# Patient Record
Sex: FEMALE | Race: WHITE | Employment: UNEMPLOYED
[De-identification: names, ages, dates, MRNs, and addresses within clinical notes are randomized per-mention and may not be internally consistent; named-entity substitution may affect disease eponyms.]

---

## 2017-01-03 ENCOUNTER — OFFICE VISIT (OUTPATIENT)
Dept: BARIATRICS/WEIGHT MGMT | Facility: CLINIC | Age: 42
End: 2017-01-03

## 2017-01-03 VITALS
WEIGHT: 232.6 LBS | BODY MASS INDEX: 43.92 KG/M2 | SYSTOLIC BLOOD PRESSURE: 122 MMHG | HEART RATE: 68 BPM | HEIGHT: 61 IN | DIASTOLIC BLOOD PRESSURE: 70 MMHG

## 2017-01-03 DIAGNOSIS — J45.909 UNCOMPLICATED ASTHMA, UNSPECIFIED ASTHMA SEVERITY: Primary | ICD-10-CM

## 2017-01-03 DIAGNOSIS — M25.561 CHRONIC PAIN OF RIGHT KNEE: ICD-10-CM

## 2017-01-03 DIAGNOSIS — F41.9 ANXIETY AND DEPRESSION: ICD-10-CM

## 2017-01-03 DIAGNOSIS — E66.01 OBESITY, MORBID, BMI 40.0-49.9 (HCC): ICD-10-CM

## 2017-01-03 DIAGNOSIS — M54.9 CHRONIC NECK AND BACK PAIN: ICD-10-CM

## 2017-01-03 DIAGNOSIS — F32.A ANXIETY AND DEPRESSION: ICD-10-CM

## 2017-01-03 DIAGNOSIS — G89.29 CHRONIC NECK AND BACK PAIN: ICD-10-CM

## 2017-01-03 DIAGNOSIS — M19.90 ARTHRITIS: ICD-10-CM

## 2017-01-03 DIAGNOSIS — M54.2 CHRONIC NECK AND BACK PAIN: ICD-10-CM

## 2017-01-03 DIAGNOSIS — G89.29 CHRONIC PAIN OF RIGHT KNEE: ICD-10-CM

## 2017-01-03 PROCEDURE — 99213 OFFICE O/P EST LOW 20 MIN: CPT | Performed by: NURSE PRACTITIONER

## 2017-01-09 ENCOUNTER — OFFICE VISIT (OUTPATIENT)
Dept: BARIATRICS/WEIGHT MGMT | Facility: CLINIC | Age: 42
End: 2017-01-09

## 2017-01-09 VITALS
WEIGHT: 234 LBS | SYSTOLIC BLOOD PRESSURE: 114 MMHG | HEART RATE: 78 BPM | BODY MASS INDEX: 44.18 KG/M2 | OXYGEN SATURATION: 14 % | HEIGHT: 61 IN | DIASTOLIC BLOOD PRESSURE: 72 MMHG

## 2017-01-09 DIAGNOSIS — F32.A ANXIETY AND DEPRESSION: ICD-10-CM

## 2017-01-09 DIAGNOSIS — M54.9 CHRONIC NECK AND BACK PAIN: ICD-10-CM

## 2017-01-09 DIAGNOSIS — J45.909 UNCOMPLICATED ASTHMA, UNSPECIFIED ASTHMA SEVERITY: Primary | ICD-10-CM

## 2017-01-09 DIAGNOSIS — M54.2 CHRONIC NECK AND BACK PAIN: ICD-10-CM

## 2017-01-09 DIAGNOSIS — G89.29 CHRONIC NECK AND BACK PAIN: ICD-10-CM

## 2017-01-09 DIAGNOSIS — E66.01 OBESITY, MORBID, BMI 40.0-49.9 (HCC): ICD-10-CM

## 2017-01-09 DIAGNOSIS — M19.90 ARTHRITIS: ICD-10-CM

## 2017-01-09 DIAGNOSIS — F41.9 ANXIETY AND DEPRESSION: ICD-10-CM

## 2017-01-09 PROCEDURE — 99213 OFFICE O/P EST LOW 20 MIN: CPT | Performed by: NURSE PRACTITIONER

## 2017-01-09 RX ORDER — BUDESONIDE AND FORMOTEROL FUMARATE DIHYDRATE 160; 4.5 UG/1; UG/1
2 AEROSOL RESPIRATORY (INHALATION) 2 TIMES DAILY
COMMUNITY
End: 2017-01-09 | Stop reason: SDUPTHER

## 2017-01-27 ENCOUNTER — NURSE ONLY (OUTPATIENT)
Dept: BARIATRICS/WEIGHT MGMT | Facility: CLINIC | Age: 42
End: 2017-01-27

## 2017-01-27 DIAGNOSIS — E66.01 MORBID OBESITY DUE TO EXCESS CALORIES (HCC): Primary | ICD-10-CM

## 2017-02-06 ENCOUNTER — NURSE ONLY (OUTPATIENT)
Dept: BARIATRICS/WEIGHT MGMT | Facility: CLINIC | Age: 42
End: 2017-02-06

## 2017-02-06 DIAGNOSIS — E66.01 MORBID OBESITY DUE TO EXCESS CALORIES (HCC): Primary | ICD-10-CM

## 2017-02-07 ENCOUNTER — APPOINTMENT (OUTPATIENT)
Dept: PHYSICAL THERAPY | Age: 42
End: 2017-02-07
Payer: MEDICARE

## 2017-02-10 ENCOUNTER — HOSPITAL ENCOUNTER (OUTPATIENT)
Dept: PHYSICAL THERAPY | Age: 42
Setting detail: THERAPIES SERIES
Discharge: HOME | End: 2017-02-10
Payer: MEDICARE

## 2017-02-10 PROCEDURE — 97113 AQUATIC THERAPY/EXERCISES: CPT

## 2017-02-10 ASSESSMENT — PAIN DESCRIPTION - PAIN TYPE: TYPE: CHRONIC PAIN

## 2017-02-10 ASSESSMENT — PAIN DESCRIPTION - ORIENTATION: ORIENTATION: LOWER

## 2017-02-10 ASSESSMENT — PAIN SCALES - GENERAL: PAINLEVEL_OUTOF10: 5

## 2017-02-10 ASSESSMENT — PAIN DESCRIPTION - DESCRIPTORS: DESCRIPTORS: DULL;CONSTANT

## 2017-02-10 ASSESSMENT — PAIN DESCRIPTION - LOCATION: LOCATION: BACK

## 2017-02-13 ENCOUNTER — HOSPITAL ENCOUNTER (OUTPATIENT)
Dept: PREADMISSION TESTING | Age: 42
Discharge: HOME | End: 2017-02-13
Payer: MEDICARE

## 2017-02-13 ENCOUNTER — HOSPITAL ENCOUNTER (OUTPATIENT)
Dept: GENERAL RADIOLOGY | Age: 42
Discharge: HOME | End: 2017-02-13
Payer: MEDICARE

## 2017-02-13 VITALS
RESPIRATION RATE: 18 BRPM | HEIGHT: 62 IN | HEART RATE: 81 BPM | OXYGEN SATURATION: 98 % | TEMPERATURE: 97.9 F | DIASTOLIC BLOOD PRESSURE: 89 MMHG | SYSTOLIC BLOOD PRESSURE: 128 MMHG | BODY MASS INDEX: 42.88 KG/M2 | WEIGHT: 233.03 LBS

## 2017-02-13 LAB
ABSOLUTE EOS #: 0.1 K/UL (ref 0–0.4)
ABSOLUTE LYMPH #: 1.9 K/UL (ref 1–4.8)
ABSOLUTE MONO #: 0.3 K/UL (ref 0.1–1.2)
ALP BLD-CCNC: 122 U/L (ref 35–104)
ALT SERPL-CCNC: 70 U/L (ref 5–33)
ANION GAP SERPL CALCULATED.3IONS-SCNC: 17 MMOL/L (ref 9–17)
BASOPHILS # BLD: 0 % (ref 0–2)
BASOPHILS ABSOLUTE: 0 K/UL (ref 0–0.2)
BUN BLDV-MCNC: 22 MG/DL (ref 6–20)
BUN/CREAT BLD: ABNORMAL (ref 9–20)
CALCIUM SERPL-MCNC: 10.3 MG/DL (ref 8.6–10.4)
CHLORIDE BLD-SCNC: 100 MMOL/L (ref 98–107)
CO2: 24 MMOL/L (ref 20–31)
CREAT SERPL-MCNC: 1.25 MG/DL (ref 0.5–0.9)
DIFFERENTIAL TYPE: ABNORMAL
EOSINOPHILS RELATIVE PERCENT: 1 % (ref 1–4)
GFR AFRICAN AMERICAN: 57 ML/MIN
GFR NON-AFRICAN AMERICAN: 47 ML/MIN
GFR SERPL CREATININE-BSD FRML MDRD: ABNORMAL ML/MIN/{1.73_M2}
GFR SERPL CREATININE-BSD FRML MDRD: ABNORMAL ML/MIN/{1.73_M2}
GLUCOSE BLD-MCNC: 82 MG/DL (ref 70–99)
HCT VFR BLD CALC: 41.7 % (ref 36–46)
HEMOGLOBIN: 13.9 G/DL (ref 12–16)
INR BLD: 0.9
LYMPHOCYTES # BLD: 25 % (ref 24–44)
MCH RBC QN AUTO: 27.3 PG (ref 26–34)
MCHC RBC AUTO-ENTMCNC: 33.2 G/DL (ref 31–37)
MCV RBC AUTO: 82.1 FL (ref 80–100)
MONOCYTES # BLD: 5 % (ref 2–11)
PDW BLD-RTO: 15 % (ref 12.5–15.4)
PLATELET # BLD: 297 K/UL (ref 140–450)
PLATELET ESTIMATE: ABNORMAL
PMV BLD AUTO: 8.6 FL (ref 6–12)
POTASSIUM SERPL-SCNC: 4.1 MMOL/L (ref 3.7–5.3)
PROTHROMBIN TIME: 10.2 SEC (ref 9.4–12.6)
RBC # BLD: 5.09 M/UL (ref 4–5.2)
RBC # BLD: ABNORMAL 10*6/UL
SEG NEUTROPHILS: 69 % (ref 36–66)
SEGMENTED NEUTROPHILS ABSOLUTE COUNT: 5.1 K/UL (ref 1.8–7.7)
SODIUM BLD-SCNC: 141 MMOL/L (ref 135–144)
WBC # BLD: 7.4 K/UL (ref 3.5–11)
WBC # BLD: ABNORMAL 10*3/UL

## 2017-02-13 PROCEDURE — 84460 ALANINE AMINO (ALT) (SGPT): CPT

## 2017-02-13 PROCEDURE — 93005 ELECTROCARDIOGRAM TRACING: CPT

## 2017-02-13 PROCEDURE — 85610 PROTHROMBIN TIME: CPT

## 2017-02-13 PROCEDURE — 71020 XR CHEST STANDARD TWO VW: CPT

## 2017-02-13 PROCEDURE — 85025 COMPLETE CBC W/AUTO DIFF WBC: CPT

## 2017-02-13 PROCEDURE — G0480 DRUG TEST DEF 1-7 CLASSES: HCPCS

## 2017-02-13 PROCEDURE — 36415 COLL VENOUS BLD VENIPUNCTURE: CPT

## 2017-02-13 PROCEDURE — 84075 ASSAY ALKALINE PHOSPHATASE: CPT

## 2017-02-13 PROCEDURE — 80048 BASIC METABOLIC PNL TOTAL CA: CPT

## 2017-02-13 RX ORDER — HYDROXYZINE PAMOATE 50 MG/1
50 CAPSULE ORAL NIGHTLY
Status: ON HOLD | COMMUNITY
End: 2017-02-20

## 2017-02-13 RX ORDER — M-VIT,TX,IRON,MINS/CALC/FOLIC 27MG-0.4MG
1 TABLET ORAL DAILY
Status: ON HOLD | COMMUNITY
End: 2017-02-20

## 2017-02-13 RX ORDER — ALBUTEROL SULFATE 90 UG/1
2 AEROSOL, METERED RESPIRATORY (INHALATION) EVERY 6 HOURS PRN
COMMUNITY

## 2017-02-13 RX ORDER — AMITRIPTYLINE HYDROCHLORIDE 100 MG/1
100 TABLET, FILM COATED ORAL NIGHTLY
Status: ON HOLD | COMMUNITY
End: 2017-02-20

## 2017-02-13 RX ORDER — SODIUM CHLORIDE, SODIUM LACTATE, POTASSIUM CHLORIDE, CALCIUM CHLORIDE 600; 310; 30; 20 MG/100ML; MG/100ML; MG/100ML; MG/100ML
1000 INJECTION, SOLUTION INTRAVENOUS CONTINUOUS
Status: CANCELLED | OUTPATIENT
Start: 2017-02-13

## 2017-02-14 LAB
EKG ATRIAL RATE: 91 BPM
EKG P AXIS: 31 DEGREES
EKG P-R INTERVAL: 148 MS
EKG Q-T INTERVAL: 368 MS
EKG QRS DURATION: 88 MS
EKG QTC CALCULATION (BAZETT): 452 MS
EKG R AXIS: 17 DEGREES
EKG T AXIS: 81 DEGREES
EKG VENTRICULAR RATE: 91 BPM

## 2017-02-16 ENCOUNTER — OFFICE VISIT (OUTPATIENT)
Dept: BARIATRICS/WEIGHT MGMT | Facility: CLINIC | Age: 42
End: 2017-02-16

## 2017-02-16 VITALS
DIASTOLIC BLOOD PRESSURE: 76 MMHG | BODY MASS INDEX: 43.43 KG/M2 | WEIGHT: 230 LBS | HEIGHT: 61 IN | SYSTOLIC BLOOD PRESSURE: 118 MMHG | HEART RATE: 76 BPM

## 2017-02-16 DIAGNOSIS — M17.0 PRIMARY OSTEOARTHRITIS OF BOTH KNEES: Primary | ICD-10-CM

## 2017-02-16 DIAGNOSIS — E66.01 OBESITY, MORBID, BMI 40.0-49.9 (HCC): ICD-10-CM

## 2017-02-16 DIAGNOSIS — J43.8 OTHER EMPHYSEMA (HCC): ICD-10-CM

## 2017-02-16 DIAGNOSIS — J45.40 MODERATE PERSISTENT ASTHMA WITHOUT COMPLICATION: ICD-10-CM

## 2017-02-16 PROCEDURE — 99212 OFFICE O/P EST SF 10 MIN: CPT | Performed by: SURGERY

## 2017-02-18 LAB
3-OH-COTININE: <2 NG/ML
COTININE: <2 NG/ML
NICOTINE: <2 NG/ML

## 2017-02-20 ENCOUNTER — ANESTHESIA (OUTPATIENT)
Dept: OPERATING ROOM | Age: 42
DRG: 403 | End: 2017-02-20
Payer: MEDICARE

## 2017-02-20 ENCOUNTER — SURGERY (OUTPATIENT)
Age: 42
End: 2017-02-20

## 2017-02-20 ENCOUNTER — ANESTHESIA EVENT (OUTPATIENT)
Dept: OPERATING ROOM | Age: 42
DRG: 403 | End: 2017-02-20
Payer: MEDICARE

## 2017-02-20 ENCOUNTER — HOSPITAL ENCOUNTER (INPATIENT)
Age: 42
LOS: 1 days | Discharge: HOME | DRG: 403 | End: 2017-02-21
Attending: SURGERY | Admitting: SURGERY
Payer: MEDICARE

## 2017-02-20 VITALS — OXYGEN SATURATION: 98 % | SYSTOLIC BLOOD PRESSURE: 112 MMHG | TEMPERATURE: 87.8 F | DIASTOLIC BLOOD PRESSURE: 64 MMHG

## 2017-02-20 LAB
GLUCOSE BLD-MCNC: 90 MG/DL (ref 74–106)
POC POTASSIUM: 4.1 MMOL/L (ref 3.5–5.1)

## 2017-02-20 PROCEDURE — 6360000002 HC RX W HCPCS: Performed by: ANESTHESIOLOGY

## 2017-02-20 PROCEDURE — 88307 TISSUE EXAM BY PATHOLOGIST: CPT

## 2017-02-20 PROCEDURE — 82947 ASSAY GLUCOSE BLOOD QUANT: CPT

## 2017-02-20 PROCEDURE — 6360000002 HC RX W HCPCS: Performed by: SURGERY

## 2017-02-20 PROCEDURE — 3600000019 HC SURGERY ROBOT ADDTL 15MIN: Performed by: SURGERY

## 2017-02-20 PROCEDURE — C9113 INJ PANTOPRAZOLE SODIUM, VIA: HCPCS | Performed by: SURGERY

## 2017-02-20 PROCEDURE — 2720000003 HC MISC SUTURE/STAPLES/RELOADS/ETC: Performed by: SURGERY

## 2017-02-20 PROCEDURE — 2500000003 HC RX 250 WO HCPCS: Performed by: SPECIALIST

## 2017-02-20 PROCEDURE — 84132 ASSAY OF SERUM POTASSIUM: CPT

## 2017-02-20 PROCEDURE — 3600000009 HC SURGERY ROBOT BASE: Performed by: SURGERY

## 2017-02-20 PROCEDURE — 2580000003 HC RX 258: Performed by: SURGERY

## 2017-02-20 PROCEDURE — 6360000002 HC RX W HCPCS

## 2017-02-20 PROCEDURE — 3700000001 HC ADD 15 MINUTES (ANESTHESIA): Performed by: SURGERY

## 2017-02-20 PROCEDURE — 7100000000 HC PACU RECOVERY - FIRST 15 MIN: Performed by: SURGERY

## 2017-02-20 PROCEDURE — 8E0W4CZ ROBOTIC ASSISTED PROCEDURE OF TRUNK REGION, PERCUTANEOUS ENDOSCOPIC APPROACH: ICD-10-PCS | Performed by: SURGERY

## 2017-02-20 PROCEDURE — 6370000000 HC RX 637 (ALT 250 FOR IP): Performed by: SURGERY

## 2017-02-20 PROCEDURE — 7100000001 HC PACU RECOVERY - ADDTL 15 MIN: Performed by: SURGERY

## 2017-02-20 PROCEDURE — 2580000003 HC RX 258: Performed by: ANESTHESIOLOGY

## 2017-02-20 PROCEDURE — 94640 AIRWAY INHALATION TREATMENT: CPT

## 2017-02-20 PROCEDURE — 0DB60Z3 EXCISION OF STOMACH, OPEN APPROACH, VERTICAL: ICD-10-PCS | Performed by: SURGERY

## 2017-02-20 PROCEDURE — 2500000003 HC RX 250 WO HCPCS

## 2017-02-20 PROCEDURE — 1200000000 HC SEMI PRIVATE

## 2017-02-20 PROCEDURE — 2500000003 HC RX 250 WO HCPCS: Performed by: NURSE ANESTHETIST, CERTIFIED REGISTERED

## 2017-02-20 PROCEDURE — 3700000000 HC ANESTHESIA ATTENDED CARE: Performed by: SURGERY

## 2017-02-20 PROCEDURE — 6360000002 HC RX W HCPCS: Performed by: NURSE ANESTHETIST, CERTIFIED REGISTERED

## 2017-02-20 PROCEDURE — 2500000003 HC RX 250 WO HCPCS: Performed by: SURGERY

## 2017-02-20 PROCEDURE — S2900 ROBOTIC SURGICAL SYSTEM: HCPCS | Performed by: SURGERY

## 2017-02-20 PROCEDURE — 43775 LAP SLEEVE GASTRECTOMY: CPT | Performed by: SURGERY

## 2017-02-20 PROCEDURE — 2580000003 HC RX 258: Performed by: NURSE ANESTHETIST, CERTIFIED REGISTERED

## 2017-02-20 PROCEDURE — 6360000002 HC RX W HCPCS: Performed by: SPECIALIST

## 2017-02-20 RX ORDER — PROMETHAZINE HYDROCHLORIDE 25 MG/1
25 TABLET ORAL EVERY 6 HOURS PRN
COMMUNITY
End: 2017-09-27 | Stop reason: ALTCHOICE

## 2017-02-20 RX ORDER — ONDANSETRON 2 MG/ML
4 INJECTION INTRAMUSCULAR; INTRAVENOUS EVERY 4 HOURS PRN
Status: DISCONTINUED | OUTPATIENT
Start: 2017-02-20 | End: 2017-02-21 | Stop reason: HOSPADM

## 2017-02-20 RX ORDER — AMITRIPTYLINE HYDROCHLORIDE 100 MG/1
100 TABLET, FILM COATED ORAL NIGHTLY
COMMUNITY

## 2017-02-20 RX ORDER — TIZANIDINE 4 MG/1
4 TABLET ORAL NIGHTLY
COMMUNITY
End: 2017-07-14 | Stop reason: ALTCHOICE

## 2017-02-20 RX ORDER — ACETAMINOPHEN 500 MG
1000 TABLET ORAL EVERY 8 HOURS PRN
Status: ON HOLD | COMMUNITY
End: 2017-02-21 | Stop reason: HOSPADM

## 2017-02-20 RX ORDER — SODIUM CHLORIDE, SODIUM LACTATE, POTASSIUM CHLORIDE, CALCIUM CHLORIDE 600; 310; 30; 20 MG/100ML; MG/100ML; MG/100ML; MG/100ML
INJECTION, SOLUTION INTRAVENOUS CONTINUOUS PRN
Status: DISCONTINUED | OUTPATIENT
Start: 2017-02-20 | End: 2017-02-20 | Stop reason: SDUPTHER

## 2017-02-20 RX ORDER — DEXAMETHASONE SODIUM PHOSPHATE 10 MG/ML
INJECTION, SOLUTION INTRAMUSCULAR; INTRAVENOUS PRN
Status: DISCONTINUED | OUTPATIENT
Start: 2017-02-20 | End: 2017-02-20 | Stop reason: SDUPTHER

## 2017-02-20 RX ORDER — ALBUTEROL SULFATE 90 UG/1
2 AEROSOL, METERED RESPIRATORY (INHALATION) EVERY 6 HOURS PRN
Status: DISCONTINUED | OUTPATIENT
Start: 2017-02-20 | End: 2017-02-21 | Stop reason: HOSPADM

## 2017-02-20 RX ORDER — SUMATRIPTAN 100 MG/1
100 TABLET, FILM COATED ORAL
COMMUNITY

## 2017-02-20 RX ORDER — FENTANYL CITRATE 50 UG/ML
INJECTION, SOLUTION INTRAMUSCULAR; INTRAVENOUS PRN
Status: DISCONTINUED | OUTPATIENT
Start: 2017-02-20 | End: 2017-02-20 | Stop reason: SDUPTHER

## 2017-02-20 RX ORDER — KETOROLAC TROMETHAMINE 30 MG/ML
30 INJECTION, SOLUTION INTRAMUSCULAR; INTRAVENOUS EVERY 6 HOURS
Status: DISCONTINUED | OUTPATIENT
Start: 2017-02-20 | End: 2017-02-21 | Stop reason: HOSPADM

## 2017-02-20 RX ORDER — GLYCOPYRROLATE 0.2 MG/ML
INJECTION INTRAMUSCULAR; INTRAVENOUS PRN
Status: DISCONTINUED | OUTPATIENT
Start: 2017-02-20 | End: 2017-02-20 | Stop reason: SDUPTHER

## 2017-02-20 RX ORDER — FLUTICASONE PROPIONATE 50 MCG
1 SPRAY, SUSPENSION (ML) NASAL 2 TIMES DAILY PRN
Status: DISCONTINUED | OUTPATIENT
Start: 2017-02-20 | End: 2017-02-21 | Stop reason: HOSPADM

## 2017-02-20 RX ORDER — OXYCODONE HCL 5 MG/5 ML
5 SOLUTION, ORAL ORAL EVERY 4 HOURS PRN
Status: DISCONTINUED | OUTPATIENT
Start: 2017-02-20 | End: 2017-02-21 | Stop reason: HOSPADM

## 2017-02-20 RX ORDER — PROMETHAZINE HYDROCHLORIDE 25 MG/ML
12.5 INJECTION, SOLUTION INTRAMUSCULAR; INTRAVENOUS EVERY 4 HOURS PRN
Status: DISCONTINUED | OUTPATIENT
Start: 2017-02-20 | End: 2017-02-21 | Stop reason: HOSPADM

## 2017-02-20 RX ORDER — M-VIT,TX,IRON,MINS/CALC/FOLIC 27MG-0.4MG
1 TABLET ORAL DAILY
COMMUNITY
End: 2017-03-01 | Stop reason: CLARIF

## 2017-02-20 RX ORDER — MAGNESIUM HYDROXIDE 1200 MG/15ML
LIQUID ORAL CONTINUOUS PRN
Status: DISCONTINUED | OUTPATIENT
Start: 2017-02-20 | End: 2017-02-20 | Stop reason: HOSPADM

## 2017-02-20 RX ORDER — ONDANSETRON 2 MG/ML
INJECTION INTRAMUSCULAR; INTRAVENOUS PRN
Status: DISCONTINUED | OUTPATIENT
Start: 2017-02-20 | End: 2017-02-20 | Stop reason: SDUPTHER

## 2017-02-20 RX ORDER — PROMETHAZINE HYDROCHLORIDE 25 MG/ML
6.25 INJECTION, SOLUTION INTRAMUSCULAR; INTRAVENOUS
Status: COMPLETED | OUTPATIENT
Start: 2017-02-20 | End: 2017-02-20

## 2017-02-20 RX ORDER — OXCARBAZEPINE 150 MG/1
450 TABLET, FILM COATED ORAL NIGHTLY
COMMUNITY

## 2017-02-20 RX ORDER — HYDROXYZINE PAMOATE 50 MG/1
25 CAPSULE ORAL 3 TIMES DAILY PRN
COMMUNITY
End: 2019-11-03

## 2017-02-20 RX ORDER — MIDAZOLAM HYDROCHLORIDE 1 MG/ML
1 INJECTION INTRAMUSCULAR; INTRAVENOUS EVERY 5 MIN PRN
Status: COMPLETED | OUTPATIENT
Start: 2017-02-20 | End: 2017-02-20

## 2017-02-20 RX ORDER — ONDANSETRON 2 MG/ML
4 INJECTION INTRAMUSCULAR; INTRAVENOUS
Status: COMPLETED | OUTPATIENT
Start: 2017-02-20 | End: 2017-02-20

## 2017-02-20 RX ORDER — OMEPRAZOLE 20 MG/1
40 CAPSULE, DELAYED RELEASE ORAL DAILY
COMMUNITY

## 2017-02-20 RX ORDER — SODIUM CHLORIDE 0.9 % (FLUSH) 0.9 %
10 SYRINGE (ML) INJECTION PRN
Status: DISCONTINUED | OUTPATIENT
Start: 2017-02-20 | End: 2017-02-21 | Stop reason: HOSPADM

## 2017-02-20 RX ORDER — SODIUM CHLORIDE, SODIUM LACTATE, POTASSIUM CHLORIDE, CALCIUM CHLORIDE 600; 310; 30; 20 MG/100ML; MG/100ML; MG/100ML; MG/100ML
1000 INJECTION, SOLUTION INTRAVENOUS CONTINUOUS
Status: DISCONTINUED | OUTPATIENT
Start: 2017-02-20 | End: 2017-02-21 | Stop reason: HOSPADM

## 2017-02-20 RX ORDER — HEPARIN SODIUM 5000 [USP'U]/ML
5000 INJECTION, SOLUTION INTRAVENOUS; SUBCUTANEOUS
Status: COMPLETED | OUTPATIENT
Start: 2017-02-20 | End: 2017-02-20

## 2017-02-20 RX ORDER — LIDOCAINE HYDROCHLORIDE 10 MG/ML
INJECTION, SOLUTION EPIDURAL; INFILTRATION; INTRACAUDAL; PERINEURAL PRN
Status: DISCONTINUED | OUTPATIENT
Start: 2017-02-20 | End: 2017-02-20 | Stop reason: SDUPTHER

## 2017-02-20 RX ORDER — NEOSTIGMINE METHYLSULFATE 1 MG/ML
INJECTION, SOLUTION INTRAVENOUS PRN
Status: DISCONTINUED | OUTPATIENT
Start: 2017-02-20 | End: 2017-02-20 | Stop reason: SDUPTHER

## 2017-02-20 RX ORDER — FENTANYL CITRATE 50 UG/ML
50 INJECTION, SOLUTION INTRAMUSCULAR; INTRAVENOUS EVERY 5 MIN PRN
Status: DISCONTINUED | OUTPATIENT
Start: 2017-02-20 | End: 2017-02-20 | Stop reason: HOSPADM

## 2017-02-20 RX ORDER — MIDAZOLAM HYDROCHLORIDE 1 MG/ML
INJECTION INTRAMUSCULAR; INTRAVENOUS PRN
Status: DISCONTINUED | OUTPATIENT
Start: 2017-02-20 | End: 2017-02-20 | Stop reason: SDUPTHER

## 2017-02-20 RX ORDER — SODIUM CHLORIDE 9 MG/ML
INJECTION, SOLUTION INTRAVENOUS CONTINUOUS
Status: DISCONTINUED | OUTPATIENT
Start: 2017-02-20 | End: 2017-02-21 | Stop reason: HOSPADM

## 2017-02-20 RX ORDER — PROPOFOL 10 MG/ML
INJECTION, EMULSION INTRAVENOUS PRN
Status: DISCONTINUED | OUTPATIENT
Start: 2017-02-20 | End: 2017-02-20 | Stop reason: SDUPTHER

## 2017-02-20 RX ORDER — ROCURONIUM BROMIDE 10 MG/ML
INJECTION, SOLUTION INTRAVENOUS PRN
Status: DISCONTINUED | OUTPATIENT
Start: 2017-02-20 | End: 2017-02-20 | Stop reason: SDUPTHER

## 2017-02-20 RX ORDER — SODIUM CHLORIDE 0.9 % (FLUSH) 0.9 %
10 SYRINGE (ML) INJECTION EVERY 12 HOURS SCHEDULED
Status: DISCONTINUED | OUTPATIENT
Start: 2017-02-20 | End: 2017-02-21 | Stop reason: HOSPADM

## 2017-02-20 RX ORDER — BUPIVACAINE HYDROCHLORIDE AND EPINEPHRINE 5; 5 MG/ML; UG/ML
INJECTION, SOLUTION PERINEURAL PRN
Status: DISCONTINUED | OUTPATIENT
Start: 2017-02-20 | End: 2017-02-20 | Stop reason: HOSPADM

## 2017-02-20 RX ORDER — PANTOPRAZOLE SODIUM 40 MG/10ML
40 INJECTION, POWDER, LYOPHILIZED, FOR SOLUTION INTRAVENOUS DAILY
Status: DISCONTINUED | OUTPATIENT
Start: 2017-02-20 | End: 2017-02-21 | Stop reason: HOSPADM

## 2017-02-20 RX ORDER — LIDOCAINE HYDROCHLORIDE 40 MG/ML
1 SOLUTION TOPICAL DAILY PRN
COMMUNITY
End: 2018-06-05 | Stop reason: ALTCHOICE

## 2017-02-20 RX ORDER — MEPERIDINE HYDROCHLORIDE 50 MG/ML
12.5 INJECTION INTRAMUSCULAR; INTRAVENOUS; SUBCUTANEOUS EVERY 5 MIN PRN
Status: DISCONTINUED | OUTPATIENT
Start: 2017-02-20 | End: 2017-02-20 | Stop reason: HOSPADM

## 2017-02-20 RX ORDER — BUDESONIDE AND FORMOTEROL FUMARATE DIHYDRATE 160; 4.5 UG/1; UG/1
2 AEROSOL RESPIRATORY (INHALATION) 2 TIMES DAILY
Status: DISCONTINUED | OUTPATIENT
Start: 2017-02-20 | End: 2017-02-21 | Stop reason: HOSPADM

## 2017-02-20 RX ORDER — FENTANYL CITRATE 50 UG/ML
25 INJECTION, SOLUTION INTRAMUSCULAR; INTRAVENOUS EVERY 5 MIN PRN
Status: DISCONTINUED | OUTPATIENT
Start: 2017-02-20 | End: 2017-02-20 | Stop reason: HOSPADM

## 2017-02-20 RX ORDER — 0.9 % SODIUM CHLORIDE 0.9 %
10 VIAL (ML) INJECTION DAILY
Status: DISCONTINUED | OUTPATIENT
Start: 2017-02-20 | End: 2017-02-21 | Stop reason: HOSPADM

## 2017-02-20 RX ADMIN — MIDAZOLAM HYDROCHLORIDE 1 MG: 1 INJECTION, SOLUTION INTRAMUSCULAR; INTRAVENOUS at 09:05

## 2017-02-20 RX ADMIN — SODIUM CHLORIDE, POTASSIUM CHLORIDE, SODIUM LACTATE AND CALCIUM CHLORIDE 1000 ML: 600; 310; 30; 20 INJECTION, SOLUTION INTRAVENOUS at 08:31

## 2017-02-20 RX ADMIN — FENTANYL CITRATE 100 MCG: 50 INJECTION INTRAMUSCULAR; INTRAVENOUS at 11:15

## 2017-02-20 RX ADMIN — ROCURONIUM BROMIDE 50 MG: 10 INJECTION, SOLUTION INTRAVENOUS at 09:43

## 2017-02-20 RX ADMIN — PHENYLEPHRINE HYDROCHLORIDE 100 MCG: 10 INJECTION INTRAMUSCULAR; INTRAVENOUS; SUBCUTANEOUS at 10:15

## 2017-02-20 RX ADMIN — ROCURONIUM BROMIDE 10 MG: 10 INJECTION, SOLUTION INTRAVENOUS at 10:30

## 2017-02-20 RX ADMIN — ENOXAPARIN SODIUM 30 MG: 30 INJECTION SUBCUTANEOUS at 21:15

## 2017-02-20 RX ADMIN — KETOROLAC TROMETHAMINE 30 MG: 30 INJECTION, SOLUTION INTRAMUSCULAR at 21:15

## 2017-02-20 RX ADMIN — HYDROMORPHONE HYDROCHLORIDE 0.5 MG: 1 INJECTION, SOLUTION INTRAMUSCULAR; INTRAVENOUS; SUBCUTANEOUS at 17:52

## 2017-02-20 RX ADMIN — BUDESONIDE AND FORMOTEROL FUMARATE DIHYDRATE 2 PUFF: 160; 4.5 AEROSOL RESPIRATORY (INHALATION) at 20:15

## 2017-02-20 RX ADMIN — FENTANYL CITRATE 50 MCG: 50 INJECTION, SOLUTION INTRAMUSCULAR; INTRAVENOUS at 12:51

## 2017-02-20 RX ADMIN — SODIUM CHLORIDE, POTASSIUM CHLORIDE, SODIUM LACTATE AND CALCIUM CHLORIDE: 600; 310; 30; 20 INJECTION, SOLUTION INTRAVENOUS at 09:37

## 2017-02-20 RX ADMIN — FENTANYL CITRATE 100 MCG: 50 INJECTION INTRAMUSCULAR; INTRAVENOUS at 09:43

## 2017-02-20 RX ADMIN — PANTOPRAZOLE SODIUM 40 MG: 40 INJECTION, POWDER, FOR SOLUTION INTRAVENOUS at 15:57

## 2017-02-20 RX ADMIN — SODIUM CHLORIDE, POTASSIUM CHLORIDE, SODIUM LACTATE AND CALCIUM CHLORIDE: 600; 310; 30; 20 INJECTION, SOLUTION INTRAVENOUS at 10:45

## 2017-02-20 RX ADMIN — KETOROLAC TROMETHAMINE 30 MG: 30 INJECTION, SOLUTION INTRAMUSCULAR at 15:49

## 2017-02-20 RX ADMIN — LIDOCAINE HYDROCHLORIDE 50 MG: 10 INJECTION, SOLUTION EPIDURAL; INFILTRATION; INTRACAUDAL; PERINEURAL at 09:43

## 2017-02-20 RX ADMIN — Medication 10 ML: at 16:00

## 2017-02-20 RX ADMIN — PROMETHAZINE HYDROCHLORIDE 6.25 MG: 25 INJECTION, SOLUTION INTRAMUSCULAR; INTRAVENOUS at 13:00

## 2017-02-20 RX ADMIN — SODIUM CHLORIDE 1000 ML: 900 IRRIGANT IRRIGATION at 10:10

## 2017-02-20 RX ADMIN — PROPOFOL 200 MG: 10 INJECTION, EMULSION INTRAVENOUS at 09:43

## 2017-02-20 RX ADMIN — DEXAMETHASONE SODIUM PHOSPHATE 10 MG: 10 INJECTION, SOLUTION INTRAMUSCULAR; INTRAVENOUS at 10:25

## 2017-02-20 RX ADMIN — ONDANSETRON 4 MG: 2 INJECTION, SOLUTION INTRAMUSCULAR; INTRAVENOUS at 12:14

## 2017-02-20 RX ADMIN — MIDAZOLAM HYDROCHLORIDE 1 MG: 1 INJECTION, SOLUTION INTRAMUSCULAR; INTRAVENOUS at 08:40

## 2017-02-20 RX ADMIN — NEOSTIGMINE METHYLSULFATE 5 MG: 1 INJECTION INTRAVENOUS at 11:02

## 2017-02-20 RX ADMIN — FENTANYL CITRATE 50 MCG: 50 INJECTION INTRAMUSCULAR; INTRAVENOUS at 11:27

## 2017-02-20 RX ADMIN — MIDAZOLAM HYDROCHLORIDE 2 MG: 1 INJECTION, SOLUTION INTRAMUSCULAR; INTRAVENOUS at 09:42

## 2017-02-20 RX ADMIN — HYDROMORPHONE HYDROCHLORIDE 0.5 MG: 1 INJECTION, SOLUTION INTRAMUSCULAR; INTRAVENOUS; SUBCUTANEOUS at 14:59

## 2017-02-20 RX ADMIN — Medication 2 G: at 09:55

## 2017-02-20 RX ADMIN — ONDANSETRON 4 MG: 2 INJECTION INTRAMUSCULAR; INTRAVENOUS at 10:59

## 2017-02-20 RX ADMIN — BUPIVACAINE HYDROCHLORIDE AND EPINEPHRINE BITARTRATE 50 ML: 5; .005 INJECTION, SOLUTION PERINEURAL at 10:45

## 2017-02-20 RX ADMIN — HYDROMORPHONE HYDROCHLORIDE 0.5 MG: 1 INJECTION, SOLUTION INTRAMUSCULAR; INTRAVENOUS; SUBCUTANEOUS at 21:15

## 2017-02-20 RX ADMIN — FENTANYL CITRATE 50 MCG: 50 INJECTION, SOLUTION INTRAMUSCULAR; INTRAVENOUS at 12:21

## 2017-02-20 RX ADMIN — DEXTROSE MONOHYDRATE 2 G: 50 INJECTION, SOLUTION INTRAVENOUS at 19:27

## 2017-02-20 RX ADMIN — HEPARIN SODIUM 5000 UNITS: 5000 INJECTION, SOLUTION INTRAVENOUS; SUBCUTANEOUS at 08:31

## 2017-02-20 RX ADMIN — SODIUM CHLORIDE 125 ML/HR: 9 INJECTION, SOLUTION INTRAVENOUS at 17:23

## 2017-02-20 RX ADMIN — PROPOFOL 80 MG: 10 INJECTION, EMULSION INTRAVENOUS at 09:55

## 2017-02-20 RX ADMIN — GLYCOPYRROLATE 0.8 MG: 0.2 INJECTION INTRAMUSCULAR; INTRAVENOUS at 11:02

## 2017-02-20 ASSESSMENT — PAIN DESCRIPTION - PROGRESSION
CLINICAL_PROGRESSION: NOT CHANGED

## 2017-02-20 ASSESSMENT — PAIN SCALES - GENERAL
PAINLEVEL_OUTOF10: 2
PAINLEVEL_OUTOF10: 4
PAINLEVEL_OUTOF10: 5
PAINLEVEL_OUTOF10: 7
PAINLEVEL_OUTOF10: 8
PAINLEVEL_OUTOF10: 5
PAINLEVEL_OUTOF10: 0
PAINLEVEL_OUTOF10: 8
PAINLEVEL_OUTOF10: 7
PAINLEVEL_OUTOF10: 5
PAINLEVEL_OUTOF10: 6
PAINLEVEL_OUTOF10: 5

## 2017-02-20 ASSESSMENT — PAIN SCALES - WONG BAKER
WONGBAKER_NUMERICALRESPONSE: 0
WONGBAKER_NUMERICALRESPONSE: 4
WONGBAKER_NUMERICALRESPONSE: 0
WONGBAKER_NUMERICALRESPONSE: 0

## 2017-02-20 ASSESSMENT — PAIN DESCRIPTION - DESCRIPTORS: DESCRIPTORS: CONSTANT;DULL

## 2017-02-20 ASSESSMENT — PAIN DESCRIPTION - PAIN TYPE
TYPE: SURGICAL PAIN

## 2017-02-20 ASSESSMENT — PAIN DESCRIPTION - LOCATION
LOCATION: ABDOMEN

## 2017-02-20 ASSESSMENT — ENCOUNTER SYMPTOMS
STRIDOR: 0
SHORTNESS OF BREATH: 0

## 2017-02-20 ASSESSMENT — PAIN - FUNCTIONAL ASSESSMENT: PAIN_FUNCTIONAL_ASSESSMENT: 0-10

## 2017-02-20 ASSESSMENT — PAIN DESCRIPTION - ORIENTATION: ORIENTATION: ANTERIOR

## 2017-02-20 ASSESSMENT — PAIN DESCRIPTION - FREQUENCY: FREQUENCY: CONTINUOUS

## 2017-02-21 VITALS
OXYGEN SATURATION: 96 % | SYSTOLIC BLOOD PRESSURE: 114 MMHG | HEART RATE: 88 BPM | HEIGHT: 62 IN | RESPIRATION RATE: 14 BRPM | DIASTOLIC BLOOD PRESSURE: 64 MMHG | BODY MASS INDEX: 42.23 KG/M2 | TEMPERATURE: 98.1 F | WEIGHT: 229.5 LBS

## 2017-02-21 LAB
ANION GAP SERPL CALCULATED.3IONS-SCNC: 11 MMOL/L (ref 9–17)
BUN BLDV-MCNC: 12 MG/DL (ref 6–20)
BUN/CREAT BLD: ABNORMAL (ref 9–20)
CALCIUM SERPL-MCNC: 9.1 MG/DL (ref 8.6–10.4)
CHLORIDE BLD-SCNC: 105 MMOL/L (ref 98–107)
CO2: 22 MMOL/L (ref 20–31)
CREAT SERPL-MCNC: 1.26 MG/DL (ref 0.5–0.9)
GFR AFRICAN AMERICAN: 56 ML/MIN
GFR NON-AFRICAN AMERICAN: 47 ML/MIN
GFR SERPL CREATININE-BSD FRML MDRD: ABNORMAL ML/MIN/{1.73_M2}
GFR SERPL CREATININE-BSD FRML MDRD: ABNORMAL ML/MIN/{1.73_M2}
GLUCOSE BLD-MCNC: 95 MG/DL (ref 70–99)
HCT VFR BLD CALC: 34.3 % (ref 36–46)
HEMOGLOBIN: 11.6 G/DL (ref 12–16)
MCH RBC QN AUTO: 27.4 PG (ref 26–34)
MCHC RBC AUTO-ENTMCNC: 33.7 G/DL (ref 31–37)
MCV RBC AUTO: 81.4 FL (ref 80–100)
PDW BLD-RTO: 14.7 % (ref 12.5–15.4)
PLATELET # BLD: 265 K/UL (ref 140–450)
PMV BLD AUTO: 8.7 FL (ref 6–12)
POTASSIUM SERPL-SCNC: 4.8 MMOL/L (ref 3.7–5.3)
RBC # BLD: 4.21 M/UL (ref 4–5.2)
SODIUM BLD-SCNC: 138 MMOL/L (ref 135–144)
SURGICAL PATHOLOGY REPORT: NORMAL
WBC # BLD: 10.1 K/UL (ref 3.5–11)

## 2017-02-21 PROCEDURE — C9113 INJ PANTOPRAZOLE SODIUM, VIA: HCPCS | Performed by: SURGERY

## 2017-02-21 PROCEDURE — 90686 IIV4 VACC NO PRSV 0.5 ML IM: CPT | Performed by: SURGERY

## 2017-02-21 PROCEDURE — 6360000002 HC RX W HCPCS: Performed by: SURGERY

## 2017-02-21 PROCEDURE — 36415 COLL VENOUS BLD VENIPUNCTURE: CPT

## 2017-02-21 PROCEDURE — 94640 AIRWAY INHALATION TREATMENT: CPT

## 2017-02-21 PROCEDURE — 80048 BASIC METABOLIC PNL TOTAL CA: CPT

## 2017-02-21 PROCEDURE — 2580000003 HC RX 258: Performed by: SURGERY

## 2017-02-21 PROCEDURE — G0009 ADMIN PNEUMOCOCCAL VACCINE: HCPCS | Performed by: SURGERY

## 2017-02-21 PROCEDURE — 6370000000 HC RX 637 (ALT 250 FOR IP): Performed by: SURGERY

## 2017-02-21 PROCEDURE — 85027 COMPLETE CBC AUTOMATED: CPT

## 2017-02-21 PROCEDURE — G0008 ADMIN INFLUENZA VIRUS VAC: HCPCS | Performed by: SURGERY

## 2017-02-21 PROCEDURE — 90732 PPSV23 VACC 2 YRS+ SUBQ/IM: CPT | Performed by: SURGERY

## 2017-02-21 RX ORDER — OXYCODONE HCL 5 MG/5 ML
5 SOLUTION, ORAL ORAL EVERY 4 HOURS PRN
Qty: 250 ML | Refills: 0 | Status: SHIPPED | OUTPATIENT
Start: 2017-02-21 | End: 2017-03-03

## 2017-02-21 RX ORDER — ONDANSETRON 4 MG/1
4 TABLET, FILM COATED ORAL EVERY 8 HOURS PRN
Qty: 30 TABLET | Refills: 2 | Status: SHIPPED | OUTPATIENT
Start: 2017-02-21 | End: 2017-07-21 | Stop reason: SDUPTHER

## 2017-02-21 RX ADMIN — SODIUM CHLORIDE: 9 INJECTION, SOLUTION INTRAVENOUS at 10:38

## 2017-02-21 RX ADMIN — PANTOPRAZOLE SODIUM 40 MG: 40 INJECTION, POWDER, FOR SOLUTION INTRAVENOUS at 08:08

## 2017-02-21 RX ADMIN — Medication 10 ML: at 08:08

## 2017-02-21 RX ADMIN — ALBUTEROL SULFATE 2 PUFF: 90 AEROSOL, METERED RESPIRATORY (INHALATION) at 07:39

## 2017-02-21 RX ADMIN — BUDESONIDE AND FORMOTEROL FUMARATE DIHYDRATE 2 PUFF: 160; 4.5 AEROSOL RESPIRATORY (INHALATION) at 08:55

## 2017-02-21 RX ADMIN — ALBUTEROL SULFATE 2 PUFF: 90 AEROSOL, METERED RESPIRATORY (INHALATION) at 01:00

## 2017-02-21 RX ADMIN — OXYCODONE HYDROCHLORIDE 5 MG: 5 SOLUTION ORAL at 11:42

## 2017-02-21 RX ADMIN — HYDROMORPHONE HYDROCHLORIDE 0.5 MG: 1 INJECTION, SOLUTION INTRAMUSCULAR; INTRAVENOUS; SUBCUTANEOUS at 04:40

## 2017-02-21 RX ADMIN — OXYCODONE HYDROCHLORIDE 5 MG: 5 SOLUTION ORAL at 07:13

## 2017-02-21 RX ADMIN — PNEUMOCOCCAL VACCINE POLYVALENT 0.5 ML
25; 25; 25; 25; 25; 25; 25; 25; 25; 25; 25; 25; 25; 25; 25; 25; 25; 25; 25; 25; 25; 25; 25 INJECTION, SOLUTION INTRAMUSCULAR; SUBCUTANEOUS at 10:38

## 2017-02-21 RX ADMIN — INFLUENZA A VIRUS A/CALIFORNIA/7/2009 X-179A (H1N1) ANTIGEN (FORMALDEHYDE INACTIVATED), INFLUENZA A VIRUS A/HONG KONG/4801/2014 X-263B (H3N2) ANTIGEN (FORMALDEHYDE INACTIVATED), INFLUENZA B VIRUS B/PHUKET/3073/2013 ANTIGEN (FORMALDEHYDE INACTIVATED), AND INFLUENZA B VIRUS B/BRISBANE/60/2008 ANTIGEN (FORMALDEHYDE INACTIVATED) 0.5 ML: 15; 15; 15; 15 INJECTION, SUSPENSION INTRAMUSCULAR at 07:40

## 2017-02-21 RX ADMIN — ENOXAPARIN SODIUM 30 MG: 30 INJECTION SUBCUTANEOUS at 08:08

## 2017-02-21 RX ADMIN — HYDROMORPHONE HYDROCHLORIDE 0.5 MG: 1 INJECTION, SOLUTION INTRAMUSCULAR; INTRAVENOUS; SUBCUTANEOUS at 00:05

## 2017-02-21 RX ADMIN — KETOROLAC TROMETHAMINE 30 MG: 30 INJECTION, SOLUTION INTRAMUSCULAR at 04:40

## 2017-02-21 RX ADMIN — OXYCODONE HYDROCHLORIDE 5 MG: 5 SOLUTION ORAL at 16:29

## 2017-02-21 ASSESSMENT — PAIN DESCRIPTION - PROGRESSION

## 2017-02-21 ASSESSMENT — PAIN SCALES - GENERAL
PAINLEVEL_OUTOF10: 7
PAINLEVEL_OUTOF10: 4
PAINLEVEL_OUTOF10: 4
PAINLEVEL_OUTOF10: 7
PAINLEVEL_OUTOF10: 7
PAINLEVEL_OUTOF10: 4
PAINLEVEL_OUTOF10: 4
PAINLEVEL_OUTOF10: 7
PAINLEVEL_OUTOF10: 8
PAINLEVEL_OUTOF10: 6
PAINLEVEL_OUTOF10: 7
PAINLEVEL_OUTOF10: 6

## 2017-02-21 ASSESSMENT — PAIN DESCRIPTION - FREQUENCY: FREQUENCY: CONTINUOUS

## 2017-02-21 ASSESSMENT — PAIN DESCRIPTION - LOCATION
LOCATION: ABDOMEN

## 2017-02-21 ASSESSMENT — PAIN DESCRIPTION - PAIN TYPE: TYPE: SURGICAL PAIN

## 2017-02-21 ASSESSMENT — PAIN DESCRIPTION - ORIENTATION
ORIENTATION: RIGHT
ORIENTATION: RIGHT;ANTERIOR

## 2017-02-21 ASSESSMENT — PAIN DESCRIPTION - DESCRIPTORS: DESCRIPTORS: ACHING;CONSTANT;DISCOMFORT

## 2017-02-23 ENCOUNTER — TELEPHONE (OUTPATIENT)
Dept: BARIATRICS/WEIGHT MGMT | Facility: CLINIC | Age: 42
End: 2017-02-23

## 2017-03-01 ENCOUNTER — OFFICE VISIT (OUTPATIENT)
Dept: BARIATRICS/WEIGHT MGMT | Facility: CLINIC | Age: 42
End: 2017-03-01

## 2017-03-01 VITALS
BODY MASS INDEX: 41.72 KG/M2 | SYSTOLIC BLOOD PRESSURE: 107 MMHG | HEIGHT: 61 IN | WEIGHT: 221 LBS | DIASTOLIC BLOOD PRESSURE: 70 MMHG | TEMPERATURE: 98.4 F | HEART RATE: 97 BPM

## 2017-03-01 DIAGNOSIS — R00.0 TACHYCARDIA: Primary | ICD-10-CM

## 2017-03-01 DIAGNOSIS — J43.2 CENTRILOBULAR EMPHYSEMA (HCC): ICD-10-CM

## 2017-03-01 DIAGNOSIS — E66.01 OBESITY, MORBID, BMI 40.0-49.9 (HCC): ICD-10-CM

## 2017-03-01 PROCEDURE — 99024 POSTOP FOLLOW-UP VISIT: CPT | Performed by: SURGERY

## 2017-03-01 RX ORDER — ACETAMINOPHEN 500 MG
500 TABLET ORAL EVERY 6 HOURS PRN
COMMUNITY
End: 2017-03-08 | Stop reason: SDUPTHER

## 2017-03-06 ENCOUNTER — TELEPHONE (OUTPATIENT)
Dept: BARIATRICS/WEIGHT MGMT | Facility: CLINIC | Age: 42
End: 2017-03-06

## 2017-03-08 ENCOUNTER — NURSE ONLY (OUTPATIENT)
Dept: BARIATRICS/WEIGHT MGMT | Facility: CLINIC | Age: 42
End: 2017-03-08

## 2017-03-08 ENCOUNTER — OFFICE VISIT (OUTPATIENT)
Dept: ORTHOPEDIC SURGERY | Facility: CLINIC | Age: 42
End: 2017-03-08

## 2017-03-08 VITALS — WEIGHT: 221 LBS | BODY MASS INDEX: 41.72 KG/M2 | HEIGHT: 61 IN

## 2017-03-08 DIAGNOSIS — E66.01 OBESITY, MORBID, BMI 40.0-49.9 (HCC): Primary | ICD-10-CM

## 2017-03-08 DIAGNOSIS — M17.11 PRIMARY OSTEOARTHRITIS OF RIGHT KNEE: Primary | ICD-10-CM

## 2017-03-08 PROCEDURE — 99213 OFFICE O/P EST LOW 20 MIN: CPT | Performed by: ORTHOPAEDIC SURGERY

## 2017-03-08 RX ORDER — ACETAMINOPHEN 500 MG
500 TABLET ORAL EVERY 6 HOURS PRN
Qty: 120 TABLET | Refills: 0 | Status: SHIPPED | OUTPATIENT
Start: 2017-03-08 | End: 2017-05-03 | Stop reason: SDUPTHER

## 2017-03-11 ASSESSMENT — ENCOUNTER SYMPTOMS
SHORTNESS OF BREATH: 0
WHEEZING: 0
ABDOMINAL DISTENTION: 0

## 2017-04-19 ENCOUNTER — OFFICE VISIT (OUTPATIENT)
Dept: ORTHOPEDIC SURGERY | Age: 42
End: 2017-04-19
Payer: MEDICARE

## 2017-04-19 VITALS — BODY MASS INDEX: 41.71 KG/M2 | WEIGHT: 220.9 LBS | HEIGHT: 61 IN

## 2017-04-19 DIAGNOSIS — S63.511A SPRAIN OF SCAPHOLUNATE LIGAMENT, RIGHT, INITIAL ENCOUNTER: ICD-10-CM

## 2017-04-19 DIAGNOSIS — M79.641 RIGHT HAND PAIN: Primary | ICD-10-CM

## 2017-04-19 PROCEDURE — 99213 OFFICE O/P EST LOW 20 MIN: CPT | Performed by: STUDENT IN AN ORGANIZED HEALTH CARE EDUCATION/TRAINING PROGRAM

## 2017-04-19 RX ORDER — NAPROXEN 250 MG/1
250 TABLET ORAL 2 TIMES DAILY WITH MEALS
Qty: 60 TABLET | Refills: 1 | Status: SHIPPED | OUTPATIENT
Start: 2017-04-19 | End: 2017-08-09 | Stop reason: ALTCHOICE

## 2017-04-21 ENCOUNTER — OFFICE VISIT (OUTPATIENT)
Dept: BARIATRICS/WEIGHT MGMT | Age: 42
End: 2017-04-21

## 2017-04-21 VITALS
SYSTOLIC BLOOD PRESSURE: 105 MMHG | WEIGHT: 212 LBS | HEART RATE: 102 BPM | DIASTOLIC BLOOD PRESSURE: 68 MMHG | BODY MASS INDEX: 40.02 KG/M2 | HEIGHT: 61 IN

## 2017-04-21 DIAGNOSIS — G89.29 CHRONIC NECK AND BACK PAIN: ICD-10-CM

## 2017-04-21 DIAGNOSIS — J45.20 MILD INTERMITTENT ASTHMA WITHOUT COMPLICATION: Primary | ICD-10-CM

## 2017-04-21 DIAGNOSIS — M54.9 CHRONIC NECK AND BACK PAIN: ICD-10-CM

## 2017-04-21 DIAGNOSIS — M54.2 CHRONIC NECK AND BACK PAIN: ICD-10-CM

## 2017-04-21 PROCEDURE — 99024 POSTOP FOLLOW-UP VISIT: CPT | Performed by: SURGERY

## 2017-04-21 RX ORDER — CIPROFLOXACIN 500 MG/1
TABLET, FILM COATED ORAL
Refills: 0 | COMMUNITY
Start: 2017-04-13 | End: 2017-06-23

## 2017-05-01 ENCOUNTER — HOSPITAL ENCOUNTER (OUTPATIENT)
Dept: INTERVENTIONAL RADIOLOGY/VASCULAR | Age: 42
Discharge: HOME | End: 2017-05-01
Payer: MEDICARE

## 2017-05-01 ENCOUNTER — HOSPITAL ENCOUNTER (OUTPATIENT)
Dept: MRI IMAGING | Age: 42
Discharge: HOME | End: 2017-05-01
Payer: MEDICARE

## 2017-05-01 DIAGNOSIS — M79.641 RIGHT HAND PAIN: ICD-10-CM

## 2017-05-01 PROCEDURE — 25246 INJECTION FOR WRIST X-RAY: CPT | Performed by: RADIOLOGY

## 2017-05-01 PROCEDURE — 77002 NEEDLE LOCALIZATION BY XRAY: CPT | Performed by: RADIOLOGY

## 2017-05-01 PROCEDURE — A9579 GAD-BASE MR CONTRAST NOS,1ML: HCPCS | Performed by: STUDENT IN AN ORGANIZED HEALTH CARE EDUCATION/TRAINING PROGRAM

## 2017-05-01 PROCEDURE — 73222 MRI JOINT UPR EXTREM W/DYE: CPT

## 2017-05-01 PROCEDURE — 6360000004 HC RX CONTRAST MEDICATION: Performed by: RADIOLOGY

## 2017-05-01 PROCEDURE — 6360000004 HC RX CONTRAST MEDICATION: Performed by: STUDENT IN AN ORGANIZED HEALTH CARE EDUCATION/TRAINING PROGRAM

## 2017-05-01 RX ADMIN — GADOPENTETATE DIMEGLUMINE 1 ML: 469.01 INJECTION INTRAVENOUS at 15:30

## 2017-05-01 RX ADMIN — IOTHALAMATE MEGLUMINE 13 ML: 430 INJECTION INTRAVASCULAR at 13:39

## 2017-05-03 ENCOUNTER — OFFICE VISIT (OUTPATIENT)
Dept: ORTHOPEDIC SURGERY | Age: 42
End: 2017-05-03
Payer: MEDICARE

## 2017-05-03 VITALS — HEIGHT: 62 IN | BODY MASS INDEX: 38.83 KG/M2 | WEIGHT: 211 LBS

## 2017-05-03 DIAGNOSIS — M22.2X1 PATELLOFEMORAL SYNDROME OF RIGHT KNEE: Primary | ICD-10-CM

## 2017-05-03 DIAGNOSIS — M79.641 RIGHT HAND PAIN: ICD-10-CM

## 2017-05-03 PROCEDURE — 99213 OFFICE O/P EST LOW 20 MIN: CPT | Performed by: STUDENT IN AN ORGANIZED HEALTH CARE EDUCATION/TRAINING PROGRAM

## 2017-05-03 RX ORDER — ACETAMINOPHEN 500 MG
500 TABLET ORAL EVERY 6 HOURS PRN
Qty: 120 TABLET | Refills: 0 | Status: SHIPPED | OUTPATIENT
Start: 2017-05-03 | End: 2017-07-14 | Stop reason: ALTCHOICE

## 2017-05-12 ENCOUNTER — HOSPITAL ENCOUNTER (OUTPATIENT)
Dept: PHYSICAL THERAPY | Age: 42
Setting detail: THERAPIES SERIES
Discharge: HOME | End: 2017-05-12
Payer: MEDICARE

## 2017-05-12 PROCEDURE — 97161 PT EVAL LOW COMPLEX 20 MIN: CPT

## 2017-05-12 PROCEDURE — 97110 THERAPEUTIC EXERCISES: CPT

## 2017-05-16 ENCOUNTER — HOSPITAL ENCOUNTER (OUTPATIENT)
Dept: PHYSICAL THERAPY | Age: 42
Setting detail: THERAPIES SERIES
Discharge: HOME | End: 2017-05-16
Payer: MEDICARE

## 2017-05-16 PROCEDURE — 97110 THERAPEUTIC EXERCISES: CPT

## 2017-05-18 ENCOUNTER — HOSPITAL ENCOUNTER (OUTPATIENT)
Dept: PHYSICAL THERAPY | Age: 42
Setting detail: THERAPIES SERIES
Discharge: HOME | End: 2017-05-18
Payer: MEDICARE

## 2017-05-18 PROCEDURE — 97110 THERAPEUTIC EXERCISES: CPT

## 2017-05-18 PROCEDURE — 97016 VASOPNEUMATIC DEVICE THERAPY: CPT

## 2017-05-23 ENCOUNTER — HOSPITAL ENCOUNTER (OUTPATIENT)
Dept: PHYSICAL THERAPY | Age: 42
Setting detail: THERAPIES SERIES
Discharge: HOME | End: 2017-05-23
Payer: MEDICARE

## 2017-05-23 PROCEDURE — 97016 VASOPNEUMATIC DEVICE THERAPY: CPT

## 2017-05-23 PROCEDURE — 97110 THERAPEUTIC EXERCISES: CPT

## 2017-05-26 ENCOUNTER — HOSPITAL ENCOUNTER (OUTPATIENT)
Dept: PHYSICAL THERAPY | Age: 42
Setting detail: THERAPIES SERIES
Discharge: HOME | End: 2017-05-26
Payer: MEDICARE

## 2017-05-26 PROCEDURE — 97110 THERAPEUTIC EXERCISES: CPT

## 2017-05-31 ENCOUNTER — APPOINTMENT (OUTPATIENT)
Dept: PHYSICAL THERAPY | Age: 42
End: 2017-05-31
Payer: MEDICARE

## 2017-06-02 ENCOUNTER — APPOINTMENT (OUTPATIENT)
Dept: PHYSICAL THERAPY | Age: 42
End: 2017-06-02
Payer: MEDICARE

## 2017-06-05 ENCOUNTER — HOSPITAL ENCOUNTER (OUTPATIENT)
Dept: PHYSICAL THERAPY | Age: 42
Setting detail: THERAPIES SERIES
Discharge: HOME | End: 2017-06-05
Payer: MEDICARE

## 2017-06-05 PROCEDURE — 97110 THERAPEUTIC EXERCISES: CPT

## 2017-06-08 ENCOUNTER — HOSPITAL ENCOUNTER (OUTPATIENT)
Dept: PHYSICAL THERAPY | Age: 42
Setting detail: THERAPIES SERIES
Discharge: HOME | End: 2017-06-08
Payer: MEDICARE

## 2017-06-08 PROCEDURE — 97110 THERAPEUTIC EXERCISES: CPT

## 2017-06-14 ENCOUNTER — HOSPITAL ENCOUNTER (OUTPATIENT)
Dept: PHYSICAL THERAPY | Age: 42
Setting detail: THERAPIES SERIES
Discharge: HOME | End: 2017-06-14
Payer: MEDICARE

## 2017-06-14 PROCEDURE — 97016 VASOPNEUMATIC DEVICE THERAPY: CPT

## 2017-06-14 PROCEDURE — 97110 THERAPEUTIC EXERCISES: CPT

## 2017-06-16 ENCOUNTER — HOSPITAL ENCOUNTER (OUTPATIENT)
Dept: PHYSICAL THERAPY | Age: 42
Setting detail: THERAPIES SERIES
Discharge: HOME | End: 2017-06-16
Payer: MEDICARE

## 2017-06-16 PROCEDURE — 97016 VASOPNEUMATIC DEVICE THERAPY: CPT

## 2017-06-16 PROCEDURE — 97110 THERAPEUTIC EXERCISES: CPT

## 2017-06-20 ENCOUNTER — HOSPITAL ENCOUNTER (OUTPATIENT)
Dept: PHYSICAL THERAPY | Age: 42
Setting detail: THERAPIES SERIES
Discharge: HOME | End: 2017-06-20
Payer: MEDICARE

## 2017-06-20 PROCEDURE — 97016 VASOPNEUMATIC DEVICE THERAPY: CPT

## 2017-06-20 PROCEDURE — 97110 THERAPEUTIC EXERCISES: CPT

## 2017-06-22 ENCOUNTER — HOSPITAL ENCOUNTER (OUTPATIENT)
Dept: PHYSICAL THERAPY | Age: 42
Setting detail: THERAPIES SERIES
Discharge: HOME | End: 2017-06-22
Payer: MEDICARE

## 2017-06-22 PROCEDURE — 97110 THERAPEUTIC EXERCISES: CPT

## 2017-06-23 ENCOUNTER — OFFICE VISIT (OUTPATIENT)
Dept: BARIATRICS/WEIGHT MGMT | Age: 42
End: 2017-06-23
Payer: MEDICARE

## 2017-06-23 VITALS
HEART RATE: 70 BPM | SYSTOLIC BLOOD PRESSURE: 95 MMHG | BODY MASS INDEX: 38.14 KG/M2 | TEMPERATURE: 98.6 F | WEIGHT: 202 LBS | DIASTOLIC BLOOD PRESSURE: 63 MMHG | HEIGHT: 61 IN

## 2017-06-23 DIAGNOSIS — M17.0 PRIMARY OSTEOARTHRITIS OF BOTH KNEES: ICD-10-CM

## 2017-06-23 DIAGNOSIS — J41.0 SIMPLE CHRONIC BRONCHITIS (HCC): ICD-10-CM

## 2017-06-23 DIAGNOSIS — M17.11 PRIMARY OSTEOARTHRITIS OF RIGHT KNEE: Primary | ICD-10-CM

## 2017-06-23 DIAGNOSIS — J45.20 MILD INTERMITTENT ASTHMA WITHOUT COMPLICATION: ICD-10-CM

## 2017-06-23 PROCEDURE — 99213 OFFICE O/P EST LOW 20 MIN: CPT | Performed by: SURGERY

## 2017-06-23 RX ORDER — CHLORHEXIDINE GLUCONATE 0.12 MG/ML
RINSE ORAL PRN
COMMUNITY
Start: 2017-05-26 | End: 2018-01-15

## 2017-06-26 ENCOUNTER — HOSPITAL ENCOUNTER (OUTPATIENT)
Dept: PAIN MANAGEMENT | Age: 42
Discharge: HOME | End: 2017-06-26
Payer: MEDICARE

## 2017-06-26 ENCOUNTER — HOSPITAL ENCOUNTER (OUTPATIENT)
Dept: PHYSICAL THERAPY | Age: 42
Setting detail: THERAPIES SERIES
Discharge: HOME | End: 2017-06-26
Payer: MEDICARE

## 2017-06-26 VITALS
TEMPERATURE: 98.2 F | HEIGHT: 61 IN | WEIGHT: 202 LBS | BODY MASS INDEX: 38.14 KG/M2 | DIASTOLIC BLOOD PRESSURE: 80 MMHG | RESPIRATION RATE: 16 BRPM | SYSTOLIC BLOOD PRESSURE: 136 MMHG | HEART RATE: 92 BPM

## 2017-06-26 PROCEDURE — 97110 THERAPEUTIC EXERCISES: CPT

## 2017-06-26 PROCEDURE — 99214 OFFICE O/P EST MOD 30 MIN: CPT

## 2017-06-26 ASSESSMENT — PAIN DESCRIPTION - PAIN TYPE: TYPE: CHRONIC PAIN

## 2017-06-26 ASSESSMENT — PAIN DESCRIPTION - LOCATION: LOCATION: BACK

## 2017-06-26 ASSESSMENT — PAIN DESCRIPTION - FREQUENCY: FREQUENCY: CONTINUOUS

## 2017-06-26 ASSESSMENT — PAIN DESCRIPTION - ONSET: ONSET: ON-GOING

## 2017-06-26 ASSESSMENT — PAIN DESCRIPTION - PROGRESSION: CLINICAL_PROGRESSION: GRADUALLY WORSENING

## 2017-06-26 ASSESSMENT — PAIN DESCRIPTION - ORIENTATION: ORIENTATION: LOWER;LEFT

## 2017-06-26 ASSESSMENT — PAIN SCALES - GENERAL: PAINLEVEL_OUTOF10: 6

## 2017-06-30 ENCOUNTER — HOSPITAL ENCOUNTER (OUTPATIENT)
Dept: PHYSICAL THERAPY | Age: 42
Setting detail: THERAPIES SERIES
Discharge: HOME | End: 2017-06-30
Payer: MEDICARE

## 2017-06-30 ENCOUNTER — HOSPITAL ENCOUNTER (EMERGENCY)
Age: 42
Discharge: HOME | End: 2017-06-30
Attending: EMERGENCY MEDICINE
Payer: MEDICARE

## 2017-06-30 VITALS
BODY MASS INDEX: 38.17 KG/M2 | DIASTOLIC BLOOD PRESSURE: 81 MMHG | HEART RATE: 97 BPM | OXYGEN SATURATION: 100 % | TEMPERATURE: 99.1 F | RESPIRATION RATE: 18 BRPM | SYSTOLIC BLOOD PRESSURE: 120 MMHG | WEIGHT: 202 LBS

## 2017-06-30 DIAGNOSIS — S61.219A LACERATION OF FINGER, INITIAL ENCOUNTER: Primary | ICD-10-CM

## 2017-06-30 PROCEDURE — 97110 THERAPEUTIC EXERCISES: CPT

## 2017-06-30 PROCEDURE — 97016 VASOPNEUMATIC DEVICE THERAPY: CPT

## 2017-06-30 PROCEDURE — 12001 RPR S/N/AX/GEN/TRNK 2.5CM/<: CPT

## 2017-06-30 PROCEDURE — 90471 IMMUNIZATION ADMIN: CPT | Performed by: PHYSICIAN ASSISTANT

## 2017-06-30 PROCEDURE — 6360000002 HC RX W HCPCS: Performed by: PHYSICIAN ASSISTANT

## 2017-06-30 PROCEDURE — G0381 LEV 2 HOSP TYPE B ED VISIT: HCPCS

## 2017-06-30 PROCEDURE — 90715 TDAP VACCINE 7 YRS/> IM: CPT | Performed by: PHYSICIAN ASSISTANT

## 2017-06-30 RX ADMIN — TETANUS TOXOID, REDUCED DIPHTHERIA TOXOID AND ACELLULAR PERTUSSIS VACCINE, ADSORBED 0.5 ML: 5; 2.5; 8; 8; 2.5 SUSPENSION INTRAMUSCULAR at 17:45

## 2017-06-30 ASSESSMENT — PAIN DESCRIPTION - ORIENTATION: ORIENTATION: LEFT

## 2017-06-30 ASSESSMENT — PAIN SCALES - GENERAL: PAINLEVEL_OUTOF10: 5

## 2017-06-30 ASSESSMENT — ENCOUNTER SYMPTOMS
NAUSEA: 0
COUGH: 0
VOMITING: 0
ABDOMINAL PAIN: 0
WHEEZING: 0

## 2017-06-30 ASSESSMENT — PAIN DESCRIPTION - PAIN TYPE: TYPE: ACUTE PAIN

## 2017-06-30 ASSESSMENT — PAIN DESCRIPTION - FREQUENCY: FREQUENCY: CONTINUOUS

## 2017-06-30 ASSESSMENT — PAIN DESCRIPTION - LOCATION: LOCATION: OTHER (COMMENT)

## 2017-06-30 ASSESSMENT — PAIN DESCRIPTION - DESCRIPTORS: DESCRIPTORS: ACHING;THROBBING

## 2017-07-05 ENCOUNTER — OFFICE VISIT (OUTPATIENT)
Dept: ORTHOPEDIC SURGERY | Age: 42
End: 2017-07-05
Payer: MEDICARE

## 2017-07-05 VITALS — HEIGHT: 63 IN | BODY MASS INDEX: 35.79 KG/M2 | WEIGHT: 202 LBS

## 2017-07-05 DIAGNOSIS — M17.11 PRIMARY OSTEOARTHRITIS OF RIGHT KNEE: Primary | ICD-10-CM

## 2017-07-05 PROCEDURE — 99213 OFFICE O/P EST LOW 20 MIN: CPT | Performed by: ORTHOPAEDIC SURGERY

## 2017-07-07 ENCOUNTER — HOSPITAL ENCOUNTER (OUTPATIENT)
Age: 42
Discharge: HOME | End: 2017-07-07
Payer: MEDICARE

## 2017-07-07 ENCOUNTER — HOSPITAL ENCOUNTER (OUTPATIENT)
Dept: PHYSICAL THERAPY | Age: 42
Setting detail: THERAPIES SERIES
Discharge: HOME | End: 2017-07-07
Payer: MEDICARE

## 2017-07-07 LAB
ALBUMIN SERPL-MCNC: 4.4 G/DL (ref 3.5–5.2)
ANION GAP SERPL CALCULATED.3IONS-SCNC: 13 MMOL/L (ref 9–17)
BILIRUBIN URINE: NEGATIVE
BUN BLDV-MCNC: 20 MG/DL (ref 6–20)
BUN/CREAT BLD: ABNORMAL (ref 9–20)
CALCIUM IONIZED: 1.36 MMOL/L (ref 1.13–1.33)
CALCIUM SERPL-MCNC: 10.1 MG/DL (ref 8.6–10.4)
CHLORIDE BLD-SCNC: 102 MMOL/L (ref 98–107)
CO2: 24 MMOL/L (ref 20–31)
COLOR: YELLOW
COMMENT UA: NORMAL
CREAT SERPL-MCNC: 1.03 MG/DL (ref 0.5–0.9)
CREATININE URINE: 190 MG/DL (ref 28–217)
GFR AFRICAN AMERICAN: >60 ML/MIN
GFR NON-AFRICAN AMERICAN: 58 ML/MIN
GFR SERPL CREATININE-BSD FRML MDRD: ABNORMAL ML/MIN/{1.73_M2}
GFR SERPL CREATININE-BSD FRML MDRD: ABNORMAL ML/MIN/{1.73_M2}
GLUCOSE BLD-MCNC: 75 MG/DL (ref 70–99)
GLUCOSE URINE: NEGATIVE
HCT VFR BLD CALC: 37.3 % (ref 36–46)
HEMOGLOBIN: 12.5 G/DL (ref 12–16)
KETONES, URINE: NEGATIVE
LEUKOCYTE ESTERASE, URINE: NEGATIVE
MAGNESIUM: 1.9 MG/DL (ref 1.6–2.6)
MCH RBC QN AUTO: 28.1 PG (ref 26–34)
MCHC RBC AUTO-ENTMCNC: 33.5 G/DL (ref 31–37)
MCV RBC AUTO: 83.8 FL (ref 80–100)
MICROALBUMIN/CREAT 24H UR: <12 MG/L
MICROALBUMIN/CREAT UR-RTO: 6 MCG/MG CREAT
NITRITE, URINE: NEGATIVE
PDW BLD-RTO: 15.5 % (ref 12.5–15.4)
PH UA: 5.5 (ref 5–8)
PHOSPHORUS: 3.2 MG/DL (ref 2.6–4.5)
PLATELET # BLD: 306 K/UL (ref 140–450)
PMV BLD AUTO: 8.7 FL (ref 6–12)
POTASSIUM SERPL-SCNC: 4.2 MMOL/L (ref 3.7–5.3)
PROTEIN UA: NEGATIVE
PTH INTACT: 57.69 PG/ML (ref 15–65)
RBC # BLD: 4.45 M/UL (ref 4–5.2)
SODIUM BLD-SCNC: 139 MMOL/L (ref 135–144)
SPECIFIC GRAVITY UA: 1.02 (ref 1–1.03)
TURBIDITY: CLEAR
URINE HGB: NEGATIVE
UROBILINOGEN, URINE: NORMAL
WBC # BLD: 7.1 K/UL (ref 3.5–11)

## 2017-07-07 PROCEDURE — 82043 UR ALBUMIN QUANTITATIVE: CPT

## 2017-07-07 PROCEDURE — 82040 ASSAY OF SERUM ALBUMIN: CPT

## 2017-07-07 PROCEDURE — 82330 ASSAY OF CALCIUM: CPT

## 2017-07-07 PROCEDURE — 85027 COMPLETE CBC AUTOMATED: CPT

## 2017-07-07 PROCEDURE — 82570 ASSAY OF URINE CREATININE: CPT

## 2017-07-07 PROCEDURE — 36415 COLL VENOUS BLD VENIPUNCTURE: CPT

## 2017-07-07 PROCEDURE — 84100 ASSAY OF PHOSPHORUS: CPT

## 2017-07-07 PROCEDURE — 80048 BASIC METABOLIC PNL TOTAL CA: CPT

## 2017-07-07 PROCEDURE — 83735 ASSAY OF MAGNESIUM: CPT

## 2017-07-07 PROCEDURE — 81003 URINALYSIS AUTO W/O SCOPE: CPT

## 2017-07-07 PROCEDURE — 83970 ASSAY OF PARATHORMONE: CPT

## 2017-07-14 ENCOUNTER — HOSPITAL ENCOUNTER (EMERGENCY)
Age: 42
Discharge: HOME | End: 2017-07-14
Attending: EMERGENCY MEDICINE
Payer: MEDICARE

## 2017-07-14 VITALS
HEART RATE: 88 BPM | DIASTOLIC BLOOD PRESSURE: 79 MMHG | SYSTOLIC BLOOD PRESSURE: 121 MMHG | TEMPERATURE: 99 F | BODY MASS INDEX: 36.62 KG/M2 | HEIGHT: 62 IN | WEIGHT: 199 LBS | OXYGEN SATURATION: 100 % | RESPIRATION RATE: 18 BRPM

## 2017-07-14 DIAGNOSIS — S29.019A STRAIN OF THORACIC SPINE, INITIAL ENCOUNTER: Primary | ICD-10-CM

## 2017-07-14 DIAGNOSIS — M54.50 CHRONIC LEFT-SIDED LOW BACK PAIN WITHOUT SCIATICA: ICD-10-CM

## 2017-07-14 DIAGNOSIS — S39.012A STRAIN OF LUMBAR SPINE, INITIAL ENCOUNTER: ICD-10-CM

## 2017-07-14 DIAGNOSIS — G89.29 CHRONIC LEFT-SIDED LOW BACK PAIN WITHOUT SCIATICA: ICD-10-CM

## 2017-07-14 PROCEDURE — 6360000002 HC RX W HCPCS: Performed by: PHYSICIAN ASSISTANT

## 2017-07-14 PROCEDURE — 6370000000 HC RX 637 (ALT 250 FOR IP): Performed by: PHYSICIAN ASSISTANT

## 2017-07-14 PROCEDURE — G0382 LEV 3 HOSP TYPE B ED VISIT: HCPCS

## 2017-07-14 PROCEDURE — 96372 THER/PROPH/DIAG INJ SC/IM: CPT

## 2017-07-14 RX ORDER — ORPHENADRINE CITRATE 30 MG/ML
60 INJECTION INTRAMUSCULAR; INTRAVENOUS ONCE
Status: COMPLETED | OUTPATIENT
Start: 2017-07-14 | End: 2017-07-14

## 2017-07-14 RX ORDER — ACETAMINOPHEN 325 MG/1
650 TABLET ORAL ONCE
Status: COMPLETED | OUTPATIENT
Start: 2017-07-14 | End: 2017-07-14

## 2017-07-14 RX ORDER — ACETAMINOPHEN 325 MG/1
650 TABLET ORAL EVERY 6 HOURS PRN
Qty: 40 TABLET | Refills: 0 | Status: ON HOLD | OUTPATIENT
Start: 2017-07-14 | End: 2017-09-12 | Stop reason: HOSPADM

## 2017-07-14 RX ORDER — DIAZEPAM 2 MG/1
2 TABLET ORAL EVERY 8 HOURS PRN
Qty: 12 TABLET | Refills: 0 | Status: SHIPPED | OUTPATIENT
Start: 2017-07-14 | End: 2017-08-29 | Stop reason: ALTCHOICE

## 2017-07-14 RX ADMIN — ACETAMINOPHEN 650 MG: 325 TABLET ORAL at 10:25

## 2017-07-14 RX ADMIN — ORPHENADRINE CITRATE 60 MG: 30 INJECTION INTRAMUSCULAR; INTRAVENOUS at 10:25

## 2017-07-14 ASSESSMENT — PAIN SCALES - GENERAL
PAINLEVEL_OUTOF10: 8
PAINLEVEL_OUTOF10: 8

## 2017-07-14 ASSESSMENT — ENCOUNTER SYMPTOMS
ALLERGIC/IMMUNOLOGIC NEGATIVE: 1
GASTROINTESTINAL NEGATIVE: 1
EYES NEGATIVE: 1
BACK PAIN: 1
RESPIRATORY NEGATIVE: 1

## 2017-07-14 ASSESSMENT — PAIN DESCRIPTION - PAIN TYPE: TYPE: CHRONIC PAIN

## 2017-07-14 ASSESSMENT — PAIN DESCRIPTION - LOCATION: LOCATION: BACK

## 2017-07-19 ENCOUNTER — HOSPITAL ENCOUNTER (OUTPATIENT)
Dept: PAIN MANAGEMENT | Age: 42
Discharge: HOME | End: 2017-07-19
Payer: MEDICARE

## 2017-07-19 VITALS
BODY MASS INDEX: 36.62 KG/M2 | HEART RATE: 89 BPM | SYSTOLIC BLOOD PRESSURE: 114 MMHG | TEMPERATURE: 98.5 F | WEIGHT: 199 LBS | RESPIRATION RATE: 18 BRPM | HEIGHT: 62 IN | OXYGEN SATURATION: 99 % | DIASTOLIC BLOOD PRESSURE: 80 MMHG

## 2017-07-19 DIAGNOSIS — M54.9 BACKACHE, UNSPECIFIED: ICD-10-CM

## 2017-07-19 PROCEDURE — 99213 OFFICE O/P EST LOW 20 MIN: CPT

## 2017-07-19 ASSESSMENT — PAIN - FUNCTIONAL ASSESSMENT: PAIN_FUNCTIONAL_ASSESSMENT: 0-10

## 2017-07-21 RX ORDER — ONDANSETRON 4 MG/1
TABLET, FILM COATED ORAL
Qty: 30 TABLET | Refills: 1 | Status: SHIPPED | OUTPATIENT
Start: 2017-07-21 | End: 2017-10-26 | Stop reason: SDUPTHER

## 2017-07-28 ENCOUNTER — HOSPITAL ENCOUNTER (OUTPATIENT)
Dept: MRI IMAGING | Age: 42
Discharge: HOME | End: 2017-07-28
Payer: MEDICARE

## 2017-07-28 DIAGNOSIS — M17.11 PRIMARY OSTEOARTHRITIS OF RIGHT KNEE: ICD-10-CM

## 2017-07-28 PROCEDURE — 73721 MRI JNT OF LWR EXTRE W/O DYE: CPT

## 2017-08-02 ENCOUNTER — TELEPHONE (OUTPATIENT)
Dept: ORTHOPEDIC SURGERY | Age: 42
End: 2017-08-02

## 2017-08-02 ENCOUNTER — HOSPITAL ENCOUNTER (OUTPATIENT)
Dept: PAIN MANAGEMENT | Age: 42
Discharge: HOME | End: 2017-08-02
Payer: MEDICARE

## 2017-08-02 VITALS
TEMPERATURE: 98.6 F | HEART RATE: 68 BPM | RESPIRATION RATE: 12 BRPM | BODY MASS INDEX: 36.62 KG/M2 | SYSTOLIC BLOOD PRESSURE: 140 MMHG | HEIGHT: 62 IN | DIASTOLIC BLOOD PRESSURE: 60 MMHG | OXYGEN SATURATION: 93 % | WEIGHT: 199 LBS

## 2017-08-02 DIAGNOSIS — G89.29 CHRONIC BACK PAIN, UNSPECIFIED BACK LOCATION, UNSPECIFIED BACK PAIN LATERALITY: ICD-10-CM

## 2017-08-02 DIAGNOSIS — M54.9 CHRONIC BACK PAIN, UNSPECIFIED BACK LOCATION, UNSPECIFIED BACK PAIN LATERALITY: ICD-10-CM

## 2017-08-02 PROCEDURE — 6360000002 HC RX W HCPCS: Performed by: ANESTHESIOLOGY

## 2017-08-02 PROCEDURE — 62323 NJX INTERLAMINAR LMBR/SAC: CPT

## 2017-08-02 PROCEDURE — 2580000003 HC RX 258: Performed by: ANESTHESIOLOGY

## 2017-08-02 RX ORDER — MIDAZOLAM HYDROCHLORIDE 1 MG/ML
0.5 INJECTION INTRAMUSCULAR; INTRAVENOUS 3 TIMES DAILY PRN
Status: DISCONTINUED | OUTPATIENT
Start: 2017-08-02 | End: 2017-08-03 | Stop reason: HOSPADM

## 2017-08-02 RX ORDER — DIPHENHYDRAMINE HYDROCHLORIDE 50 MG/ML
25 INJECTION INTRAMUSCULAR; INTRAVENOUS ONCE
Status: COMPLETED | OUTPATIENT
Start: 2017-08-02 | End: 2017-08-02

## 2017-08-02 RX ORDER — SODIUM CHLORIDE, SODIUM LACTATE, POTASSIUM CHLORIDE, CALCIUM CHLORIDE 600; 310; 30; 20 MG/100ML; MG/100ML; MG/100ML; MG/100ML
500 INJECTION, SOLUTION INTRAVENOUS CONTINUOUS
Status: DISCONTINUED | OUTPATIENT
Start: 2017-08-02 | End: 2017-08-03 | Stop reason: HOSPADM

## 2017-08-02 RX ORDER — FENTANYL CITRATE 50 UG/ML
50 INJECTION, SOLUTION INTRAMUSCULAR; INTRAVENOUS
Status: DISCONTINUED | OUTPATIENT
Start: 2017-08-02 | End: 2017-08-03 | Stop reason: HOSPADM

## 2017-08-02 RX ADMIN — FENTANYL CITRATE 50 MCG: 50 INJECTION, SOLUTION INTRAMUSCULAR; INTRAVENOUS at 10:26

## 2017-08-02 ASSESSMENT — PAIN SCALES - GENERAL
PAINLEVEL_OUTOF10: 0
PAINLEVEL_OUTOF10: 5

## 2017-08-02 ASSESSMENT — PAIN - FUNCTIONAL ASSESSMENT
PAIN_FUNCTIONAL_ASSESSMENT: 0-10

## 2017-08-09 ENCOUNTER — OFFICE VISIT (OUTPATIENT)
Dept: ORTHOPEDIC SURGERY | Age: 42
End: 2017-08-09
Payer: MEDICARE

## 2017-08-09 VITALS — WEIGHT: 199.08 LBS | BODY MASS INDEX: 36.63 KG/M2 | HEIGHT: 62 IN

## 2017-08-09 DIAGNOSIS — M17.11 PRIMARY OSTEOARTHRITIS OF RIGHT KNEE: ICD-10-CM

## 2017-08-09 DIAGNOSIS — M22.2X1 PATELLOFEMORAL SYNDROME OF RIGHT KNEE: Primary | ICD-10-CM

## 2017-08-09 DIAGNOSIS — Z01.818 PRE-OP TESTING: ICD-10-CM

## 2017-08-09 PROCEDURE — 99213 OFFICE O/P EST LOW 20 MIN: CPT | Performed by: STUDENT IN AN ORGANIZED HEALTH CARE EDUCATION/TRAINING PROGRAM

## 2017-08-29 ENCOUNTER — HOSPITAL ENCOUNTER (OUTPATIENT)
Dept: PREADMISSION TESTING | Age: 42
Discharge: HOME | End: 2017-08-29
Payer: MEDICARE

## 2017-08-29 VITALS
RESPIRATION RATE: 16 BRPM | TEMPERATURE: 98.1 F | WEIGHT: 195 LBS | HEART RATE: 89 BPM | DIASTOLIC BLOOD PRESSURE: 75 MMHG | HEIGHT: 62 IN | SYSTOLIC BLOOD PRESSURE: 112 MMHG | BODY MASS INDEX: 35.88 KG/M2 | OXYGEN SATURATION: 99 %

## 2017-08-29 DIAGNOSIS — Z01.818 PRE-OP TESTING: ICD-10-CM

## 2017-08-29 LAB
-: NORMAL
ALBUMIN SERPL-MCNC: 4.3 G/DL (ref 3.5–5.2)
ALBUMIN/GLOBULIN RATIO: 1.3 (ref 1–2.5)
ALP BLD-CCNC: 113 U/L (ref 35–104)
ALT SERPL-CCNC: 16 U/L (ref 5–33)
AMORPHOUS: NORMAL
ANION GAP SERPL CALCULATED.3IONS-SCNC: 12 MMOL/L (ref 9–17)
AST SERPL-CCNC: 14 U/L
BACTERIA: NORMAL
BILIRUB SERPL-MCNC: 0.19 MG/DL (ref 0.3–1.2)
BILIRUBIN URINE: NEGATIVE
BUN BLDV-MCNC: 16 MG/DL (ref 6–20)
BUN/CREAT BLD: ABNORMAL (ref 9–20)
CALCIUM SERPL-MCNC: 10 MG/DL (ref 8.6–10.4)
CASTS UA: NORMAL /LPF (ref 0–8)
CHLORIDE BLD-SCNC: 101 MMOL/L (ref 98–107)
CO2: 25 MMOL/L (ref 20–31)
COLOR: YELLOW
COMMENT UA: ABNORMAL
CREAT SERPL-MCNC: 1.04 MG/DL (ref 0.5–0.9)
CRYSTALS, UA: NORMAL /HPF
EPITHELIAL CELLS UA: NORMAL /HPF (ref 0–5)
GFR AFRICAN AMERICAN: >60 ML/MIN
GFR NON-AFRICAN AMERICAN: 58 ML/MIN
GFR SERPL CREATININE-BSD FRML MDRD: ABNORMAL ML/MIN/{1.73_M2}
GFR SERPL CREATININE-BSD FRML MDRD: ABNORMAL ML/MIN/{1.73_M2}
GLUCOSE BLD-MCNC: 85 MG/DL (ref 70–99)
GLUCOSE URINE: NEGATIVE
HCT VFR BLD CALC: 39.4 % (ref 36–46)
HEMOGLOBIN: 13 G/DL (ref 12–16)
KETONES, URINE: NEGATIVE
LEUKOCYTE ESTERASE, URINE: ABNORMAL
MCH RBC QN AUTO: 28.7 PG (ref 26–34)
MCHC RBC AUTO-ENTMCNC: 33.1 G/DL (ref 31–37)
MCV RBC AUTO: 86.8 FL (ref 80–100)
MUCUS: NORMAL
NITRITE, URINE: NEGATIVE
OTHER OBSERVATIONS UA: NORMAL
PDW BLD-RTO: 14.1 % (ref 12.5–15.4)
PH UA: 7.5 (ref 5–8)
PLATELET # BLD: 282 K/UL (ref 140–450)
PMV BLD AUTO: 8.4 FL (ref 6–12)
POTASSIUM SERPL-SCNC: 4.1 MMOL/L (ref 3.7–5.3)
PROTEIN UA: NEGATIVE
RBC # BLD: 4.53 M/UL (ref 4–5.2)
RBC UA: NORMAL /HPF (ref 0–4)
RENAL EPITHELIAL, UA: NORMAL /HPF
SODIUM BLD-SCNC: 138 MMOL/L (ref 135–144)
SPECIFIC GRAVITY UA: 1.01 (ref 1–1.03)
TOTAL PROTEIN: 7.7 G/DL (ref 6.4–8.3)
TRICHOMONAS: NORMAL
TURBIDITY: ABNORMAL
URINE HGB: ABNORMAL
UROBILINOGEN, URINE: NORMAL
WBC # BLD: 7.6 K/UL (ref 3.5–11)
WBC UA: NORMAL /HPF (ref 0–5)
YEAST: NORMAL

## 2017-08-29 PROCEDURE — 85027 COMPLETE CBC AUTOMATED: CPT

## 2017-08-29 PROCEDURE — 81001 URINALYSIS AUTO W/SCOPE: CPT

## 2017-08-29 PROCEDURE — 80053 COMPREHEN METABOLIC PANEL: CPT

## 2017-08-29 PROCEDURE — 93005 ELECTROCARDIOGRAM TRACING: CPT

## 2017-08-29 PROCEDURE — 36415 COLL VENOUS BLD VENIPUNCTURE: CPT

## 2017-08-29 RX ORDER — MONTELUKAST SODIUM 10 MG/1
10 TABLET ORAL NIGHTLY
Refills: 1 | COMMUNITY
Start: 2017-08-07

## 2017-08-29 RX ORDER — ORPHENADRINE CITRATE 100 MG/1
100 TABLET, EXTENDED RELEASE ORAL 2 TIMES DAILY
COMMUNITY
Start: 2017-08-10 | End: 2019-11-03

## 2017-08-29 RX ORDER — SODIUM CHLORIDE, SODIUM LACTATE, POTASSIUM CHLORIDE, CALCIUM CHLORIDE 600; 310; 30; 20 MG/100ML; MG/100ML; MG/100ML; MG/100ML
1000 INJECTION, SOLUTION INTRAVENOUS CONTINUOUS
Status: CANCELLED | OUTPATIENT
Start: 2017-08-29

## 2017-08-29 RX ORDER — OMEGA-3S/DHA/EPA/FISH OIL/D3 300MG-1000
400 CAPSULE ORAL DAILY
COMMUNITY

## 2017-08-29 ASSESSMENT — PAIN DESCRIPTION - FREQUENCY: FREQUENCY: CONTINUOUS

## 2017-08-29 ASSESSMENT — PAIN DESCRIPTION - PAIN TYPE: TYPE: CHRONIC PAIN

## 2017-08-29 ASSESSMENT — PAIN DESCRIPTION - PROGRESSION: CLINICAL_PROGRESSION: GRADUALLY WORSENING

## 2017-08-29 ASSESSMENT — PAIN DESCRIPTION - ONSET: ONSET: ON-GOING

## 2017-08-29 ASSESSMENT — PAIN DESCRIPTION - DESCRIPTORS: DESCRIPTORS: THROBBING;CONSTANT

## 2017-08-29 ASSESSMENT — PAIN DESCRIPTION - LOCATION: LOCATION: KNEE

## 2017-08-29 ASSESSMENT — PAIN DESCRIPTION - ORIENTATION: ORIENTATION: RIGHT

## 2017-08-29 ASSESSMENT — PAIN SCALES - GENERAL: PAINLEVEL_OUTOF10: 7

## 2017-08-30 LAB
EKG ATRIAL RATE: 90 BPM
EKG P AXIS: 29 DEGREES
EKG P-R INTERVAL: 146 MS
EKG Q-T INTERVAL: 366 MS
EKG QRS DURATION: 90 MS
EKG QTC CALCULATION (BAZETT): 447 MS
EKG R AXIS: 18 DEGREES
EKG T AXIS: 82 DEGREES
EKG VENTRICULAR RATE: 90 BPM

## 2017-09-11 ENCOUNTER — HOSPITAL ENCOUNTER (OUTPATIENT)
Age: 42
Discharge: HOME | End: 2017-09-11
Payer: MEDICARE

## 2017-09-11 ENCOUNTER — HOSPITAL ENCOUNTER (OUTPATIENT)
Dept: PAIN MANAGEMENT | Age: 42
Discharge: HOME | End: 2017-09-11
Payer: MEDICARE

## 2017-09-11 VITALS
BODY MASS INDEX: 35.51 KG/M2 | TEMPERATURE: 97.5 F | HEART RATE: 100 BPM | RESPIRATION RATE: 14 BRPM | SYSTOLIC BLOOD PRESSURE: 101 MMHG | WEIGHT: 193 LBS | DIASTOLIC BLOOD PRESSURE: 63 MMHG | HEIGHT: 62 IN

## 2017-09-11 LAB
ANION GAP SERPL CALCULATED.3IONS-SCNC: 15 MMOL/L (ref 9–17)
BUN BLDV-MCNC: 16 MG/DL (ref 6–20)
BUN/CREAT BLD: ABNORMAL (ref 9–20)
CALCIUM SERPL-MCNC: 10 MG/DL (ref 8.6–10.4)
CHLORIDE BLD-SCNC: 100 MMOL/L (ref 98–107)
CO2: 25 MMOL/L (ref 20–31)
CREAT SERPL-MCNC: 1.1 MG/DL (ref 0.5–0.9)
GFR AFRICAN AMERICAN: >60 ML/MIN
GFR NON-AFRICAN AMERICAN: 54 ML/MIN
GFR SERPL CREATININE-BSD FRML MDRD: ABNORMAL ML/MIN/{1.73_M2}
GFR SERPL CREATININE-BSD FRML MDRD: ABNORMAL ML/MIN/{1.73_M2}
GLUCOSE BLD-MCNC: 85 MG/DL (ref 70–99)
POTASSIUM SERPL-SCNC: 4.3 MMOL/L (ref 3.7–5.3)
SODIUM BLD-SCNC: 140 MMOL/L (ref 135–144)

## 2017-09-11 PROCEDURE — 36415 COLL VENOUS BLD VENIPUNCTURE: CPT

## 2017-09-11 PROCEDURE — 99213 OFFICE O/P EST LOW 20 MIN: CPT

## 2017-09-11 PROCEDURE — 80048 BASIC METABOLIC PNL TOTAL CA: CPT

## 2017-09-11 PROCEDURE — 99211 OFF/OP EST MAY X REQ PHY/QHP: CPT | Performed by: ANESTHESIOLOGY

## 2017-09-11 ASSESSMENT — PAIN SCALES - GENERAL: PAINLEVEL_OUTOF10: 2

## 2017-09-11 ASSESSMENT — PAIN DESCRIPTION - DESCRIPTORS: DESCRIPTORS: THROBBING

## 2017-09-11 ASSESSMENT — PAIN DESCRIPTION - PROGRESSION: CLINICAL_PROGRESSION: GRADUALLY IMPROVING

## 2017-09-11 ASSESSMENT — PAIN DESCRIPTION - FREQUENCY: FREQUENCY: INTERMITTENT

## 2017-09-11 ASSESSMENT — PAIN DESCRIPTION - ORIENTATION: ORIENTATION: LOWER;LEFT

## 2017-09-11 ASSESSMENT — PAIN DESCRIPTION - LOCATION: LOCATION: BACK

## 2017-09-11 ASSESSMENT — PAIN DESCRIPTION - ONSET: ONSET: ON-GOING

## 2017-09-11 ASSESSMENT — PAIN DESCRIPTION - PAIN TYPE: TYPE: CHRONIC PAIN

## 2017-09-12 ENCOUNTER — HOSPITAL ENCOUNTER (OUTPATIENT)
Age: 42
Setting detail: OUTPATIENT SURGERY
Discharge: HOME | End: 2017-09-12
Attending: ORTHOPAEDIC SURGERY | Admitting: ORTHOPAEDIC SURGERY
Payer: MEDICARE

## 2017-09-12 ENCOUNTER — ANESTHESIA EVENT (OUTPATIENT)
Dept: OPERATING ROOM | Age: 42
End: 2017-09-12
Payer: MEDICARE

## 2017-09-12 ENCOUNTER — ANESTHESIA (OUTPATIENT)
Dept: OPERATING ROOM | Age: 42
End: 2017-09-12
Payer: MEDICARE

## 2017-09-12 VITALS — DIASTOLIC BLOOD PRESSURE: 85 MMHG | SYSTOLIC BLOOD PRESSURE: 110 MMHG | TEMPERATURE: 96.5 F | OXYGEN SATURATION: 96 %

## 2017-09-12 VITALS
SYSTOLIC BLOOD PRESSURE: 106 MMHG | HEART RATE: 111 BPM | HEIGHT: 62 IN | WEIGHT: 193 LBS | TEMPERATURE: 97.9 F | BODY MASS INDEX: 35.51 KG/M2 | RESPIRATION RATE: 20 BRPM | OXYGEN SATURATION: 99 % | DIASTOLIC BLOOD PRESSURE: 61 MMHG

## 2017-09-12 PROCEDURE — 6360000002 HC RX W HCPCS: Performed by: STUDENT IN AN ORGANIZED HEALTH CARE EDUCATION/TRAINING PROGRAM

## 2017-09-12 PROCEDURE — 6370000000 HC RX 637 (ALT 250 FOR IP): Performed by: ANESTHESIOLOGY

## 2017-09-12 PROCEDURE — 3700000001 HC ADD 15 MINUTES (ANESTHESIA): Performed by: ORTHOPAEDIC SURGERY

## 2017-09-12 PROCEDURE — 3700000000 HC ANESTHESIA ATTENDED CARE: Performed by: ORTHOPAEDIC SURGERY

## 2017-09-12 PROCEDURE — 2580000003 HC RX 258: Performed by: ORTHOPAEDIC SURGERY

## 2017-09-12 PROCEDURE — 6360000002 HC RX W HCPCS: Performed by: NURSE ANESTHETIST, CERTIFIED REGISTERED

## 2017-09-12 PROCEDURE — 7100000001 HC PACU RECOVERY - ADDTL 15 MIN: Performed by: ORTHOPAEDIC SURGERY

## 2017-09-12 PROCEDURE — 2580000003 HC RX 258: Performed by: ANESTHESIOLOGY

## 2017-09-12 PROCEDURE — 2500000003 HC RX 250 WO HCPCS: Performed by: NURSE ANESTHETIST, CERTIFIED REGISTERED

## 2017-09-12 PROCEDURE — 7100000010 HC PHASE II RECOVERY - FIRST 15 MIN: Performed by: ORTHOPAEDIC SURGERY

## 2017-09-12 PROCEDURE — 3600000004 HC SURGERY LEVEL 4 BASE: Performed by: ORTHOPAEDIC SURGERY

## 2017-09-12 PROCEDURE — 6360000002 HC RX W HCPCS: Performed by: ANESTHESIOLOGY

## 2017-09-12 PROCEDURE — 2500000003 HC RX 250 WO HCPCS: Performed by: ORTHOPAEDIC SURGERY

## 2017-09-12 PROCEDURE — 29876 ARTHRS KNEE SURG SYNVCT MAJ: CPT | Performed by: ORTHOPAEDIC SURGERY

## 2017-09-12 PROCEDURE — 6360000002 HC RX W HCPCS

## 2017-09-12 PROCEDURE — 7100000000 HC PACU RECOVERY - FIRST 15 MIN: Performed by: ORTHOPAEDIC SURGERY

## 2017-09-12 PROCEDURE — 3600000014 HC SURGERY LEVEL 4 ADDTL 15MIN: Performed by: ORTHOPAEDIC SURGERY

## 2017-09-12 PROCEDURE — 29873 ARTHRS KNEE SURG W/LAT RLS: CPT | Performed by: ORTHOPAEDIC SURGERY

## 2017-09-12 RX ORDER — MAGNESIUM HYDROXIDE 1200 MG/15ML
LIQUID ORAL CONTINUOUS PRN
Status: DISCONTINUED | OUTPATIENT
Start: 2017-09-12 | End: 2017-09-12 | Stop reason: HOSPADM

## 2017-09-12 RX ORDER — BUPIVACAINE HYDROCHLORIDE 2.5 MG/ML
INJECTION, SOLUTION INFILTRATION; PERINEURAL PRN
Status: DISCONTINUED | OUTPATIENT
Start: 2017-09-12 | End: 2017-09-12 | Stop reason: HOSPADM

## 2017-09-12 RX ORDER — ROCURONIUM BROMIDE 10 MG/ML
INJECTION, SOLUTION INTRAVENOUS PRN
Status: DISCONTINUED | OUTPATIENT
Start: 2017-09-12 | End: 2017-09-12 | Stop reason: SDUPTHER

## 2017-09-12 RX ORDER — MIDAZOLAM HYDROCHLORIDE 1 MG/ML
1 INJECTION INTRAMUSCULAR; INTRAVENOUS EVERY 5 MIN PRN
Status: COMPLETED | OUTPATIENT
Start: 2017-09-12 | End: 2017-09-12

## 2017-09-12 RX ORDER — GLYCOPYRROLATE 0.2 MG/ML
INJECTION INTRAMUSCULAR; INTRAVENOUS PRN
Status: DISCONTINUED | OUTPATIENT
Start: 2017-09-12 | End: 2017-09-12 | Stop reason: SDUPTHER

## 2017-09-12 RX ORDER — HYDROCODONE BITARTRATE AND ACETAMINOPHEN 5; 325 MG/1; MG/1
1-2 TABLET ORAL EVERY 6 HOURS PRN
Qty: 50 TABLET | Refills: 0 | Status: SHIPPED | OUTPATIENT
Start: 2017-09-12 | End: 2017-09-19

## 2017-09-12 RX ORDER — DOCUSATE SODIUM 100 MG/1
100 CAPSULE, LIQUID FILLED ORAL 2 TIMES DAILY PRN
Qty: 60 CAPSULE | Refills: 0 | Status: SHIPPED | OUTPATIENT
Start: 2017-09-12 | End: 2017-11-16 | Stop reason: ALTCHOICE

## 2017-09-12 RX ORDER — LIDOCAINE HYDROCHLORIDE 10 MG/ML
INJECTION, SOLUTION EPIDURAL; INFILTRATION; INTRACAUDAL; PERINEURAL PRN
Status: DISCONTINUED | OUTPATIENT
Start: 2017-09-12 | End: 2017-09-12 | Stop reason: SDUPTHER

## 2017-09-12 RX ORDER — FENTANYL CITRATE 50 UG/ML
INJECTION, SOLUTION INTRAMUSCULAR; INTRAVENOUS
Status: COMPLETED
Start: 2017-09-12 | End: 2017-09-12

## 2017-09-12 RX ORDER — PROPOFOL 10 MG/ML
INJECTION, EMULSION INTRAVENOUS PRN
Status: DISCONTINUED | OUTPATIENT
Start: 2017-09-12 | End: 2017-09-12 | Stop reason: SDUPTHER

## 2017-09-12 RX ORDER — SODIUM CHLORIDE, SODIUM LACTATE, POTASSIUM CHLORIDE, CALCIUM CHLORIDE 600; 310; 30; 20 MG/100ML; MG/100ML; MG/100ML; MG/100ML
1000 INJECTION, SOLUTION INTRAVENOUS CONTINUOUS
Status: DISCONTINUED | OUTPATIENT
Start: 2017-09-12 | End: 2017-09-12 | Stop reason: HOSPADM

## 2017-09-12 RX ORDER — EPHEDRINE SULFATE 50 MG/ML
INJECTION, SOLUTION INTRAVENOUS PRN
Status: DISCONTINUED | OUTPATIENT
Start: 2017-09-12 | End: 2017-09-12 | Stop reason: SDUPTHER

## 2017-09-12 RX ORDER — ONDANSETRON 2 MG/ML
4 INJECTION INTRAMUSCULAR; INTRAVENOUS
Status: DISCONTINUED | OUTPATIENT
Start: 2017-09-12 | End: 2017-09-12 | Stop reason: HOSPADM

## 2017-09-12 RX ORDER — ONDANSETRON 2 MG/ML
INJECTION INTRAMUSCULAR; INTRAVENOUS PRN
Status: DISCONTINUED | OUTPATIENT
Start: 2017-09-12 | End: 2017-09-12 | Stop reason: SDUPTHER

## 2017-09-12 RX ORDER — OXYCODONE HYDROCHLORIDE AND ACETAMINOPHEN 5; 325 MG/1; MG/1
1 TABLET ORAL PRN
Status: COMPLETED | OUTPATIENT
Start: 2017-09-12 | End: 2017-09-12

## 2017-09-12 RX ORDER — OXYCODONE HYDROCHLORIDE AND ACETAMINOPHEN 5; 325 MG/1; MG/1
2 TABLET ORAL PRN
Status: COMPLETED | OUTPATIENT
Start: 2017-09-12 | End: 2017-09-12

## 2017-09-12 RX ORDER — FENTANYL CITRATE 50 UG/ML
25 INJECTION, SOLUTION INTRAMUSCULAR; INTRAVENOUS EVERY 5 MIN PRN
Status: DISCONTINUED | OUTPATIENT
Start: 2017-09-12 | End: 2017-09-12 | Stop reason: HOSPADM

## 2017-09-12 RX ORDER — FENTANYL CITRATE 50 UG/ML
50 INJECTION, SOLUTION INTRAMUSCULAR; INTRAVENOUS EVERY 5 MIN PRN
Status: DISCONTINUED | OUTPATIENT
Start: 2017-09-12 | End: 2017-09-12 | Stop reason: HOSPADM

## 2017-09-12 RX ORDER — FENTANYL CITRATE 50 UG/ML
INJECTION, SOLUTION INTRAMUSCULAR; INTRAVENOUS PRN
Status: DISCONTINUED | OUTPATIENT
Start: 2017-09-12 | End: 2017-09-12 | Stop reason: SDUPTHER

## 2017-09-12 RX ORDER — DEXAMETHASONE SODIUM PHOSPHATE 10 MG/ML
INJECTION INTRAMUSCULAR; INTRAVENOUS PRN
Status: DISCONTINUED | OUTPATIENT
Start: 2017-09-12 | End: 2017-09-12 | Stop reason: SDUPTHER

## 2017-09-12 RX ADMIN — FENTANYL CITRATE 100 MCG: 50 INJECTION INTRAMUSCULAR; INTRAVENOUS at 10:40

## 2017-09-12 RX ADMIN — OXYCODONE HYDROCHLORIDE AND ACETAMINOPHEN 1 TABLET: 5; 325 TABLET ORAL at 14:06

## 2017-09-12 RX ADMIN — FENTANYL CITRATE 50 MCG: 50 INJECTION INTRAMUSCULAR; INTRAVENOUS at 12:41

## 2017-09-12 ASSESSMENT — PAIN DESCRIPTION - ONSET: ONSET: AWAKENED FROM SLEEP

## 2017-09-12 ASSESSMENT — PAIN SCALES - GENERAL
PAINLEVEL_OUTOF10: 4
PAINLEVEL_OUTOF10: 5
PAINLEVEL_OUTOF10: 6
PAINLEVEL_OUTOF10: 8
PAINLEVEL_OUTOF10: 3

## 2017-09-12 ASSESSMENT — PAIN DESCRIPTION - ORIENTATION: ORIENTATION: RIGHT

## 2017-09-12 ASSESSMENT — ENCOUNTER SYMPTOMS
STRIDOR: 0
SHORTNESS OF BREATH: 0

## 2017-09-12 ASSESSMENT — PAIN DESCRIPTION - LOCATION: LOCATION: KNEE

## 2017-09-12 ASSESSMENT — PAIN DESCRIPTION - FREQUENCY: FREQUENCY: CONTINUOUS

## 2017-09-12 ASSESSMENT — PAIN DESCRIPTION - PAIN TYPE: TYPE: ACUTE PAIN;SURGICAL PAIN

## 2017-09-12 ASSESSMENT — PAIN DESCRIPTION - PROGRESSION: CLINICAL_PROGRESSION: GRADUALLY WORSENING

## 2017-09-12 ASSESSMENT — PAIN DESCRIPTION - DESCRIPTORS: DESCRIPTORS: ACHING

## 2017-09-12 ASSESSMENT — PAIN - FUNCTIONAL ASSESSMENT: PAIN_FUNCTIONAL_ASSESSMENT: 0-10

## 2017-09-15 ENCOUNTER — HOSPITAL ENCOUNTER (OUTPATIENT)
Dept: PHYSICAL THERAPY | Age: 42
Setting detail: THERAPIES SERIES
Discharge: HOME | End: 2017-09-15
Payer: MEDICARE

## 2017-09-15 PROCEDURE — 97016 VASOPNEUMATIC DEVICE THERAPY: CPT

## 2017-09-15 PROCEDURE — 97161 PT EVAL LOW COMPLEX 20 MIN: CPT

## 2017-09-15 PROCEDURE — 97110 THERAPEUTIC EXERCISES: CPT

## 2017-09-17 ENCOUNTER — HOSPITAL ENCOUNTER (EMERGENCY)
Age: 42
Discharge: HOME | End: 2017-09-17
Attending: EMERGENCY MEDICINE
Payer: MEDICARE

## 2017-09-17 ENCOUNTER — APPOINTMENT (OUTPATIENT)
Dept: CT IMAGING | Age: 42
End: 2017-09-17
Payer: MEDICARE

## 2017-09-17 VITALS
RESPIRATION RATE: 19 BRPM | SYSTOLIC BLOOD PRESSURE: 110 MMHG | TEMPERATURE: 99.1 F | DIASTOLIC BLOOD PRESSURE: 69 MMHG | HEART RATE: 96 BPM | OXYGEN SATURATION: 99 %

## 2017-09-17 DIAGNOSIS — R79.89 ELEVATED SERUM CREATININE: ICD-10-CM

## 2017-09-17 DIAGNOSIS — J20.9 ACUTE BRONCHITIS, UNSPECIFIED ORGANISM: Primary | ICD-10-CM

## 2017-09-17 LAB
ABSOLUTE EOS #: 0.1 K/UL (ref 0–0.4)
ABSOLUTE LYMPH #: 2.4 K/UL (ref 1–4.8)
ABSOLUTE MONO #: 0.2 K/UL (ref 0.1–1.2)
ANION GAP SERPL CALCULATED.3IONS-SCNC: 12 MMOL/L (ref 9–17)
BASOPHILS # BLD: 1 %
BASOPHILS ABSOLUTE: 0.1 K/UL (ref 0–0.2)
BUN BLDV-MCNC: 18 MG/DL (ref 6–20)
BUN/CREAT BLD: ABNORMAL (ref 9–20)
CALCIUM SERPL-MCNC: 9.5 MG/DL (ref 8.6–10.4)
CHLORIDE BLD-SCNC: 101 MMOL/L (ref 98–107)
CO2: 26 MMOL/L (ref 20–31)
CREAT SERPL-MCNC: 1.03 MG/DL (ref 0.5–0.9)
DIFFERENTIAL TYPE: NORMAL
EOSINOPHILS RELATIVE PERCENT: 2 %
GFR AFRICAN AMERICAN: >60 ML/MIN
GFR NON-AFRICAN AMERICAN: 58 ML/MIN
GFR SERPL CREATININE-BSD FRML MDRD: ABNORMAL ML/MIN/{1.73_M2}
GFR SERPL CREATININE-BSD FRML MDRD: ABNORMAL ML/MIN/{1.73_M2}
GLUCOSE BLD-MCNC: 99 MG/DL (ref 70–99)
HCT VFR BLD CALC: 38 % (ref 36–46)
HEMOGLOBIN: 12.6 G/DL (ref 12–16)
LYMPHOCYTES # BLD: 27 %
MCH RBC QN AUTO: 28.6 PG (ref 26–34)
MCHC RBC AUTO-ENTMCNC: 33.1 G/DL (ref 31–37)
MCV RBC AUTO: 86.4 FL (ref 80–100)
MONOCYTES # BLD: 3 %
PDW BLD-RTO: 13.6 % (ref 12.5–15.4)
PLATELET # BLD: 290 K/UL (ref 140–450)
PLATELET ESTIMATE: NORMAL
PMV BLD AUTO: 8.4 FL (ref 6–12)
POC TROPONIN I: 0 NG/ML (ref 0–0.1)
POC TROPONIN I: 0 NG/ML (ref 0–0.1)
POC TROPONIN INTERP: NORMAL
POC TROPONIN INTERP: NORMAL
POTASSIUM SERPL-SCNC: 4.3 MMOL/L (ref 3.7–5.3)
RBC # BLD: 4.4 M/UL (ref 4–5.2)
RBC # BLD: NORMAL 10*6/UL
SEG NEUTROPHILS: 67 %
SEGMENTED NEUTROPHILS ABSOLUTE COUNT: 6 K/UL (ref 1.8–7.7)
SODIUM BLD-SCNC: 139 MMOL/L (ref 135–144)
TSH SERPL DL<=0.05 MIU/L-ACNC: 0.9 MIU/L (ref 0.3–5)
WBC # BLD: 8.8 K/UL (ref 3.5–11)
WBC # BLD: NORMAL 10*3/UL

## 2017-09-17 PROCEDURE — 71260 CT THORAX DX C+: CPT

## 2017-09-17 PROCEDURE — 93005 ELECTROCARDIOGRAM TRACING: CPT

## 2017-09-17 PROCEDURE — 84484 ASSAY OF TROPONIN QUANT: CPT

## 2017-09-17 PROCEDURE — 85025 COMPLETE CBC W/AUTO DIFF WBC: CPT

## 2017-09-17 PROCEDURE — 80048 BASIC METABOLIC PNL TOTAL CA: CPT

## 2017-09-17 PROCEDURE — 6360000002 HC RX W HCPCS: Performed by: EMERGENCY MEDICINE

## 2017-09-17 PROCEDURE — 96374 THER/PROPH/DIAG INJ IV PUSH: CPT

## 2017-09-17 PROCEDURE — 84443 ASSAY THYROID STIM HORMONE: CPT

## 2017-09-17 PROCEDURE — 99284 EMERGENCY DEPT VISIT MOD MDM: CPT

## 2017-09-17 PROCEDURE — 6360000004 HC RX CONTRAST MEDICATION: Performed by: EMERGENCY MEDICINE

## 2017-09-17 RX ORDER — ALBUTEROL SULFATE 90 UG/1
2 AEROSOL, METERED RESPIRATORY (INHALATION) EVERY 6 HOURS PRN
Status: DISCONTINUED | OUTPATIENT
Start: 2017-09-17 | End: 2017-09-17

## 2017-09-17 RX ORDER — FENTANYL CITRATE 50 UG/ML
50 INJECTION, SOLUTION INTRAMUSCULAR; INTRAVENOUS ONCE
Status: COMPLETED | OUTPATIENT
Start: 2017-09-17 | End: 2017-09-17

## 2017-09-17 RX ORDER — BENZONATATE 100 MG/1
100 CAPSULE ORAL 3 TIMES DAILY PRN
Qty: 15 CAPSULE | Refills: 0 | Status: SHIPPED | OUTPATIENT
Start: 2017-09-17 | End: 2017-09-24

## 2017-09-17 RX ADMIN — IOVERSOL 75 ML: 741 INJECTION INTRA-ARTERIAL; INTRAVENOUS at 21:11

## 2017-09-17 RX ADMIN — FENTANYL CITRATE 50 MCG: 50 INJECTION INTRAMUSCULAR; INTRAVENOUS at 20:07

## 2017-09-17 ASSESSMENT — ENCOUNTER SYMPTOMS
VOMITING: 0
SORE THROAT: 0
EYE DISCHARGE: 0
NAUSEA: 0
EYE REDNESS: 0
WHEEZING: 0
SHORTNESS OF BREATH: 1
COLOR CHANGE: 0

## 2017-09-17 ASSESSMENT — PAIN SCALES - GENERAL: PAINLEVEL_OUTOF10: 5

## 2017-09-19 ASSESSMENT — HEART SCORE: ECG: 1

## 2017-09-20 ENCOUNTER — HOSPITAL ENCOUNTER (OUTPATIENT)
Dept: PHYSICAL THERAPY | Age: 42
Setting detail: THERAPIES SERIES
Discharge: HOME | End: 2017-09-20
Payer: MEDICARE

## 2017-09-20 PROCEDURE — 97016 VASOPNEUMATIC DEVICE THERAPY: CPT

## 2017-09-20 PROCEDURE — 97110 THERAPEUTIC EXERCISES: CPT

## 2017-09-22 ENCOUNTER — HOSPITAL ENCOUNTER (OUTPATIENT)
Dept: PHYSICAL THERAPY | Age: 42
Setting detail: THERAPIES SERIES
Discharge: HOME | End: 2017-09-22
Payer: MEDICARE

## 2017-09-22 PROCEDURE — 97110 THERAPEUTIC EXERCISES: CPT

## 2017-09-22 PROCEDURE — 97016 VASOPNEUMATIC DEVICE THERAPY: CPT

## 2017-09-25 ENCOUNTER — HOSPITAL ENCOUNTER (OUTPATIENT)
Age: 42
Discharge: HOME | End: 2017-09-25
Payer: MEDICARE

## 2017-09-25 ENCOUNTER — OFFICE VISIT (OUTPATIENT)
Dept: ORTHOPEDIC SURGERY | Age: 42
End: 2017-09-25

## 2017-09-25 VITALS — HEIGHT: 62 IN | BODY MASS INDEX: 35.5 KG/M2 | WEIGHT: 192.9 LBS

## 2017-09-25 DIAGNOSIS — Z98.890 S/P RIGHT KNEE ARTHROSCOPY: Primary | ICD-10-CM

## 2017-09-25 PROCEDURE — 87086 URINE CULTURE/COLONY COUNT: CPT

## 2017-09-25 PROCEDURE — 99024 POSTOP FOLLOW-UP VISIT: CPT | Performed by: STUDENT IN AN ORGANIZED HEALTH CARE EDUCATION/TRAINING PROGRAM

## 2017-09-25 PROCEDURE — 87088 URINE BACTERIA CULTURE: CPT

## 2017-09-25 RX ORDER — HYDROCODONE BITARTRATE AND ACETAMINOPHEN 5; 325 MG/1; MG/1
1 TABLET ORAL EVERY 6 HOURS PRN
COMMUNITY
End: 2017-11-16 | Stop reason: ALTCHOICE

## 2017-09-26 ENCOUNTER — HOSPITAL ENCOUNTER (OUTPATIENT)
Dept: PHYSICAL THERAPY | Age: 42
Setting detail: THERAPIES SERIES
Discharge: HOME | End: 2017-09-26
Payer: MEDICARE

## 2017-09-26 LAB
CULTURE: NORMAL
CULTURE: NORMAL
Lab: NORMAL
SPECIMEN DESCRIPTION: NORMAL
STATUS: NORMAL

## 2017-09-26 PROCEDURE — 97016 VASOPNEUMATIC DEVICE THERAPY: CPT

## 2017-09-26 PROCEDURE — 97110 THERAPEUTIC EXERCISES: CPT

## 2017-09-27 ENCOUNTER — OFFICE VISIT (OUTPATIENT)
Dept: BARIATRICS/WEIGHT MGMT | Age: 42
End: 2017-09-27
Payer: MEDICARE

## 2017-09-27 VITALS
BODY MASS INDEX: 34.96 KG/M2 | HEART RATE: 76 BPM | SYSTOLIC BLOOD PRESSURE: 110 MMHG | RESPIRATION RATE: 20 BRPM | DIASTOLIC BLOOD PRESSURE: 78 MMHG | HEIGHT: 62 IN | WEIGHT: 190 LBS

## 2017-09-27 DIAGNOSIS — E66.9 OBESITY (BMI 30-39.9): Chronic | ICD-10-CM

## 2017-09-27 DIAGNOSIS — M54.9 CHRONIC NECK AND BACK PAIN: ICD-10-CM

## 2017-09-27 DIAGNOSIS — J45.909 UNCOMPLICATED ASTHMA, UNSPECIFIED ASTHMA SEVERITY: Primary | ICD-10-CM

## 2017-09-27 DIAGNOSIS — F31.9 BIPOLAR 1 DISORDER (HCC): ICD-10-CM

## 2017-09-27 DIAGNOSIS — G89.29 CHRONIC PAIN OF RIGHT KNEE: ICD-10-CM

## 2017-09-27 DIAGNOSIS — G89.29 CHRONIC NECK AND BACK PAIN: ICD-10-CM

## 2017-09-27 DIAGNOSIS — M54.2 CHRONIC NECK AND BACK PAIN: ICD-10-CM

## 2017-09-27 DIAGNOSIS — M25.561 CHRONIC PAIN OF RIGHT KNEE: ICD-10-CM

## 2017-09-27 PROCEDURE — 99213 OFFICE O/P EST LOW 20 MIN: CPT | Performed by: NURSE PRACTITIONER

## 2017-09-28 ENCOUNTER — TELEPHONE (OUTPATIENT)
Dept: ORTHOPEDIC SURGERY | Age: 42
End: 2017-09-28

## 2017-09-28 NOTE — TELEPHONE ENCOUNTER
Patient is wanting to know if she could get a script for Motrin for her knee Tylenol is not working, please advise.

## 2017-09-29 ENCOUNTER — HOSPITAL ENCOUNTER (OUTPATIENT)
Dept: PHYSICAL THERAPY | Age: 42
Setting detail: THERAPIES SERIES
Discharge: HOME | End: 2017-09-29
Payer: MEDICARE

## 2017-09-29 ENCOUNTER — HOSPITAL ENCOUNTER (OUTPATIENT)
Age: 42
Discharge: HOME | End: 2017-09-29
Payer: MEDICARE

## 2017-09-29 DIAGNOSIS — F31.9 BIPOLAR 1 DISORDER (HCC): ICD-10-CM

## 2017-09-29 DIAGNOSIS — G89.29 CHRONIC PAIN OF RIGHT KNEE: ICD-10-CM

## 2017-09-29 DIAGNOSIS — M54.2 CHRONIC NECK AND BACK PAIN: ICD-10-CM

## 2017-09-29 DIAGNOSIS — M54.9 CHRONIC NECK AND BACK PAIN: ICD-10-CM

## 2017-09-29 DIAGNOSIS — J45.909 UNCOMPLICATED ASTHMA, UNSPECIFIED ASTHMA SEVERITY: ICD-10-CM

## 2017-09-29 DIAGNOSIS — G89.29 CHRONIC NECK AND BACK PAIN: ICD-10-CM

## 2017-09-29 DIAGNOSIS — M25.561 CHRONIC PAIN OF RIGHT KNEE: ICD-10-CM

## 2017-09-29 DIAGNOSIS — E66.9 OBESITY (BMI 30-39.9): Chronic | ICD-10-CM

## 2017-09-29 LAB
ALBUMIN SERPL-MCNC: 4.3 G/DL (ref 3.5–5.2)
ALBUMIN/GLOBULIN RATIO: 1.3 (ref 1–2.5)
ALP BLD-CCNC: 123 U/L (ref 35–104)
ALT SERPL-CCNC: 15 U/L (ref 5–33)
ANION GAP SERPL CALCULATED.3IONS-SCNC: 15 MMOL/L (ref 9–17)
AST SERPL-CCNC: 14 U/L
BILIRUB SERPL-MCNC: 0.27 MG/DL (ref 0.3–1.2)
BUN BLDV-MCNC: 15 MG/DL (ref 6–20)
BUN/CREAT BLD: ABNORMAL (ref 9–20)
CALCIUM SERPL-MCNC: 9.8 MG/DL (ref 8.6–10.4)
CHLORIDE BLD-SCNC: 102 MMOL/L (ref 98–107)
CHOLESTEROL/HDL RATIO: 4.7
CHOLESTEROL: 256 MG/DL
CO2: 23 MMOL/L (ref 20–31)
CREAT SERPL-MCNC: 1.12 MG/DL (ref 0.5–0.9)
ESTIMATED AVERAGE GLUCOSE: 88 MG/DL
FERRITIN: 64 UG/L (ref 13–150)
FOLATE: >20 NG/ML
GFR AFRICAN AMERICAN: >60 ML/MIN
GFR NON-AFRICAN AMERICAN: 53 ML/MIN
GFR SERPL CREATININE-BSD FRML MDRD: ABNORMAL ML/MIN/{1.73_M2}
GFR SERPL CREATININE-BSD FRML MDRD: ABNORMAL ML/MIN/{1.73_M2}
GLUCOSE BLD-MCNC: 82 MG/DL (ref 70–99)
HBA1C MFR BLD: 4.7 % (ref 4–6)
HDLC SERPL-MCNC: 55 MG/DL
IRON SATURATION: 37 % (ref 20–55)
IRON: 102 UG/DL (ref 37–145)
LDL CHOLESTEROL: 169 MG/DL (ref 0–130)
MAGNESIUM: 1.8 MG/DL (ref 1.6–2.6)
POTASSIUM SERPL-SCNC: 4.1 MMOL/L (ref 3.7–5.3)
PTH INTACT: 40.6 PG/ML (ref 15–65)
SODIUM BLD-SCNC: 140 MMOL/L (ref 135–144)
THYROXINE, FREE: 0.8 NG/DL (ref 0.93–1.7)
TOTAL IRON BINDING CAPACITY: 276 UG/DL (ref 250–450)
TOTAL PROTEIN: 7.6 G/DL (ref 6.4–8.3)
TRIGL SERPL-MCNC: 159 MG/DL
UNSATURATED IRON BINDING CAPACITY: 174 UG/DL (ref 112–347)
VITAMIN B-12: 680 PG/ML (ref 211–946)
VITAMIN D 25-HYDROXY: 40.2 NG/ML (ref 30–100)
VLDLC SERPL CALC-MCNC: ABNORMAL MG/DL (ref 1–30)

## 2017-09-29 PROCEDURE — 97016 VASOPNEUMATIC DEVICE THERAPY: CPT

## 2017-09-29 PROCEDURE — 83540 ASSAY OF IRON: CPT

## 2017-09-29 PROCEDURE — 84425 ASSAY OF VITAMIN B-1: CPT

## 2017-09-29 PROCEDURE — 82607 VITAMIN B-12: CPT

## 2017-09-29 PROCEDURE — 36415 COLL VENOUS BLD VENIPUNCTURE: CPT

## 2017-09-29 PROCEDURE — 97110 THERAPEUTIC EXERCISES: CPT

## 2017-09-29 PROCEDURE — 84630 ASSAY OF ZINC: CPT

## 2017-09-29 PROCEDURE — 83550 IRON BINDING TEST: CPT

## 2017-09-29 PROCEDURE — 83970 ASSAY OF PARATHORMONE: CPT

## 2017-09-29 PROCEDURE — 82746 ASSAY OF FOLIC ACID SERUM: CPT

## 2017-09-29 PROCEDURE — 82728 ASSAY OF FERRITIN: CPT

## 2017-09-29 PROCEDURE — 80061 LIPID PANEL: CPT

## 2017-09-29 PROCEDURE — 82306 VITAMIN D 25 HYDROXY: CPT

## 2017-09-29 PROCEDURE — 84439 ASSAY OF FREE THYROXINE: CPT

## 2017-09-29 PROCEDURE — 83036 HEMOGLOBIN GLYCOSYLATED A1C: CPT

## 2017-09-29 PROCEDURE — 80053 COMPREHEN METABOLIC PANEL: CPT

## 2017-09-29 PROCEDURE — 84590 ASSAY OF VITAMIN A: CPT

## 2017-09-29 PROCEDURE — 83735 ASSAY OF MAGNESIUM: CPT

## 2017-10-01 LAB
RETINYL PALMITATE: <0.02 MG/L (ref 0–0.1)
VITAMIN A LEVEL: 0.94 MG/L (ref 0.3–1.2)
VITAMIN A, INTERP: NORMAL
ZINC: 65 UG/DL (ref 60–120)

## 2017-10-02 LAB — VITAMIN B1 WHOLE BLOOD: 88 NMOL/L (ref 70–180)

## 2017-10-04 ENCOUNTER — HOSPITAL ENCOUNTER (OUTPATIENT)
Dept: PHYSICAL THERAPY | Age: 42
Setting detail: THERAPIES SERIES
Discharge: HOME | End: 2017-10-04
Payer: MEDICARE

## 2017-10-04 PROCEDURE — 97110 THERAPEUTIC EXERCISES: CPT

## 2017-10-04 NOTE — FLOWSHEET NOTE
[x] Drea Azar       Outpatient Physical        Therapy       [address]       Phone: [phone]       Fax: [phone] [] [facility] for [facility] Promotion at [address] [facility]       Phone: [phone]       Fax: [phone] [] [facility] for [facility] Promotion  [address] [facility]   Phone: [phone]   Fax:  [phone]     Physical Therapy Daily Treatment Note    Date:  10/4/2017  Patient Name:  Anum Willis    :  1975  MRN: [id number]  Physician: Jai Osman DO                                                               Insurance: [place] Advantage 13 used  Medical Diagnosis:             Patellofemoral syndrome of R knee M22.2x1, Primary OA of Right knee M17.11, S/P R knee scope                   Rehab Codes: R26.2, M25.661  Onset date: 17                                                         Next 's appt.: 17  Visit# / total visits:   Cancels/No Shows: 0/0    Subjective:    Pain:  [x] Yes  [] No Location: R knee Pain Rating: (0-10 scale) 8/10  Pain altered Tx:  [x] No  [] Yes  Action:  Comments: States pain is 8/10 upon arrival this date. Over weekend, stepped off a curb and patella grabbed and has hurt since then. Objective:  Modalities: Vasocompression to R knee In supine elevated at low pressure and 38 degrees x 15 minutes after exercises--pt declined, will ice at home.   Precautions:  Exercises:  Exercise Reps/ Time Weight/ Level Comments   Supine      Quad sets 15x  Towel roll under knee   Heel slides  15x  Orange slide tube   Hip abduction  15x  Orange slide tube   SAQs  15x     SLR   15x     SLR with ER  10x     Add sets  15x  Yellow ball         Sidelying      Hip abduction 15x  added         Prone   added   Hamstring curls 15x     Hip extension with knee bent 15x           Seated       LAQs 15x     Hamstring curls 15x           Standing      HR  15x     Mini-squats to 60 deg 15x Increased reps   Hip extensions  15x  R LE only increased reps    Hip abduction  15x  R LE only   Step ups 10x 2\" Added   Step downs 10x 2\" Added    Other: Ambulation 150 ft x 2 with rolling walker, no deficits noted. Specific Instructions for next treatment: add weights to mat exercises next session. Assess goals soon    Treatment Charges: Mins Units   []  Modalities     [x]  Ther Exercise 32 2   []  Manual Therapy     []  Ther Activities     []  Aquatics     []  Vasocompression     []  Other     Total Treatment time 32 mins        Assessment: [x] Progressing toward goals. Added sidelying and prone exercises as noted in chart to increase LE strength and ROM. Also added step exercises to increase ease with steps at home and community. Patient tolerated exercises and advances fairly well without increased pain complaints. Patient declined need for game ready this date as patient will ice at home. [] No change. [] Other:    STG: (to be met in 8 treatments)  1. ? Pain:4/10 left knee pain with ambulation. 2. ? ROM: R knee flexion 0-120 degrees to improve gait pattern. 3. ? Strength: 4+/5 left knee flexion and extension to improve squatting. 4. ? Function: LEFS score to 50% impaired or better to improve ADLs. 5. Independent with Home Exercise Programs     LTG: (to be met in 12 treatments)  1. Pt will be able to ambulate community distances without AD and 2/10 pain. 2. Pt will be able to climb stair with reciprocal gait pattern.          Patient goals: Reduce pain, normalize gait. Pt. Education:  [x] Yes  [] No  [x] Reviewed Prior HEP/Ed  Method of Education: [x] Verbal  [x] Demo  [] Written  Comprehension of Education:  [x] Verbalizes understanding. [x] Demonstrates understanding. [x] Needs review. [] Demonstrates/verbalizes HEP/Ed previously given. Plan: [x] Continue per plan of care.    [] Other:      Time In: 1:02 pm            Time Out: 1:36 pm    Electronically signed by:  Yetta Kawasaki Miracle Hess, PTA

## 2017-10-05 LAB
EKG ATRIAL RATE: 104 BPM
EKG P AXIS: 36 DEGREES
EKG P-R INTERVAL: 150 MS
EKG Q-T INTERVAL: 366 MS
EKG QRS DURATION: 88 MS
EKG QTC CALCULATION (BAZETT): 481 MS
EKG R AXIS: 35 DEGREES
EKG T AXIS: 92 DEGREES
EKG VENTRICULAR RATE: 104 BPM

## 2017-10-06 ENCOUNTER — HOSPITAL ENCOUNTER (OUTPATIENT)
Dept: PHYSICAL THERAPY | Age: 42
Setting detail: THERAPIES SERIES
Discharge: HOME | End: 2017-10-06
Payer: MEDICARE

## 2017-10-06 PROCEDURE — 97016 VASOPNEUMATIC DEVICE THERAPY: CPT

## 2017-10-06 PROCEDURE — 97110 THERAPEUTIC EXERCISES: CPT

## 2017-10-06 NOTE — PROGRESS NOTES
[x] [facility]       Outpatient Physical                Therapy         [address]       Phone: [phone]       Fax: [phone] [] [facility]       Promotion at [address]       Phone: [phone]       Fax: [phone] [] [place]. [address] [place] [address]     [address] [facility]     Phone: [phone]     Fax:  [phone]     Physical Therapy Progress Note    Date: 10/6/2017      Patient: Hector Ramos  : 1975  MRN: [id number]    Physician: Goldie Foster DO                                                    Insurance: [place] Advantage 13 used  Medical Diagnosis:             Patellofemoral syndrome of R knee M22.2x1, Primary OA of Right knee M17.11, S/P R knee scope                   Rehab Codes: R26.2, M25.661  Onset date: 17                                                         Next 's appt.: 17  Visit# / total visits:                                         Cancels/No Shows: 0/0    Subjective:  Pain:  [x] Yes  [] No                    Location: R knee                   Pain Rating: (0-10 scale) 8/10  Pain altered Tx:  [x] No  [] Yes  Action:  Comments: Still complains of high pain levels with WB activities. Pt also reports occasional  R knee instability.      Objective:  Test Measurements:  ROM: R knee flexion 8-102, lacking 8 degrees of extension. MMT: 4/5 R knee flexion and extension ( pain with testing)  Function: ain still present with prolonged WB activities and difficulty climbing stairs. Assessment:  Functional capacity is slowly progressing. ROM and strength have improved, but not to expected level. Pain remains the most limiting factor. Will continue to progress POC as patient tolerates. STG: (to be met in 8 treatments)  1. ? Pain:4/10 left knee pain with ambulation. --Not Met  2. ? ROM: R knee flexion 0-120 degrees to improve gait pattern. --Progress  3. ? Strength: 4+/5 left knee flexion and extension to improve squatting.--Progress  4. ? Function: LEFS score to 50% impaired or better to improve ADLs. 5. Independent with Home Exercise Programs--progress      LTG: (to be met in 12 treatments)  1. Pt will be able to ambulate community distances without AD and 2/10 pain. --not met   2. Pt will be able to climb stair with reciprocal gait pattern. --not met     Treatment to Date:  [x] Therapeutic Exercise    [] Modalities:  [] Therapeutic Activity    [] Ultrasound  [] Electrical Stimulation  [x] Gait Training     [] Massage       [] Lumbar/Cervical Traction  [] Neuromuscular Re-education [] Cold/hotpack [] Iontophoresis: 4 mg/mL  [x] Instruction in Home Exercise Program                     Dexamethasone Sodium  [] Manual Therapy             Phosphate 40-80 mAmin  [] Aquatic Therapy                   [x] Vasocompression/    [] Other:            Game Ready    Patient Status:     [] Continue per initial plan of care. [x] Additional visits necessary. 5 additional visits. [] Other:     Requested Frequency/Duration: 2 times per week for 10  treatments. Electronically signed by: Nadine Tang. Jarad PT    If you have any questions or concerns, please don't hesitate to call.   Thank you for your referral.

## 2017-10-06 NOTE — FLOWSHEET NOTE
[x] Elvira Dalton       Outpatient Physical        Therapy       [address]       Phone: [phone]       Fax: [phone] [] [facility] at [address]       Phone: [phone]       Fax: [phone] [] [place]. [facility] Promotion  [address] [place] [address]   Phone: [phone]   Fax:  [phone]     Physical Therapy Daily Treatment Note    Date:  10/6/2017  Patient Name:  Estefani Tejada    :  1975  MRN: [id number]  Physician: Tobi Nieto DO                                                               Insurance: [place] Advantage 13 used  Medical Diagnosis:             Patellofemoral syndrome of R knee M22.2x1, Primary OA of Right knee M17.11, S/P R knee scope                   Rehab Codes: R26.2, M25.661  Onset date: 17                                                         Next 's appt.: 17  Visit# / total visits:   Cancels/No Shows: 0/0    Subjective:    Pain:  [x] Yes  [] No Location: R knee Pain Rating: (0-10 scale) 8/10  Pain altered Tx:  [x] No  [] Yes  Action:  Comments: Still complains pain is the same. Did take 3 Tylenol at 11 am. Walking up here, right knee was giving out on her.      Objective:  Modalities: Vasocompression to R knee In supine elevated at low pressure and 38 degrees x 15 minutes after exercises  Precautions:  Exercises:  Exercise Reps/ Time Weight/ Level Comments   Supine      Quad sets 15x  Towel roll under knee   Heel slides   15x  Orange slide tube   Hip abduction 15x  Orange slide tube   SAQs   15x     SLR    15x     SLR with ER   10x     Add sets  15x  Yellow ball         Sidelying      Hip abduction 15x  reviewed         Prone   reviewed   Hamstring curls 15x     Hip extension with knee bent 15x           Seated       LAQs 15x     Hamstring curls 15x           Standing      HR  15x     Mini-squats to 60 deg 10x  Decreased reps this date   Hip extensions  15x  R LE only    Hip abduction  15x  R LE only   Step ups 10x 2\" reviewed   Step downs 10x 2\" reviewed   Other:     Specific Instructions for next treatment: add weights to mat exercises next session. Treatment Charges: Mins Units   []  Modalities     [x]  Ther Exercise 27 2   []  Manual Therapy     []  Ther Activities     []  Aquatics     [x]  Vasocompression 15 1   []  Other     Total Treatment time 42 mins        Assessment: [x] Progressing toward goals. Reviewed sidelying, prone exercises, and step exercises as exercises are still new to patient. Patient tolerated exercises fairly well, and has fairly good knowledge of technique. Minimal cues for progressions. [] No change. [] Other:    STG: (to be met in 8 treatments)  1. ? Pain:4/10 left knee pain with ambulation. --pain in knee with walking 8/10  2. ? ROM: R knee flexion 0-120 degrees to improve gait pattern. --8-101   3. ? Strength: 4+/5 left knee flexion and extension to improve squatting.--4/5 pain in patella   4. ? Function: LEFS score to 50% impaired or better to improve ADLs. 5. Independent with Home Exercise Programs--progress     LTG: (to be met in 12 treatments)  1. Pt will be able to ambulate community distances without AD and 2/10 pain. --not met   2. Pt will be able to climb stair with reciprocal gait pattern. --not doing stairs          Patient goals: Reduce pain, normalize gait. Pt. Education:  [x] Yes  [] No  [x] Reviewed Prior HEP/Ed  Method of Education: [x] Verbal  [x] Demo  [] Written  Comprehension of Education:  [x] Verbalizes understanding. [x] Demonstrates understanding. [x] Needs review. [] Demonstrates/verbalizes HEP/Ed previously given. Plan: [x] Continue per plan of care.    [] Other:      Time In: 1:45 pm            Time Out: 2:29 pm    Electronically signed by:  Pantera Bonilla PTA

## 2017-10-10 ENCOUNTER — HOSPITAL ENCOUNTER (OUTPATIENT)
Dept: PHYSICAL THERAPY | Age: 42
Setting detail: THERAPIES SERIES
Discharge: HOME | End: 2017-10-10
Payer: MEDICARE

## 2017-10-10 PROCEDURE — 97110 THERAPEUTIC EXERCISES: CPT

## 2017-10-10 NOTE — FLOWSHEET NOTE
[x] Cheyenne Allred       Outpatient Physical        Therapy       [address]       Phone: [phone]       Fax: [phone] [] [facility] Promotion at [address] [facility]       Phone: [phone]       Fax: [phone] [] [place]. [facility] Promotion  [address] [place] [address]   Phone: [phone]   Fax:  [phone]     Physical Therapy Daily Treatment Note    Date:  10/10/2017  Patient Name:  Freddie Sesay    :  1975  MRN: [id number]  Physician: Case Cuevas DO                                                               Insurance: [place] Advantage 13 used  Medical Diagnosis:             Patellofemoral syndrome of R knee M22.2x1, Primary OA of Right knee M17.11, S/P R knee scope                   Rehab Codes: R26.2, M25.661  Onset date: 17                                                         Next Dr's appt.: 17  Visit# / total visits:   Cancels/No Shows: 0/0    Subjective:    Pain:  [x] Yes  [] No Location: R knee Pain Rating: (0-10 scale) 7/10  Pain altered Tx:  [x] No  [] Yes  Action:  Comments: Took tylenol prior to arrival. States yesterday was not good, popliteal region was bothering patient. Still waiting on doctor to give her a script for motrin.     Objective:  Modalities: Vasocompression to R knee In supine elevated at low pressure and 38 degrees x 15 minutes after exercises--patient declined, will ice at home  Precautions:  Exercises:  Exercise Reps/ Time Weight/ Level Comments   Supine      Quad sets 15x  Towel roll under knee   Heel slides   15x  Orange slide tube   SAQs   15x     SLR    15x     SLR with ER   10x     Add sets  15x  Yellow ball         Sidelying      Hip abduction 15x           Prone      Hamstring curls 15x     Hip extension with knee bent 15x           Seated       LAQs 15x     Hamstring curls 15x           Standing      HR  15x     Mini-squats to 60 deg 10x     Hip

## 2017-10-12 DIAGNOSIS — N20.0 STAGHORN CALCULUS: Primary | ICD-10-CM

## 2017-10-13 ENCOUNTER — HOSPITAL ENCOUNTER (OUTPATIENT)
Dept: PHYSICAL THERAPY | Age: 42
Setting detail: THERAPIES SERIES
Discharge: HOME | End: 2017-10-13
Payer: MEDICARE

## 2017-10-13 PROCEDURE — 97016 VASOPNEUMATIC DEVICE THERAPY: CPT

## 2017-10-13 PROCEDURE — 97110 THERAPEUTIC EXERCISES: CPT

## 2017-10-13 RX ORDER — IBUPROFEN 800 MG/1
800 TABLET ORAL EVERY 8 HOURS PRN
Qty: 90 TABLET | Refills: 2 | Status: SHIPPED | OUTPATIENT
Start: 2017-10-13 | End: 2018-01-09 | Stop reason: SDUPTHER

## 2017-10-13 NOTE — FLOWSHEET NOTE
only    Hip abduction  15x  R LE only   Step ups 15x 2\" Increased reps   Step downs 10x 2\"    Other:     Specific Instructions for next treatment:      Treatment Charges: Mins Units   []  Modalities     [x]  Ther Exercise 25 2   []  Manual Therapy     []  Ther Activities     []  Aquatics     [x]  Vasocompression 15 1   []  Other     Total Treatment time 40 mins        Assessment: [x] Progressing toward goals. Increased reps on step up exercises this date. Also added 1# weight to mat exercises to increase knee strength. Fair tolerance/self limiting per patient for exercise advances. Did note some \"popping\" in knee with SAQs. Verbal cues for exercise progressions, patient has fairly good knowledge of technique. [] No change. [] Other:    STG: (to be met in 8 treatments)  1. ? Pain:4/10 left knee pain with ambulation. --pain in knee with walking 8/10  2. ? ROM: R knee flexion 0-120 degrees to improve gait pattern. --8-101   3. ? Strength: 4+/5 left knee flexion and extension to improve squatting.--4/5 pain in patella   4. ? Function: LEFS score to 50% impaired or better to improve ADLs. 5. Independent with Home Exercise Programs--progress     LTG: (to be met in 12 treatments)  1. Pt will be able to ambulate community distances without AD and 2/10 pain. --not met   2. Pt will be able to climb stair with reciprocal gait pattern. --not doing stairs          Patient goals: Reduce pain, normalize gait. Pt. Education:  [x] Yes  [] No  [x] Reviewed Prior HEP/Ed  Method of Education: [x] Verbal  [x] Demo  [] Written  Comprehension of Education:  [x] Verbalizes understanding. [x] Demonstrates understanding. [x] Needs review. [] Demonstrates/verbalizes HEP/Ed previously given. Plan: [x] Continue per plan of care.    [] Other:      Time In: 1:49 pm            Time Out: 2:32 pm    Electronically signed by:  Aurea Ash PTA

## 2017-10-13 NOTE — TELEPHONE ENCOUNTER
Patient called on 9/28/17 asking for script for Ibuprofen. Dr. Maryanna Lanes did not respond. Please Advise. Thank you!

## 2017-10-16 ENCOUNTER — HOSPITAL ENCOUNTER (OUTPATIENT)
Dept: PHYSICAL THERAPY | Age: 42
Setting detail: THERAPIES SERIES
Discharge: HOME | End: 2017-10-16
Payer: MEDICARE

## 2017-10-16 PROCEDURE — 97016 VASOPNEUMATIC DEVICE THERAPY: CPT

## 2017-10-16 PROCEDURE — 97110 THERAPEUTIC EXERCISES: CPT

## 2017-10-16 NOTE — FLOWSHEET NOTE
[x] [facility]       Outpatient Physical        Therapy       [address]       Phone: [phone]       Fax: [phone] [] [facility] for Health Promotion at [address] [facility]       Phone: [phone]       Fax: [phone] [] [facility] Promotion  [address] [facility]   Phone: [phone]   Fax:  [phone]     Physical Therapy Daily Treatment Note    Date:  10/16/2017  Patient Name:  Lashaun Arriola    :  1975  MRN: [id number]  Physician: Bianka Guillermo DO                                                               Insurance: [place] Advantage 13 used  Medical Diagnosis:             Patellofemoral syndrome of R knee M22.2x1, Primary OA of Right knee M17.11, S/P R knee scope                   Rehab Codes: R26.2, M25.661  Onset date: 17                                                         Next Dr's appt.: 17  Visit# / total visits: 10/17  Cancels/No Shows: 0/0    Subjective:    Pain:  [x] Yes  [] No Location: R knee Pain Rating: (0-10 scale) 7/10  Pain altered Tx:  [x] No  [] Yes  Action:  Comments: Patient arrives to therapy stating she could not get up this morning. Notes pain and stiffness upon getting up this morning. Reports that she will be picking up her motrin after therapy this date. Slight antalgic gait pattern noted this date.       Objective:  Modalities: Vasocompression to R knee In supine elevated at low pressure and 38 degrees x 15 minutes after exercises  Precautions:  Exercises:  Exercise Reps/ Time Weight/ Level Comments   Supine      Patellar mobs  X     Quad sets 15x  Towel roll under knee   Heel slides 15x 1# Orange slide tube   SAQs  15x 1#    SLR    15x 1#    SLR with ER    10x 1#    Add sets   15x  Yellow ball         Sidelying      Hip abduction 15x 1#          Prone      Hamstring curls 15x 1#    Hip extension with knee bent 15x 1#          Seated       LAQs 15x 1# Hamstring curls 15x 1#          Standing      HR  15x     Mini-squats to 60 deg 10x     Hip extensions  15x  R LE only    Hip abduction  15x  R LE only   Step ups 15x 2\"    Step downs 10x 2\"    Other:     Specific Instructions for next treatment:      Treatment Charges: Mins Units   []  Modalities     [x]  Ther Exercise 30 2   []  Manual Therapy     []  Ther Activities     []  Aquatics     [x]  Vasocompression 15 1   []  Other     Total Treatment time 45 mins        Assessment: [x] Progressing toward goals. Continued exercises per log this date to increase knee ROM and strength. Patient notes \"popping\" in knee with SAQs, and prone knee flexed with hip extensions. Fair tolerance/self limiting per patient for exercise advancement. Patient has good knowledge of exercise techniques, minimal cues for exercise progressions. [] No change. [] Other:    STG: (to be met in 8 treatments)  1. ? Pain:4/10 left knee pain with ambulation. --pain in knee with walking 8/10  2. ? ROM: R knee flexion 0-120 degrees to improve gait pattern. --8-101   3. ? Strength: 4+/5 left knee flexion and extension to improve squatting.--4/5 pain in patella   4. ? Function: LEFS score to 50% impaired or better to improve ADLs. 5. Independent with Home Exercise Programs--progress     LTG: (to be met in 12 treatments)  1. Pt will be able to ambulate community distances without AD and 2/10 pain. --not met   2. Pt will be able to climb stair with reciprocal gait pattern. --not doing stairs          Patient goals: Reduce pain, normalize gait. Pt. Education:  [x] Yes  [] No  [x] Reviewed Prior HEP/Ed  Method of Education: [x] Verbal  [x] Demo  [] Written  Comprehension of Education:  [x] Verbalizes understanding. [x] Demonstrates understanding. [x] Needs review. [] Demonstrates/verbalizes HEP/Ed previously given. Plan: [x] Continue per plan of care.    [] Other:      Time In: 11:50 am            Time Out: 12:35 pm    Electronically signed

## 2017-10-17 ENCOUNTER — HOSPITAL ENCOUNTER (OUTPATIENT)
Age: 42
Discharge: HOME | End: 2017-10-17
Payer: MEDICARE

## 2017-10-17 ENCOUNTER — HOSPITAL ENCOUNTER (OUTPATIENT)
Dept: GENERAL RADIOLOGY | Age: 42
Discharge: HOME | End: 2017-10-17
Payer: MEDICARE

## 2017-10-17 DIAGNOSIS — N20.0 STAGHORN CALCULUS: ICD-10-CM

## 2017-10-17 PROCEDURE — 74000 XR ABDOMEN LIMITED (KUB): CPT

## 2017-10-19 ENCOUNTER — HOSPITAL ENCOUNTER (OUTPATIENT)
Age: 42
Setting detail: SPECIMEN
Discharge: HOME | End: 2017-10-19
Payer: MEDICARE

## 2017-10-19 ENCOUNTER — OFFICE VISIT (OUTPATIENT)
Dept: OBGYN CLINIC | Age: 42
End: 2017-10-19
Payer: MEDICARE

## 2017-10-19 VITALS — BODY MASS INDEX: 34.6 KG/M2 | DIASTOLIC BLOOD PRESSURE: 64 MMHG | WEIGHT: 188 LBS | SYSTOLIC BLOOD PRESSURE: 120 MMHG

## 2017-10-19 DIAGNOSIS — N94.10 DYSPAREUNIA, FEMALE: ICD-10-CM

## 2017-10-19 DIAGNOSIS — N94.10 DYSPAREUNIA, FEMALE: Primary | ICD-10-CM

## 2017-10-19 DIAGNOSIS — Z12.4 CERVICAL CANCER SCREENING: ICD-10-CM

## 2017-10-19 LAB
DIRECT EXAM: NORMAL
Lab: NORMAL
SPECIMEN DESCRIPTION: NORMAL
STATUS: NORMAL

## 2017-10-19 PROCEDURE — 4004F PT TOBACCO SCREEN RCVD TLK: CPT | Performed by: NURSE PRACTITIONER

## 2017-10-19 PROCEDURE — G8484 FLU IMMUNIZE NO ADMIN: HCPCS | Performed by: NURSE PRACTITIONER

## 2017-10-19 PROCEDURE — G8417 CALC BMI ABV UP PARAM F/U: HCPCS | Performed by: NURSE PRACTITIONER

## 2017-10-19 PROCEDURE — G8428 CUR MEDS NOT DOCUMENT: HCPCS | Performed by: NURSE PRACTITIONER

## 2017-10-19 PROCEDURE — 99213 OFFICE O/P EST LOW 20 MIN: CPT | Performed by: NURSE PRACTITIONER

## 2017-10-19 ASSESSMENT — ENCOUNTER SYMPTOMS
SHORTNESS OF BREATH: 0
GASTROINTESTINAL NEGATIVE: 1
NAUSEA: 0
BACK PAIN: 0
DIARRHEA: 0
CONSTIPATION: 0
ABDOMINAL DISTENTION: 0
ANAL BLEEDING: 0
BLOOD IN STOOL: 0
COUGH: 0
ABDOMINAL PAIN: 0
STRIDOR: 0
WHEEZING: 0

## 2017-10-20 ENCOUNTER — HOSPITAL ENCOUNTER (OUTPATIENT)
Dept: PHYSICAL THERAPY | Age: 42
Setting detail: THERAPIES SERIES
Discharge: HOME | End: 2017-10-20
Payer: MEDICARE

## 2017-10-20 PROCEDURE — 97110 THERAPEUTIC EXERCISES: CPT

## 2017-10-20 PROCEDURE — 97016 VASOPNEUMATIC DEVICE THERAPY: CPT

## 2017-10-22 LAB
CULTURE: NORMAL
CULTURE: NORMAL
Lab: NORMAL
SPECIMEN DESCRIPTION: NORMAL
STATUS: NORMAL

## 2017-10-23 LAB
HPV SAMPLE: NORMAL
HPV SOURCE: NORMAL
HPV, GENOTYPE 16: NOT DETECTED
HPV, GENOTYPE 18: NOT DETECTED
HPV, HIGH RISK OTHER: NOT DETECTED
HPV, INTERPRETATION: NORMAL

## 2017-10-24 ENCOUNTER — TELEPHONE (OUTPATIENT)
Dept: OBGYN CLINIC | Age: 42
End: 2017-10-24

## 2017-10-24 NOTE — TELEPHONE ENCOUNTER
----- Message from Manjit Ramirez CNP sent at 10/22/2017  2:44 PM EDT -----  Neg infection,   Tissue is thin try OTC replens   twice weekly & use astroglide or [place] with [place]  I would consider [place] ,but she is [place] will not be covered  Out of pocket expense will be  60 - 90 per month

## 2017-10-25 ENCOUNTER — HOSPITAL ENCOUNTER (OUTPATIENT)
Dept: PHYSICAL THERAPY | Age: 42
Setting detail: THERAPIES SERIES
Discharge: HOME | End: 2017-10-25
Payer: MEDICARE

## 2017-10-25 LAB — CYTOLOGY REPORT: NORMAL

## 2017-10-25 PROCEDURE — 97110 THERAPEUTIC EXERCISES: CPT

## 2017-10-25 NOTE — FLOWSHEET NOTE
[x] Roman Lombardi       Outpatient Physical        Therapy       [address]       Phone: [phone]       Fax: [phone] [] [facility] Promotion at [address]       Phone: [phone]       Fax: [phone] [] [place]. [facility] Health Promotion  [address] [place] [address]   Phone: [phone]   Fax:  [phone]     Physical Therapy Daily Treatment Note    Date:  10/25/2017  Patient Name:  Maria C Alatorre    :  1975  MRN: [id number]  Physician: Hetal Piper DO                                                               Insurance: [place] Advantage 13 used  Medical Diagnosis:             Patellofemoral syndrome of R knee M22.2x1, Primary OA of Right knee M17.11, S/P R knee scope                   Rehab Codes: R26.2, M25.661  Onset date: 17                                                         Next Dr's appt.: 17  Visit# / total visits:   Cancels/No Shows: 0/0    Subjective:    Pain:  [x] Yes  [] No Location: R knee Pain Rating: (0-10 scale) 6/10  Pain altered Tx:  [x] No  [] Yes  Action:  Comments: Patient states pain is 6/10 this date. Took motrin prior to arrival. Feels like patella is moving on her. States that tape helped for the rest of the day, but came off on Saturday. \"Knee feels the best it has today. \"    Objective:  Modalities: Vasocompression to R knee In supine elevated at low pressure and 38 degrees x 15 minutes after exercises--patient will ice at home this date.   Precautions:  Exercises:  Exercise Reps/ Time Weight/ Level Comments   Supine      Patellar mobs   X     Quad sets  15x  No towel roll   Heel slides  15x 1# Orange slide tube  pain   SAQs   15x 1#    SLR  15x 1#    SLR with ER   10x 1#    Add sets  15x  Yellow ball         Sidelying      Hip abduction 15x 1#          Prone      Hamstring curls 15x 1#    Hip extension with knee bent 15x 1#          Seated       LAQs 15x 1# care.   [] Other:       Time In: 2:20 pm            Time Out: 2:46 pm    Electronically signed by:  Jeannine Dejesus PTA

## 2017-10-26 RX ORDER — ONDANSETRON 4 MG/1
4 TABLET, FILM COATED ORAL EVERY 8 HOURS PRN
Qty: 30 TABLET | Refills: 1 | Status: SHIPPED | OUTPATIENT
Start: 2017-10-26 | End: 2018-02-08 | Stop reason: SDUPTHER

## 2017-10-26 NOTE — TELEPHONE ENCOUNTER
From: Arin Hughes  Sent: 10/25/2017 6:50 PM EDT  Subject: Medication Renewal Request    Maria G Stewart.  Abebe would like a refill of the following medications:  ondansetron (ZOFRAN) 4 MG tablet Basilio Arevalo NP]    Preferred pharmacy: [id number] [phone] [facility] [phone]    Comment:      Medication renewals requested in this message routed separately:  docusate sodium (COLACE) 100 MG capsule Blayne Collazo, DO]

## 2017-10-27 ENCOUNTER — HOSPITAL ENCOUNTER (OUTPATIENT)
Dept: PHYSICAL THERAPY | Age: 42
Setting detail: THERAPIES SERIES
Discharge: HOME | End: 2017-10-27
Payer: MEDICARE

## 2017-10-30 NOTE — FLOWSHEET NOTE
[x] Bola Ho        Outpatient Physical                Therapy       [address]       Phone: [phone]       Fax: [phone] [] [facility] at [address]       Phone: [phone]       Fax: [phone] [] [place]. [address] [place] [address]      Phone: [phone]      Fax:  [phone]     Physical Therapy Cancel/No Show note    Date: 10/30/2017  Patient: Josephine Alicea  : 1975  MRN: [id number]    Cancels/No Shows to date:  (1 cancel greater than 24 hours)    For today's appointment patient:  [x]  Cancelled  []  Rescheduled appointment  []  No-show     Reason given by patient:  []  Patient ill  []  Conflicting appointment  []  No transportation    []  Conflict with work  []  No reason given  []  Weather related  [x]  Other: family stuff   Comments: Confirmed appointment time for Friday, also scheduled patient for next week.      Electronically signed by:  [address], SHARAD

## 2017-11-01 ENCOUNTER — HOSPITAL ENCOUNTER (OUTPATIENT)
Dept: PHYSICAL THERAPY | Age: 42
Setting detail: THERAPIES SERIES
Discharge: HOME | End: 2017-11-01
Payer: MEDICARE

## 2017-11-03 ENCOUNTER — HOSPITAL ENCOUNTER (OUTPATIENT)
Dept: PHYSICAL THERAPY | Age: 42
Setting detail: THERAPIES SERIES
Discharge: HOME | End: 2017-11-03
Payer: MEDICARE

## 2017-11-03 PROCEDURE — 97110 THERAPEUTIC EXERCISES: CPT

## 2017-11-03 NOTE — FLOWSHEET NOTE
[x] Sandi Adkins       Outpatient Physical        Therapy       [address]       Phone: [phone]       Fax: [phone] [] [facility] Promotion at [address]       Phone: [phone]       Fax: [phone] [] [place]. [address] [place] [address] Promotion  [address] [place] [address]   Phone: [phone]   Fax:  [phone]     Physical Therapy Daily Treatment Note    Date:  11/3/2017  Patient Name:  Garland Gifford    :  1975  MRN: [id number]  Physician: Tonie Atkins DO                                                               Insurance: [employer] Advantage 13 used  Medical Diagnosis:             Patellofemoral syndrome of R knee M22.2x1, Primary OA of Right knee M17.11, S/P R knee scope                   Rehab Codes: R26.2, M25.661  Onset date: 17                                                         Next Dr's appt.: 17  Visit# / total visits:   Cancels/No Shows: 1/0    Subjective:    Pain:  [x] Yes  [] No Location: R knee Pain Rating: (0-10 scale) 8/10  Pain altered Tx:  [x] No  [] Yes  Action:  Comments:Patient arrives stating she took pain meds prior to arrival. C/o knee pain 8/10. Notes that she sat on the floor yesterday, and had difficulty getting back up, knee was \"stuck. \"     Objective:  Modalities: Vasocompression to R knee In supine elevated at low pressure and 38 degrees x 15 minutes after exercises--patient will ice at home this date.   Precautions:  Exercises:  Exercise Reps/ Time Weight/ Level Comments   Supine      Patellar mobs   X     Quad sets  15x  No towel roll   Heel slides  12x  Orange slide tube  pain   SAQs   15x     SLR  15x     SLR with ER   10x     Add sets  15x  Yellow ball         Sidelying      Hip abduction 15x           Prone      Hamstring curls 15x     Hip extension with knee bent 15x           Seated       LAQs 15x     Hamstring curls 15x           Standing      HR   15x

## 2017-11-06 ENCOUNTER — OFFICE VISIT (OUTPATIENT)
Dept: ORTHOPEDIC SURGERY | Age: 42
End: 2017-11-06

## 2017-11-06 VITALS
SYSTOLIC BLOOD PRESSURE: 119 MMHG | BODY MASS INDEX: 34.96 KG/M2 | DIASTOLIC BLOOD PRESSURE: 83 MMHG | WEIGHT: 190 LBS | HEIGHT: 62 IN | HEART RATE: 105 BPM

## 2017-11-06 DIAGNOSIS — M25.561 PATELLOFEMORAL ARTHRALGIA OF RIGHT KNEE: Primary | ICD-10-CM

## 2017-11-06 PROCEDURE — 99024 POSTOP FOLLOW-UP VISIT: CPT | Performed by: ORTHOPAEDIC SURGERY

## 2017-11-06 NOTE — PROGRESS NOTES
[address] [place] [address] [place] [phone]  Dept: [phone]  Dept Fax: [phone]        Ambulatory Follow Up      Subjective:   Karie Cassidy is a 36y.o. year old female who presents to our office today for routine followup regarding her chronic right patellofemoral pain syndrome. She is brought to 6 weeks out from an arthroscopic lateral patellofemoral release as well. She was noted to have significant patellofemoral arthritic changes but her medial and lateral compartments showed minimal degenerative changes of the cartilage. She states she's been doing physical therapy but is still having difficulties with stairs and squatting as expected. She reports continued crepitance with her knee. She denies symptoms of instability    Chief Complaint   Patient presents with    Knee Pain     Right knee pain       HPI    Review of Systems  Gen: no fever, chills, malaise  CV: no chest pain or palpitations  Resp: no cough or shortness of breath  GI: no nausea, vomiting, diarrhea, or constipation  Neuro: no numbness, tingling, or weakness  Msk: Right anterior knee arthralgias no myalgias  10 remaining systems reviewed and negative    Objective :     Vitals:    11/06/17 1112   BP: 119/83   Pulse: 105   Body mass index is 34.75 kg/m². General: Karie Cassidy is a 36 y.o. female who is alert and oriented and sitting comfortably in our office. Ortho Exam  MS:  Right knee with significant crepitance of the patellofemoral joint with range of motion. Mild tenderness to palpation at the tibial tubercle but none over the medial lateral aspects of the patella. no effusion appreciated. Since last patellar tracking with flexion and extension  Neuro: alert. oriented  Eyes: Extra-ocular muscles intact  Mouth: Oral mucosa moist. No perioral lesions  Pulm: Respirations unlabored and regular.   Skin: warm, well perfused  Psych:   Patient has good fund of knowledge and displays understanging of exam, diagnosis, and plan. Radiology:  History: Patellofemoral syndrome status post lateral release    Comparison: Preoperative x-rays    Findings: 4 weightbearing views views of the right knee showing moderate arthritic changes of the patellofemoral joint and minimal medial compartment joint space narrowing    Impression: Early osteoarthritic changes of the right knee     Assessment:      1. Patellofemoral arthralgia of right knee       Plan:      Patient states she has not seen any significant improvement since her surgery however she is still only 6 weeks out from surgery. She only has 5 visits left this year of physical therapy according to her insurance. We'll encourage her to finish the therapy and then extended to a home regimen. I would like to reevaluate her after the first of the year to assess her progress as well as any potential need for additional formal physical therapy. We did have a brief discussion of the possibility of a patellofemoral arthroplasty down the road. She understands this is an option but appropriately wishes to delay as long as possible. She is welcome call the office with any questions or concerns may arise between here and her follow-up. Follow up:Return in about 3 months (around 2/6/2018), or if symptoms worsen or fail to improve. No orders of the defined types were placed in this encounter.          Orders Placed This Encounter   Procedures    XR KNEE RIGHT (MIN 4 VIEWS)     Order Specific Question:   Reason for exam:     Answer:   Eval patellofemoral tracking       Electronically signed by Jaime Oakley DO on 11/6/2017 at 12:13 PM

## 2017-11-08 ENCOUNTER — HOSPITAL ENCOUNTER (OUTPATIENT)
Dept: PHYSICAL THERAPY | Age: 42
Setting detail: THERAPIES SERIES
Discharge: HOME | End: 2017-11-08
Payer: MEDICARE

## 2017-11-08 PROCEDURE — 97110 THERAPEUTIC EXERCISES: CPT

## 2017-11-08 NOTE — FLOWSHEET NOTE
[x] [facility]       Outpatient Physical        Therapy       [address]       Phone: [phone]       Fax: [phone] [] [facility] at [address] [facility]       Phone: [phone]       Fax: [phone] [] [place]. [facility]   Phone: [phone]   Fax:  [phone]     Physical Therapy Daily Treatment Note    Date:  2017  Patient Name:  Antonette Damian    :  1975  MRN: [id number]  Physician: Tello Mar DO                                                               Insurance: [employer] Advantage 13 used  Medical Diagnosis:             Patellofemoral syndrome of R knee M22.2x1, Primary OA of Right knee M17.11, S/P R knee scope                   Rehab Codes: R26.2, M25.661  Onset date: 17                                                         Next Dr's appt.: 17  Visit# / total visits:   Cancels/No Shows: 1/0    Subjective:    Pain:  [x] Yes  [] No Location: R knee Pain Rating: (0-10 scale) 7/10  Pain altered Tx:  [x] No  [] Yes  Action:  Comments: Patient states pain is still the same. Seen Doctor on Monday and wants her to finish up therapy. Patient and doctor will talk about a partial knee replacement next year. Objective:  Modalities: Vasocompression to R knee In supine elevated at low pressure and 38 degrees x 15 minutes after exercises--patient will ice at home this date.   Precautions:  Exercises:  Exercise Reps/ Time Weight/ Level Comments   Supine      Patellar mobs   X     Quad sets  15x  No towel roll   Heel slides  12x  Orange slide tube  pain   SAQs   15x     SLR  15x     SLR with ER  10x     Add sets  15x  Yellow ball         Sidelying      Hip abduction 15x           Prone      Hamstring curls 15x     Hip extension with knee bent 15x           Seated       LAQs 15x     Hamstring curls 15x           Standing      HR   15x Mini-squats to 60 deg  10x     Hip extensions   15x  B LE this date   Hip abduction   15x  B LE this date   Step ups 15x 2\"    Step downs 10x 2\"    Gastroc stretch 3x 15 sec    Other:    Specific Instructions for next treatment:      Treatment Charges: Mins Units   []  Modalities     [x]  Ther Exercise 27 2   []  Manual Therapy     []  Ther Activities     []  Aquatics     []  Vasocompression     []  Other     Total Treatment time 27 mins        Assessment: [x] Progressing toward goals. Patient tends to self limit exercises, therefore no increase in reps or additional exercises this date. Minimal cues for exercise set up and progressions. Patient has good knowledge. [] No change. [] Other:    STG: (to be met in 8 treatments) 11/3/17  1. ? Pain:4/10 left knee pain with ambulation. --pain in knee with walking 8/10  2. ? ROM: R knee flexion 0-120 degrees to improve gait pattern. --4-101   3. ? Strength: 4+/5 left knee flexion and extension to improve squatting.--4/5 pain in patella   4. ? Function: LEFS score to 50% impaired or better to improve ADLs.--76% impaired   5. Independent with Home Exercise Programs--progress     LTG: (to be met in 12 treatments)  1. Pt will be able to ambulate community distances without AD and 2/10 pain. --not met   2. Pt will be able to climb stair with reciprocal gait pattern. --not doing stairs          Patient goals: Reduce pain, normalize gait. Pt. Education:  [x] Yes  [] No  [x] Reviewed Prior HEP/Ed  Method of Education: [x] Verbal  [x] Demo  [] Written  Comprehension of Education:  [x] Verbalizes understanding. [x] Demonstrates understanding. [x] Needs review. [] Demonstrates/verbalizes HEP/Ed previously given. Plan: [x] Continue per plan of care.    [] Other:       Time In: 2:34 pm            Time Out: 3:03 pm    Electronically signed by:  You Lazaro PTA

## 2017-11-10 ENCOUNTER — HOSPITAL ENCOUNTER (OUTPATIENT)
Dept: PHYSICAL THERAPY | Age: 42
Setting detail: THERAPIES SERIES
Discharge: HOME | End: 2017-11-10
Payer: MEDICARE

## 2017-11-10 PROCEDURE — 97110 THERAPEUTIC EXERCISES: CPT

## 2017-11-15 ENCOUNTER — HOSPITAL ENCOUNTER (OUTPATIENT)
Dept: PHYSICAL THERAPY | Age: 42
Setting detail: THERAPIES SERIES
Discharge: HOME | End: 2017-11-15
Payer: MEDICARE

## 2017-11-15 PROCEDURE — 97110 THERAPEUTIC EXERCISES: CPT

## 2017-11-15 NOTE — FLOWSHEET NOTE
[x] Sandi Jed       Outpatient Physical        Therapy       [address]       Phone: [phone]       Fax: [phone] [] [facility] Promotion at [address]       Phone: [phone]       Fax: [phone] [] [place]. [facility] Promotion  [address] [place] [address]   Phone: [phone]   Fax:  [phone]     Physical Therapy Daily Treatment Note    Date:  11/15/2017  Patient Name:  Garland Gifford    :  1975  MRN: [id number]  Physician: Tonie Atkins DO                                                               Insurance: [place] Advantage 13 used  Medical Diagnosis:             Patellofemoral syndrome of R knee M22.2x1, Primary OA of Right knee M17.11, S/P R knee scope                   Rehab Codes: R26.2, M25.661  Onset date: 17                                                         Next Dr's appt. : 1/15/17  Visit# / total visits:   Cancels/No Shows: 1/0    Subjective:    Pain:  [x] Yes  [] No Location: R knee Pain Rating: (0-10 scale) 8/10  Pain altered Tx:  [x] No  [] Yes  Action:  Comments: Patient states she is achy today secondary to the weather. Pain a little bit higher also. Objective:  Modalities: Vasocompression to R knee In supine elevated at low pressure and 38 degrees x 15 minutes after exercises--patient will ice at home this date.   Precautions:  Exercises:  Exercise Reps/ Time Weight/ Level Comments   Supine      Patellar mobs   X     Quad sets  15x  No towel roll   Heel slides  15x 1# Orange slide tube    SAQs   15x 1#    SLR  15x 1#    SLR with ER  10x 1#    Add sets  15x  Yellow ball         Sidelying      Hip abduction 15x 1#          Prone      Hamstring curls 15x 1#    Hip extension with knee bent 15x 1#          Seated       LAQs 15x     Hamstring curls 15x           Standing      HR   15x     Mini-squats to 60 deg  10x     Hip extensions   15x  B LE   Hip abduction [address], PTA

## 2017-11-16 ENCOUNTER — HOSPITAL ENCOUNTER (OUTPATIENT)
Dept: PHYSICAL THERAPY | Age: 42
Setting detail: THERAPIES SERIES
Discharge: HOME | End: 2017-11-16
Payer: MEDICARE

## 2017-11-16 ENCOUNTER — OFFICE VISIT (OUTPATIENT)
Dept: UROLOGY | Age: 42
End: 2017-11-16
Payer: MEDICARE

## 2017-11-16 VITALS
HEIGHT: 62 IN | HEART RATE: 87 BPM | BODY MASS INDEX: 35.15 KG/M2 | DIASTOLIC BLOOD PRESSURE: 71 MMHG | WEIGHT: 191 LBS | SYSTOLIC BLOOD PRESSURE: 101 MMHG

## 2017-11-16 DIAGNOSIS — N39.0 RECURRENT UTI: Primary | ICD-10-CM

## 2017-11-16 DIAGNOSIS — N11.8 XANTHOGRANULOMATOUS PYELONEPHRITIS: ICD-10-CM

## 2017-11-16 DIAGNOSIS — Z87.442 PERSONAL HISTORY OF KIDNEY STONES: ICD-10-CM

## 2017-11-16 PROBLEM — K59.09 CHRONIC CONSTIPATION: Status: ACTIVE | Noted: 2017-10-26

## 2017-11-16 PROBLEM — G43.009 MIGRAINE WITHOUT AURA AND WITHOUT STATUS MIGRAINOSUS, NOT INTRACTABLE: Status: ACTIVE | Noted: 2017-03-09

## 2017-11-16 PROBLEM — M54.2 CERVICALGIA: Status: ACTIVE | Noted: 2017-10-26

## 2017-11-16 PROBLEM — R00.2 PALPITATIONS: Status: ACTIVE | Noted: 2017-11-16

## 2017-11-16 PROBLEM — R07.9 CHEST PAIN: Status: ACTIVE | Noted: 2017-11-16

## 2017-11-16 LAB
APPEARANCE FLUID: NORMAL
BILIRUBIN, POC: NORMAL
BLOOD URINE, POC: NORMAL
CLARITY, POC: CLEAR
COLOR, POC: YELLOW
GLUCOSE URINE, POC: NORMAL
KETONES, POC: NORMAL
LEUKOCYTE EST, POC: NORMAL
NITRITE, POC: NORMAL
PH, POC: 6
PROTEIN, POC: NORMAL
SPECIFIC GRAVITY, POC: 1.01
UROBILINOGEN, POC: 0.2

## 2017-11-16 PROCEDURE — 99213 OFFICE O/P EST LOW 20 MIN: CPT | Performed by: SPECIALIST

## 2017-11-16 PROCEDURE — G8417 CALC BMI ABV UP PARAM F/U: HCPCS | Performed by: SPECIALIST

## 2017-11-16 PROCEDURE — 4004F PT TOBACCO SCREEN RCVD TLK: CPT | Performed by: SPECIALIST

## 2017-11-16 PROCEDURE — G8484 FLU IMMUNIZE NO ADMIN: HCPCS | Performed by: SPECIALIST

## 2017-11-16 PROCEDURE — G8427 DOCREV CUR MEDS BY ELIG CLIN: HCPCS | Performed by: SPECIALIST

## 2017-11-16 PROCEDURE — 97110 THERAPEUTIC EXERCISES: CPT

## 2017-11-16 NOTE — DISCHARGE SUMMARY
[x] Amy Douglas        Outpatient Physical                Therapy       [address]       Phone: [phone]       Fax: [phone] [] [facility] at [address] [facility]       Phone: [phone]       Fax: [phone] [] [place]. [address] [place] [address]     [address] [facility]     Phone: [phone]     Fax:  [phone]     Physical Therapy Discharge Note    Date: 2017      Patient: Chari Hu  : 1975  MRN: [id number]    Physician: Kimberli Cho DO                                                               Insurance: [place] Advantage 13 used  Medical Diagnosis:             Patellofemoral syndrome of R knee M22.2x1, Primary OA of Right knee M17.11, S/P R knee scope                   Rehab Codes: R26.2, M25.661  Onset date: 17                                                         Next 's appt. : 1/15/17  Visit# / total visits:                   Cancels/No Shows: 1/0  Date of initial visit: 9/15/17                Date of final visit: 17      Subjective:  Pain:  [x] Yes  [] No  Location: R knee Pain Rating: (0-10 scale) 8/10  Pain altered Tx:  [x] No  [] Yes  Action:  Comments: Patient arrives stating no change in pain rating. Feels like knee has gotten worse since starting therapy. Objective:  Test Measurements: knee ROM decreased since initial eval. See goals below. Function: LEFS score has increased impairment. See goals below. Assessment:  STG: (to be met in 8 treatments) 11/15/17  1. ? Pain:4/10 left knee pain with ambulation. --pain in knee with walking 7-8/10 worse in morning  2. ? ROM: R knee flexion 0-120 degrees to improve gait pattern.--4-96   3. ? Strength: 4+/5 left knee flexion and extension to improve squatting.--quads 4/5, hamstrings 4+/5 pain in patella   4. ? Function: LEFS score to 50% impaired or better to improve ADLs.--90% impaired

## 2017-11-16 NOTE — PROGRESS NOTES
Alberto Perales MD, [facility]  [facility] [address] [facility] Progress Note      Patient:  Lashaun Arriola  YOB: 1975  Date: 11/16/2017    HISTORY OF PRESENT ILLNESS:   The patient is a 36 y.o. female who presents today for follow-up for the following problem(s): recurrent UTI and history of xgp and stones  Overall the problem(s) : show no change. Associated Symptoms: No dysuria, gross hematuria. 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 No flank pain or gross hematuria to suggest recurrent kidney stones. Her 10/17/17 KUB showed no evidence of recurrent stones. She is seeing Dr. Millie Carranza at [facility] for recurrent UTI's.     Imaging Reviewed during this Office Visit:   (results were independently reviewed by physician and radiology report verified)    Urinalysis today:  Results for POC orders placed in visit on 11/16/17   POCT Urinalysis no Micro   Result Value Ref Range    Color, UA yellow     Clarity, UA clear     Glucose, UA POC neg     Bilirubin, UA neg     Ketones, UA neg     Spec Grav, UA 1.015     Blood, UA POC neg     pH, UA 6.0     Protein, UA POC neg     Urobilinogen, UA 0.2     Leukocytes, UA neg     Nitrite, UA neg     Appearance, Fluid  Clear, Slightly Cloudy       Last BUN and creatinine:  Lab Results   Component Value Date    BUN 15 09/29/2017     Lab Results   Component Value Date    CREATININE 1.12 (H) 09/29/2017       PAST MEDICAL, FAMILY AND SOCIAL HISTORY UPDATE:  Past Medical History:   Diagnosis Date    Anemia     Anxiety     Asthma     Bipolar 1 disorder (HCC)     Blackout spell     Body piercing     X2 ABOVE AND BELOW LIP     Cervicalgia 8/14/2013    Chest pain     Chronic back pain     Chronic kidney disease     stage 3    Chronic 10/17/17 KUB showed no evidence of recurrent stones. She is seeing Dr. Aydin Baez at [facility] for recurrent UTI's. I have discussed the care of this patient including pertinent history and exam findings, with the resident. I have seen and examined the patient and the key elements of all parts of the encounter have been performed by me. I agree with the assessment, plan and orders as documented by the resident. Zoe Craig MD, FACS

## 2017-12-20 ENCOUNTER — OFFICE VISIT (OUTPATIENT)
Dept: BARIATRICS/WEIGHT MGMT | Age: 42
End: 2017-12-20
Payer: MEDICARE

## 2017-12-20 VITALS
HEIGHT: 62 IN | SYSTOLIC BLOOD PRESSURE: 116 MMHG | DIASTOLIC BLOOD PRESSURE: 72 MMHG | HEART RATE: 72 BPM | RESPIRATION RATE: 20 BRPM | WEIGHT: 193 LBS | BODY MASS INDEX: 35.51 KG/M2

## 2017-12-20 DIAGNOSIS — M19.90 ARTHRITIS: ICD-10-CM

## 2017-12-20 DIAGNOSIS — N18.30 CHRONIC KIDNEY DISEASE, STAGE III (MODERATE) (HCC): ICD-10-CM

## 2017-12-20 DIAGNOSIS — M25.561 PATELLOFEMORAL ARTHRALGIA OF RIGHT KNEE: ICD-10-CM

## 2017-12-20 DIAGNOSIS — G43.009 MIGRAINE WITHOUT AURA AND WITHOUT STATUS MIGRAINOSUS, NOT INTRACTABLE: ICD-10-CM

## 2017-12-20 DIAGNOSIS — E66.9 OBESITY (BMI 30-39.9): Chronic | ICD-10-CM

## 2017-12-20 DIAGNOSIS — J45.909 UNCOMPLICATED ASTHMA, UNSPECIFIED ASTHMA SEVERITY, UNSPECIFIED WHETHER PERSISTENT: Primary | ICD-10-CM

## 2017-12-20 PROCEDURE — G8417 CALC BMI ABV UP PARAM F/U: HCPCS | Performed by: NURSE PRACTITIONER

## 2017-12-20 PROCEDURE — 1036F TOBACCO NON-USER: CPT | Performed by: NURSE PRACTITIONER

## 2017-12-20 PROCEDURE — G8484 FLU IMMUNIZE NO ADMIN: HCPCS | Performed by: NURSE PRACTITIONER

## 2017-12-20 PROCEDURE — 99213 OFFICE O/P EST LOW 20 MIN: CPT | Performed by: NURSE PRACTITIONER

## 2017-12-20 PROCEDURE — G8427 DOCREV CUR MEDS BY ELIG CLIN: HCPCS | Performed by: NURSE PRACTITIONER

## 2017-12-22 ENCOUNTER — HOSPITAL ENCOUNTER (OUTPATIENT)
Dept: WOMENS IMAGING | Age: 42
Discharge: HOME | End: 2017-12-22
Payer: MEDICARE

## 2017-12-22 DIAGNOSIS — Z12.31 SCREENING MAMMOGRAM, ENCOUNTER FOR: ICD-10-CM

## 2017-12-22 PROCEDURE — G0202 SCR MAMMO BI INCL CAD: HCPCS

## 2018-01-02 RX ORDER — ACETAMINOPHEN 500 MG
TABLET ORAL
Qty: 120 TABLET | Refills: 2 | Status: SHIPPED | OUTPATIENT
Start: 2018-01-02 | End: 2018-03-26 | Stop reason: SDUPTHER

## 2018-01-04 LAB
ALBUMIN SERPL-MCNC: 4.3 G/DL
ALP BLD-CCNC: 131 U/L
ALT SERPL-CCNC: 42 U/L
ANION GAP SERPL CALCULATED.3IONS-SCNC: NORMAL MMOL/L
AST SERPL-CCNC: 31 U/L
AVERAGE GLUCOSE: 100
BASOPHILS ABSOLUTE: 0 /ΜL
BASOPHILS RELATIVE PERCENT: 0.4 %
BILIRUB SERPL-MCNC: 0.3 MG/DL (ref 0.1–1.4)
BUN BLDV-MCNC: 18 MG/DL
CALCIUM SERPL-MCNC: 10.3 MG/DL
CHLORIDE BLD-SCNC: 104 MMOL/L
CHOLESTEROL, TOTAL: 287 MG/DL
CHOLESTEROL/HDL RATIO: 4.9
CO2: 27 MMOL/L
CREAT SERPL-MCNC: 1.17 MG/DL
EOSINOPHILS ABSOLUTE: 0.2 /ΜL
EOSINOPHILS RELATIVE PERCENT: 2.9 %
FERRITIN: 37 NG/ML (ref 9–150)
FOLATE: 18.6
GFR CALCULATED: NORMAL
GLUCOSE BLD-MCNC: 982 MG/DL
HBA1C MFR BLD: 5.1 %
HCT VFR BLD CALC: 37.2 % (ref 36–46)
HDLC SERPL-MCNC: 59 MG/DL (ref 35–70)
HEMOGLOBIN: 12.5 G/DL (ref 12–16)
IRON: 112
LDL CHOLESTEROL CALCULATED: 198 MG/DL (ref 0–160)
LYMPHOCYTES ABSOLUTE: 1.8 /ΜL
LYMPHOCYTES RELATIVE PERCENT: 33.5 %
MAGNESIUM: 1.6 MG/DL
MCH RBC QN AUTO: 29.1 PG
MCHC RBC AUTO-ENTMCNC: 33.5 G/DL
MCV RBC AUTO: 87 FL
MONOCYTES ABSOLUTE: 0.3 /ΜL
MONOCYTES RELATIVE PERCENT: 4.5 %
NEUTROPHILS ABSOLUTE: 3.2 /ΜL
NEUTROPHILS RELATIVE PERCENT: 58.7 %
PDW BLD-RTO: 13.3 %
PLATELET # BLD: 310 K/ΜL
PMV BLD AUTO: 8.6 FL
POTASSIUM SERPL-SCNC: 4.2 MMOL/L
PTH INTACT: 32
RBC # BLD: 4.28 10^6/ΜL
SODIUM BLD-SCNC: 140 MMOL/L
T4 FREE: 0.63
TOTAL IRON BINDING CAPACITY: 367
TOTAL PROTEIN: 7.4
TRIGL SERPL-MCNC: 151 MG/DL
TSH SERPL DL<=0.05 MIU/L-ACNC: 1.3 UIU/ML
VITAMIN B-12: 428
VITAMIN D 25-HYDROXY: 29.2
VITAMIN D2, 25 HYDROXY: NORMAL
VITAMIN D3,25 HYDROXY: NORMAL
VLDLC SERPL CALC-MCNC: 30 MG/DL
WBC # BLD: 5.5 10^3/ML

## 2018-01-08 DIAGNOSIS — M19.90 ARTHRITIS: ICD-10-CM

## 2018-01-08 DIAGNOSIS — N18.30 CHRONIC KIDNEY DISEASE, STAGE III (MODERATE) (HCC): ICD-10-CM

## 2018-01-08 DIAGNOSIS — J45.909 UNCOMPLICATED ASTHMA, UNSPECIFIED ASTHMA SEVERITY, UNSPECIFIED WHETHER PERSISTENT: ICD-10-CM

## 2018-01-08 DIAGNOSIS — G43.009 MIGRAINE WITHOUT AURA AND WITHOUT STATUS MIGRAINOSUS, NOT INTRACTABLE: ICD-10-CM

## 2018-01-08 DIAGNOSIS — M25.561 PATELLOFEMORAL ARTHRALGIA OF RIGHT KNEE: ICD-10-CM

## 2018-01-08 DIAGNOSIS — E66.9 OBESITY (BMI 30-39.9): Chronic | ICD-10-CM

## 2018-01-12 RX ORDER — IBUPROFEN 800 MG/1
TABLET ORAL
Qty: 90 TABLET | Refills: 1 | Status: SHIPPED | OUTPATIENT
Start: 2018-01-12 | End: 2018-01-15 | Stop reason: ALTCHOICE

## 2018-01-15 ENCOUNTER — OFFICE VISIT (OUTPATIENT)
Dept: ORTHOPEDIC SURGERY | Age: 43
End: 2018-01-15
Payer: MEDICARE

## 2018-01-15 VITALS — WEIGHT: 194 LBS | BODY MASS INDEX: 35.7 KG/M2 | HEIGHT: 62 IN

## 2018-01-15 DIAGNOSIS — N18.30 CHRONIC KIDNEY DISEASE, STAGE III (MODERATE) (HCC): ICD-10-CM

## 2018-01-15 DIAGNOSIS — G43.009 MIGRAINE WITHOUT AURA AND WITHOUT STATUS MIGRAINOSUS, NOT INTRACTABLE: ICD-10-CM

## 2018-01-15 DIAGNOSIS — J45.909 UNCOMPLICATED ASTHMA, UNSPECIFIED ASTHMA SEVERITY, UNSPECIFIED WHETHER PERSISTENT: ICD-10-CM

## 2018-01-15 DIAGNOSIS — M19.90 ARTHRITIS: ICD-10-CM

## 2018-01-15 DIAGNOSIS — E66.9 OBESITY (BMI 30-39.9): Chronic | ICD-10-CM

## 2018-01-15 DIAGNOSIS — M22.2X1 PATELLOFEMORAL SYNDROME OF RIGHT KNEE: Primary | ICD-10-CM

## 2018-01-15 DIAGNOSIS — M25.561 PATELLOFEMORAL ARTHRALGIA OF RIGHT KNEE: ICD-10-CM

## 2018-01-15 PROCEDURE — G8417 CALC BMI ABV UP PARAM F/U: HCPCS | Performed by: STUDENT IN AN ORGANIZED HEALTH CARE EDUCATION/TRAINING PROGRAM

## 2018-01-15 PROCEDURE — 99213 OFFICE O/P EST LOW 20 MIN: CPT | Performed by: STUDENT IN AN ORGANIZED HEALTH CARE EDUCATION/TRAINING PROGRAM

## 2018-01-15 PROCEDURE — 1036F TOBACCO NON-USER: CPT | Performed by: STUDENT IN AN ORGANIZED HEALTH CARE EDUCATION/TRAINING PROGRAM

## 2018-01-15 PROCEDURE — G8484 FLU IMMUNIZE NO ADMIN: HCPCS | Performed by: STUDENT IN AN ORGANIZED HEALTH CARE EDUCATION/TRAINING PROGRAM

## 2018-01-15 PROCEDURE — G8427 DOCREV CUR MEDS BY ELIG CLIN: HCPCS | Performed by: STUDENT IN AN ORGANIZED HEALTH CARE EDUCATION/TRAINING PROGRAM

## 2018-01-15 ASSESSMENT — ENCOUNTER SYMPTOMS
DIARRHEA: 0
CHEST TIGHTNESS: 0
NAUSEA: 0
WHEEZING: 0
EYE DISCHARGE: 0
VOMITING: 0
ABDOMINAL PAIN: 0
CHOKING: 0

## 2018-01-15 NOTE — PROGRESS NOTES
Skin: Negative for rash and wound. Neurological: Negative for dizziness and light-headedness. I have reviewed the CC, HPI, ROS, PMH, FHX, Social History. I agree with the documentation provided by other staff, residents and have reviewed their documentation prior to providing my signature indicating agreement. Objective :   Ht 5' 2\" (1.575 m)   Wt 194 lb (88 kg)   LMP 09/08/2015   BMI 35.48 kg/m²  Body mass index is 35.48 kg/m². General: Wing Hernandez is a 36 y.o. female who is alert and oriented and sitting comfortably in our office. Ortho Exam  General: Wing Hernandez is a 36 y.o. female who is alert and oriented and sitting comfortably in our office. Ortho Exam  MS:  Right knee with significant crepitance of the patellofemoral joint with range of motion. Mild tenderness to palpation at the tibial tubercle. no effusion appreciated. Midline patellar tracking with flexion and extension. Pain with patellar compression. No tenderness to the medial and lateral joint lines. Overall alignment of the knee appears to be midline. Neuro: alert. oriented  Eyes: Extra-ocular muscles intact  Mouth: Oral mucosa moist. No perioral lesions  Pulm: Respirations unlabored and regular. Skin: warm, well perfused  Psych:   Patient has good fund of knowledge and displays understanging of exam, diagnosis, and plan. Radiology:   No new images today. Assessment:      1. Patellofemoral syndrome of right knee       Plan:      Continue with over-the-counter Tylenol arthritis. Recommend patient refrain from deep squats and numerous stairs. Patient can continue activity as tolerated. Patient should continue with activity modifications, assistive ambulatory devices such as a cane and her anti-inflammatory until optimal time for surgical intervention. We will plan for a right patellofemoral arthroplasty the end of February. Risks and benefits of the procedure were discussed in detail with the patient.   Consent

## 2018-01-26 ENCOUNTER — NURSE ONLY (OUTPATIENT)
Dept: BARIATRICS/WEIGHT MGMT | Age: 43
End: 2018-01-26

## 2018-01-26 DIAGNOSIS — Z01.818 PRE-OP TESTING: Primary | ICD-10-CM

## 2018-01-30 ENCOUNTER — HOSPITAL ENCOUNTER (OUTPATIENT)
Dept: GENERAL RADIOLOGY | Age: 43
Discharge: HOME | End: 2018-02-01
Payer: MEDICARE

## 2018-01-30 ENCOUNTER — HOSPITAL ENCOUNTER (OUTPATIENT)
Age: 43
Discharge: HOME | End: 2018-02-01
Payer: MEDICARE

## 2018-01-30 ENCOUNTER — HOSPITAL ENCOUNTER (EMERGENCY)
Age: 43
Discharge: HOME | End: 2018-01-30
Attending: EMERGENCY MEDICINE
Payer: MEDICARE

## 2018-01-30 VITALS
DIASTOLIC BLOOD PRESSURE: 91 MMHG | HEART RATE: 80 BPM | RESPIRATION RATE: 16 BRPM | OXYGEN SATURATION: 100 % | TEMPERATURE: 98.2 F | SYSTOLIC BLOOD PRESSURE: 141 MMHG

## 2018-01-30 DIAGNOSIS — R06.02 BREATH SHORTNESS: ICD-10-CM

## 2018-01-30 DIAGNOSIS — G43.709 CHRONIC MIGRAINE WITHOUT AURA WITHOUT STATUS MIGRAINOSUS, NOT INTRACTABLE: Primary | ICD-10-CM

## 2018-01-30 PROCEDURE — 2580000003 HC RX 258: Performed by: STUDENT IN AN ORGANIZED HEALTH CARE EDUCATION/TRAINING PROGRAM

## 2018-01-30 PROCEDURE — 71046 X-RAY EXAM CHEST 2 VIEWS: CPT

## 2018-01-30 PROCEDURE — 6360000002 HC RX W HCPCS: Performed by: STUDENT IN AN ORGANIZED HEALTH CARE EDUCATION/TRAINING PROGRAM

## 2018-01-30 PROCEDURE — 96374 THER/PROPH/DIAG INJ IV PUSH: CPT

## 2018-01-30 PROCEDURE — 96375 TX/PRO/DX INJ NEW DRUG ADDON: CPT

## 2018-01-30 PROCEDURE — G0382 LEV 3 HOSP TYPE B ED VISIT: HCPCS

## 2018-01-30 RX ORDER — DIPHENHYDRAMINE HYDROCHLORIDE 50 MG/ML
50 INJECTION INTRAMUSCULAR; INTRAVENOUS ONCE
Status: COMPLETED | OUTPATIENT
Start: 2018-01-30 | End: 2018-01-30

## 2018-01-30 RX ORDER — ONDANSETRON 2 MG/ML
4 INJECTION INTRAMUSCULAR; INTRAVENOUS ONCE
Status: COMPLETED | OUTPATIENT
Start: 2018-01-30 | End: 2018-01-30

## 2018-01-30 RX ORDER — SODIUM CHLORIDE, SODIUM LACTATE, POTASSIUM CHLORIDE, AND CALCIUM CHLORIDE .6; .31; .03; .02 G/100ML; G/100ML; G/100ML; G/100ML
500 INJECTION, SOLUTION INTRAVENOUS ONCE
Status: COMPLETED | OUTPATIENT
Start: 2018-01-30 | End: 2018-01-30

## 2018-01-30 RX ORDER — DIPHENHYDRAMINE HCL 25 MG
50 TABLET ORAL ONCE
Status: DISCONTINUED | OUTPATIENT
Start: 2018-01-30 | End: 2018-01-30

## 2018-01-30 RX ORDER — KETOROLAC TROMETHAMINE 30 MG/ML
30 INJECTION, SOLUTION INTRAMUSCULAR; INTRAVENOUS ONCE
Status: COMPLETED | OUTPATIENT
Start: 2018-01-30 | End: 2018-01-30

## 2018-01-30 RX ORDER — KETOROLAC TROMETHAMINE 30 MG/ML
30 INJECTION, SOLUTION INTRAMUSCULAR; INTRAVENOUS ONCE
Status: DISCONTINUED | OUTPATIENT
Start: 2018-01-30 | End: 2018-01-30

## 2018-01-30 RX ORDER — ONDANSETRON 4 MG/1
8 TABLET, FILM COATED ORAL ONCE
Status: DISCONTINUED | OUTPATIENT
Start: 2018-01-30 | End: 2018-01-30

## 2018-01-30 RX ADMIN — DIPHENHYDRAMINE HYDROCHLORIDE 50 MG: 50 INJECTION, SOLUTION INTRAMUSCULAR; INTRAVENOUS at 20:46

## 2018-01-30 RX ADMIN — ONDANSETRON 4 MG: 2 INJECTION INTRAMUSCULAR; INTRAVENOUS at 20:47

## 2018-01-30 RX ADMIN — SODIUM CHLORIDE, POTASSIUM CHLORIDE, SODIUM LACTATE AND CALCIUM CHLORIDE 500 ML: 600; 310; 30; 20 INJECTION, SOLUTION INTRAVENOUS at 21:12

## 2018-01-30 RX ADMIN — KETOROLAC TROMETHAMINE 30 MG: 30 INJECTION, SOLUTION INTRAMUSCULAR at 20:04

## 2018-01-30 ASSESSMENT — ENCOUNTER SYMPTOMS
BACK PAIN: 0
SINUS PAIN: 0
EYE DISCHARGE: 0
PHOTOPHOBIA: 1
TROUBLE SWALLOWING: 0
EYE PAIN: 0
COUGH: 0
VOMITING: 0
ABDOMINAL PAIN: 0
RHINORRHEA: 0
SORE THROAT: 0
DIARRHEA: 0
NAUSEA: 1
SHORTNESS OF BREATH: 0
EYE REDNESS: 0

## 2018-01-30 ASSESSMENT — PAIN DESCRIPTION - FREQUENCY: FREQUENCY: INTERMITTENT

## 2018-01-30 ASSESSMENT — PAIN SCALES - GENERAL
PAINLEVEL_OUTOF10: 10
PAINLEVEL_OUTOF10: 10

## 2018-01-30 ASSESSMENT — PAIN DESCRIPTION - LOCATION: LOCATION: HEAD

## 2018-01-30 ASSESSMENT — PAIN DESCRIPTION - DESCRIPTORS: DESCRIPTORS: ACHING

## 2018-01-30 ASSESSMENT — PAIN DESCRIPTION - ONSET: ONSET: ON-GOING

## 2018-01-30 ASSESSMENT — PAIN DESCRIPTION - PAIN TYPE: TYPE: ACUTE PAIN

## 2018-01-31 ENCOUNTER — HOSPITAL ENCOUNTER (OUTPATIENT)
Dept: PREADMISSION TESTING | Age: 43
Discharge: HOME | End: 2018-02-04
Payer: MEDICARE

## 2018-01-31 ENCOUNTER — TELEPHONE (OUTPATIENT)
Dept: ORTHOPEDIC SURGERY | Age: 43
End: 2018-01-31

## 2018-01-31 VITALS
OXYGEN SATURATION: 100 % | DIASTOLIC BLOOD PRESSURE: 78 MMHG | HEIGHT: 62 IN | HEART RATE: 85 BPM | SYSTOLIC BLOOD PRESSURE: 110 MMHG | WEIGHT: 192.8 LBS | BODY MASS INDEX: 35.48 KG/M2 | TEMPERATURE: 97.8 F | RESPIRATION RATE: 20 BRPM

## 2018-01-31 DIAGNOSIS — M22.2X1 PATELLOFEMORAL SYNDROME OF RIGHT KNEE: Primary | ICD-10-CM

## 2018-01-31 DIAGNOSIS — Z01.818 PRE-OP TESTING: ICD-10-CM

## 2018-01-31 LAB
ALBUMIN SERPL-MCNC: 4.5 G/DL (ref 3.5–5.2)
ALBUMIN/GLOBULIN RATIO: 1.9 (ref 1–2.5)
ALP BLD-CCNC: 111 U/L (ref 35–104)
ALT SERPL-CCNC: 10 U/L (ref 5–33)
ANION GAP SERPL CALCULATED.3IONS-SCNC: 13 MMOL/L (ref 9–17)
AST SERPL-CCNC: 11 U/L
BILIRUB SERPL-MCNC: 0.17 MG/DL (ref 0.3–1.2)
BILIRUBIN URINE: NEGATIVE
BUN BLDV-MCNC: 9 MG/DL (ref 6–20)
BUN/CREAT BLD: ABNORMAL (ref 9–20)
CALCIUM SERPL-MCNC: 9.8 MG/DL (ref 8.6–10.4)
CHLORIDE BLD-SCNC: 103 MMOL/L (ref 98–107)
CO2: 24 MMOL/L (ref 20–31)
COLOR: YELLOW
COMMENT UA: NORMAL
CREAT SERPL-MCNC: 1.09 MG/DL (ref 0.5–0.9)
GFR AFRICAN AMERICAN: >60 ML/MIN
GFR NON-AFRICAN AMERICAN: 55 ML/MIN
GFR SERPL CREATININE-BSD FRML MDRD: ABNORMAL ML/MIN/{1.73_M2}
GFR SERPL CREATININE-BSD FRML MDRD: ABNORMAL ML/MIN/{1.73_M2}
GLUCOSE BLD-MCNC: 88 MG/DL (ref 70–99)
GLUCOSE URINE: NEGATIVE
HCT VFR BLD CALC: 36.6 % (ref 36.3–47.1)
HEMOGLOBIN: 11.6 G/DL (ref 11.9–15.1)
KETONES, URINE: NEGATIVE
LEUKOCYTE ESTERASE, URINE: NEGATIVE
MCH RBC QN AUTO: 28 PG (ref 25.2–33.5)
MCHC RBC AUTO-ENTMCNC: 31.7 G/DL (ref 28.4–34.8)
MCV RBC AUTO: 88.4 FL (ref 82.6–102.9)
NITRITE, URINE: NEGATIVE
NRBC AUTOMATED: 0 PER 100 WBC
PDW BLD-RTO: 12.8 % (ref 11.8–14.4)
PH UA: 5.5 (ref 5–8)
PLATELET # BLD: 338 K/UL (ref 138–453)
PMV BLD AUTO: 10.2 FL (ref 8.1–13.5)
POTASSIUM SERPL-SCNC: 4.2 MMOL/L (ref 3.7–5.3)
PROTEIN UA: NEGATIVE
RBC # BLD: 4.14 M/UL (ref 3.95–5.11)
SODIUM BLD-SCNC: 140 MMOL/L (ref 135–144)
SPECIFIC GRAVITY UA: 1.01 (ref 1–1.03)
TOTAL PROTEIN: 6.9 G/DL (ref 6.4–8.3)
TURBIDITY: CLEAR
URINE HGB: NEGATIVE
UROBILINOGEN, URINE: NORMAL
WBC # BLD: 6.5 K/UL (ref 3.5–11.3)

## 2018-01-31 PROCEDURE — 36415 COLL VENOUS BLD VENIPUNCTURE: CPT

## 2018-01-31 PROCEDURE — 81003 URINALYSIS AUTO W/O SCOPE: CPT

## 2018-01-31 PROCEDURE — 85027 COMPLETE CBC AUTOMATED: CPT

## 2018-01-31 PROCEDURE — 80053 COMPREHEN METABOLIC PANEL: CPT

## 2018-01-31 RX ORDER — MULTIVIT WITH CALCIUM,IRON,MIN 27MG-0.4MG
1 TABLET ORAL DAILY
COMMUNITY

## 2018-01-31 RX ORDER — SODIUM CHLORIDE, SODIUM LACTATE, POTASSIUM CHLORIDE, CALCIUM CHLORIDE 600; 310; 30; 20 MG/100ML; MG/100ML; MG/100ML; MG/100ML
1000 INJECTION, SOLUTION INTRAVENOUS CONTINUOUS
Status: CANCELLED | OUTPATIENT
Start: 2018-01-31

## 2018-01-31 ASSESSMENT — PAIN SCALES - GENERAL
PAINLEVEL_OUTOF10: 9
PAINLEVEL_OUTOF10: 8

## 2018-01-31 ASSESSMENT — PAIN DESCRIPTION - ORIENTATION
ORIENTATION: RIGHT
ORIENTATION: RIGHT

## 2018-01-31 ASSESSMENT — PAIN DESCRIPTION - LOCATION
LOCATION: KNEE
LOCATION: KNEE

## 2018-01-31 NOTE — H&P
release capsule Take 20 mg by mouth daily   Yes Historical Provider, MD   albuterol sulfate  (90 BASE) MCG/ACT inhaler Inhale 2 puffs into the lungs every 6 hours as needed for Wheezing   Yes Historical Provider, MD   budesonide-formoterol (SYMBICORT) 160-4.5 MCG/ACT AERO Inhale 2 puffs into the lungs 2 times daily   Yes Historical Provider, MD   fluticasone (FLONASE) 50 MCG/ACT nasal spray   1 spray by Nasal route 2 times daily as needed  2/5/15  Yes Historical Provider, MD     Allergies  is allergic to claritin [loratadine] and tape [adhesive tape]. Family History  family history includes Anxiety Disorder in her daughter; Cancer in her father; Cervical Cancer in her sister; Coronary Art Dis in her father; Diabetes in her maternal cousin and maternal uncle; Heart Disease in her father; Kidney Disease in her mother; Cathleen Junk in her maternal grandmother and paternal grandfather; Migraines in her father and sister; Seizures in her father and son. Social History   reports that she quit smoking about 2 months ago. Her smoking use included Cigarettes. She started smoking about 27 years ago. She smoked 0.50 packs per day. She has never used smokeless tobacco.  1/2 ppd for 23 years, quit. reports that she does not drink alcohol. reports that she does not use drugs. Marital Status   Occupation none    OBJECTIVE:   VITALS:  height is 5' 2\" (1.575 m) and weight is 192 lb 12.8 oz (87.5 kg). Her oral temperature is 97.8 °F (36.6 °C). Her blood pressure is 110/78 and her pulse is 85. Her respiration is 20 and oxygen saturation is 100%. CONSTITUTIONAL:alert & oriented x 3, no acute distress. Very pleasant. SKIN:  Warm and dry, no rashes. Facial piercings. HEAD:  Normocephalic, atraumatic   EYES: PERRL. EOMs intact. EARS:  Hearing grossly WNL. NOSE:  Nares patent.   No rhinorrhea   MOUTH/THROAT:  benign  NECK:supple, no lymphadenopathy  LUNGS: Clear to auscultation bilaterally, no

## 2018-01-31 NOTE — ED NOTES
PT arrived to ED via self with CO migraine headache lasting three days. PT reports sensitivity to light and sound. PT states same as usual migraine. PT states that, \"benadryl, zofran, and dilala usually works really well. \"  PT denies any further issues. NAD noted. RR even and unlabored.          Romeo Armas RN  01/30/18 1944

## 2018-01-31 NOTE — ED PROVIDER NOTES
[facility] ED  eMERGENCY dEPARTMENT eNCOUnter  Resident    Pt Name: Mckenzie Galindo  MRN: [id number]  [place] 1975  Date of evaluation: 1/30/2018  PCP:  Maria C Watkins MD    [facility]       Chief Complaint   Patient presents with    Migraine         HISTORY OF PRESENT ILLNESS    Mckenzie Galindo is a 36 y.o. female who presents c/o migraine. Onset was gradual two days ago without aura. She has tried taking Imitrex and Excedrin migraine yesterday and today, but this has not helped. She has a history of migraines and states she typically gets migraines twice a month. She follows regularly with Neurology. She is prescribed Imitrex for migraines and was instructed per her neurologist to take Motrin for breakthrough symptoms. She reports she does not take Motrin because it causes her to have SOB and diarrhea. She reports in the past when she has reported to the ED with migraine it has been resolved with Toradol, Benadryl, and Zofran. She describes her headache as diffuse, constant, and pounding. It is associated with nausea and photosensitivity. She describes the pain as a 10/10. She denies thunderclap headache, neck stiffness, recent head injury, recent illness, dizziness, syncope, F/C, vision changes, chest pain, shortness of breath, RUQ pain, abdominal pain, vomiting, dysuria, flank pain and increased swelling/tenderness in bilateral lower extremities. HPI      REVIEW OF SYSTEMS       Review of Systems   Constitutional: Negative for chills, fatigue and fever. HENT: Negative for congestion, ear pain, postnasal drip, rhinorrhea, sinus pain, sneezing, sore throat and trouble swallowing. Eyes: Positive for photophobia. Negative for pain, discharge, redness and visual disturbance. Respiratory: Negative for cough and shortness of breath. Cardiovascular: Negative for chest pain, palpitations and leg swelling. Gastrointestinal: Positive for nausea.  Negative for abdominal pain, reactive to light. Right eye exhibits no discharge. Left eye exhibits no discharge. No scleral icterus. Neck: Normal range of motion. Neck supple. Cardiovascular: Normal rate, regular rhythm, normal heart sounds and intact distal pulses. Exam reveals no gallop and no friction rub. No murmur heard. Pulmonary/Chest: Breath sounds normal. No respiratory distress. She has no wheezes. She has no rales. Abdominal: Soft. Bowel sounds are normal. She exhibits no distension. There is no tenderness. There is no guarding. Musculoskeletal: She exhibits no tenderness or deformity. Lymphadenopathy:     She has no cervical adenopathy. Neurological: She is alert and oriented to person, place, and time. No cranial nerve deficit. She exhibits normal muscle tone. Coordination normal.   Skin: Skin is warm and dry. No rash noted. She is not diaphoretic. No erythema. Psychiatric: She has a normal mood and affect. Her behavior is normal.   Nursing note and vitals reviewed. DIFFERENTIAL DIAGNOSIS/MDM:   Migraine headache without aura, Tension headache, cluster headache, TMJ dysfunction, acute sinusitis, meningitis, SAH    DIAGNOSTIC RESULTS     EKG: All EKG's are interpreted by the Emergency Department Physician who either signs or Co-signs this chart in the absence of a cardiologist.      RADIOLOGY:   I directly visualized the following  images and reviewed the radiologist interpretations:  No orders to display           ED BEDSIDE ULTRASOUND:       LABS:  Labs Reviewed - No data to display        EMERGENCY DEPARTMENT COURSE:   Vitals:    Vitals:    01/30/18 1931   BP: (!) 141/91   Pulse: 80   Resp: 16   Temp: 98.2 °F (36.8 °C)   TempSrc: Oral   SpO2: 100%     Patient seen and examined. Afebrile, VSS. C/o headache that is typical of her migraines. States migraines have been resolved in the past with Toradol, Benadryl and Zofran. Patient received Toradol, Benadryl, and Zofran IV with 500 mL LR bolus.  Patient

## 2018-02-02 ASSESSMENT — PROMIS GLOBAL HEALTH SCALE
IN THE PAST 7 DAYS, HOW OFTEN HAVE YOU BEEN BOTHERED BY EMOTIONAL PROBLEMS, SUCH AS FEELING ANXIOUS, DEPRESSED, OR IRRITABLE [ON A SCALE FROM 1 (NEVER) TO 5 (ALWAYS)]?: 3
IN GENERAL, PLEASE RATE HOW WELL YOU CARRY OUT YOUR USUAL SOCIAL ACTIVITIES (INCLUDES ACTIVITIES AT HOME, AT WORK, AND IN YOUR COMMUNITY, AND RESPONSIBILITIES AS A PARENT, CHILD, SPOUSE, EMPLOYEE, FRIEND, ETC) [ON A SCALE OF 1 (POOR) TO 5 (EXCELLENT)]?: 4
IN GENERAL, HOW WOULD YOU RATE YOUR PHYSICAL HEALTH [ON A SCALE OF 1 (POOR) TO 5 (EXCELLENT)]?: 2
IN GENERAL, HOW WOULD YOU RATE YOUR SATISFACTION WITH YOUR SOCIAL ACTIVITIES AND RELATIONSHIPS [ON A SCALE OF 1 (POOR) TO 5 (EXCELLENT)]?: 2
IN GENERAL, WOULD YOU SAY YOUR HEALTH IS...[ON A SCALE OF 1 (POOR) TO 5 (EXCELLENT)]: 2
IN THE PAST 7 DAYS, HOW WOULD YOU RATE YOUR PAIN ON AVERAGE [ON A SCALE FROM 0 (NO PAIN) TO 10 (WORST IMAGINABLE PAIN)]?: 8
IN GENERAL, HOW WOULD YOU RATE YOUR MENTAL HEALTH, INCLUDING YOUR MOOD AND YOUR ABILITY TO THINK [ON A SCALE OF 1 (POOR) TO 5 (EXCELLENT)]?: 2
HOW IS THE PROMIS V1.1 BEING ADMINISTERED?: 2
TO WHAT EXTENT ARE YOU ABLE TO CARRY OUT YOUR EVERYDAY PHYSICAL ACTIVITIES SUCH AS WALKING, CLIMBING STAIRS, CARRYING GROCERIES, OR MOVING A CHAIR [ON A SCALE OF 1 (NOT AT ALL) TO 5 (COMPLETELY)]?: 3
SUM OF RESPONSES TO QUESTIONS 2, 4, 5, & 10: 9
SUM OF RESPONSES TO QUESTIONS 3, 6, 7, & 8: 17
IN THE PAST 7 DAYS, HOW WOULD YOU RATE YOUR FATIGUE ON AVERAGE [ON A SCALE FROM 1 (NONE) TO 5 (VERY SEVERE)]?: 4
IN GENERAL, WOULD YOU SAY YOUR QUALITY OF LIFE IS...[ON A SCALE OF 1 (POOR) TO 5 (EXCELLENT)]: 2
WHO IS THE PERSON COMPLETING THE PROMIS V1.1 SURVEY?: 0

## 2018-02-02 ASSESSMENT — KOOS JR
STANDING UPRIGHT: 3
RISING FROM SITTING: 2
GOING UP OR DOWN STAIRS: 3
STRAIGHTENING KNEE FULLY: 2
BENDING TO THE FLOOR TO PICK UP OBJECT: 3
TWISING OR PIVOTING ON KNEE: 2
HOW SEVERE IS YOUR KNEE STIFFNESS AFTER FIRST WAKING IN MORNING: 2

## 2018-02-08 RX ORDER — ONDANSETRON 4 MG/1
TABLET, FILM COATED ORAL
Qty: 30 TABLET | Refills: 1 | Status: SHIPPED | OUTPATIENT
Start: 2018-02-08 | End: 2018-02-27 | Stop reason: SDUPTHER

## 2018-02-14 ENCOUNTER — TELEPHONE (OUTPATIENT)
Dept: ORTHOPEDIC SURGERY | Age: 43
End: 2018-02-14

## 2018-02-14 DIAGNOSIS — M17.11 PATELLOFEMORAL ARTHRITIS OF RIGHT KNEE: Primary | ICD-10-CM

## 2018-02-20 ENCOUNTER — APPOINTMENT (OUTPATIENT)
Dept: GENERAL RADIOLOGY | Age: 43
End: 2018-02-20
Attending: ORTHOPAEDIC SURGERY
Payer: MEDICARE

## 2018-02-20 ENCOUNTER — HOSPITAL ENCOUNTER (OUTPATIENT)
Age: 43
Setting detail: OBSERVATION
Discharge: HOME | End: 2018-02-22
Attending: ORTHOPAEDIC SURGERY | Admitting: ORTHOPAEDIC SURGERY
Payer: MEDICARE

## 2018-02-20 ENCOUNTER — ANESTHESIA (OUTPATIENT)
Dept: OPERATING ROOM | Age: 43
End: 2018-02-20
Payer: MEDICARE

## 2018-02-20 ENCOUNTER — ANESTHESIA EVENT (OUTPATIENT)
Dept: OPERATING ROOM | Age: 43
End: 2018-02-20
Payer: MEDICARE

## 2018-02-20 VITALS — SYSTOLIC BLOOD PRESSURE: 106 MMHG | OXYGEN SATURATION: 93 % | DIASTOLIC BLOOD PRESSURE: 51 MMHG

## 2018-02-20 DIAGNOSIS — M17.11 PRIMARY OSTEOARTHRITIS OF RIGHT KNEE: ICD-10-CM

## 2018-02-20 DIAGNOSIS — M25.561 PATELLOFEMORAL ARTHRALGIA OF RIGHT KNEE: Primary | ICD-10-CM

## 2018-02-20 LAB
ABSOLUTE EOS #: 0 K/UL (ref 0–0.4)
ABSOLUTE IMMATURE GRANULOCYTE: 0 K/UL (ref 0–0.3)
ABSOLUTE LYMPH #: 0.33 K/UL (ref 1–4.8)
ABSOLUTE MONO #: 0.11 K/UL (ref 0.1–0.8)
ANION GAP SERPL CALCULATED.3IONS-SCNC: 16 MMOL/L (ref 9–17)
BASOPHILS # BLD: 0 % (ref 0–2)
BASOPHILS ABSOLUTE: 0 K/UL (ref 0–0.2)
BUN BLDV-MCNC: 14 MG/DL (ref 6–20)
BUN/CREAT BLD: ABNORMAL (ref 9–20)
CALCIUM SERPL-MCNC: 9 MG/DL (ref 8.6–10.4)
CHLORIDE BLD-SCNC: 97 MMOL/L (ref 98–107)
CO2: 21 MMOL/L (ref 20–31)
CREAT SERPL-MCNC: 0.96 MG/DL (ref 0.5–0.9)
DIFFERENTIAL TYPE: ABNORMAL
EOSINOPHILS RELATIVE PERCENT: 0 % (ref 1–4)
GFR AFRICAN AMERICAN: >60 ML/MIN
GFR NON-AFRICAN AMERICAN: >60 ML/MIN
GFR SERPL CREATININE-BSD FRML MDRD: ABNORMAL ML/MIN/{1.73_M2}
GFR SERPL CREATININE-BSD FRML MDRD: ABNORMAL ML/MIN/{1.73_M2}
GLUCOSE BLD-MCNC: 139 MG/DL (ref 70–99)
HCT VFR BLD CALC: 37 % (ref 36.3–47.1)
HEMOGLOBIN: 11.7 G/DL (ref 11.9–15.1)
IMMATURE GRANULOCYTES: 0 %
LYMPHOCYTES # BLD: 3 % (ref 24–44)
MCH RBC QN AUTO: 28.3 PG (ref 25.2–33.5)
MCHC RBC AUTO-ENTMCNC: 31.6 G/DL (ref 28.4–34.8)
MCV RBC AUTO: 89.6 FL (ref 82.6–102.9)
MONOCYTES # BLD: 1 % (ref 1–7)
MORPHOLOGY: NORMAL
NRBC AUTOMATED: 0 PER 100 WBC
PDW BLD-RTO: 12.5 % (ref 11.8–14.4)
PLATELET # BLD: 301 K/UL (ref 138–453)
PLATELET ESTIMATE: ABNORMAL
PMV BLD AUTO: 10 FL (ref 8.1–13.5)
POC POTASSIUM: 4.3 MMOL/L (ref 3.5–4.5)
POTASSIUM SERPL-SCNC: 4.6 MMOL/L (ref 3.7–5.3)
RBC # BLD: 4.13 M/UL (ref 3.95–5.11)
RBC # BLD: ABNORMAL 10*6/UL
SEG NEUTROPHILS: 96 % (ref 36–66)
SEGMENTED NEUTROPHILS ABSOLUTE COUNT: 10.56 K/UL (ref 1.8–7.7)
SODIUM BLD-SCNC: 134 MMOL/L (ref 135–144)
WBC # BLD: 11 K/UL (ref 3.5–11.3)
WBC # BLD: ABNORMAL 10*3/UL

## 2018-02-20 PROCEDURE — 2500000003 HC RX 250 WO HCPCS: Performed by: NURSE ANESTHETIST, CERTIFIED REGISTERED

## 2018-02-20 PROCEDURE — 80048 BASIC METABOLIC PNL TOTAL CA: CPT

## 2018-02-20 PROCEDURE — C1776 JOINT DEVICE (IMPLANTABLE): HCPCS | Performed by: ORTHOPAEDIC SURGERY

## 2018-02-20 PROCEDURE — 3700000001 HC ADD 15 MINUTES (ANESTHESIA): Performed by: ORTHOPAEDIC SURGERY

## 2018-02-20 PROCEDURE — G8988 SELF CARE GOAL STATUS: HCPCS

## 2018-02-20 PROCEDURE — 97166 OT EVAL MOD COMPLEX 45 MIN: CPT

## 2018-02-20 PROCEDURE — 6370000000 HC RX 637 (ALT 250 FOR IP): Performed by: STUDENT IN AN ORGANIZED HEALTH CARE EDUCATION/TRAINING PROGRAM

## 2018-02-20 PROCEDURE — 6370000000 HC RX 637 (ALT 250 FOR IP): Performed by: ORTHOPAEDIC SURGERY

## 2018-02-20 PROCEDURE — 96375 TX/PRO/DX INJ NEW DRUG ADDON: CPT

## 2018-02-20 PROCEDURE — 6360000002 HC RX W HCPCS: Performed by: ORTHOPAEDIC SURGERY

## 2018-02-20 PROCEDURE — C1713 ANCHOR/SCREW BN/BN,TIS/BN: HCPCS | Performed by: ORTHOPAEDIC SURGERY

## 2018-02-20 PROCEDURE — G8987 SELF CARE CURRENT STATUS: HCPCS

## 2018-02-20 PROCEDURE — 27438 REVISE KNEECAP WITH IMPLANT: CPT | Performed by: ORTHOPAEDIC SURGERY

## 2018-02-20 PROCEDURE — 99255 IP/OBS CONSLTJ NEW/EST HI 80: CPT | Performed by: INTERNAL MEDICINE

## 2018-02-20 PROCEDURE — 3600000014 HC SURGERY LEVEL 4 ADDTL 15MIN: Performed by: ORTHOPAEDIC SURGERY

## 2018-02-20 PROCEDURE — G8979 MOBILITY GOAL STATUS: HCPCS

## 2018-02-20 PROCEDURE — 1200000000 HC SEMI PRIVATE

## 2018-02-20 PROCEDURE — 73562 X-RAY EXAM OF KNEE 3: CPT

## 2018-02-20 PROCEDURE — G8978 MOBILITY CURRENT STATUS: HCPCS

## 2018-02-20 PROCEDURE — 6360000002 HC RX W HCPCS: Performed by: NURSE ANESTHETIST, CERTIFIED REGISTERED

## 2018-02-20 PROCEDURE — 96374 THER/PROPH/DIAG INJ IV PUSH: CPT

## 2018-02-20 PROCEDURE — G0378 HOSPITAL OBSERVATION PER HR: HCPCS

## 2018-02-20 PROCEDURE — 36415 COLL VENOUS BLD VENIPUNCTURE: CPT

## 2018-02-20 PROCEDURE — 2500000003 HC RX 250 WO HCPCS: Performed by: ANESTHESIOLOGY

## 2018-02-20 PROCEDURE — 7100000000 HC PACU RECOVERY - FIRST 15 MIN: Performed by: ORTHOPAEDIC SURGERY

## 2018-02-20 PROCEDURE — 3700000000 HC ANESTHESIA ATTENDED CARE: Performed by: ORTHOPAEDIC SURGERY

## 2018-02-20 PROCEDURE — 6360000002 HC RX W HCPCS

## 2018-02-20 PROCEDURE — 84132 ASSAY OF SERUM POTASSIUM: CPT

## 2018-02-20 PROCEDURE — 2580000003 HC RX 258: Performed by: ANESTHESIOLOGY

## 2018-02-20 PROCEDURE — 2580000003 HC RX 258: Performed by: ORTHOPAEDIC SURGERY

## 2018-02-20 PROCEDURE — 97162 PT EVAL MOD COMPLEX 30 MIN: CPT

## 2018-02-20 PROCEDURE — 94640 AIRWAY INHALATION TREATMENT: CPT

## 2018-02-20 PROCEDURE — 64448 NJX AA&/STRD FEM NRV NFS IMG: CPT | Performed by: ANESTHESIOLOGY

## 2018-02-20 PROCEDURE — 20985 CPTR-ASST DIR MS PX: CPT | Performed by: ORTHOPAEDIC SURGERY

## 2018-02-20 PROCEDURE — 2720000010 HC SURG SUPPLY STERILE: Performed by: ORTHOPAEDIC SURGERY

## 2018-02-20 PROCEDURE — 97530 THERAPEUTIC ACTIVITIES: CPT

## 2018-02-20 PROCEDURE — 6360000002 HC RX W HCPCS: Performed by: STUDENT IN AN ORGANIZED HEALTH CARE EDUCATION/TRAINING PROGRAM

## 2018-02-20 PROCEDURE — 64999 UNLISTED PX NERVOUS SYSTEM: CPT | Performed by: ANESTHESIOLOGY

## 2018-02-20 PROCEDURE — 85025 COMPLETE CBC W/AUTO DIFF WBC: CPT

## 2018-02-20 PROCEDURE — 3600000004 HC SURGERY LEVEL 4 BASE: Performed by: ORTHOPAEDIC SURGERY

## 2018-02-20 PROCEDURE — 96376 TX/PRO/DX INJ SAME DRUG ADON: CPT

## 2018-02-20 PROCEDURE — 7100000001 HC PACU RECOVERY - ADDTL 15 MIN: Performed by: ORTHOPAEDIC SURGERY

## 2018-02-20 DEVICE — COMPONENT FEM PAT PFJ L W OXINIUM AND RND: Type: IMPLANTABLE DEVICE | Status: FUNCTIONAL

## 2018-02-20 DEVICE — COMPONENT KNEE LOWER EXTREMITIES. ANCIL ZIRCONIUM NAVIO DISP: Type: IMPLANTABLE DEVICE | Status: FUNCTIONAL

## 2018-02-20 DEVICE — GENESIS II RESURFACING PATELLAR                                    PROSTHESIS  32MM
Type: IMPLANTABLE DEVICE | Status: FUNCTIONAL
Brand: GENESIS II

## 2018-02-20 RX ORDER — PROMETHAZINE HYDROCHLORIDE 25 MG/1
25 TABLET ORAL EVERY 6 HOURS PRN
COMMUNITY

## 2018-02-20 RX ORDER — TRAMADOL HYDROCHLORIDE 50 MG/1
100 TABLET ORAL EVERY 6 HOURS PRN
Status: DISCONTINUED | OUTPATIENT
Start: 2018-02-20 | End: 2018-02-22 | Stop reason: HOSPADM

## 2018-02-20 RX ORDER — TRANEXAMIC ACID 100 MG/ML
INJECTION, SOLUTION INTRAVENOUS PRN
Status: DISCONTINUED | OUTPATIENT
Start: 2018-02-20 | End: 2018-02-20 | Stop reason: SDUPTHER

## 2018-02-20 RX ORDER — SODIUM CHLORIDE 0.9 % (FLUSH) 0.9 %
10 SYRINGE (ML) INJECTION PRN
Status: DISCONTINUED | OUTPATIENT
Start: 2018-02-20 | End: 2018-02-20 | Stop reason: HOSPADM

## 2018-02-20 RX ORDER — ASPIRIN 81 MG/1
81 TABLET, CHEWABLE ORAL 2 TIMES DAILY
Status: DISCONTINUED | OUTPATIENT
Start: 2018-02-20 | End: 2018-02-22 | Stop reason: HOSPADM

## 2018-02-20 RX ORDER — DOCUSATE SODIUM 100 MG/1
100 CAPSULE, LIQUID FILLED ORAL 2 TIMES DAILY
Status: DISCONTINUED | OUTPATIENT
Start: 2018-02-20 | End: 2018-02-22 | Stop reason: HOSPADM

## 2018-02-20 RX ORDER — PANTOPRAZOLE SODIUM 40 MG/1
40 TABLET, DELAYED RELEASE ORAL
Status: DISCONTINUED | OUTPATIENT
Start: 2018-02-21 | End: 2018-02-22 | Stop reason: HOSPADM

## 2018-02-20 RX ORDER — MEPERIDINE HYDROCHLORIDE 50 MG/ML
12.5 INJECTION INTRAMUSCULAR; INTRAVENOUS; SUBCUTANEOUS EVERY 5 MIN PRN
Status: DISCONTINUED | OUTPATIENT
Start: 2018-02-20 | End: 2018-02-20 | Stop reason: HOSPADM

## 2018-02-20 RX ORDER — OXCARBAZEPINE 300 MG/1
TABLET, FILM COATED ORAL
Refills: 0 | COMMUNITY
Start: 2018-01-30

## 2018-02-20 RX ORDER — MAGNESIUM HYDROXIDE 1200 MG/15ML
LIQUID ORAL CONTINUOUS PRN
Status: DISCONTINUED | OUTPATIENT
Start: 2018-02-20 | End: 2018-02-20 | Stop reason: HOSPADM

## 2018-02-20 RX ORDER — TOPIRAMATE 25 MG/1
25-75 TABLET ORAL
COMMUNITY
Start: 2018-02-16 | End: 2018-03-20 | Stop reason: ALTCHOICE

## 2018-02-20 RX ORDER — ORPHENADRINE CITRATE 100 MG/1
100 TABLET, EXTENDED RELEASE ORAL 2 TIMES DAILY
Status: DISCONTINUED | OUTPATIENT
Start: 2018-02-20 | End: 2018-02-22 | Stop reason: HOSPADM

## 2018-02-20 RX ORDER — ACETAMINOPHEN 500 MG
1000 TABLET ORAL ONCE
Status: COMPLETED | OUTPATIENT
Start: 2018-02-20 | End: 2018-02-20

## 2018-02-20 RX ORDER — SODIUM CHLORIDE 0.9 % (FLUSH) 0.9 %
10 SYRINGE (ML) INJECTION EVERY 12 HOURS SCHEDULED
Status: DISCONTINUED | OUTPATIENT
Start: 2018-02-20 | End: 2018-02-22 | Stop reason: HOSPADM

## 2018-02-20 RX ORDER — PROPOFOL 10 MG/ML
INJECTION, EMULSION INTRAVENOUS CONTINUOUS PRN
Status: DISCONTINUED | OUTPATIENT
Start: 2018-02-20 | End: 2018-02-20 | Stop reason: SDUPTHER

## 2018-02-20 RX ORDER — SUMATRIPTAN 50 MG/1
100 TABLET, FILM COATED ORAL
Status: DISPENSED | OUTPATIENT
Start: 2018-02-20 | End: 2018-02-20

## 2018-02-20 RX ORDER — TOPIRAMATE 100 MG/1
100 TABLET, FILM COATED ORAL DAILY
Status: DISCONTINUED | OUTPATIENT
Start: 2018-02-20 | End: 2018-02-22 | Stop reason: HOSPADM

## 2018-02-20 RX ORDER — TOPIRAMATE 100 MG/1
100 TABLET, FILM COATED ORAL
COMMUNITY
Start: 2018-02-16 | End: 2020-10-19

## 2018-02-20 RX ORDER — SODIUM CHLORIDE 9 MG/ML
INJECTION, SOLUTION INTRAVENOUS CONTINUOUS
Status: DISCONTINUED | OUTPATIENT
Start: 2018-02-20 | End: 2018-02-20

## 2018-02-20 RX ORDER — CEFAZOLIN SODIUM 1 G/3ML
INJECTION, POWDER, FOR SOLUTION INTRAMUSCULAR; INTRAVENOUS PRN
Status: DISCONTINUED | OUTPATIENT
Start: 2018-02-20 | End: 2018-02-20 | Stop reason: SDUPTHER

## 2018-02-20 RX ORDER — VENLAFAXINE HYDROCHLORIDE 37.5 MG/1
CAPSULE, EXTENDED RELEASE ORAL
Status: ON HOLD | COMMUNITY
Start: 2018-02-08 | End: 2018-02-20

## 2018-02-20 RX ORDER — MONTELUKAST SODIUM 10 MG/1
10 TABLET ORAL NIGHTLY
Status: DISCONTINUED | OUTPATIENT
Start: 2018-02-20 | End: 2018-02-22 | Stop reason: HOSPADM

## 2018-02-20 RX ORDER — FLUTICASONE PROPIONATE 50 MCG
1 SPRAY, SUSPENSION (ML) NASAL 2 TIMES DAILY PRN
Status: DISCONTINUED | OUTPATIENT
Start: 2018-02-20 | End: 2018-02-22 | Stop reason: HOSPADM

## 2018-02-20 RX ORDER — OXYCODONE HYDROCHLORIDE 5 MG/1
10 TABLET ORAL EVERY 4 HOURS PRN
Status: DISCONTINUED | OUTPATIENT
Start: 2018-02-20 | End: 2018-02-22 | Stop reason: HOSPADM

## 2018-02-20 RX ORDER — TRAMADOL HYDROCHLORIDE 50 MG/1
50 TABLET ORAL EVERY 6 HOURS PRN
Status: DISCONTINUED | OUTPATIENT
Start: 2018-02-20 | End: 2018-02-22 | Stop reason: HOSPADM

## 2018-02-20 RX ORDER — VENLAFAXINE HYDROCHLORIDE 37.5 MG/1
37.5 CAPSULE, EXTENDED RELEASE ORAL
Status: DISCONTINUED | OUTPATIENT
Start: 2018-02-21 | End: 2018-02-22 | Stop reason: HOSPADM

## 2018-02-20 RX ORDER — OXCARBAZEPINE 150 MG/1
450 TABLET, FILM COATED ORAL NIGHTLY
Status: DISCONTINUED | OUTPATIENT
Start: 2018-02-20 | End: 2018-02-22 | Stop reason: HOSPADM

## 2018-02-20 RX ORDER — PROPOFOL 10 MG/ML
INJECTION, EMULSION INTRAVENOUS PRN
Status: DISCONTINUED | OUTPATIENT
Start: 2018-02-20 | End: 2018-02-20 | Stop reason: SDUPTHER

## 2018-02-20 RX ORDER — MIDAZOLAM HYDROCHLORIDE 1 MG/ML
INJECTION INTRAMUSCULAR; INTRAVENOUS
Status: COMPLETED
Start: 2018-02-20 | End: 2018-02-20

## 2018-02-20 RX ORDER — MIDAZOLAM HYDROCHLORIDE 1 MG/ML
1 INJECTION INTRAMUSCULAR; INTRAVENOUS EVERY 10 MIN PRN
Status: DISCONTINUED | OUTPATIENT
Start: 2018-02-20 | End: 2018-02-20 | Stop reason: HOSPADM

## 2018-02-20 RX ORDER — SODIUM CHLORIDE 0.9 % (FLUSH) 0.9 %
10 SYRINGE (ML) INJECTION PRN
Status: DISCONTINUED | OUTPATIENT
Start: 2018-02-20 | End: 2018-02-22 | Stop reason: HOSPADM

## 2018-02-20 RX ORDER — GABAPENTIN 400 MG/1
800 CAPSULE ORAL ONCE
Status: DISCONTINUED | OUTPATIENT
Start: 2018-02-20 | End: 2018-02-20 | Stop reason: HOSPADM

## 2018-02-20 RX ORDER — ROPIVACAINE HYDROCHLORIDE 2 MG/ML
INJECTION, SOLUTION EPIDURAL; INFILTRATION; PERINEURAL
Status: COMPLETED
Start: 2018-02-20 | End: 2018-02-20

## 2018-02-20 RX ORDER — SCOLOPAMINE TRANSDERMAL SYSTEM 1 MG/1
1 PATCH, EXTENDED RELEASE TRANSDERMAL ONCE
Status: DISCONTINUED | OUTPATIENT
Start: 2018-02-20 | End: 2018-02-20

## 2018-02-20 RX ORDER — POLYETHYLENE GLYCOL 3350 17 G/17G
17 POWDER, FOR SOLUTION ORAL DAILY
COMMUNITY

## 2018-02-20 RX ORDER — LURASIDONE HYDROCHLORIDE 80 MG/1
TABLET, FILM COATED ORAL
Refills: 0 | COMMUNITY
Start: 2018-01-30 | End: 2019-11-03

## 2018-02-20 RX ORDER — MIDAZOLAM HYDROCHLORIDE 1 MG/ML
INJECTION INTRAMUSCULAR; INTRAVENOUS PRN
Status: DISCONTINUED | OUTPATIENT
Start: 2018-02-20 | End: 2018-02-20 | Stop reason: SDUPTHER

## 2018-02-20 RX ORDER — VENLAFAXINE HYDROCHLORIDE 37.5 MG/1
75 CAPSULE, EXTENDED RELEASE ORAL
COMMUNITY
Start: 2018-02-08

## 2018-02-20 RX ORDER — MULTIVITAMIN WITH FOLIC ACID 400 MCG
1 TABLET ORAL DAILY
Status: DISCONTINUED | OUTPATIENT
Start: 2018-02-20 | End: 2018-02-22 | Stop reason: HOSPADM

## 2018-02-20 RX ORDER — ONDANSETRON 4 MG/1
4 TABLET, FILM COATED ORAL EVERY 8 HOURS PRN
Status: DISCONTINUED | OUTPATIENT
Start: 2018-02-20 | End: 2018-02-22 | Stop reason: HOSPADM

## 2018-02-20 RX ORDER — DEXAMETHASONE SODIUM PHOSPHATE 10 MG/ML
10 INJECTION INTRAMUSCULAR; INTRAVENOUS ONCE
Status: COMPLETED | OUTPATIENT
Start: 2018-02-20 | End: 2018-02-20

## 2018-02-20 RX ORDER — KETOROLAC TROMETHAMINE 30 MG/ML
30 INJECTION, SOLUTION INTRAMUSCULAR; INTRAVENOUS EVERY 6 HOURS PRN
Status: DISPENSED | OUTPATIENT
Start: 2018-02-20 | End: 2018-02-22

## 2018-02-20 RX ORDER — OXCARBAZEPINE 300 MG/1
300 TABLET, FILM COATED ORAL NIGHTLY
Status: DISCONTINUED | OUTPATIENT
Start: 2018-02-20 | End: 2018-02-22 | Stop reason: HOSPADM

## 2018-02-20 RX ORDER — AMITRIPTYLINE HYDROCHLORIDE 50 MG/1
100 TABLET, FILM COATED ORAL NIGHTLY
Status: DISCONTINUED | OUTPATIENT
Start: 2018-02-20 | End: 2018-02-22 | Stop reason: HOSPADM

## 2018-02-20 RX ORDER — DIPHENHYDRAMINE HCL 25 MG
25 TABLET ORAL EVERY 6 HOURS PRN
Status: DISCONTINUED | OUTPATIENT
Start: 2018-02-20 | End: 2018-02-22 | Stop reason: HOSPADM

## 2018-02-20 RX ORDER — OXYCODONE HYDROCHLORIDE 5 MG/1
5 TABLET ORAL EVERY 4 HOURS PRN
Status: DISCONTINUED | OUTPATIENT
Start: 2018-02-20 | End: 2018-02-22 | Stop reason: HOSPADM

## 2018-02-20 RX ORDER — CELECOXIB 100 MG/1
200 CAPSULE ORAL 2 TIMES DAILY
Status: DISCONTINUED | OUTPATIENT
Start: 2018-02-20 | End: 2018-02-22 | Stop reason: HOSPADM

## 2018-02-20 RX ORDER — ALBUTEROL SULFATE 90 UG/1
2 AEROSOL, METERED RESPIRATORY (INHALATION) EVERY 6 HOURS PRN
Status: DISCONTINUED | OUTPATIENT
Start: 2018-02-20 | End: 2018-02-22 | Stop reason: HOSPADM

## 2018-02-20 RX ORDER — FENTANYL CITRATE 50 UG/ML
25 INJECTION, SOLUTION INTRAMUSCULAR; INTRAVENOUS EVERY 5 MIN PRN
Status: DISCONTINUED | OUTPATIENT
Start: 2018-02-20 | End: 2018-02-20 | Stop reason: HOSPADM

## 2018-02-20 RX ORDER — TOPIRAMATE 25 MG/1
25 TABLET ORAL DAILY
Status: DISCONTINUED | OUTPATIENT
Start: 2018-02-20 | End: 2018-02-22 | Stop reason: HOSPADM

## 2018-02-20 RX ORDER — ROPIVACAINE HYDROCHLORIDE 5 MG/ML
INJECTION, SOLUTION EPIDURAL; INFILTRATION; PERINEURAL
Status: DISPENSED
Start: 2018-02-20 | End: 2018-02-20

## 2018-02-20 RX ORDER — SODIUM CHLORIDE 0.9 % (FLUSH) 0.9 %
10 SYRINGE (ML) INJECTION EVERY 12 HOURS SCHEDULED
Status: DISCONTINUED | OUTPATIENT
Start: 2018-02-20 | End: 2018-02-20 | Stop reason: HOSPADM

## 2018-02-20 RX ORDER — HYDROXYZINE 50 MG/1
100 TABLET, FILM COATED ORAL NIGHTLY
Status: DISCONTINUED | OUTPATIENT
Start: 2018-02-20 | End: 2018-02-22 | Stop reason: HOSPADM

## 2018-02-20 RX ORDER — FENTANYL CITRATE 50 UG/ML
100 INJECTION, SOLUTION INTRAMUSCULAR; INTRAVENOUS ONCE
Status: COMPLETED | OUTPATIENT
Start: 2018-02-20 | End: 2018-02-20

## 2018-02-20 RX ORDER — MIDAZOLAM HYDROCHLORIDE 1 MG/ML
2 INJECTION INTRAMUSCULAR; INTRAVENOUS ONCE
Status: COMPLETED | OUTPATIENT
Start: 2018-02-20 | End: 2018-02-20

## 2018-02-20 RX ORDER — FENTANYL CITRATE 50 UG/ML
INJECTION, SOLUTION INTRAMUSCULAR; INTRAVENOUS
Status: COMPLETED
Start: 2018-02-20 | End: 2018-02-20

## 2018-02-20 RX ORDER — LIDOCAINE HYDROCHLORIDE 10 MG/ML
INJECTION, SOLUTION INFILTRATION; PERINEURAL PRN
Status: DISCONTINUED | OUTPATIENT
Start: 2018-02-20 | End: 2018-02-20 | Stop reason: SDUPTHER

## 2018-02-20 RX ORDER — MULTIVIT WITH CALCIUM,IRON,MIN 27MG-0.4MG
1 TABLET ORAL DAILY
Status: DISCONTINUED | OUTPATIENT
Start: 2018-02-20 | End: 2018-02-20

## 2018-02-20 RX ORDER — ROPIVACAINE HYDROCHLORIDE 2 MG/ML
40 INJECTION, SOLUTION EPIDURAL; INFILTRATION; PERINEURAL ONCE
Status: COMPLETED | OUTPATIENT
Start: 2018-02-20 | End: 2018-02-20

## 2018-02-20 RX ORDER — FENTANYL CITRATE 50 UG/ML
INJECTION, SOLUTION INTRAMUSCULAR; INTRAVENOUS PRN
Status: DISCONTINUED | OUTPATIENT
Start: 2018-02-20 | End: 2018-02-20 | Stop reason: SDUPTHER

## 2018-02-20 RX ORDER — ACETAMINOPHEN 325 MG/1
650 TABLET ORAL EVERY 4 HOURS PRN
Status: DISCONTINUED | OUTPATIENT
Start: 2018-02-20 | End: 2018-02-22 | Stop reason: HOSPADM

## 2018-02-20 RX ORDER — PREGABALIN 75 MG/1
75 CAPSULE ORAL 2 TIMES DAILY
Status: DISCONTINUED | OUTPATIENT
Start: 2018-02-20 | End: 2018-02-22 | Stop reason: HOSPADM

## 2018-02-20 RX ORDER — SODIUM CHLORIDE, SODIUM LACTATE, POTASSIUM CHLORIDE, CALCIUM CHLORIDE 600; 310; 30; 20 MG/100ML; MG/100ML; MG/100ML; MG/100ML
1000 INJECTION, SOLUTION INTRAVENOUS CONTINUOUS
Status: DISCONTINUED | OUTPATIENT
Start: 2018-02-20 | End: 2018-02-20

## 2018-02-20 RX ORDER — SODIUM CHLORIDE, SODIUM LACTATE, POTASSIUM CHLORIDE, CALCIUM CHLORIDE 600; 310; 30; 20 MG/100ML; MG/100ML; MG/100ML; MG/100ML
INJECTION, SOLUTION INTRAVENOUS CONTINUOUS
Status: DISCONTINUED | OUTPATIENT
Start: 2018-02-20 | End: 2018-02-20

## 2018-02-20 RX ADMIN — FENTANYL CITRATE 25 MCG: 50 INJECTION INTRAMUSCULAR; INTRAVENOUS at 08:55

## 2018-02-20 RX ADMIN — ASPIRIN 81 MG: 81 TABLET, CHEWABLE ORAL at 20:54

## 2018-02-20 RX ADMIN — OXYCODONE HYDROCHLORIDE 10 MG: 5 TABLET ORAL at 17:39

## 2018-02-20 RX ADMIN — FENTANYL CITRATE 25 MCG: 50 INJECTION INTRAMUSCULAR; INTRAVENOUS at 08:20

## 2018-02-20 RX ADMIN — MIDAZOLAM HYDROCHLORIDE 2 MG: 1 INJECTION INTRAMUSCULAR; INTRAVENOUS at 06:45

## 2018-02-20 RX ADMIN — MIDAZOLAM HYDROCHLORIDE 2 MG: 1 INJECTION, SOLUTION INTRAMUSCULAR; INTRAVENOUS at 06:45

## 2018-02-20 RX ADMIN — PREGABALIN 75 MG: 75 CAPSULE ORAL at 20:54

## 2018-02-20 RX ADMIN — FENTANYL CITRATE 100 MCG: 50 INJECTION, SOLUTION INTRAMUSCULAR; INTRAVENOUS at 06:45

## 2018-02-20 RX ADMIN — Medication 550 ML: at 09:54

## 2018-02-20 RX ADMIN — KETOROLAC TROMETHAMINE 30 MG: 30 INJECTION, SOLUTION INTRAMUSCULAR at 17:39

## 2018-02-20 RX ADMIN — Medication 2 G: at 23:25

## 2018-02-20 RX ADMIN — DOCUSATE SODIUM 100 MG: 100 CAPSULE ORAL at 21:00

## 2018-02-20 RX ADMIN — OXYCODONE HYDROCHLORIDE 10 MG: 5 TABLET ORAL at 13:07

## 2018-02-20 RX ADMIN — MIDAZOLAM HYDROCHLORIDE 2 MG: 1 INJECTION, SOLUTION INTRAMUSCULAR; INTRAVENOUS at 07:17

## 2018-02-20 RX ADMIN — ROPIVACAINE HYDROCHLORIDE 40 ML: 2 INJECTION, SOLUTION EPIDURAL; INFILTRATION at 06:55

## 2018-02-20 RX ADMIN — FENTANYL CITRATE 50 MCG: 50 INJECTION INTRAMUSCULAR; INTRAVENOUS at 07:17

## 2018-02-20 RX ADMIN — SODIUM CHLORIDE, POTASSIUM CHLORIDE, SODIUM LACTATE AND CALCIUM CHLORIDE: 600; 310; 30; 20 INJECTION, SOLUTION INTRAVENOUS at 13:09

## 2018-02-20 RX ADMIN — ASPIRIN 81 MG: 81 TABLET, CHEWABLE ORAL at 13:10

## 2018-02-20 RX ADMIN — OXYCODONE HYDROCHLORIDE 10 MG: 5 TABLET ORAL at 21:53

## 2018-02-20 RX ADMIN — FENTANYL CITRATE 100 MCG: 50 INJECTION INTRAMUSCULAR; INTRAVENOUS at 06:45

## 2018-02-20 RX ADMIN — PROPOFOL 40 MG: 10 INJECTION, EMULSION INTRAVENOUS at 07:18

## 2018-02-20 RX ADMIN — LURASIDONE HYDROCHLORIDE 80 MG: 40 TABLET, FILM COATED ORAL at 20:56

## 2018-02-20 RX ADMIN — MONTELUKAST SODIUM 10 MG: 10 TABLET, FILM COATED ORAL at 20:55

## 2018-02-20 RX ADMIN — SODIUM CHLORIDE, POTASSIUM CHLORIDE, SODIUM LACTATE AND CALCIUM CHLORIDE 1000 ML: 600; 310; 30; 20 INJECTION, SOLUTION INTRAVENOUS at 06:46

## 2018-02-20 RX ADMIN — PROPOFOL 50 MG: 10 INJECTION, EMULSION INTRAVENOUS at 07:24

## 2018-02-20 RX ADMIN — AMITRIPTYLINE HYDROCHLORIDE 100 MG: 50 TABLET, FILM COATED ORAL at 20:54

## 2018-02-20 RX ADMIN — DEXAMETHASONE SODIUM PHOSPHATE 10 MG: 10 INJECTION INTRAMUSCULAR; INTRAVENOUS at 07:11

## 2018-02-20 RX ADMIN — HYDROXYZINE HYDROCHLORIDE 100 MG: 50 TABLET ORAL at 20:54

## 2018-02-20 RX ADMIN — PROPOFOL 100 MCG/KG/MIN: 10 INJECTION, EMULSION INTRAVENOUS at 07:25

## 2018-02-20 RX ADMIN — MOMETASONE FUROATE AND FORMOTEROL FUMARATE DIHYDRATE 2 PUFF: 200; 5 AEROSOL RESPIRATORY (INHALATION) at 20:13

## 2018-02-20 RX ADMIN — KETOROLAC TROMETHAMINE 30 MG: 30 INJECTION, SOLUTION INTRAMUSCULAR at 23:25

## 2018-02-20 RX ADMIN — VITAMIN D, TAB 1000IU (100/BT) 1000 UNITS: 25 TAB at 13:10

## 2018-02-20 RX ADMIN — ORPHENADRINE CITRATE 100 MG: 100 TABLET, EXTENDED RELEASE ORAL at 21:53

## 2018-02-20 RX ADMIN — TRANEXAMIC ACID 1000 MG: 100 INJECTION, SOLUTION INTRAVENOUS at 09:25

## 2018-02-20 RX ADMIN — PROPOFOL 40 MG: 10 INJECTION, EMULSION INTRAVENOUS at 08:38

## 2018-02-20 RX ADMIN — CELECOXIB 200 MG: 100 CAPSULE ORAL at 20:55

## 2018-02-20 RX ADMIN — CEFAZOLIN 2000 MG: 1 INJECTION, POWDER, FOR SOLUTION INTRAMUSCULAR; INTRAVENOUS at 07:25

## 2018-02-20 RX ADMIN — ROPIVACAINE HYDROCHLORIDE 40 ML: 2 INJECTION, SOLUTION EPIDURAL; INFILTRATION; PERINEURAL at 06:55

## 2018-02-20 RX ADMIN — TRANEXAMIC ACID 1000 MG: 100 INJECTION, SOLUTION INTRAVENOUS at 07:36

## 2018-02-20 RX ADMIN — Medication 10 ML: at 21:53

## 2018-02-20 RX ADMIN — ACETAMINOPHEN 1000 MG: 500 TABLET ORAL at 07:11

## 2018-02-20 RX ADMIN — OXCARBAZEPINE 450 MG: 300 TABLET, FILM COATED ORAL at 20:54

## 2018-02-20 RX ADMIN — Medication 2 G: at 15:11

## 2018-02-20 RX ADMIN — LIDOCAINE HYDROCHLORIDE 40 MG: 10 INJECTION, SOLUTION INFILTRATION; PERINEURAL at 07:17

## 2018-02-20 ASSESSMENT — PULMONARY FUNCTION TESTS
PIF_VALUE: 0

## 2018-02-20 ASSESSMENT — PAIN SCALES - GENERAL
PAINLEVEL_OUTOF10: 0
PAINLEVEL_OUTOF10: 6
PAINLEVEL_OUTOF10: 7
PAINLEVEL_OUTOF10: 7
PAINLEVEL_OUTOF10: 8
PAINLEVEL_OUTOF10: 7
PAINLEVEL_OUTOF10: 5
PAINLEVEL_OUTOF10: 0
PAINLEVEL_OUTOF10: 0
PAINLEVEL_OUTOF10: 7
PAINLEVEL_OUTOF10: 5
PAINLEVEL_OUTOF10: 6
PAINLEVEL_OUTOF10: 0
PAINLEVEL_OUTOF10: 6
PAINLEVEL_OUTOF10: 6
PAINLEVEL_OUTOF10: 8

## 2018-02-20 ASSESSMENT — PAIN DESCRIPTION - DESCRIPTORS
DESCRIPTORS: CONSTANT
DESCRIPTORS: ACHING;CONSTANT

## 2018-02-20 ASSESSMENT — COPD QUESTIONNAIRES: CAT_SEVERITY: MODERATE

## 2018-02-20 ASSESSMENT — PAIN DESCRIPTION - FREQUENCY: FREQUENCY: CONTINUOUS

## 2018-02-20 ASSESSMENT — PAIN DESCRIPTION - LOCATION
LOCATION: KNEE
LOCATION: KNEE

## 2018-02-20 ASSESSMENT — PAIN DESCRIPTION - PAIN TYPE: TYPE: ACUTE PAIN;SURGICAL PAIN

## 2018-02-20 ASSESSMENT — PAIN SCALES - WONG BAKER: WONGBAKER_NUMERICALRESPONSE: 0

## 2018-02-20 ASSESSMENT — PAIN DESCRIPTION - PROGRESSION: CLINICAL_PROGRESSION: NOT CHANGED

## 2018-02-20 ASSESSMENT — PAIN DESCRIPTION - ORIENTATION
ORIENTATION: RIGHT
ORIENTATION: RIGHT

## 2018-02-20 ASSESSMENT — PAIN - FUNCTIONAL ASSESSMENT: PAIN_FUNCTIONAL_ASSESSMENT: 0-10

## 2018-02-20 ASSESSMENT — PAIN DESCRIPTION - ONSET: ONSET: ON-GOING

## 2018-02-20 NOTE — ANESTHESIA PRE PROCEDURE
Endo/Other: Negative Endo/Other ROS                    Abdominal:   (+) obese,         Vascular:                                        Anesthesia Plan      general, regional and spinal     ASA 3       Induction: intravenous. Anesthetic plan and risks discussed with patient. Plan discussed with CRNA.                   Andrea Painting MD   2/20/2018

## 2018-02-20 NOTE — PROGRESS NOTES
transfers/mobility   Sit to stand: Moderate assistance  Stand to sit: Moderate assistance  Comment: Unsteady, buckling of B knees, relying on BUEs, VC for proper transfer techniques, use of RW     Functional Mobility  Functional - Mobility Device: Rolling Walker  Activity: Other  Assist Level: Moderate assistance    ADL  Feeding: Setup  Grooming: Setup  UE Bathing: Setup  LE Bathing: Minimal assistance  UE Dressing: Setup  LE Dressing: Minimal assistance  Toileting: Moderate assistance (for t/f. )     Tone RUE  RUE Tone: Normotonic  Tone LUE  LUE Tone: Normotonic  Coordination  Movements Are Fluid And Coordinated: Yes     Bed mobility  Supine to Sit: Moderate assistance  Sit to Supine: Moderate assistance  Comment: increased A required d/t numbness in B legs d/t femoral block     Transfers  Sit to stand: Moderate assistance  Stand to sit: Moderate assistance  Transfer Comments: VC for proper hand placements during functional transfers to improve safety with functional transfers. Pt required increased A d/t buckling of knee. Use of RW     Cognition  Overall Cognitive Status: WFL     Sensation  Overall Sensation Status: Impaired (Numbness in legs d/t femoral block. )      LUE AROM : WFL  Left Hand AROM: WFL  RUE AROM : WFL  Right Hand AROM: WFL    LUE Strength  Gross LUE Strength: WFL  L Hand Grasp: 4/5  LUE Strength Comment: 4/5 overall LUE strength   RUE Strength  Gross RUE Strength: WFL  R Hand Grasp: 4/5  RUE Strength Comment: 4/5 overall RUE strength    Assessment   Performance deficits / Impairments: Decreased functional mobility ; Decreased ADL status; Decreased balance  Assessment: Pt's participation in ADL routines and functional mobility/transfers is inhibited by above noted performance deficits. Pt is likely to return home with support/assistance from family and would benefit from continued acute care OT services to progress towards below stated goal and prior level of independence.    Prognosis: Good  Decision Making: Medium Complexity  Patient Education: Pt education on OT services/POC, AE/DME. Pt verbalized good understanding of education provided. REQUIRES OT FOLLOW UP: Yes  Activity Tolerance  Activity Tolerance: Patient Tolerated treatment well  Safety Devices  Safety Devices in place: Yes  Type of devices: Call light within reach; Left in bed         Plan   Plan  Times per week: 6-7x/wk  Current Treatment Recommendations: Balance Training, Functional Mobility Training, Endurance Training, Patient/Caregiver Education & Training, Equipment Evaluation, Education, & procurement, Self-Care / ADL    G-Code  OT G-codes  Functional Assessment Tool Used: [facility]  Score: 20/24  Functional Limitation: Self care  Self Care Current Status (): At least 20 percent but less than 40 percent impaired, limited or restricted  Self Care Goal Status (): At least 1 percent but less than 20 percent impaired, limited or restricted    AM-PAC Score  How much help from another person does the pt currently need? Unable A Lot A Little None   1. Putting on and taking off regular lower body clothing? 1      2      3       4   2. Bathing (including washing, rinsing, drying)? 1      2      3      4   3. Toileting, which includes using toilet, bedpan, or urinal?      1      2        3      4   4. Putting on and taking off regular upper body clothing? 1      2      3       4   5. Taking care of personal grooming such as brushing teeth? 1      2      3      4   6. Eating meals? 1      2       3       4     1. Unable = Total/Dependent Assist  2. A Lot = Maximum/Moderate Assist  3. A Little = Minimum/Contact Guard Assist/Supervision  4.  None= Modified [place]/Independent    Raw Score Scale Score Scale Score Standard Error Approximate Degree of Functional Impairment     6 17.07 3.74 100%   7 20.13 3.68 92%   8 22.86 3.43 86%   9 25.33 3.17 80%   10 27.31 2.96 75%   11 29.04 2.79 70%   12 30.60 2.68 67%

## 2018-02-20 NOTE — BRIEF OP NOTE
Brief Postoperative Note  ______________________________________________________________    Patient: Brianna Gallagher  YOB: 1975  MRN: [id number]  Date of Procedure: 2/20/2018    Pre-Op Diagnosis: RIGHT KNEE PATELLOFEMORAL ARTHRITIS     Post-Op Diagnosis: Same       Procedure(s):  NAVIO ROBOTIC RIGHT PATELLOFEMORAL  ARTHROPLASTY    Anesthesia: Spinal and femoral n block    Surgeon(s):  Jumana Rivas DO     Assistant:  Jose Alfredo Pretty PGY 5    Staff:  Sc[employer]: Jacob Jaramillo; Ashleigh Roy     Estimated Blood Loss: 200 cc     IVF: 1200 cc crystalloid    TT: 640 min    Complications: None    Specimens:   * No specimens in log *    Implants:    Implant Name Type Inv.  Item Serial No.  Lot No. LRB No. Used   CEMENT PALACOS R SING DOSE 40GR Cement CEMENT [facility] [id number] Right 1   SMITH [employer]PHE[employer] TROCHLEAR COMPONENT     [id number] Right 1   NUNEZ NEPHEW Vera Amaya COMPONENT         [id number] Right 1         Drains:      Findings: Right PF arthritis     Lan Marshall DO  Date: 2/20/2018  Time: 9:46 AM

## 2018-02-20 NOTE — INTERVAL H&P NOTE
Pt Name: Owen Lares  MRN: [id number]  YOB: 1975  Date of evaluation: 2/20/2018    I have reviewed the patient's history and physical examination completed in pre-admission testing on 1/31/18    No changes to history or on examination today, unless noted below. None. Clearances on file.      GABBIE CAO CNP  2/20/18  6:39 AM

## 2018-02-20 NOTE — CARE COORDINATION
Case Management Initial Discharge Plan  Blanquita Beverly,         Readmission Risk              Readmission Risk:        22.75       Age 72 or Greater:  0    Admitted from SNF or Requires Paid or Family Care:  0    Currently has CHF,COPD,ARF,CRI,or is on dialysis:  0    Takes more than 5 Prescription Medications:  4    Takes Digoxin,Insulin,Anticoagulants,Narcotics or ASA/Plavix:  [address] in Past 12 Months:  10    On Disability:  3    Patient Considers own Health:  3.75            Met with:patient to discuss discharge plans.    Information verified: address, contacts, phone number, , insurance Yes  PCP: Ganga Guerra MD  Date of last visit:      Insurance Provider: [employer]    Discharge Planning  Current Residence:  Private Residence  Living Arrangements:  Family Members   Home has 1 stories/  stairs to climb  Support Systems:  Family Members  Current Services PTA:  None Supplier:    Patient able to perform ADL's:Independent  DME used to aid ambulation prior to admission: none /during admission walker    Potential Assistance Needed:  N/A    Pharmacy:     Potential Assistance Purchasing Medications:  No  Does patient want to participate in local refill/ meds to beds program?  Yes    Patient agreeable to home care: Yes  Freedom of choice provided:  yes      Type of Home Care Services:  PT, Nursing Services  Patient expects to be discharged to:  home    Prior SNF/Rehab Placement and Facility:    Agreeable to SNF/Rehab: No  [place] of choice provided: n/a   Evaluation: no    Expected Discharge date:  18  Follow Up Appointment: Best Day/ Time: Monday AM    Transportation provider: will have family take to appts  Transportation arrangements needed for discharge: No but cannot go until after 5:30 pm     Discharge Plan: home with [facility]        Electronically signed by Wanda Strickland RN on 18 at 1:58 PM

## 2018-02-20 NOTE — ANESTHESIA PROCEDURE NOTES
Spinal Block    Patient location during procedure: OR  Start time: 2/20/2018 7:10 AM  End time: 2/20/2018 7:15 AM  Reason for block: primary anesthetic and at surgeon's request  Staffing  Anesthesiologist: Jered Fortune Checklist  Completed: patient identified, site marked, surgical consent, pre-op evaluation, timeout performed, IV checked, risks and benefits discussed, monitors and equipment checked, anesthesia consent given, oxygen available and patient being monitored  Spinal Block  Patient position: sitting  Prep: Betadine  Patient monitoring: frequent blood pressure checks and continuous pulse ox  Approach: midline  Location: L3/L4  Provider prep: sterile gloves and mask  Local infiltration: lidocaine  Agent: bupivacaine  Adjuvant: duramorph  Dose: 3  Dose: 3  Needle  Needle type:  Nabor   Needle gauge: 22 G  Needle length: 4 in  Needle insertion depth: 4 cm  Assessment  Sensory level: T4  Swirl obtained: Yes  CSF: clear  Attempts: 1  Hemodynamics: stable

## 2018-02-20 NOTE — H&P (VIEW-ONLY)
Referring Provider: Margret Uribe   Reason for Consultation: Abnormal troponin     Patient Care Team:  SNAFORD Gill as PCP - General    .     History of present illness  middle-aged lady with multiple medical problems who is been very short of breath for some time now presented with a fall requiring shoulder surgery as well as a plate to be placed in the right wrist.  Overnight has gotten severe shortness of breath minimal activities making it extremely short winded is unable to lie down flat in bed.  Had to be assisted to her bed.  Troponin evaluated reveals elevation in the same.    On questioning patient admits that she is been very short of breath in even walking to her mailbox.  Has to sit multiple times to feel any better this is been going on for the last 2 years.  Has had a lot of swelling of her lower extremities.  Her blood pressures have been borderline.    Review of Systems   Pertinent items are noted in HPI  Review of Systems      History    #1 baseline EKG sinus rhythm ND interval of 160 ms nonspecific ST wave changes but    #2 LV function assessment: Pending but    #3 CAD workup-Cardec catheterization in the mid 90s told to have small vessels in her secondary branches.  Medical therapy suggested.  Stress test many years ago told to be negative.    #4 Hypertesion.    #5 leg edema:.    #6 sleep apnea syndrome on CPAP therapy.    #7 morbid obesity.    #8 postmenopausal status     #9 diabetes mellitus with poor control.    #10 diabetic neuropathy.    [place] hypothyroidism on replacement therapy.    #12 arthritis     #13 dyslipidemia.    #14 cerebrovascular disease being followed up conservative therapy.    Vessel history:    Nonsmoker does not drink alcohol function status the patient is been very limited.    Family history 2 sisters one brother Brother  of myocardial infarction at age of 55 patient's mom  of myocardial infarction at the age of 70s.  This sisters are free  and Gastric bypass surgery. Medications  Prior to Admission medications    Medication Sig Start Date End Date Taking? Authorizing Provider   Multiple Vitamins-Minerals (WOMENS ONE DAILY) TABS Take 1 tablet by mouth daily   Yes Historical Provider, MD   mupirocin (BACTROBAN) 2 % ointment Apply to bilateral nares twice daily for 5 days to include day of surgery 1/29/18  Yes Lizda Franchesca, DO   RA ACETAMINOPHEN EX  MG tablet take 1 tablet by mouth every 6 hours if needed for pain 1/2/18  Yes Conor Robbins,    ondansetron (ZOFRAN) 4 MG tablet Take 1 tablet by mouth every 8 hours as needed for Nausea or Vomiting 10/26/17  Yes Nakul Chanel NP   vitamin D3 (CHOLECALCIFEROL) 400 units TABS tablet Take 400 Units by mouth daily   Yes Historical Provider, MD   orphenadrine (NORFLEX) 100 MG extended release tablet Take 100 mg by mouth 2 times daily 8/10/17  Yes Historical Provider, MD   montelukast (SINGULAIR) 10 MG tablet Take 10 mg by mouth nightly 8/7/17  Yes Historical Provider, MD   Multiple Vitamin (MVI, CELEBRATE, CHEWABLE TABLET) Take 1 tablet by mouth daily   Yes Historical Provider, MD   hydrOXYzine (VISTARIL) 50 MG capsule Take 100 mg by mouth nightly    Yes Historical Provider, MD   OXcarbazepine (TRILEPTAL) 150 MG tablet Take 450 mg by mouth nightly   Yes Historical Provider, MD   amitriptyline (ELAVIL) 100 MG tablet Take 100 mg by mouth nightly   Yes Historical Provider, MD   lurasidone (LATUDA) 40 MG TABS tablet Take 80 mg by mouth nightly Indications: Lowered Mood    Yes Historical Provider, MD   SUMAtriptan (IMITREX) 100 MG tablet Take 100 mg by mouth once as needed for Migraine   Yes Historical Provider, MD   Potassium 75 MG TABS Take 75 mg by mouth 2 times daily   Yes Historical Provider, MD   lidocaine (XYLOCAINE) 4 % external solution Apply 1 applicator topically daily as needed for Pain Apply topically as needed.    Yes Historical Provider, MD   omeprazole (PRILOSEC) 20 MG delayed of her cardiac issues.    Review of symptoms there is cough there is increasing shortness of breath that has been persistent swelling of the lower extremities.  There is no nausea vomiting diarrhea.  Has had abdominal pain for which workup has been done.  Has been gaining weight in a persistent fashion.  No stroke no weakness.    Past Surgical History:   Procedure Laterality Date   • ANKLE ARTHROSCOPY Right    • BREAST SURGERY Left     CYST REMOVED   • CARDIAC CATHETERIZATION      NO STENT   • CATARACT EXTRACTION Bilateral     2014 and 2015   • COLONOSCOPY  12/10/2012   • COLONOSCOPY N/A 2/22/2017    Procedure: COLONOSCOPY with hot and cold snare polypectomies, resolution clip placement, cold biopsy polypectomy;  Surgeon: Mg Awan MD;  Location: [facility] ENDOSCOPY;  Service:    • DILATION AND CURETTAGE, DIAGNOSTIC / THERAPEUTIC      X4   • ENDOSCOPY N/A 7/20/2017    Procedure: ESOPHAGOGASTRODUODENOSCOPY with biopsies and cold biopsy polypectomy;  Surgeon: Mg Awan MD;  Location: [facility] ENDOSCOPY;  Service:    • FRACTURE SURGERY Right     ANKLE   • TUBAL ABDOMINAL LIGATION  1980?   • UPPER GASTROINTESTINAL ENDOSCOPY  07/20/2017   , Family History   Problem Relation Age of Onset   • Heart disease Other    • Cancer Other    • Colon cancer Neg Hx    • Liver cancer Neg Hx    • Liver disease Neg Hx    • Stomach cancer Neg Hx    • Esophageal cancer Neg Hx    , Social History   Substance Use Topics   • Smoking status: Never Smoker   • Smokeless tobacco: Never Used   • Alcohol use No   , Prescriptions Prior to Admission   Medication Sig Dispense Refill Last Dose   • Calcium Citrate-Vitamin D (CALCIUM + D PO) Take 1 tablet by mouth Daily.   Past Month at Unknown time   • CloNIDine (CATAPRES) 0.1 MG tablet Take 0.1 mg by mouth 2 (Two) Times a Day As Needed for High Blood Pressure.   Past Week at Unknown time   • clopidogrel (PLAVIX) 75 MG tablet Take 75 mg by mouth Daily.   Past Week at Unknown time   • diltiaZEM CD  (DILTIAZEM CD) 240 MG 24 hr capsule Take 120 mg by mouth Daily.   2/1/2018 at 2000   • Furosemide (LASIX PO) Take 20 mg by mouth Daily As Needed.   Past Week at Unknown time   • insulin glargine (LANTUS) 100 UNIT/ML injection Inject 33 Units under the skin Every Night.   2/1/2018 at 2000   • insulin lispro (humaLOG) 100 UNIT/ML injection Inject 12-14 Units under the skin 3 (Three) Times a Day Before Meals.   2/1/2018 at 1800   • levothyroxine (SYNTHROID, LEVOTHROID) 100 MCG tablet Take 100 mcg by mouth Daily.   2/2/2018 at 13914   • montelukast (SINGULAIR) 10 MG tablet Take 10 mg by mouth Every Night.   2/1/2018 at 2000   • olmesartan-hydrochlorothiazide (BENICAR HCT) 20-12.5 MG per tablet Take 1 tablet by mouth Daily.   2/2/2018 at 0500   • oxyCODONE-acetaminophen (PERCOCET) 5-325 MG per tablet Take 1 tablet by mouth Every 6 (Six) Hours As Needed for Moderate Pain . 12 tablet 0 2/2/2018 at 1230   • Potassium Chloride (KLOR-CON 10 PO) Take 10 mEq by mouth 2 (Two) Times a Day As Needed (TAKES WHEN LASIX IS TAKEN).   Past Month at Unknown time   • pravastatin (PRAVACHOL) 80 MG tablet Take 80 mg by mouth Daily.   2/1/2018 at 2000   • raNITIdine (ZANTAC) 150 MG tablet TAKE ONE TABLET BY MOUTH TWICE DAILY 60 tablet 5 2/1/2018 at 2000   • SITagliptin (JANUVIA) 100 MG tablet Take 100 mg by mouth Daily.   2/2/2018 at 0500   • ipratropium-albuterol (COMBIVENT RESPIMAT)  MCG/ACT inhaler Inhale 1 puff 4 (Four) Times a Day As Needed for Wheezing.   More than a month at Unknown time   , Scheduled Meds:    calcium-vitamin D 1 tablet Oral Daily   carvedilol 6.25 mg Oral BID With Meals   clopidogrel 75 mg Oral Daily   diltiaZEM  mg Oral Q24H   enoxaparin 40 mg Subcutaneous Daily   famotidine 20 mg Oral BID   furosemide 20 mg Oral Daily   insulin aspart 0-7 Units Subcutaneous 4x Daily AC & at Bedtime   insulin aspart 10 Units Subcutaneous TID With Meals   insulin detemir 40 Units Subcutaneous Q24H   levothyroxine 100 mcg  "Oral QAM   linagliptin 5 mg Oral Daily   losartan-hydrochlorothiazide 1 tablet Oral Q24H   montelukast 10 mg Oral Nightly   potassium chloride 10 mEq Oral Daily   pravastatin 80 mg Oral Nightly   , Continuous Infusions:    Bupivacaine HCl-NaCl (PF) 6 mL/hr Last Rate: 6 mL/hr (02/02/18 1501)   , PRN Meds:  •  bisacodyl  •  CloNIDine  •  dextrose  •  dextrose  •  docusate sodium  •  glucagon (human recombinant)  •  ipratropium-albuterol  •  LORazepam  •  Morphine **AND** naloxone  •  oxyCODONE-acetaminophen  •  oxyCODONE-acetaminophen  •  sodium chloride, Allergies:  Review of patient's allergies indicates no known allergies.     Objective     Vital Sign Min/Max for last 24 hours  Temp  Min: 98.1 °F (36.7 °C)  Max: 98.7 °F (37.1 °C)   BP  Min: 150/76  Max: 179/69   Pulse  Min: 84  Max: 95   Resp  Min: 18  Max: 18   SpO2  Min: 95 %  Max: 97 %   Flow (L/min)  Min: 2  Max: 2   No Data Recorded     Flowsheet Rows         First Filed Value    Admission Height  160 cm (63\") Documented at 02/02/2018 0703    Admission Weight  111 kg (245 lb) Documented at 02/02/2018 0703               Physical Exam:     General Appearance:    Alert, cooperative, in no acute distress   Head:    Normocephalic, without obvious abnormality, atraumatic   Eyes:            Lids and lashes normal, conjunctivae and sclerae normal, no   icterus, no pallor, corneas clear, PERRLA   Ears:    Ears appear intact with no abnormalities noted   Throat:   No oral lesions, no thrush, oral mucosa moist   Neck:  Carotid bruit present    Back:     No kyphosis present, no scoliosis present, no skin lesions,       erythema or scars, no tenderness to percussion or                   palpation,   range of motion normal   Lungs:     Clear to auscultation,respirations regular, even and                   unlabored    Heart:    Regular rhythm and normal rate, normal S1 and S2, no            murmur, no gallop, no rub, no click   Breast Exam:    Deferred   Abdomen:     Normal " bowel sounds, no masses, no organomegaly, soft        non-tender, non-distended, no guarding, no rebound                 tenderness   Genitalia:    Deferred   Extremities: Plus leg edema with redness of the shin present.     Pulses:  Weak pedal pulses    Skin:   No bleeding, bruising or rash   Lymph nodes:   No palpable adenopathy   Neurologic:   Cranial nerves 2 - 12 grossly intact, sensation intact, DTR        present and equal bilaterally       Results Review:   I reviewed the patient's new clinical results.    EKG: Sinus rhythm VT of 176 ms nonspecific ST wave changes poor RV progression low-voltage complexes.    LAB DATA :           WBC   Date Value Ref Range Status   02/05/2018 12.40 (H) 4.80 - 10.80 10*3/mm3 Final     RBC   Date Value Ref Range Status   02/05/2018 3.38 (L) 4.20 - 5.40 10*6/mm3 Final     Hemoglobin   Date Value Ref Range Status   02/05/2018 9.1 (L) 12.0 - 16.0 g/dL Final     Hematocrit   Date Value Ref Range Status   02/05/2018 28.7 (L) 37.0 - 47.0 % Final     MCV   Date Value Ref Range Status   02/05/2018 84.9 81.0 - 99.0 fL Final     MCH   Date Value Ref Range Status   02/05/2018 26.9 (L) 27.0 - 31.0 pg Final     MCHC   Date Value Ref Range Status   02/05/2018 31.7 30.0 - 37.0 g/dL Final     RDW   Date Value Ref Range Status   02/05/2018 13.7 11.5 - 14.5 % Final     RDW-SD   Date Value Ref Range Status   02/05/2018 42.7 37.0 - 54.0 fl Final     MPV   Date Value Ref Range Status   02/05/2018 9.6 6.0 - 12.0 fL Final     Platelets   Date Value Ref Range Status   02/05/2018 338 130 - 400 10*3/mm3 Final     Neutrophil %   Date Value Ref Range Status   02/05/2018 66.7 37.0 - 80.0 % Final     Lymphocyte %   Date Value Ref Range Status   02/05/2018 21.3 10.0 - 50.0 % Final     Monocyte %   Date Value Ref Range Status   02/05/2018 8.5 0.0 - 12.0 % Final     Eosinophil %   Date Value Ref Range Status   02/05/2018 1.6 0.0 - 7.0 % Final     Basophil %   Date Value Ref Range Status   02/05/2018 0.7 0.0 -  2.5 % Final     Immature Grans %   Date Value Ref Range Status   02/05/2018 1.2 (H) 0.0 - 0.6 % Final     Neutrophils, Absolute   Date Value Ref Range Status   02/05/2018 8.27 (H) 2.00 - 6.90 10*3/mm3 Final     Lymphocytes, Absolute   Date Value Ref Range Status   02/05/2018 2.64 0.60 - 3.40 10*3/mm3 Final     Monocytes, Absolute   Date Value Ref Range Status   02/05/2018 1.05 (H) 0.00 - 0.90 10*3/mm3 Final     Eosinophils, Absolute   Date Value Ref Range Status   02/05/2018 0.20 0.00 - 0.70 10*3/mm3 Final     Basophils, Absolute   Date Value Ref Range Status   02/05/2018 0.09 0.00 - 0.20 10*3/mm3 Final     Immature Grans, Absolute   Date Value Ref Range Status   02/05/2018 0.15 (H) 0.00 - 0.06 10*3/mm3 Final     nRBC   Date Value Ref Range Status   02/05/2018 0.0 0.0 - 0.0 /100 WBC Final       Lab Results   Component Value Date    GLUCOSE 178 (H) 02/05/2018    BUN 29 (H) 02/05/2018    CREATININE 1.00 02/05/2018    EGFRIFNONA 54 (L) 02/05/2018    BCR 29.0 (H) 02/05/2018    CO2 33.0 (H) 02/05/2018    CALCIUM 9.1 02/05/2018    ALBUMIN 3.70 02/01/2018    LABIL2 1.1 02/01/2018    AST 20 02/01/2018    ALT 31 02/01/2018       Lab Results   Component Value Date    CKTOTAL 106 02/05/2018    TROPONINI 0.644 (C) 02/05/2018       No results found for: DDIMER    No results found for: SITE, ALLENTEST, PHART, DWQ7FYQ, PO2ART, HRJ0HAU, BASEEXCESS, S4EFCWQS, HGBBG, HCTABG, OXYHEMOGLOBI, METHHGBN, CARBOXYHGB, CO2CT, BAROMETRIC, MODALITY, FIO2  Lab Results   Component Value Date    HGBA1C 9.2 (H) 02/03/2018         Lab Results   Component Value Date    LIPASE 109 08/17/2017       IMAGING DATA:     Xr Chest 2 Vw    Result Date: 2/1/2018  Narrative: PROCEDURE: XR CHEST 2 VW-   HISTORY: Pre-op; Z01.818-Encounter for other preprocedural examination    COMPARISON: None.  FINDINGS:  The lungs are clear.   There is no evidence of effusion or other pleural disease.  The mediastinum has a normal appearance.  The cardiac silhouette is  unremarkable.           Impression: Unremarkable chest exam.    This report was finalized on 2/1/2018 2:07 PM by Jan Hinojosa MD.    Xr Shoulder 2+ View Left    Result Date: 2/2/2018  Narrative: Left shoulder  HISTORY: Fracture  COMPARISON: 1/31/2018  FINDINGS: Single view left shoulder shows interval arthroplasty of shoulder joint. Hardware is unremarkable. A small area of cortical irregularity is seen along the proximal aspect of the lateral humeral cortex which may be remnant fracture line from original fracture. AC joint is intact.      Impression: Post arthroplasty changes.  This report was finalized on 2/2/2018 3:35 PM by Jan Hinojosa MD.    Xr Shoulder 2+ View Left    Result Date: 2/1/2018  Narrative: XR SHOULDER 2+ VIEW LEFT-  THREE VIEW  HISTORY: fall  FINDINGS:  Three views show comminuted fracture of the proximal humerus. There is anterior displacement of humeral head. Fracture does involve greater tuberosity..  There is mild displacement.     There is inferior pseudosubluxation presumably due to hemarthrosis. AC joint arthropathy is noted.          Impression: Comminuted proximal humeral fracture as above.         This report was finalized on 2/1/2018 8:43 AM by Jan Hinojosa MD.    Xr Humerus Left    Result Date: 2/1/2018  Narrative: XR HUMERUS LEFT-  HISTORY: fall, pain.  FINDINGS:  Two views show fracture of the proximal humerus involving humeral neck and head.  There is moderate displacement.     No dislocation is seen.          Impression: Comminuted proximal humeral fracture as above.         This report was finalized on 2/1/2018 8:33 AM by Jan Hinojosa MD.    Xr Wrist 3+ View Right    Result Date: 2/2/2018  Narrative: RIGHT WRIST  HISTORY: Fracture.  COMPARISON: 1/31/2018.  FINDINGS: 6 digital spot intraoperative radiographs were obtained. The examination shows placement of a malleable cortical plate along the volar aspect of the distal radius. Near anatomic reduction is noted. Ulnar styloid  process fracture is noted.      Impression: Status post ORIF of distal radial fracture.  This report was finalized on 2/2/2018 4:06 PM by Jan Hinojosa MD.    Xr Wrist 3+ View Right    Result Date: 2/1/2018  Narrative: XR WRIST 3+ VIEW RIGHT-  HISTORY: Fall.  FINDINGS:  3 views show comminuted fracture of the distal radius extending into the radiocarpal joint. There is mild displacement.     The joint spaces appear normal.          Impression: Comminuted distal radial fracture as above.         This report was finalized on 2/1/2018 8:33 AM by Jan Hinojosa MD.    Xr Knee 3 View Left    Result Date: 2/1/2018  Narrative: XR KNEE 3 VIEW LEFT-  HISTORY: Fall, pain.  FINDINGS:  Three views show no evidence of an acute, displaced fracture or dislocation of the visualized bony architecture.  The joint spaces appear normal.         Impression: Unremarkable exam.           This report was finalized on 2/1/2018 9:02 AM by Jan Hinojosa MD.    Fl C Arm During Surgery    Result Date: 2/2/2018  Narrative: This procedure was auto-finalized with no dictation required.    Ct Upper Extremity Left Without Contrast    Result Date: 1/31/2018  Narrative: FINAL REPORT CLINICAL HISTORY: Abnormal xray, shoulder; Shoulder FINDINGS: Multiple contiguous axial slices through the left shoulder were obtained with coronal and sagittal reformatted images. There is a comminuted humeral head and surgical neck fracture without dislocation or radiopaque foreign body. Degenerative changes are present. Impression: Comminuted humeral head and neck fracture     Impression: Authenticated by Eugene Pan MD on 01/31/2018 11:08:34 PM        DIAGNOSIS   #1 abnormal troponin: Patient has been symptomatic for quite some time now.  It has gotten worse in the last 2-3 days time.  The troponin is abnormal and has been told to have small vessel disease in the 90s.  There is a high probability that the patient has significant CAD which has led to the above  findings.  She has been offered invasive workup to evaluate the coronary anatomy versus medical therapy.  She is going to talk to her daughter and decide on the same.  In the interim we will continue anticoagulation as he is.  Antiplatelet therapy is not being maximized secondary to a reasonable probability of need for surgical correction.    #2 LV function assessment: Will obtain a 2-D echogram to assess the LV systolic function as there appears to be severe shortness of breath.  This would be technically very limited study due to her left arm surgery and her inability to lie down.  Will try to get as much information as possible needed to help with her medications.    #3 hypertension we will try to optimize medications including calcium channel blockers to help with the same.    #4 leg edema: Appears to be more of chronic venous insufficiency nevertheless the LV function needs to be assessed and all other etiologies was attempted.    #5 peripheral vascular disease: Has poor pulses in lower extremity with soft bruits over the femorals.  Again medical therapy will be pursued for this at this point in time.        Principal Problem:    Closed fracture of left shoulder  Active Problems:    Right wrist fracture    Diabetes mellitus type 2 in obese    Essential hypertension    Acquired hypothyroidism    Morbid obesity with BMI of 40.0-44.9, adult    Status post total shoulder replacement, left          I discussed the patients findings and my recommendations with patient    Shay Stewart MD  02/05/18  9:16 AM      Please note that portions of this note may have been completed with a voice recognition program. Efforts were made to edit the dictations, but occasionally words are mistranscribed.

## 2018-02-20 NOTE — PROGRESS NOTES
[phone] Dr Mike Araiza to the bedside, time out performed, Pt monitored, 02,Right femoral catheter  nerve block, and right Ipack nerve block completed using Ropivacaine, 0.2% 20 ml to each site.    pt tolerated procedure well,  Site CDI, (see charting)  Versed Given: 2 mg  Fentanyl  100 mcg, pt denies co pain or discomfort,

## 2018-02-20 NOTE — CONSULTS
Historical Provider, MD       ALLERGIES      Claritin [loratadine] and Tape Raymond Kirbyki tape]    SOCIAL HISTORY       reports that she quit smoking about 2 months ago. Her smoking use included Cigarettes. She started smoking about 27 years ago. She smoked 0.50 packs per day. She has never used smokeless tobacco. She reports that she does not drink alcohol or use drugs. FAMILY HISTORY      family history includes Anxiety Disorder in her daughter; Cancer in her father; Cervical Cancer in her sister; Coronary Art Dis in her father; Diabetes in her maternal cousin and maternal uncle; Heart Disease in her father; Kidney Disease in her mother; Mikey Rack in her maternal grandmother and paternal grandfather; Migraines in her father and sister; Seizures in her father and son. REVIEW OF SYSTEMS      · Constitutional: Negative for weight loss  · Eyes: Negative for visual changes, diplopia, scleral icterus. · ENT: Negative for Headaches, hearing loss, vertigo, mouth sores, sore throat. · Cardiovascular: Negative for lightheadedness/orthostatic symptoms ,chest pain, dyspnea on exertion, palpitations or loss of consciousness. · Respiratory: Negative for cough or wheezing, sputum production, hemoptysis, pleuritic pain. · Gastrointestinal: Negative for nausea/vomiting, change in bowel habits, abdominal pain, dysphagia/appetite loss, hematemesis, blood in stools. · Genitourinary:Negative for change in bladder habits, dysuria, trouble voiding, hematuria. · Musculoskeletal: Negative for gait disturbance, weakness, joint complaints. · Integumentary: Negative for rash, pruritis. · Neurological: Negative for headache, dizziness, change in muscle strength, numbness/tingling, change in gait, balance, coordination,   · Psychiatric: negative for change in mood, affect, memory, mentation, behavior.   · Endocrine: negative for temperature intolerance, excessive thirst, fluid intake, or urination, LACTA in the last 72 hours. Cardiac enzymes:No results for input(s): CKTOTAL, CKMB, CKMBINDEX, TROPONINI in the last 72 hours. BNP:No results for input(s): BNP in the last 72 hours. Lipid profile: No results for input(s): CHOL, TRIG, HDL, LDLCALC in the last 72 hours. Invalid input(s): LDL  Blood Gases: No results found for: PH, PCO2, PO2, HCO3, O2SAT  Thyroid functions:   Lab Results   Component Value Date    TSH 1.30 01/04/2018        Imaging/Diagonstics:      CXR: No acute cardiopulmonary findings. ASSESSMENT     Patient Active Problem List   Diagnosis    Asthma    Chronic pain    Staghorn calculus    Xanthogranulomatous pyelonephritis    Calculus of kidney    Numbness of face    Patellofemoral arthralgia of right knee    Paresthesia of upper extremity    Headache disorder    Herniation of lumbar intervertebral disc without myelopathy    Status post nephrectomy    Chronic obstructive pulmonary disease (HCC)    Sinus tachycardia    Obesity (BMI 30-39. 9)    Bipolar I disorder ([address]    Impingement syndrome of shoulder region    Internal derangement of knee    Mixed anxiety depressive disorder    Obesity    History of tobacco use    Osteoarthritis of both knees    Arthritis    Osteoarthritis of knee    Patellar maltracking    Synovitis of knee    Arthralgia of shoulder    Atypical migraine    Cervicalgia    Chest pain    Chondromalacia of patella    Chronic constipation    Chronic kidney disease, stage III (moderate)    Fatigue    Insomnia    Laceration of finger    Migraine without aura and without status migrainosus, not intractable    Movement disorder    Palpitations    Pins and needles sensation    Sleep dysfunction with arousal disturbance    Vitamin D deficiency    Patellofemoral arthritis of right knee     Principal Problem:    Patellofemoral arthritis of right knee  Active Problems:    Status post nephrectomy    Obesity (BMI 30-39. 9)    Bipolar I disorder

## 2018-02-20 NOTE — PROGRESS NOTES
Physical Therapy    Facility/Department: [address] ORTHO/MED SURG  Initial Assessment    NAME: Rizwan Kulkarni  : 1975  MRN: [id number]    Date of Service: 2018  Copied from Internal Medicine: This is a 36 y.o. female admitted 2018 for Patellofemoral arthritis of right knee [M17.11]. See H&P of admitting/intern resident for more details. Procedure: Procedure(s):  NAVIO ROBOTIC TOTAL KNEE PATELLOFEMORAL  ARTHROPLASTY - NUNEZ &  NEPHEW, NSA=FEMORAL NERVE BLOCK, SPINAL VS GENERAL  Patient Diagnosis(es): There were no encounter diagnoses. has a past medical history of Anemia; Anxiety; Asthma; Bipolar 1 disorder ([address] Blackout spell; Body piercing; Cervicalgia; Chest pain; Chronic back pain; Chronic kidney disease; Chronic neck pain; Constipation; COPD (chronic obstructive pulmonary disease) ([address] Dental crown present; Depression; Fall; GERD (gastroesophageal reflux disease); Heartburn; History of blood transfusion; Hyperglycemia; Insomnia; Kidney stones; Knee pain, right; Low blood pressure; Low vitamin D level; Migraine; Numbness; Palpitations; Sprain of lumbosacral (joint) (ligament); Staghorn kidney stones; Tachycardia; Wears glasses; and Wears glasses. has a past surgical history that includes Ankle fracture surgery (Right, ); Wrist surgery (Right, ); total nephrectomy (Left, 12); laparoscopy (2014); Dilation and curettage of uterus (2014); myringotomy (Bilateral); other surgical history (age 27); Nerve Block ([id number]); Nerve Block (10/4/13); knee surgery (Left, 13); Cystoscopy (04/15/14); Ureter stent placement (Right, 04/15/14); Lithotripsy (Right, 04/15/14); Cystoscopy (Right, 14); Endometrial ablation (14); Hand surgery (Right); Hysterectomy (9-8-15); Nerve Block (12/8/15); Nerve Block (16); Nerve Block (2016); Sleeve Gastrectomy (N/A, 2017); Nerve Block (2017); Gastric bypass surgery (2017);  Umbilical hernia repair

## 2018-02-20 NOTE — FLOWSHEET NOTE
02/20/18 1730   Encounter Summary   Services provided to: Patient and family together   Referral/Consult From: [address] [facility] Family members   Continue Visiting (2/20/18)   Complexity of Encounter Low   Length of Encounter 15 minutes   Spiritual Assessment Completed Yes   Routine   Type Initial   Spiritual/Jehovah's witness   Type Spiritual support   Assessment Calm; Approachable   Intervention Active listening;Explored feelings, thoughts, concerns;Nurtured hope;Prayer;Discussed illness/injury and it's impact   Outcome Expressed gratitude;Engaged in conversation      visited patient per rounding. Patient was awake and laying in her bed watching TV with her son Kyrie Shafer present in the room.  engaged patient in conversation. Patient talked about her surgery that she had today and was starting to feel some pain in her knee. Patient discussed her current relationship with her significant other and said she was reading the Bible more.  was a listening presence.  offered prayer and patient accepted. Patient thanked  for the prayer. Chaplains remain available for spiritual and emotional support.

## 2018-02-21 ENCOUNTER — APPOINTMENT (OUTPATIENT)
Dept: GENERAL RADIOLOGY | Age: 43
End: 2018-02-21
Attending: ORTHOPAEDIC SURGERY
Payer: MEDICARE

## 2018-02-21 LAB
ABSOLUTE EOS #: 0 K/UL (ref 0–0.44)
ABSOLUTE IMMATURE GRANULOCYTE: 0 K/UL (ref 0–0.3)
ABSOLUTE LYMPH #: 2.21 K/UL (ref 1.1–3.7)
ABSOLUTE MONO #: 0.74 K/UL (ref 0.1–1.2)
ANION GAP SERPL CALCULATED.3IONS-SCNC: 13 MMOL/L (ref 9–17)
BASOPHILS # BLD: 0 % (ref 0–2)
BASOPHILS ABSOLUTE: 0 K/UL (ref 0–0.2)
BUN BLDV-MCNC: 16 MG/DL (ref 6–20)
BUN/CREAT BLD: ABNORMAL (ref 9–20)
CALCIUM SERPL-MCNC: 9.4 MG/DL (ref 8.6–10.4)
CHLORIDE BLD-SCNC: 97 MMOL/L (ref 98–107)
CO2: 22 MMOL/L (ref 20–31)
CREAT SERPL-MCNC: 1 MG/DL (ref 0.5–0.9)
DIFFERENTIAL TYPE: ABNORMAL
EOSINOPHILS RELATIVE PERCENT: 0 % (ref 1–4)
GFR AFRICAN AMERICAN: >60 ML/MIN
GFR NON-AFRICAN AMERICAN: >60 ML/MIN
GFR SERPL CREATININE-BSD FRML MDRD: ABNORMAL ML/MIN/{1.73_M2}
GFR SERPL CREATININE-BSD FRML MDRD: ABNORMAL ML/MIN/{1.73_M2}
GLUCOSE BLD-MCNC: 94 MG/DL (ref 70–99)
HCT VFR BLD CALC: 34.4 % (ref 36.3–47.1)
HEMOGLOBIN: 10.7 G/DL (ref 11.9–15.1)
IMMATURE GRANULOCYTES: 0 %
LYMPHOCYTES # BLD: 21 % (ref 24–43)
MCH RBC QN AUTO: 28.2 PG (ref 25.2–33.5)
MCHC RBC AUTO-ENTMCNC: 31.1 G/DL (ref 28.4–34.8)
MCV RBC AUTO: 90.5 FL (ref 82.6–102.9)
MONOCYTES # BLD: 7 % (ref 3–12)
MORPHOLOGY: NORMAL
NRBC AUTOMATED: 0 PER 100 WBC
PDW BLD-RTO: 12.5 % (ref 11.8–14.4)
PLATELET # BLD: 271 K/UL (ref 138–453)
PLATELET ESTIMATE: ABNORMAL
PMV BLD AUTO: 10.1 FL (ref 8.1–13.5)
POTASSIUM SERPL-SCNC: 4.6 MMOL/L (ref 3.7–5.3)
RBC # BLD: 3.8 M/UL (ref 3.95–5.11)
RBC # BLD: ABNORMAL 10*6/UL
SEG NEUTROPHILS: 72 % (ref 36–65)
SEGMENTED NEUTROPHILS ABSOLUTE COUNT: 7.55 K/UL (ref 1.5–8.1)
SODIUM BLD-SCNC: 132 MMOL/L (ref 135–144)
WBC # BLD: 10.5 K/UL (ref 3.5–11.3)
WBC # BLD: ABNORMAL 10*3/UL

## 2018-02-21 PROCEDURE — 85025 COMPLETE CBC W/AUTO DIFF WBC: CPT

## 2018-02-21 PROCEDURE — 96376 TX/PRO/DX INJ SAME DRUG ADON: CPT

## 2018-02-21 PROCEDURE — 90732 PPSV23 VACC 2 YRS+ SUBQ/IM: CPT | Performed by: ORTHOPAEDIC SURGERY

## 2018-02-21 PROCEDURE — 73560 X-RAY EXAM OF KNEE 1 OR 2: CPT

## 2018-02-21 PROCEDURE — 1200000000 HC SEMI PRIVATE

## 2018-02-21 PROCEDURE — G0378 HOSPITAL OBSERVATION PER HR: HCPCS

## 2018-02-21 PROCEDURE — G0009 ADMIN PNEUMOCOCCAL VACCINE: HCPCS | Performed by: ORTHOPAEDIC SURGERY

## 2018-02-21 PROCEDURE — 97530 THERAPEUTIC ACTIVITIES: CPT

## 2018-02-21 PROCEDURE — 94762 N-INVAS EAR/PLS OXIMTRY CONT: CPT

## 2018-02-21 PROCEDURE — 97110 THERAPEUTIC EXERCISES: CPT

## 2018-02-21 PROCEDURE — 94640 AIRWAY INHALATION TREATMENT: CPT

## 2018-02-21 PROCEDURE — 6360000002 HC RX W HCPCS: Performed by: ORTHOPAEDIC SURGERY

## 2018-02-21 PROCEDURE — 2580000003 HC RX 258: Performed by: ORTHOPAEDIC SURGERY

## 2018-02-21 PROCEDURE — 6370000000 HC RX 637 (ALT 250 FOR IP): Performed by: STUDENT IN AN ORGANIZED HEALTH CARE EDUCATION/TRAINING PROGRAM

## 2018-02-21 PROCEDURE — 36415 COLL VENOUS BLD VENIPUNCTURE: CPT

## 2018-02-21 PROCEDURE — G0008 ADMIN INFLUENZA VIRUS VAC: HCPCS | Performed by: ORTHOPAEDIC SURGERY

## 2018-02-21 PROCEDURE — 6370000000 HC RX 637 (ALT 250 FOR IP): Performed by: ORTHOPAEDIC SURGERY

## 2018-02-21 PROCEDURE — 90686 IIV4 VACC NO PRSV 0.5 ML IM: CPT | Performed by: ORTHOPAEDIC SURGERY

## 2018-02-21 PROCEDURE — 97535 SELF CARE MNGMENT TRAINING: CPT

## 2018-02-21 PROCEDURE — 80048 BASIC METABOLIC PNL TOTAL CA: CPT

## 2018-02-21 RX ORDER — DOCUSATE SODIUM 100 MG/1
100 CAPSULE, LIQUID FILLED ORAL 2 TIMES DAILY PRN
Qty: 60 CAPSULE | Refills: 0 | Status: SHIPPED | OUTPATIENT
Start: 2018-02-21 | End: 2018-06-05 | Stop reason: ALTCHOICE

## 2018-02-21 RX ORDER — OXYCODONE HYDROCHLORIDE AND ACETAMINOPHEN 5; 325 MG/1; MG/1
1-2 TABLET ORAL EVERY 6 HOURS PRN
Qty: 50 TABLET | Refills: 0 | Status: SHIPPED | OUTPATIENT
Start: 2018-02-21 | End: 2018-02-28

## 2018-02-21 RX ORDER — ASPIRIN 81 MG/1
81 TABLET ORAL 2 TIMES DAILY
Qty: 84 TABLET | Refills: 0 | Status: SHIPPED | OUTPATIENT
Start: 2018-02-21 | End: 2018-06-05 | Stop reason: ALTCHOICE

## 2018-02-21 RX ORDER — SENNA PLUS 8.6 MG/1
1 TABLET ORAL 2 TIMES DAILY
Qty: 60 TABLET | Refills: 0 | Status: SHIPPED | OUTPATIENT
Start: 2018-02-21 | End: 2018-06-05

## 2018-02-21 RX ADMIN — ASPIRIN 81 MG: 81 TABLET, CHEWABLE ORAL at 20:50

## 2018-02-21 RX ADMIN — ASPIRIN 81 MG: 81 TABLET, CHEWABLE ORAL at 09:02

## 2018-02-21 RX ADMIN — DOCUSATE SODIUM 100 MG: 100 CAPSULE ORAL at 20:50

## 2018-02-21 RX ADMIN — PREGABALIN 75 MG: 75 CAPSULE ORAL at 20:50

## 2018-02-21 RX ADMIN — VENLAFAXINE HYDROCHLORIDE 37.5 MG: 37.5 CAPSULE, EXTENDED RELEASE ORAL at 09:02

## 2018-02-21 RX ADMIN — OXCARBAZEPINE 450 MG: 300 TABLET, FILM COATED ORAL at 20:51

## 2018-02-21 RX ADMIN — PREGABALIN 75 MG: 75 CAPSULE ORAL at 09:02

## 2018-02-21 RX ADMIN — AMITRIPTYLINE HYDROCHLORIDE 100 MG: 50 TABLET, FILM COATED ORAL at 20:50

## 2018-02-21 RX ADMIN — OXYCODONE HYDROCHLORIDE 10 MG: 5 TABLET ORAL at 07:01

## 2018-02-21 RX ADMIN — Medication 2 G: at 07:02

## 2018-02-21 RX ADMIN — KETOROLAC TROMETHAMINE 30 MG: 30 INJECTION, SOLUTION INTRAMUSCULAR at 13:04

## 2018-02-21 RX ADMIN — KETOROLAC TROMETHAMINE 30 MG: 30 INJECTION, SOLUTION INTRAMUSCULAR at 18:38

## 2018-02-21 RX ADMIN — KETOROLAC TROMETHAMINE 30 MG: 30 INJECTION, SOLUTION INTRAMUSCULAR at 07:02

## 2018-02-21 RX ADMIN — VITAMIN D, TAB 1000IU (100/BT) 1000 UNITS: 25 TAB at 09:02

## 2018-02-21 RX ADMIN — MONTELUKAST SODIUM 10 MG: 10 TABLET, FILM COATED ORAL at 20:51

## 2018-02-21 RX ADMIN — PNEUMOCOCCAL VACCINE POLYVALENT 0.5 ML
25; 25; 25; 25; 25; 25; 25; 25; 25; 25; 25; 25; 25; 25; 25; 25; 25; 25; 25; 25; 25; 25; 25 INJECTION, SOLUTION INTRAMUSCULAR; SUBCUTANEOUS at 10:17

## 2018-02-21 RX ADMIN — PANTOPRAZOLE SODIUM 40 MG: 40 TABLET, DELAYED RELEASE ORAL at 07:01

## 2018-02-21 RX ADMIN — CELECOXIB 200 MG: 100 CAPSULE ORAL at 09:02

## 2018-02-21 RX ADMIN — LURASIDONE HYDROCHLORIDE 80 MG: 40 TABLET, FILM COATED ORAL at 09:02

## 2018-02-21 RX ADMIN — CELECOXIB 200 MG: 100 CAPSULE ORAL at 20:51

## 2018-02-21 RX ADMIN — Medication 10 ML: at 20:52

## 2018-02-21 RX ADMIN — Medication 10 ML: at 10:58

## 2018-02-21 RX ADMIN — DOCUSATE SODIUM 100 MG: 100 CAPSULE ORAL at 09:02

## 2018-02-21 RX ADMIN — OXYCODONE HYDROCHLORIDE 10 MG: 5 TABLET ORAL at 16:07

## 2018-02-21 RX ADMIN — ORPHENADRINE CITRATE 100 MG: 100 TABLET, EXTENDED RELEASE ORAL at 20:50

## 2018-02-21 RX ADMIN — OXYCODONE HYDROCHLORIDE 10 MG: 5 TABLET ORAL at 02:40

## 2018-02-21 RX ADMIN — INFLUENZA VIRUS VACCINE 0.5 ML: 15; 15; 15; 15 SUSPENSION INTRAMUSCULAR at 10:15

## 2018-02-21 RX ADMIN — HYDROXYZINE HYDROCHLORIDE 100 MG: 50 TABLET ORAL at 20:50

## 2018-02-21 RX ADMIN — OXYCODONE HYDROCHLORIDE 10 MG: 5 TABLET ORAL at 20:51

## 2018-02-21 RX ADMIN — MOMETASONE FUROATE AND FORMOTEROL FUMARATE DIHYDRATE 2 PUFF: 200; 5 AEROSOL RESPIRATORY (INHALATION) at 08:34

## 2018-02-21 RX ADMIN — OXYCODONE HYDROCHLORIDE 10 MG: 5 TABLET ORAL at 10:57

## 2018-02-21 RX ADMIN — THERA TABS 1 TABLET: TAB at 09:02

## 2018-02-21 ASSESSMENT — PAIN DESCRIPTION - FREQUENCY: FREQUENCY: CONTINUOUS

## 2018-02-21 ASSESSMENT — PAIN SCALES - GENERAL
PAINLEVEL_OUTOF10: 9
PAINLEVEL_OUTOF10: 9
PAINLEVEL_OUTOF10: 3
PAINLEVEL_OUTOF10: 7
PAINLEVEL_OUTOF10: 3
PAINLEVEL_OUTOF10: 9
PAINLEVEL_OUTOF10: 7
PAINLEVEL_OUTOF10: 8
PAINLEVEL_OUTOF10: 7
PAINLEVEL_OUTOF10: 7
PAINLEVEL_OUTOF10: 3
PAINLEVEL_OUTOF10: 5
PAINLEVEL_OUTOF10: 5
PAINLEVEL_OUTOF10: 2
PAINLEVEL_OUTOF10: 7

## 2018-02-21 ASSESSMENT — PAIN DESCRIPTION - ORIENTATION
ORIENTATION: RIGHT

## 2018-02-21 ASSESSMENT — PAIN DESCRIPTION - LOCATION
LOCATION: KNEE

## 2018-02-21 ASSESSMENT — PAIN DESCRIPTION - PAIN TYPE
TYPE: ACUTE PAIN;SURGICAL PAIN
TYPE: ACUTE PAIN;SURGICAL PAIN
TYPE: SURGICAL PAIN

## 2018-02-21 ASSESSMENT — PAIN DESCRIPTION - DESCRIPTORS
DESCRIPTORS: ACHING;CONSTANT
DESCRIPTORS: ACHING;DISCOMFORT

## 2018-02-21 ASSESSMENT — PAIN DESCRIPTION - ONSET: ONSET: ON-GOING

## 2018-02-21 NOTE — OP NOTE
[address] [facility]                                 OPERATIVE REPORT    PATIENT NAME: Priya Reeder                     :        1975  MED REC NO:   [id number]                             ROOM:       [id number]  ACCOUNT NO:   [de-identified]                           ADMIT DATE: 2018  PROVIDER:     Ashley Green    DATE OF PROCEDURE:  2018    PREOPERATIVE DIAGNOSIS:  Right knee patellofemoral degenerative joint  disease, severe. POSTOPERATIVE DIAGNOSIS:  Right knee patellofemoral degenerative joint  disease, severe. PROCEDURE:  Right patellofemoral knee arthroplasty with robotic assistance. SURGEON:  Ashley Green DO    ASSISTANT:  Alexandria Beauchamp DO    ANESTHESIA:  General with regional block. COMPLICATIONS:  None. DISPOSITION:  To PACU in stable condition. NARRATIVE SUMMARY:  The patient is a 28-year-old female who presented to  [facility] Surgical Department for right knee patellofemoral  knee arthroplasty. The patient has progressively worsening right knee pain  which has failed to improve despite conservative therapy to include  activity modification. Her symptoms are greatly affecting her usual  activities of daily living, and as a result, she has indicated she would  like surgical intervention at this time. Her knee pain causes her to feel  like she is going to fall and has not improved with therapy and injections. The patient was identified preoperatively, where consent was obtained,  signed, placed on the chart. The procedure was described, the questions  were answered. Risks of the procedure have been described and include, but  not limited to, the risk of anesthesia; infection; bleeding; need for  further surgery in future; numbness, tingling, weakness, or pain to the  right lower extremity; scar; stiffness; patellar dislocation; DVT;and  death.   The patient notes understanding of these risks and states she  wishes to proceed. She was administered IV antibiotics perioperatively. She was then taken to  the operative suite, where she was placed supine upon the operative table. General anesthesia was affected. A well-padded tourniquet was placed high  upon the right thigh. The right knee was inspected under general  anesthesia and was found to be ligamentously stable. The right lower  extremity was prepped and draped in a sterile fashion; 1 gm IV tranexamic  acid was administered just prior to incision, and a second 1 gm aliquot was  administered just after closure of incision. After gravity exsanguination  and an appropriate surgical time-out, tourniquet was inflated to 300 mmHg. Midline incision was made over the patella starting 2 fingerbreadths  proximal to the patella and extending just medial to the tibial tuberosity. Sharp incision was carried through the skin and subcutaneous tissue. Full-thickness skin flaps were raised over the medial parapatellar  retinaculum, and a medial parapatellar arthrotomy was affected to the knee. Great care was taken during the dissection not to injury the inner meniscal  ligament. The patella and patellofemoral joint were inspected. Grade 4  chondromalacia of the patella was affected diffusely, especially over the  medial patellar facet, and then over the medial trochlea, grade 4 changes  were noted. The femoral array was established for the robotic system with 2 small stab  incisions approximately 1 fist breadth above the superior pole of the  patella through small stab incisions, dilation with the tonsil, and  placement of Steinmann pins in the anterior portion of the femur. Once the  Steinmann pins were placed, the femoral array was attached to the Steinmann  pins, and the machine was calibrated.   Once the robot assistance for  computer assistance was noted to be capturing appropriately and all  targeting

## 2018-02-21 NOTE — PLAN OF CARE
Problem: Falls - Risk of  Goal: Absence of falls  Outcome: Met This Shift      Problem: Musculor/Skeletal Functional Status  Goal: Highest potential functional level  Outcome: Ongoing      Problem: Pain:  Goal: Pain level will decrease  Pain level will decrease    Outcome: Ongoing    Goal: Control of acute pain  Control of acute pain   Outcome: Ongoing    Goal: Control of chronic pain  Control of chronic pain   Outcome: Ongoing      Problem: Discharge Planning:  Goal: Discharged to appropriate level of care  Discharged to appropriate level of care   Outcome: Not Met This Shift      Problem: Mobility - Impaired:  Goal: Mobility will improve  Mobility will improve   Outcome: Ongoing      Problem: Infection - Surgical Site:  Goal: Will show no infection signs and symptoms  Will show no infection signs and symptoms   Outcome: Ongoing      Problem: Pain - Acute:  Goal: Pain level will decrease  Pain level will decrease    Outcome: Ongoing

## 2018-02-21 NOTE — PROGRESS NOTES
Physical Therapy  Physical Therapy  Facility/Department: [address] ORTHO/MED SURG  Daily Treatment Note  NAME: Flash Rice  : 1975  MRN: [id number]     Date of Service: 2018     Patient Diagnosis(es):        Patient Active Problem List     Diagnosis Date Noted    Patellofemoral arthritis of right knee 2018    Chest pain 2017    Palpitations 2017    Cervicalgia 10/26/2017    Chronic constipation 10/26/2017    Patellar maltracking      Synovitis of knee      Migraine without aura and without status migrainosus, not intractable 2017    Osteoarthritis of knee 2016    Arthralgia of shoulder 2016    Atypical migraine 2016    Chondromalacia of patella 2016    Chronic kidney disease, stage III (moderate) 2016    Fatigue 2016    Insomnia 2016    Movement disorder 2016    Sleep dysfunction with arousal disturbance 2016    Vitamin D deficiency 2016    Arthritis 2016    Osteoarthritis of both knees 10/19/2016    History of tobacco use 10/17/2016    Obesity 10/12/2016    Mixed anxiety depressive disorder 2016    Internal derangement of knee 2016    Impingement syndrome of shoulder region 2016    Bipolar I disorder ([facility] 2016    Obesity (BMI 30-39.9) 2015    Status post nephrectomy 2015    Chronic obstructive pulmonary disease (HCC) 2015    Sinus tachycardia 2015    Herniation of lumbar intervertebral disc without myelopathy 10/04/2013    Headache disorder 2012    Laceration of finger 2012    Paresthesia of upper extremity 2012    Pins and needles sensation 2012    Numbness of face 2012    Patellofemoral arthralgia of right knee 2012    Calculus of kidney      Xanthogranulomatous pyelonephritis 2012    Staghorn calculus 2012    Asthma      Chronic pain           Past Medical History        Past assistance  Transfers  Sit to Stand: Contact guard assistance  Stand to sit: Contact guard assistance  Ambulation  Ambulation?: Yes  Ambulation 1  Surface: level tile  Device: Rolling Walker  Assistance: Contact guard assistance  Quality of Gait: Step to gait pattern, no knee buckling noted  Distance: 100ft     Balance  Posture: Good  Sitting - Static: Good  Sitting - Dynamic: Good  Standing - Static: Good;-  Standing - Dynamic: Fair;+  Comments: with RW      AROM RLE (degrees)  RLE General AROM: 10-80 deg knee flex      Therapeutic Exercises: Total Knee Arthroplasty Exercise Program: Quad sets, glut sets, ankle pumps, heel slides, HS sets. Knee flex/ext stretch while sitting EOB  Reps: 10x AROM              Assessment   Body structures, Functions, Activity limitations: Decreased functional mobility ; Decreased strength;Decreased endurance  Assessment: Pt with greatly increased control of RLE this date, able to ambulate 100ft with CGA and RW. Will benefit from continued acute PT, should be safe to return home at discharge.    Prognosis: Good  Patient Education: PT  POC, Goals  REQUIRES PT FOLLOW UP: Yes  Activity Tolerance  Activity Tolerance: Patient Tolerated treatment well                        Goals  Short term goals  Time Frame for Short term goals: 10 visits  Short term goal 1: transfers with SBA  Short term goal 2: amb 200 ft with a RW x SBA  Short term goal 3: up and down 4 steps with SBA  Short term goal 4: total knee exercises x SBA  Patient Goals   Patient goals : Return home     Plan    Plan  Times per week: BID  Current Treatment Recommendations: Strengthening, Functional Mobility Training, Transfer Training, Gait Training, Safety Education & Training, Endurance Training, Stair training  Safety Devices  Type of devices: Call light within reach, Left in bed, Gait belt      Therapy Time    Individual Concurrent Group Co-treatment   Time In 1312         Time Out 1325         Minutes 13

## 2018-02-21 NOTE — PROGRESS NOTES
 Atypical migraine    Cervicalgia    Chest pain    Chondromalacia of patella    Chronic constipation    Chronic kidney disease, stage III (moderate)    Fatigue    Insomnia    Laceration of finger    Migraine without aura and without status migrainosus, not intractable    Movement disorder    Palpitations    Pins and needles sensation    Sleep dysfunction with arousal disturbance    Vitamin D deficiency    Patellofemoral arthritis of right knee       Current Medications    bupivacaine 0.125% (MARCAINE) elastomeric infusion 550 mL Continuous   albuterol sulfate  (90 Base) MCG/ACT inhaler 2 puff Q6H PRN   amitriptyline (ELAVIL) tablet 100 mg Nightly   mometasone-formoterol (DULERA) 200-5 MCG/ACT inhaler 2 puff BID   fluticasone (FLONASE) 50 MCG/ACT nasal spray 1 spray BID PRN   hydrOXYzine (ATARAX) tablet 100 mg Nightly   lurasidone (LATUDA) tablet 80 mg Daily   montelukast (SINGULAIR) tablet 10 mg Nightly   pantoprazole (PROTONIX) tablet 40 mg QAM AC   ondansetron (ZOFRAN) tablet 4 mg Q8H PRN   OXcarbazepine (TRILEPTAL) tablet 450 mg Nightly   OXcarbazepine (TRILEPTAL) tablet 300 mg Nightly   topiramate (TOPAMAX) tablet 100 mg Daily   topiramate (TOPAMAX) tablet 25 mg Daily   venlafaxine (EFFEXOR XR) extended release capsule 37.5 mg Daily with breakfast   vitamin D (CHOLECALCIFEROL) tablet 1,000 Units Daily   acetaminophen (TYLENOL) tablet 650 mg Q4H PRN   pregabalin (LYRICA) capsule 75 mg BID   ketorolac (TORADOL) injection 30 mg Q6H PRN   traMADol (ULTRAM) tablet 50 mg Q6H PRN   Or    traMADol (ULTRAM) tablet 100 mg Q6H PRN   celecoxib (CELEBREX) capsule 200 mg BID   oxyCODONE (ROXICODONE) immediate release tablet 5 mg Q4H PRN   Or    oxyCODONE (ROXICODONE) immediate release tablet 10 mg Q4H PRN   diphenhydrAMINE (BENADRYL) tablet 25 mg Q6H PRN   aspirin chewable tablet 81 mg BID   sodium chloride flush 0.9 % injection 10 mL 2 times per day   sodium chloride flush 0.9 % injection 10 mL PRN

## 2018-02-21 NOTE — PROGRESS NOTES
Occupational Therapy  [facility]  Daily Treatment Note  NAME: Alexandra Milton  : 1975  MRN: [id number]    Date of Service: 2018    Patient Diagnosis(es): The primary encounter diagnosis was Patellofemoral arthralgia of right knee. A diagnosis of Primary osteoarthritis of right knee was also pertinent to this visit. has a past medical history of Anemia; Anxiety; Asthma; Bipolar 1 disorder ([facility] Blackout spell; Body piercing; Cervicalgia; Chest pain; Chronic back pain; Chronic kidney disease; Chronic neck pain; Constipation; COPD (chronic obstructive pulmonary disease) ([facility] [address] [facility] Dental crown present; Depression; Fall; GERD (gastroesophageal reflux disease); Heartburn; History of blood transfusion; Hyperglycemia; Insomnia; Kidney stones; Knee pain, right; Low blood pressure; Low vitamin D level; Migraine; Numbness; Palpitations; Sprain of lumbosacral (joint) (ligament); Staghorn kidney stones; Tachycardia; Wears glasses; and Wears glasses. has a past surgical history that includes Ankle fracture surgery (Right, ); Wrist surgery (Right, ); total nephrectomy (Left, 12); laparoscopy (2014); Dilation and curettage of uterus (2014); myringotomy (Bilateral); other surgical history (age 27); Nerve Block (71-35-34); Nerve Block (10/4/13); knee surgery (Left, 13); Cystoscopy (04/15/14); Ureter stent placement (Right, 04/15/14); Lithotripsy (Right, 04/15/14); Cystoscopy (Right, 14); Endometrial ablation (14); Hand surgery (Right); Hysterectomy (9-8-15); Nerve Block (12/8/15); Nerve Block (16); Nerve Block (2016); Sleeve Gastrectomy (N/A, 2017); Nerve Block (2017); Gastric bypass surgery (2017); Umbilical hernia repair (); Shoulder arthroscopy (Right); Knee arthroscopy (Right, 2017); knee scope,diagnostic (Right, 2017); Lithotripsy ();  [place] tooth extraction; Gastric bypass surgery; and Knee Arthroplasty (Right, 02/20/2018). Restrictions  Restrictions/Precautions  Restrictions/Precautions: Weight Bearing  Required Braces or Orthoses?: No  Lower Extremity Weight Bearing Restrictions  Right Lower Extremity Weight Bearing: Weight Bearing As Tolerated     Subjective   General  Patient assessed for rehabilitation services?: Yes  Family / Caregiver Present: Yes (Significant other and son.)  General Comment  Comments: SAMMI Amin ok'd for OT tx. Pt agreeable/cooperative, activity as tolerated. FWBAT  Pain Assessment  Patient Currently in Pain: Yes  Pain Assessment: 0-10  Pain Level: 7  Pain Type: Acute pain;Surgical pain  Pain Location: Knee  Pain Orientation: Right  Pain Descriptors: Aching;Constant  Pain Intervention(s): Ambulation/Increased activity;Repositioned;Distraction    Objective      ADL  Grooming: Setup (Completed while seated EOB.)  UE Bathing: Setup (Completed while seated EOB. Use of HIBIclens.)  LE Bathing: Stand by assistance (Completed while seated EOB. Use of HIBIclens.)  UE Dressing: Setup (Donned bra and shirt while seated EOB.)  LE Dressing: Contact guard assistance (Donned underwear and pants while seated EOB and pulled up once standing.)  Toileting: Contact guard assistance (Use of RW to complete functional mobility to toilet.)     Balance  Sitting Balance: Independent  Standing Balance: Contact guard assistance (use of RW)    Standing Balance  Time: ~5 minutes  Activity: functional transfers from EOB/toilet, functional mobility for ADL routines, ADL completion  Sit to stand: Contact guard assistance  Stand to sit: Contact guard assistance  Comment: use of Rw.     Toilet Transfers  Toilet - Technique: Stand step  Equipment Used: Raised toilet seat with rails  Toilet Transfer: Contact guard assistance    Bed mobility  Supine to Sit: Stand by assistance  Sit to Supine: Stand by assistance  Scooting: Stand by assistance     Transfers  Stand Step Transfers: Contact guard assistance  Sit to

## 2018-02-21 NOTE — PROGRESS NOTES
[address]    Date:   2/21/2018  Patient name:  Lamin Whatley  Date of admission:  2/20/2018  5:15 AM  MRN:   [id number]  YOB: 1975    CC-   Elective surgery     HPI     The patient is a 36 y.o.  female who is admitted to the hospital for an elective right knee arthroplasty for right DJD. She has PMH of migraines, s/p left nephrectomy baseline Cr 1.1, bipolar disorder. She had her robotic right patellofemoral knee arthroplasty today with spinal block. We were consulted for medical management. She is on multiple medications for bipolar and migraines which are stable at the moment. Her last Cr on 1/31 was 1.09 which is her baseline    Subjective    Patient is seen and examined at bedside. No acute events overnight. Hemodynamically stable. Afebrile. Creatinine at baseline     Past Medical History:   Diagnosis Date    Anemia     Anxiety     Asthma     appt with pulmonologist 2/1/18    Bipolar 1 disorder ([facility] [address]     Blackout spell     Body piercing     X2 ABOVE AND BELOW LIP     Cervicalgia 8/14/2013    Chest pain     Chronic back pain     Chronic kidney disease     stage 3    Chronic neck pain     Constipation     COPD (chronic obstructive pulmonary disease) (HCC)     Dental crown present     has dental clearance    Depression     Fall     landed on tailbone, exacrbated back pain and headaches    GERD (gastroesophageal reflux disease)     Heartburn     History of blood transfusion 2012    no reaction    Hyperglycemia     DIET CONTROLED    Insomnia     Kidney stones     Knee pain, right     Low blood pressure     Low vitamin D level     Migraine     Numbness     bilat. legs    Palpitations     Sprain of lumbosacral (joint) (ligament) 8/14/2013    Staghorn kidney stones     L side Kidney stone  and R side    Tachycardia     HR: 130's with chest pain at times, sees Cardiologist @ [facility]/ pt. 02/21/18   0514   WBC  10.5   HGB  10.7*   PLT  271     BMP:    Recent Labs      02/20/18   1601  02/21/18   0514   NA  134*  132*   K  4.6  4.6   CL  97*  97*   CO2  21  22   BUN  14  16   CREATININE  0.96*  1.00*   GLUCOSE  139*  94         ASSESSMENT:    Principal Problem:    Patellofemoral arthritis of right knee  Active Problems:    Status post nephrectomy    Obesity (BMI 30-39. 9)    Bipolar I disorder (HCC)    Chronic kidney disease, stage III (moderate)    Migraine without aura and without status migrainosus, not intractable  Resolved Problems:    * No resolved hospital problems. *      PLAN:    1. D/c fluids . 2. Continue home medications  3. On ASA 81 mg BID per primary for DVT prophylaxis. 4. Monitor creatinine       Janett Boeck, MD [address], [address] [place] [address].    Phone [phone]   Fax: [phone]  Answering Service: [phone]

## 2018-02-22 VITALS
HEIGHT: 62 IN | WEIGHT: 207.7 LBS | RESPIRATION RATE: 17 BRPM | TEMPERATURE: 98.1 F | BODY MASS INDEX: 38.22 KG/M2 | HEART RATE: 97 BPM | DIASTOLIC BLOOD PRESSURE: 57 MMHG | OXYGEN SATURATION: 94 % | SYSTOLIC BLOOD PRESSURE: 90 MMHG

## 2018-02-22 PROBLEM — M22.41 PATELLA, CHONDROMALACIA, RIGHT: Status: ACTIVE | Noted: 2018-02-22

## 2018-02-22 PROCEDURE — 6360000002 HC RX W HCPCS: Performed by: ORTHOPAEDIC SURGERY

## 2018-02-22 PROCEDURE — 97116 GAIT TRAINING THERAPY: CPT

## 2018-02-22 PROCEDURE — 6370000000 HC RX 637 (ALT 250 FOR IP): Performed by: ORTHOPAEDIC SURGERY

## 2018-02-22 PROCEDURE — G0378 HOSPITAL OBSERVATION PER HR: HCPCS

## 2018-02-22 PROCEDURE — 96376 TX/PRO/DX INJ SAME DRUG ADON: CPT

## 2018-02-22 PROCEDURE — 2580000003 HC RX 258: Performed by: ORTHOPAEDIC SURGERY

## 2018-02-22 PROCEDURE — 97530 THERAPEUTIC ACTIVITIES: CPT

## 2018-02-22 PROCEDURE — 97535 SELF CARE MNGMENT TRAINING: CPT

## 2018-02-22 PROCEDURE — 99232 SBSQ HOSP IP/OBS MODERATE 35: CPT | Performed by: INTERNAL MEDICINE

## 2018-02-22 PROCEDURE — 97110 THERAPEUTIC EXERCISES: CPT

## 2018-02-22 PROCEDURE — 94762 N-INVAS EAR/PLS OXIMTRY CONT: CPT

## 2018-02-22 RX ADMIN — VENLAFAXINE HYDROCHLORIDE 37.5 MG: 37.5 CAPSULE, EXTENDED RELEASE ORAL at 09:32

## 2018-02-22 RX ADMIN — OXYCODONE HYDROCHLORIDE 10 MG: 5 TABLET ORAL at 00:29

## 2018-02-22 RX ADMIN — LURASIDONE HYDROCHLORIDE 80 MG: 40 TABLET, FILM COATED ORAL at 09:33

## 2018-02-22 RX ADMIN — ASPIRIN 81 MG: 81 TABLET, CHEWABLE ORAL at 09:33

## 2018-02-22 RX ADMIN — THERA TABS 1 TABLET: TAB at 09:33

## 2018-02-22 RX ADMIN — VITAMIN D, TAB 1000IU (100/BT) 1000 UNITS: 25 TAB at 09:32

## 2018-02-22 RX ADMIN — KETOROLAC TROMETHAMINE 30 MG: 30 INJECTION, SOLUTION INTRAMUSCULAR at 00:28

## 2018-02-22 RX ADMIN — OXYCODONE HYDROCHLORIDE 10 MG: 5 TABLET ORAL at 13:24

## 2018-02-22 RX ADMIN — CELECOXIB 200 MG: 100 CAPSULE ORAL at 09:33

## 2018-02-22 RX ADMIN — DOCUSATE SODIUM 100 MG: 100 CAPSULE ORAL at 09:33

## 2018-02-22 RX ADMIN — PREGABALIN 75 MG: 75 CAPSULE ORAL at 09:32

## 2018-02-22 RX ADMIN — Medication 10 ML: at 09:33

## 2018-02-22 RX ADMIN — OXYCODONE HYDROCHLORIDE 10 MG: 5 TABLET ORAL at 09:33

## 2018-02-22 ASSESSMENT — PAIN DESCRIPTION - LOCATION
LOCATION: KNEE

## 2018-02-22 ASSESSMENT — PAIN DESCRIPTION - PAIN TYPE
TYPE: SURGICAL PAIN
TYPE: SURGICAL PAIN;ACUTE PAIN
TYPE: SURGICAL PAIN
TYPE: SURGICAL PAIN

## 2018-02-22 ASSESSMENT — PAIN DESCRIPTION - ORIENTATION
ORIENTATION: RIGHT

## 2018-02-22 ASSESSMENT — PAIN DESCRIPTION - DESCRIPTORS
DESCRIPTORS: ACHING;DISCOMFORT
DESCRIPTORS: ACHING;DISCOMFORT

## 2018-02-22 ASSESSMENT — PAIN DESCRIPTION - FREQUENCY: FREQUENCY: CONTINUOUS

## 2018-02-22 ASSESSMENT — PAIN SCALES - GENERAL
PAINLEVEL_OUTOF10: 8
PAINLEVEL_OUTOF10: 7
PAINLEVEL_OUTOF10: 8
PAINLEVEL_OUTOF10: 7
PAINLEVEL_OUTOF10: 8
PAINLEVEL_OUTOF10: 6

## 2018-02-22 ASSESSMENT — PAIN DESCRIPTION - PROGRESSION: CLINICAL_PROGRESSION: NOT CHANGED

## 2018-02-22 ASSESSMENT — PAIN DESCRIPTION - ONSET: ONSET: ON-GOING

## 2018-02-22 NOTE — PROGRESS NOTES
Patient seen at bedside. VSS  The catheter site is dry and clean. Patient being d/c today with the On-Q ball. Will follow up as needed.

## 2018-02-22 NOTE — PROGRESS NOTES
Medical History:   Diagnosis Date    Anemia     Anxiety     Asthma     appt with pulmonologist 2/1/18    Bipolar 1 disorder ([facility] [address]     Blackout spell     Body piercing     X2 ABOVE AND BELOW LIP     Cervicalgia 8/14/2013    Chest pain     Chronic back pain     Chronic kidney disease     stage 3    Chronic neck pain     Constipation     COPD (chronic obstructive pulmonary disease) ([facility] [address]     Dental crown present     has dental clearance    Depression     Fall     landed on tailbone, exacrbated back pain and headaches    GERD (gastroesophageal reflux disease)     Heartburn     History of blood transfusion 2012    no reaction    Hyperglycemia     DIET CONTROLED    Insomnia     Kidney stones     Knee pain, right     Low blood pressure     Low vitamin D level     Migraine     Numbness     bilat. legs    Palpitations     Sprain of lumbosacral (joint) (ligament) 8/14/2013    Staghorn kidney stones     L side Kidney stone  and R side    Tachycardia     HR: 130's with chest pain at times, sees Cardiologist @ [facility]/ pt. can't remember name dr Matti Pritchard cardiologist appt for cc next week    Wears glasses     Wears glasses      Past Surgical History:   Procedure Laterality Date    ANKLE FRACTURE SURGERY Right 1998    CYSTOSCOPY  04/15/14    CYSTOSCOPY Right 4/29/14    with rt ureteral stent removal (Dr Socorro Costa)   [facility]  05/23/2014    ENDOMETRIAL ABLATION  1/23/14    [facility] -  [address]  02/20/2017    gastric sleeve    GASTRIC BYPASS SURGERY      sleeve    HAND SURGERY Right     cyst removal    HYSTERECTOMY  9-8-15    RONNY with BSO    KNEE ARTHROPLASTY Right 02/20/2018    KNEE ARTHROSCOPY Right 09/12/2017    rt knee scope    KNEE SURGERY Left 12/16/13    LAPAROSCOPY  05/23/2014    pelvic exploratory    LITHOTRIPSY Right 04/15/14    LITHOTRIPSY  2014    MYRINGOTOMY Bilateral     NERVE BLOCK  09-19-13    KENALOG 40 MG Yes       Orientation  Orientation  Overall Orientation Status: Within Normal Limits  Objective   Bed mobility  Supine to Sit: Modified independent  Sit to Supine: Modified independent  Transfers  Sit to Stand: Stand by assistance  Stand to sit: Stand by assistance  Ambulation  Ambulation?: Yes  Ambulation 1  Surface: level tile  Device: Rolling Walker  Assistance: Stand by assistance  Quality of Gait: Step through gait pattern with no buckling noted  Distance: 300ft     Balance  Posture: Good  Sitting - Static: Good  Sitting - Dynamic: Good  Standing - Static: Good  Standing - Dynamic: Good;-  Comments: with RW      AROM RLE (degrees)  RLE General AROM: 2-80 knee flex         Therapeutic Exercises: Total Knee Arthroplasty Exercise Program: Quad sets, glut sets, ankle pumps, heel slides, short arc quads, straight leg raise. Reps: 10x AROM           Assessment   Body structures, Functions, Activity limitations: Decreased functional mobility ; Decreased strength;Decreased endurance  Assessment: Pt able to greatly increase ambulation distance, able to increase to 300ft with a step through gait battern. Pt will benefit from continued acute PT, safe to return home at discharge.    Prognosis: Good  Patient Education: PT  POC, Goals  REQUIRES PT FOLLOW UP: Yes  Activity Tolerance  Activity Tolerance: Patient Tolerated treatment well                  Goals  Short term goals  Time Frame for Short term goals: 10 visits  Short term goal 1: transfers with SBA  Short term goal 2: amb 200 ft with a RW x SBA  Short term goal 3: up and down 4 steps with SBA  Short term goal 4: total knee exercises x SBA  Patient Goals   Patient goals : Return home    Plan    Plan  Times per week: BID  Current Treatment Recommendations: Strengthening, Functional Mobility Training, Transfer Training, Gait Training, Safety Education & Training, Endurance Training, Stair training  Safety Devices  Type of devices: Call light within reach, Gait belt, Left (0) understands/communicates without difficulty

## 2018-02-22 NOTE — PROGRESS NOTES
Surgical aquacel dressing in place  · Neurological:  Motor: strength 5/5 throughout. Laboratory Testing:  CBC:   Recent Labs      02/21/18   0514   WBC  10.5   HGB  10.7*   PLT  271     BMP:    Recent Labs      02/20/18   1601  02/21/18   0514   NA  134*  132*   K  4.6  4.6   CL  97*  97*   CO2  21  22   BUN  14  16   CREATININE  0.96*  1.00*   GLUCOSE  139*  94         ASSESSMENT:    Principal Problem:    Patellofemoral arthritis of right knee  Active Problems:    Status post nephrectomy    Obesity (BMI 30-39. 9)    Bipolar I disorder (HCC)    Chronic kidney disease, stage III (moderate)    Migraine without aura and without status migrainosus, not intractable  Resolved Problems:    * No resolved hospital problems. *      PLAN:    Creatinine at baseline /off fluids   SBP in lower normal, continue monitoring, Monitor creatinine  Started on oral diet , will recommend renal diet with low sodium  Continue all antiepileptic medications   On ASA 81 mg BID per primary for DVT prophylaxis. Jacqueline Villasenor MD  PGY-1, Internal Medicine resident  [facility]. Attending Physician Statement  Patient seen and examined  I have discussed the care of the patient, including pertinent history and exam findings,  with the resident. I have reviewed the key elements of all parts of the encounter with the resident. I agree with the assessment, plan and orders as documented by the resident.     Roma Lino

## 2018-02-22 NOTE — CARE COORDINATION
Discharge [facility] Case Management Department  Written by: Vianca Zhou RN    Patient Name: Brianna Gallagher  Attending Provider: Jumana Rivas DO  Admit Date: 2018  5:15 AM  MRN: [id number]  Account: [de-identified]                     : 1975  Discharge Date:  2018  Met with pt and verified dc plan: home with [address] [facility]. Pt in agreement, faxed docs to [address] and called informed of dc home today.        Disposition: home with [address] home care     Vianca Zhou RN

## 2018-02-22 NOTE — PROGRESS NOTES
 NERVE BLOCK  10/4/13    Left MBNB #2, Decadron 10mg    NERVE BLOCK  12/8/15    duramorph, celestone 9mg, duramorph 1.25mg    NERVE BLOCK  5/24/16    duramorph 1.5  decadron 7mg    NERVE BLOCK  11/21/2016    duramorph 1mg  celestone 9mg    NERVE BLOCK  08/02/2017    duramorph morphine 1.5mg decadron 7.5mg    OTHER SURGICAL HISTORY  age 27    Essure contraceptive implants     NE KNEE SCOPE,DIAGNOSTIC Right 9/12/2017    KNEE ARTHROSCOPY, LATERAL RELEASE PATELLA RETINACULUM   ARTHROSCOPIC BOVIE, performed by Nara Gil DO at [address] Right 2/20/2018    NAVIO ROBOTIC TOTAL KNEE PATELLOFEMORAL  ARTHROPLASTY - [facility], NSA=FEMORAL NERVE BLOCK, SPINAL VS GENERAL performed by Nara Gil DO at [address] ARTHROSCOPY Right     AUG 3,2015    SLEEVE GASTRECTOMY N/A 2/20/2017    GASTRECTOMY SLEEVE LAPAROSCOPIC XI ROBOTIC, ENDOSEALER  performed by Ame Garrett MD at [address] Left 4/17/12    Left Nephrectomy - staghorn calculus    UMBILICAL HERNIA REPAIR  2007    URETER STENT PLACEMENT Right 04/15/14    removed 2 weeks later    WISDOM TOOTH EXTRACTION      WRIST SURGERY Right 2002    right ganglion cystectomy       Restrictions  Restrictions/Precautions  Restrictions/Precautions: Weight Bearing  Required Braces or Orthoses?: No  Lower Extremity Weight Bearing Restrictions  Right Lower Extremity Weight Bearing: Weight Bearing As Tolerated  Subjective   General  Response To Previous Treatment: Patient with no complaints from previous session. Family / Caregiver Present: No  Subjective  Subjective: Pt states she is eager to go home  Pain Screening  Patient Currently in Pain: Yes  Pain Assessment  Pain Assessment: 0-10  Pain Level: 8  Pain Type: Surgical pain  Pain Location: Knee  Pain Orientation: Right  Pain Intervention(s): Repositioned; Ambulation/Increased activity  Response to Pain Intervention: Patient

## 2018-02-23 ENCOUNTER — TELEPHONE (OUTPATIENT)
Dept: CASE MANAGEMENT | Age: 43
End: 2018-02-23

## 2018-02-26 ENCOUNTER — OFFICE VISIT (OUTPATIENT)
Dept: ORTHOPEDIC SURGERY | Age: 43
End: 2018-02-26

## 2018-02-26 VITALS
HEART RATE: 90 BPM | SYSTOLIC BLOOD PRESSURE: 100 MMHG | DIASTOLIC BLOOD PRESSURE: 67 MMHG | HEIGHT: 62 IN | WEIGHT: 207.67 LBS | BODY MASS INDEX: 38.22 KG/M2

## 2018-02-26 DIAGNOSIS — Z96.651 S/P TOTAL KNEE ARTHROPLASTY, RIGHT: Primary | ICD-10-CM

## 2018-02-26 PROBLEM — Z01.810 PREOP CARDIOVASCULAR EXAM: Status: ACTIVE | Noted: 2017-09-06

## 2018-02-26 PROCEDURE — 99024 POSTOP FOLLOW-UP VISIT: CPT | Performed by: ORTHOPAEDIC SURGERY

## 2018-02-26 RX ORDER — OXYCODONE HYDROCHLORIDE AND ACETAMINOPHEN 5; 325 MG/1; MG/1
1 TABLET ORAL EVERY 4 HOURS PRN
Qty: 42 TABLET | Refills: 0 | Status: SHIPPED | OUTPATIENT
Start: 2018-02-26 | End: 2018-03-05

## 2018-02-26 NOTE — PROGRESS NOTES
I performed a history and physical examination of the patient and discussed management with the resident. I reviewed the residents note and agree with the documented findings and plan of care. Any areas of disagreement are noted on the chart. I have personally evaluated this patient and have completed at least one if not all key elements of the E/M (history, physical exam, and MDM). Additional findings are as noted. I agree with the chief complaint, past medical history, past surgical history, allergies, medications, social and family history as documented unless otherwise noted below.      Electronically signed by Laith Juarez DO on 2/26/2018 at 11:56 AM

## 2018-02-26 NOTE — PROGRESS NOTES
S: Pt is a 36 y.o. female being seen for first post op f/u s/p R PF arthroplasty, 2/20/18. Pain has been well-controlled with Percocet. Of note, she did have a fall her first day home from the hospital.  She fell onto her side. She states her knee gave out on her. She did have a continuous nerve catheter. She did not fall onto her knee. She did not have any significant knee pain or problems following a fall. She has home therapy 3 times a week. She is ambulating with a walker. Nice fevers, chills, numbness or tingling. O:   Vitals:    02/26/18 1001   BP: 100/67   Pulse: 90   Body mass index is 37.97 kg/m². Gen: A&O, NAD, cooperative    R LE: Incision healing appropriately. The 2 proximal pin sites had sutures intact. There is no erythema, fluctuance, and or drainage concerning for infection. There is an area of skin compromise from the procedure medial to the incision with no sutures that is healing appropriately. TA/EHL/FHL/GS motor intact. Knee range of motion 5-60°. Extensor mechanism intact. Radiology  History: 58-year-old female status post right patellofemoral replacement on 2/20/2018    Comparison: 4 views of the right knee from 2/20/2018    Findings: 3 views of the right knee were taken today. There is stable patellofemoral arthroplasty. The patella is tracking within the trochlear groove. There are no fractures or dislocations. Impression: Stable patellofemoral arthritis    A/P: 36 y.o. female one-week status post right patellofemoral arthroplasty  - Weightbearing as tolerated, continue home physical therapy 3 times a week. We will transition her to outpatient physical therapy the next visit  - Percocet prescribed for pain  - Proximal sutures removed and replaced with Steri-Strips  - Okay to shower but do not soak incisions.   Okay to leave incision open to air  - Ambulatory devices as needed  - Follow-up in 4 weeks    Dixon Hubbard DO   11:23 AM 2/26/2018

## 2018-02-27 RX ORDER — ONDANSETRON 4 MG/1
TABLET, FILM COATED ORAL
Qty: 30 TABLET | Refills: 1 | Status: SHIPPED | OUTPATIENT
Start: 2018-02-27 | End: 2018-03-16 | Stop reason: SDUPTHER

## 2018-02-28 ENCOUNTER — TELEPHONE (OUTPATIENT)
Dept: ORTHOPEDIC SURGERY | Age: 43
End: 2018-02-28

## 2018-03-12 RX ORDER — IBUPROFEN 800 MG/1
TABLET ORAL
Qty: 90 TABLET | Refills: 1 | Status: SHIPPED | OUTPATIENT
Start: 2018-03-12 | End: 2018-05-16 | Stop reason: ALTCHOICE

## 2018-03-20 ENCOUNTER — OFFICE VISIT (OUTPATIENT)
Dept: BARIATRICS/WEIGHT MGMT | Age: 43
End: 2018-03-20
Payer: MEDICARE

## 2018-03-20 VITALS
HEIGHT: 61 IN | HEART RATE: 74 BPM | DIASTOLIC BLOOD PRESSURE: 72 MMHG | BODY MASS INDEX: 35.87 KG/M2 | WEIGHT: 190 LBS | SYSTOLIC BLOOD PRESSURE: 110 MMHG | RESPIRATION RATE: 20 BRPM

## 2018-03-20 DIAGNOSIS — R53.82 CHRONIC FATIGUE: ICD-10-CM

## 2018-03-20 DIAGNOSIS — M17.0 PRIMARY OSTEOARTHRITIS OF BOTH KNEES: Primary | ICD-10-CM

## 2018-03-20 DIAGNOSIS — J41.0 SIMPLE CHRONIC BRONCHITIS (HCC): ICD-10-CM

## 2018-03-20 PROCEDURE — 1036F TOBACCO NON-USER: CPT | Performed by: SURGERY

## 2018-03-20 PROCEDURE — G8417 CALC BMI ABV UP PARAM F/U: HCPCS | Performed by: SURGERY

## 2018-03-20 PROCEDURE — G8482 FLU IMMUNIZE ORDER/ADMIN: HCPCS | Performed by: SURGERY

## 2018-03-20 PROCEDURE — G8926 SPIRO NO PERF OR DOC: HCPCS | Performed by: SURGERY

## 2018-03-20 PROCEDURE — 3023F SPIROM DOC REV: CPT | Performed by: SURGERY

## 2018-03-20 PROCEDURE — 99213 OFFICE O/P EST LOW 20 MIN: CPT | Performed by: SURGERY

## 2018-03-20 PROCEDURE — G8427 DOCREV CUR MEDS BY ELIG CLIN: HCPCS | Performed by: SURGERY

## 2018-03-20 RX ORDER — OXYCODONE AND ACETAMINOPHEN 10; 325 MG/1; MG/1
1 TABLET ORAL EVERY 6 HOURS PRN
COMMUNITY
End: 2018-03-26 | Stop reason: ALTCHOICE

## 2018-03-20 NOTE — PROGRESS NOTES
of patella    Chronic constipation    Chronic kidney disease, stage III (moderate)    Fatigue    Psychophysiological insomnia    Laceration of finger    Migraine without aura and without status migrainosus, not intractable    Movement disorder    Palpitations    Pins and needles sensation    Sleep dysfunction with arousal disturbance    Vitamin D deficiency    Patellofemoral arthritis of right knee    Patella, chondromalacia, right    Chronic fatigue    Preop cardiovascular exam     OA of knees-stable  Bariatric Surgery status    Plan:  Patient to continue to strive for at least 60-80 grams of protien/day, and at least 48-64oz of fluid daily  Reinforced need for regular exercise  Reinforced need for consistency with MVI and Ca  Return in 1 year

## 2018-03-21 RX ORDER — ONDANSETRON 4 MG/1
TABLET, FILM COATED ORAL
Qty: 30 TABLET | Refills: 1 | Status: SHIPPED | OUTPATIENT
Start: 2018-03-21 | End: 2018-04-06 | Stop reason: SDUPTHER

## 2018-03-26 ENCOUNTER — OFFICE VISIT (OUTPATIENT)
Dept: ORTHOPEDIC SURGERY | Age: 43
End: 2018-03-26

## 2018-03-26 VITALS — BODY MASS INDEX: 38.22 KG/M2 | WEIGHT: 189.6 LBS | HEIGHT: 59 IN

## 2018-03-26 DIAGNOSIS — M17.11 PATELLOFEMORAL ARTHRITIS OF RIGHT KNEE: Primary | ICD-10-CM

## 2018-03-26 PROCEDURE — 99024 POSTOP FOLLOW-UP VISIT: CPT | Performed by: STUDENT IN AN ORGANIZED HEALTH CARE EDUCATION/TRAINING PROGRAM

## 2018-03-26 RX ORDER — ACETAMINOPHEN 500 MG
TABLET ORAL
Qty: 120 TABLET | Refills: 2 | Status: SHIPPED | OUTPATIENT
Start: 2018-03-26 | End: 2018-08-06

## 2018-04-03 ENCOUNTER — HOSPITAL ENCOUNTER (OUTPATIENT)
Dept: PHYSICAL THERAPY | Age: 43
Setting detail: THERAPIES SERIES
Discharge: HOME | End: 2018-04-03
Payer: MEDICARE

## 2018-04-03 PROCEDURE — 97161 PT EVAL LOW COMPLEX 20 MIN: CPT

## 2018-04-03 PROCEDURE — 97110 THERAPEUTIC EXERCISES: CPT

## 2018-04-05 ENCOUNTER — HOSPITAL ENCOUNTER (OUTPATIENT)
Dept: PHYSICAL THERAPY | Age: 43
Setting detail: THERAPIES SERIES
Discharge: HOME | End: 2018-04-05
Payer: MEDICARE

## 2018-04-05 PROCEDURE — 97110 THERAPEUTIC EXERCISES: CPT

## 2018-04-05 PROCEDURE — 97140 MANUAL THERAPY 1/> REGIONS: CPT

## 2018-04-06 RX ORDER — ONDANSETRON 4 MG/1
TABLET, FILM COATED ORAL
Qty: 30 TABLET | Refills: 1 | Status: SHIPPED | OUTPATIENT
Start: 2018-04-06 | End: 2018-05-03 | Stop reason: SDUPTHER

## 2018-04-11 ENCOUNTER — HOSPITAL ENCOUNTER (OUTPATIENT)
Dept: PHYSICAL THERAPY | Age: 43
Setting detail: THERAPIES SERIES
Discharge: HOME | End: 2018-04-11
Payer: MEDICARE

## 2018-04-11 PROBLEM — Z01.810 PREOP CARDIOVASCULAR EXAM: Status: RESOLVED | Noted: 2017-09-06 | Resolved: 2018-04-11

## 2018-04-11 PROCEDURE — 97110 THERAPEUTIC EXERCISES: CPT

## 2018-04-11 PROCEDURE — 97140 MANUAL THERAPY 1/> REGIONS: CPT

## 2018-04-13 ENCOUNTER — HOSPITAL ENCOUNTER (OUTPATIENT)
Dept: PHYSICAL THERAPY | Age: 43
Setting detail: THERAPIES SERIES
Discharge: HOME | End: 2018-04-13
Payer: MEDICARE

## 2018-04-13 PROCEDURE — 97110 THERAPEUTIC EXERCISES: CPT

## 2018-04-13 PROCEDURE — 97140 MANUAL THERAPY 1/> REGIONS: CPT

## 2018-04-17 ENCOUNTER — HOSPITAL ENCOUNTER (OUTPATIENT)
Dept: PHYSICAL THERAPY | Age: 43
Setting detail: THERAPIES SERIES
Discharge: HOME | End: 2018-04-17
Payer: MEDICARE

## 2018-04-20 ENCOUNTER — HOSPITAL ENCOUNTER (OUTPATIENT)
Dept: PHYSICAL THERAPY | Age: 43
Setting detail: THERAPIES SERIES
Discharge: HOME | End: 2018-04-20
Payer: MEDICARE

## 2018-04-20 PROCEDURE — 97110 THERAPEUTIC EXERCISES: CPT

## 2018-04-25 ENCOUNTER — HOSPITAL ENCOUNTER (OUTPATIENT)
Dept: PHYSICAL THERAPY | Age: 43
Setting detail: THERAPIES SERIES
Discharge: HOME | End: 2018-04-25
Payer: MEDICARE

## 2018-04-25 ENCOUNTER — HOSPITAL ENCOUNTER (OUTPATIENT)
Dept: PAIN MANAGEMENT | Age: 43
Discharge: HOME | End: 2018-04-25
Payer: MEDICARE

## 2018-04-25 VITALS
HEART RATE: 88 BPM | SYSTOLIC BLOOD PRESSURE: 144 MMHG | TEMPERATURE: 98.3 F | BODY MASS INDEX: 38.51 KG/M2 | RESPIRATION RATE: 16 BRPM | DIASTOLIC BLOOD PRESSURE: 77 MMHG | HEIGHT: 59 IN | WEIGHT: 191 LBS

## 2018-04-25 DIAGNOSIS — M54.17 L-S RADICULOPATHY: Primary | ICD-10-CM

## 2018-04-25 PROCEDURE — 97016 VASOPNEUMATIC DEVICE THERAPY: CPT

## 2018-04-25 PROCEDURE — 99213 OFFICE O/P EST LOW 20 MIN: CPT | Performed by: ANESTHESIOLOGY

## 2018-04-25 PROCEDURE — 99214 OFFICE O/P EST MOD 30 MIN: CPT

## 2018-04-25 PROCEDURE — 97110 THERAPEUTIC EXERCISES: CPT

## 2018-04-25 ASSESSMENT — PAIN DESCRIPTION - PAIN TYPE: TYPE: CHRONIC PAIN

## 2018-04-25 ASSESSMENT — PAIN DESCRIPTION - FREQUENCY: FREQUENCY: CONTINUOUS

## 2018-04-25 ASSESSMENT — PAIN SCALES - GENERAL: PAINLEVEL_OUTOF10: 8

## 2018-04-25 ASSESSMENT — PAIN DESCRIPTION - LOCATION: LOCATION: BUTTOCKS;BACK;LEG

## 2018-04-25 ASSESSMENT — PAIN DESCRIPTION - PROGRESSION: CLINICAL_PROGRESSION: GRADUALLY WORSENING

## 2018-04-25 ASSESSMENT — PAIN DESCRIPTION - ORIENTATION: ORIENTATION: LEFT

## 2018-04-25 ASSESSMENT — PAIN DESCRIPTION - ONSET: ONSET: PROGRESSIVE

## 2018-04-27 ENCOUNTER — HOSPITAL ENCOUNTER (OUTPATIENT)
Dept: PHYSICAL THERAPY | Age: 43
Setting detail: THERAPIES SERIES
Discharge: HOME | End: 2018-04-27
Payer: MEDICARE

## 2018-04-27 PROCEDURE — 97110 THERAPEUTIC EXERCISES: CPT

## 2018-05-01 ENCOUNTER — HOSPITAL ENCOUNTER (OUTPATIENT)
Dept: PHYSICAL THERAPY | Age: 43
Setting detail: THERAPIES SERIES
Discharge: HOME | End: 2018-05-01
Payer: MEDICARE

## 2018-05-01 PROCEDURE — 97110 THERAPEUTIC EXERCISES: CPT

## 2018-05-03 ENCOUNTER — HOSPITAL ENCOUNTER (OUTPATIENT)
Dept: PHYSICAL THERAPY | Age: 43
Setting detail: THERAPIES SERIES
Discharge: HOME | End: 2018-05-03
Payer: MEDICARE

## 2018-05-03 PROCEDURE — 97110 THERAPEUTIC EXERCISES: CPT

## 2018-05-07 ENCOUNTER — OFFICE VISIT (OUTPATIENT)
Dept: ORTHOPEDIC SURGERY | Age: 43
End: 2018-05-07

## 2018-05-07 VITALS — WEIGHT: 191 LBS | BODY MASS INDEX: 35.15 KG/M2 | HEIGHT: 62 IN

## 2018-05-07 DIAGNOSIS — Z96.651 S/P TOTAL KNEE ARTHROPLASTY, RIGHT: Primary | ICD-10-CM

## 2018-05-07 PROCEDURE — 99024 POSTOP FOLLOW-UP VISIT: CPT | Performed by: STUDENT IN AN ORGANIZED HEALTH CARE EDUCATION/TRAINING PROGRAM

## 2018-05-08 RX ORDER — ONDANSETRON 4 MG/1
TABLET, FILM COATED ORAL
Qty: 30 TABLET | Refills: 1 | Status: SHIPPED | OUTPATIENT
Start: 2018-05-08 | End: 2019-01-16 | Stop reason: SDUPTHER

## 2018-05-10 ENCOUNTER — HOSPITAL ENCOUNTER (OUTPATIENT)
Dept: PHYSICAL THERAPY | Age: 43
Setting detail: THERAPIES SERIES
Discharge: HOME | End: 2018-05-10
Payer: MEDICARE

## 2018-05-11 ENCOUNTER — HOSPITAL ENCOUNTER (OUTPATIENT)
Dept: PHYSICAL THERAPY | Age: 43
Setting detail: THERAPIES SERIES
Discharge: HOME | End: 2018-05-11
Payer: MEDICARE

## 2018-05-11 PROCEDURE — 97110 THERAPEUTIC EXERCISES: CPT

## 2018-05-15 ENCOUNTER — HOSPITAL ENCOUNTER (OUTPATIENT)
Dept: MRI IMAGING | Facility: CLINIC | Age: 43
Discharge: HOME | End: 2018-05-17
Payer: MEDICARE

## 2018-05-15 DIAGNOSIS — M54.17 L-S RADICULOPATHY: ICD-10-CM

## 2018-05-15 PROCEDURE — 72148 MRI LUMBAR SPINE W/O DYE: CPT

## 2018-05-16 ENCOUNTER — HOSPITAL ENCOUNTER (OUTPATIENT)
Age: 43
Discharge: HOME | End: 2018-05-16
Payer: MEDICARE

## 2018-05-16 ENCOUNTER — HOSPITAL ENCOUNTER (OUTPATIENT)
Dept: PHYSICAL THERAPY | Age: 43
Setting detail: THERAPIES SERIES
Discharge: HOME | End: 2018-05-16
Payer: MEDICARE

## 2018-05-16 ENCOUNTER — OFFICE VISIT (OUTPATIENT)
Dept: ORTHOPEDIC SURGERY | Age: 43
End: 2018-05-16

## 2018-05-16 VITALS
DIASTOLIC BLOOD PRESSURE: 78 MMHG | TEMPERATURE: 98.1 F | WEIGHT: 193 LBS | BODY MASS INDEX: 35.51 KG/M2 | HEIGHT: 62 IN | SYSTOLIC BLOOD PRESSURE: 137 MMHG

## 2018-05-16 DIAGNOSIS — M17.11 PATELLOFEMORAL ARTHRITIS OF RIGHT KNEE: ICD-10-CM

## 2018-05-16 DIAGNOSIS — Z96.651 S/P TOTAL KNEE ARTHROPLASTY, RIGHT: ICD-10-CM

## 2018-05-16 DIAGNOSIS — M17.11 PATELLOFEMORAL ARTHRITIS OF RIGHT KNEE: Primary | ICD-10-CM

## 2018-05-16 LAB
ALBUMIN SERPL-MCNC: 4.4 G/DL (ref 3.5–5.2)
ALBUMIN/GLOBULIN RATIO: 1.4 (ref 1–2.5)
ALP BLD-CCNC: 120 U/L (ref 35–104)
ALT SERPL-CCNC: 17 U/L (ref 5–33)
ANION GAP SERPL CALCULATED.3IONS-SCNC: 11 MMOL/L (ref 9–17)
AST SERPL-CCNC: 14 U/L
BILIRUB SERPL-MCNC: <0.1 MG/DL (ref 0.3–1.2)
BUN BLDV-MCNC: 18 MG/DL (ref 6–20)
BUN/CREAT BLD: ABNORMAL (ref 9–20)
C-REACTIVE PROTEIN: 3.2 MG/L (ref 0–5)
CALCIUM SERPL-MCNC: 9.4 MG/DL (ref 8.6–10.4)
CHLORIDE BLD-SCNC: 102 MMOL/L (ref 98–107)
CO2: 24 MMOL/L (ref 20–31)
CREAT SERPL-MCNC: 1.16 MG/DL (ref 0.5–0.9)
GFR AFRICAN AMERICAN: >60 ML/MIN
GFR NON-AFRICAN AMERICAN: 51 ML/MIN
GFR SERPL CREATININE-BSD FRML MDRD: ABNORMAL ML/MIN/{1.73_M2}
GFR SERPL CREATININE-BSD FRML MDRD: ABNORMAL ML/MIN/{1.73_M2}
GLUCOSE BLD-MCNC: 88 MG/DL (ref 70–99)
HCT VFR BLD CALC: 37.8 % (ref 36.3–47.1)
HEMOGLOBIN: 11.9 G/DL (ref 11.9–15.1)
MCH RBC QN AUTO: 28.2 PG (ref 25.2–33.5)
MCHC RBC AUTO-ENTMCNC: 31.5 G/DL (ref 28.4–34.8)
MCV RBC AUTO: 89.6 FL (ref 82.6–102.9)
NRBC AUTOMATED: 0 PER 100 WBC
PDW BLD-RTO: 14.2 % (ref 11.8–14.4)
PLATELET # BLD: 311 K/UL (ref 138–453)
PMV BLD AUTO: 9.7 FL (ref 8.1–13.5)
POTASSIUM SERPL-SCNC: 3.9 MMOL/L (ref 3.7–5.3)
RBC # BLD: 4.22 M/UL (ref 3.95–5.11)
SEDIMENTATION RATE, ERYTHROCYTE: 17 MM (ref 0–20)
SODIUM BLD-SCNC: 137 MMOL/L (ref 135–144)
TOTAL PROTEIN: 7.5 G/DL (ref 6.4–8.3)
WBC # BLD: 6.6 K/UL (ref 3.5–11.3)

## 2018-05-16 PROCEDURE — 85027 COMPLETE CBC AUTOMATED: CPT

## 2018-05-16 PROCEDURE — 36415 COLL VENOUS BLD VENIPUNCTURE: CPT

## 2018-05-16 PROCEDURE — 85651 RBC SED RATE NONAUTOMATED: CPT

## 2018-05-16 PROCEDURE — 80053 COMPREHEN METABOLIC PANEL: CPT

## 2018-05-16 PROCEDURE — 99024 POSTOP FOLLOW-UP VISIT: CPT | Performed by: ORTHOPAEDIC SURGERY

## 2018-05-16 PROCEDURE — 86140 C-REACTIVE PROTEIN: CPT

## 2018-05-16 ASSESSMENT — ENCOUNTER SYMPTOMS
VOMITING: 0
APNEA: 0
COUGH: 0
CHEST TIGHTNESS: 0
ABDOMINAL PAIN: 0

## 2018-05-18 ENCOUNTER — HOSPITAL ENCOUNTER (OUTPATIENT)
Dept: PHYSICAL THERAPY | Age: 43
Setting detail: THERAPIES SERIES
Discharge: HOME | End: 2018-05-18
Payer: MEDICARE

## 2018-05-18 PROCEDURE — 97110 THERAPEUTIC EXERCISES: CPT

## 2018-05-21 ENCOUNTER — HOSPITAL ENCOUNTER (OUTPATIENT)
Dept: PHYSICAL THERAPY | Age: 43
Setting detail: THERAPIES SERIES
Discharge: HOME | End: 2018-05-21
Payer: MEDICARE

## 2018-05-21 PROCEDURE — 97110 THERAPEUTIC EXERCISES: CPT

## 2018-05-23 ENCOUNTER — HOSPITAL ENCOUNTER (OUTPATIENT)
Dept: PHYSICAL THERAPY | Age: 43
Setting detail: THERAPIES SERIES
Discharge: HOME | End: 2018-05-23
Payer: MEDICARE

## 2018-05-23 ENCOUNTER — HOSPITAL ENCOUNTER (OUTPATIENT)
Dept: PAIN MANAGEMENT | Age: 43
Discharge: HOME | End: 2018-05-23
Payer: MEDICARE

## 2018-05-23 VITALS
SYSTOLIC BLOOD PRESSURE: 97 MMHG | RESPIRATION RATE: 16 BRPM | TEMPERATURE: 98.1 F | WEIGHT: 190 LBS | BODY MASS INDEX: 34.96 KG/M2 | DIASTOLIC BLOOD PRESSURE: 65 MMHG | HEIGHT: 62 IN | HEART RATE: 84 BPM

## 2018-05-23 DIAGNOSIS — M54.17 L-S RADICULOPATHY: Primary | ICD-10-CM

## 2018-05-23 DIAGNOSIS — M51.36 L4-L5 DISC BULGE: Chronic | ICD-10-CM

## 2018-05-23 PROBLEM — M51.369 L4-L5 DISC BULGE: Chronic | Status: ACTIVE | Noted: 2018-05-23

## 2018-05-23 PROCEDURE — 99214 OFFICE O/P EST MOD 30 MIN: CPT

## 2018-05-23 PROCEDURE — 99214 OFFICE O/P EST MOD 30 MIN: CPT | Performed by: NURSE PRACTITIONER

## 2018-05-23 ASSESSMENT — ENCOUNTER SYMPTOMS
COUGH: 0
SHORTNESS OF BREATH: 0
BACK PAIN: 1
CONSTIPATION: 0

## 2018-05-30 ENCOUNTER — HOSPITAL ENCOUNTER (OUTPATIENT)
Dept: PHYSICAL THERAPY | Age: 43
Setting detail: THERAPIES SERIES
Discharge: HOME | End: 2018-05-30
Payer: MEDICARE

## 2018-05-30 PROCEDURE — 97110 THERAPEUTIC EXERCISES: CPT

## 2018-05-31 ENCOUNTER — HOSPITAL ENCOUNTER (OUTPATIENT)
Dept: PHYSICAL THERAPY | Age: 43
Setting detail: THERAPIES SERIES
Discharge: HOME | End: 2018-05-31
Payer: MEDICARE

## 2018-06-05 ENCOUNTER — HOSPITAL ENCOUNTER (OUTPATIENT)
Dept: PAIN MANAGEMENT | Age: 43
Discharge: HOME | End: 2018-06-05
Payer: MEDICARE

## 2018-06-05 VITALS
HEART RATE: 85 BPM | TEMPERATURE: 97.6 F | BODY MASS INDEX: 34.96 KG/M2 | RESPIRATION RATE: 14 BRPM | DIASTOLIC BLOOD PRESSURE: 60 MMHG | SYSTOLIC BLOOD PRESSURE: 103 MMHG | HEIGHT: 62 IN | WEIGHT: 190 LBS | OXYGEN SATURATION: 96 %

## 2018-06-05 DIAGNOSIS — G89.29 CHRONIC BACK PAIN, UNSPECIFIED BACK LOCATION, UNSPECIFIED BACK PAIN LATERALITY: ICD-10-CM

## 2018-06-05 DIAGNOSIS — M54.9 CHRONIC BACK PAIN, UNSPECIFIED BACK LOCATION, UNSPECIFIED BACK PAIN LATERALITY: ICD-10-CM

## 2018-06-05 PROCEDURE — 2500000003 HC RX 250 WO HCPCS

## 2018-06-05 PROCEDURE — 6360000002 HC RX W HCPCS: Performed by: ANESTHESIOLOGY

## 2018-06-05 PROCEDURE — 2580000003 HC RX 258: Performed by: ANESTHESIOLOGY

## 2018-06-05 PROCEDURE — 6360000002 HC RX W HCPCS

## 2018-06-05 PROCEDURE — 62323 NJX INTERLAMINAR LMBR/SAC: CPT

## 2018-06-05 PROCEDURE — 62323 NJX INTERLAMINAR LMBR/SAC: CPT | Performed by: ANESTHESIOLOGY

## 2018-06-05 RX ORDER — FENTANYL CITRATE 50 UG/ML
25 INJECTION, SOLUTION INTRAMUSCULAR; INTRAVENOUS
Status: DISCONTINUED | OUTPATIENT
Start: 2018-06-05 | End: 2018-06-06 | Stop reason: HOSPADM

## 2018-06-05 RX ORDER — SODIUM CHLORIDE, SODIUM LACTATE, POTASSIUM CHLORIDE, CALCIUM CHLORIDE 600; 310; 30; 20 MG/100ML; MG/100ML; MG/100ML; MG/100ML
500 INJECTION, SOLUTION INTRAVENOUS CONTINUOUS
Status: DISCONTINUED | OUTPATIENT
Start: 2018-06-05 | End: 2018-06-06 | Stop reason: HOSPADM

## 2018-06-05 RX ORDER — DIPHENHYDRAMINE HYDROCHLORIDE 50 MG/ML
25 INJECTION INTRAMUSCULAR; INTRAVENOUS EVERY 6 HOURS PRN
Status: DISCONTINUED | OUTPATIENT
Start: 2018-06-05 | End: 2018-06-06 | Stop reason: HOSPADM

## 2018-06-05 RX ORDER — MIDAZOLAM HYDROCHLORIDE 1 MG/ML
0.5 INJECTION INTRAMUSCULAR; INTRAVENOUS 3 TIMES DAILY PRN
Status: DISCONTINUED | OUTPATIENT
Start: 2018-06-05 | End: 2018-06-06 | Stop reason: HOSPADM

## 2018-06-05 RX ADMIN — FENTANYL CITRATE: 50 INJECTION, SOLUTION INTRAMUSCULAR; INTRAVENOUS at 09:19

## 2018-06-05 RX ADMIN — SODIUM CHLORIDE, POTASSIUM CHLORIDE, SODIUM LACTATE AND CALCIUM CHLORIDE 500 ML: 600; 310; 30; 20 INJECTION, SOLUTION INTRAVENOUS at 09:08

## 2018-06-05 RX ADMIN — MIDAZOLAM HYDROCHLORIDE 1 MG: 1 INJECTION, SOLUTION INTRAMUSCULAR; INTRAVENOUS at 09:19

## 2018-06-05 RX ADMIN — DIPHENHYDRAMINE HYDROCHLORIDE 50 MG: 50 INJECTION, SOLUTION INTRAMUSCULAR; INTRAVENOUS at 09:10

## 2018-06-05 ASSESSMENT — PAIN SCALES - GENERAL: PAINLEVEL_OUTOF10: 0

## 2018-06-05 ASSESSMENT — PAIN - FUNCTIONAL ASSESSMENT
PAIN_FUNCTIONAL_ASSESSMENT: 0-10
PAIN_FUNCTIONAL_ASSESSMENT: 0-10

## 2018-06-07 ENCOUNTER — HOSPITAL ENCOUNTER (OUTPATIENT)
Dept: PHYSICAL THERAPY | Age: 43
Setting detail: THERAPIES SERIES
Discharge: HOME | End: 2018-06-07
Payer: MEDICARE

## 2018-06-07 PROCEDURE — 97110 THERAPEUTIC EXERCISES: CPT

## 2018-06-21 ENCOUNTER — HOSPITAL ENCOUNTER (OUTPATIENT)
Dept: PHYSICAL THERAPY | Age: 43
Setting detail: THERAPIES SERIES
Discharge: HOME | End: 2018-06-21
Payer: MEDICARE

## 2018-06-22 ENCOUNTER — HOSPITAL ENCOUNTER (OUTPATIENT)
Dept: PHYSICAL THERAPY | Age: 43
Setting detail: THERAPIES SERIES
Discharge: HOME | End: 2018-06-22
Payer: MEDICARE

## 2018-06-22 PROCEDURE — 97110 THERAPEUTIC EXERCISES: CPT

## 2018-06-27 ENCOUNTER — HOSPITAL ENCOUNTER (OUTPATIENT)
Dept: PHYSICAL THERAPY | Age: 43
Setting detail: THERAPIES SERIES
Discharge: HOME | End: 2018-06-27
Payer: MEDICARE

## 2018-06-27 PROCEDURE — 97110 THERAPEUTIC EXERCISES: CPT

## 2018-07-05 ENCOUNTER — HOSPITAL ENCOUNTER (OUTPATIENT)
Dept: PHYSICAL THERAPY | Age: 43
Setting detail: THERAPIES SERIES
Discharge: HOME | End: 2018-07-05
Payer: MEDICARE

## 2018-07-09 ENCOUNTER — HOSPITAL ENCOUNTER (OUTPATIENT)
Dept: PAIN MANAGEMENT | Age: 43
Discharge: HOME | End: 2018-07-09
Payer: MEDICARE

## 2018-07-09 ENCOUNTER — HOSPITAL ENCOUNTER (OUTPATIENT)
Age: 43
Discharge: HOME | End: 2018-07-09
Payer: MEDICARE

## 2018-07-09 VITALS
RESPIRATION RATE: 16 BRPM | TEMPERATURE: 98 F | DIASTOLIC BLOOD PRESSURE: 76 MMHG | BODY MASS INDEX: 34.23 KG/M2 | WEIGHT: 186 LBS | HEIGHT: 62 IN | SYSTOLIC BLOOD PRESSURE: 123 MMHG | HEART RATE: 83 BPM

## 2018-07-09 DIAGNOSIS — M54.17 L-S RADICULOPATHY: ICD-10-CM

## 2018-07-09 DIAGNOSIS — M51.26 HERNIATION OF LUMBAR INTERVERTEBRAL DISC WITHOUT MYELOPATHY: Primary | Chronic | ICD-10-CM

## 2018-07-09 LAB
ALBUMIN SERPL-MCNC: 4 G/DL (ref 3.5–5.2)
ANION GAP SERPL CALCULATED.3IONS-SCNC: 12 MMOL/L (ref 9–17)
BILIRUBIN URINE: NEGATIVE
BUN BLDV-MCNC: 8 MG/DL (ref 6–20)
BUN/CREAT BLD: ABNORMAL (ref 9–20)
CALCIUM IONIZED: 1.4 MMOL/L (ref 1.13–1.33)
CALCIUM SERPL-MCNC: 9.6 MG/DL (ref 8.6–10.4)
CHLORIDE BLD-SCNC: 109 MMOL/L (ref 98–107)
CO2: 22 MMOL/L (ref 20–31)
COLOR: YELLOW
COMMENT UA: NORMAL
CREAT SERPL-MCNC: 0.91 MG/DL (ref 0.5–0.9)
CREATININE URINE: 48.9 MG/DL (ref 28–217)
GFR AFRICAN AMERICAN: >60 ML/MIN
GFR NON-AFRICAN AMERICAN: >60 ML/MIN
GFR SERPL CREATININE-BSD FRML MDRD: ABNORMAL ML/MIN/{1.73_M2}
GFR SERPL CREATININE-BSD FRML MDRD: ABNORMAL ML/MIN/{1.73_M2}
GLUCOSE BLD-MCNC: 82 MG/DL (ref 70–99)
GLUCOSE URINE: NEGATIVE
HCT VFR BLD CALC: 40.3 % (ref 36.3–47.1)
HEMOGLOBIN: 12.7 G/DL (ref 11.9–15.1)
KETONES, URINE: NEGATIVE
LEUKOCYTE ESTERASE, URINE: NEGATIVE
MAGNESIUM: 1.9 MG/DL (ref 1.6–2.6)
MCH RBC QN AUTO: 27.9 PG (ref 25.2–33.5)
MCHC RBC AUTO-ENTMCNC: 31.5 G/DL (ref 28.4–34.8)
MCV RBC AUTO: 88.6 FL (ref 82.6–102.9)
MICROALBUMIN/CREAT 24H UR: <12 MG/L
MICROALBUMIN/CREAT UR-RTO: NORMAL MCG/MG CREAT
NITRITE, URINE: NEGATIVE
NRBC AUTOMATED: 0 PER 100 WBC
PDW BLD-RTO: 13.7 % (ref 11.8–14.4)
PH UA: 6.5 (ref 5–8)
PHOSPHORUS: 3 MG/DL (ref 2.6–4.5)
PLATELET # BLD: 337 K/UL (ref 138–453)
PMV BLD AUTO: 10.2 FL (ref 8.1–13.5)
POTASSIUM SERPL-SCNC: 3.7 MMOL/L (ref 3.7–5.3)
PROTEIN UA: NEGATIVE
PTH INTACT: 35.32 PG/ML (ref 15–65)
RBC # BLD: 4.55 M/UL (ref 3.95–5.11)
SODIUM BLD-SCNC: 143 MMOL/L (ref 135–144)
SPECIFIC GRAVITY UA: 1.01 (ref 1–1.03)
TURBIDITY: CLEAR
URINE HGB: NEGATIVE
UROBILINOGEN, URINE: NORMAL
VITAMIN D 25-HYDROXY: 34.1 NG/ML (ref 30–100)
WBC # BLD: 6.3 K/UL (ref 3.5–11.3)

## 2018-07-09 PROCEDURE — 82330 ASSAY OF CALCIUM: CPT

## 2018-07-09 PROCEDURE — 81003 URINALYSIS AUTO W/O SCOPE: CPT

## 2018-07-09 PROCEDURE — 36415 COLL VENOUS BLD VENIPUNCTURE: CPT

## 2018-07-09 PROCEDURE — 82570 ASSAY OF URINE CREATININE: CPT

## 2018-07-09 PROCEDURE — 99213 OFFICE O/P EST LOW 20 MIN: CPT | Performed by: NURSE PRACTITIONER

## 2018-07-09 PROCEDURE — 83970 ASSAY OF PARATHORMONE: CPT

## 2018-07-09 PROCEDURE — 99214 OFFICE O/P EST MOD 30 MIN: CPT

## 2018-07-09 PROCEDURE — 82043 UR ALBUMIN QUANTITATIVE: CPT

## 2018-07-09 PROCEDURE — 85027 COMPLETE CBC AUTOMATED: CPT

## 2018-07-09 PROCEDURE — 84100 ASSAY OF PHOSPHORUS: CPT

## 2018-07-09 PROCEDURE — 83735 ASSAY OF MAGNESIUM: CPT

## 2018-07-09 PROCEDURE — 82040 ASSAY OF SERUM ALBUMIN: CPT

## 2018-07-09 PROCEDURE — 80048 BASIC METABOLIC PNL TOTAL CA: CPT

## 2018-07-09 PROCEDURE — 82306 VITAMIN D 25 HYDROXY: CPT

## 2018-07-09 RX ORDER — ACETAMINOPHEN AND CODEINE PHOSPHATE 300; 30 MG/1; MG/1
1 TABLET ORAL EVERY 6 HOURS PRN
COMMUNITY
End: 2018-12-28

## 2018-07-09 ASSESSMENT — ENCOUNTER SYMPTOMS
SHORTNESS OF BREATH: 0
CONSTIPATION: 0
COUGH: 0
BACK PAIN: 1

## 2018-07-09 NOTE — PROGRESS NOTES
Patient is here today to rfollow up on injection      Chief Complaint:  Low back pain    [facility]     Patient complains of back pain that radiates to left leg down to the foot. She has had this pain for years with no known injury. Recent MRI shows Mild broad-based disc bulge without significant central canal stenosis.  Disc bulge extends into the left foramina causing minimal foraminal stenosis.  Right foramina is patent. She has had duramorph injections in the past with significant, long-lasting relief. Her last one was 6/5 and only about 50% relief in the lumbosacral area but no relief of the radicular pain. She has appt 8/8/18 with Dr Tori Rosado to discuss options. HPI:     Back Pain   This is a chronic problem. The current episode started more than 1 year ago. The problem occurs constantly. The problem is unchanged. The pain is present in the lumbar spine. The quality of the pain is described as stabbing. The pain radiates to the left foot and right foot. The pain is at a severity of 10/10 (Left Leg 10/10, right left 0/10 today). The pain is severe. The pain is the same all the time. The symptoms are aggravated by twisting, bending, standing, position and sitting. Associated symptoms include numbness. Pertinent negatives include no chest pain or fever. She has tried analgesics, ice, muscle relaxant and NSAIDs (tens unit) for the symptoms. The treatment provided moderate (50-60% relief after duramorph) relief. Patient denies any new neurological symptoms. No bowel or bladder incontinence, no weakness, and no falling. Review of OARRS does not show any aberrant prescription behavior. Medication is helping the patient stay active. Patient denies any side effects and reports adequate analgesia. No sign of misuse/abuse.     Past Medical History:   Diagnosis Date    Anemia     Anxiety     Asthma     appt with pulmonologist 2/1/18    B[facility] 1 disorder ([facility] [address]     Blackout spell     Body piercing     X2 350 CALORIE, Disp: , Rfl: 0    OXcarbazepine (TRILEPTAL) 300 MG tablet, take 1 tablet by mouth at bedtime, Disp: , Rfl: 0    polyethylene glycol (GLYCOLAX) powder, Take 17 g by mouth daily, Disp: , Rfl:     promethazine (PHENERGAN) 25 MG tablet, Take 25 mg by mouth every 6 hours as needed for Nausea, Disp: , Rfl:     Multiple Vitamins-Minerals (WOMENS ONE DAILY) TABS, Take 1 tablet by mouth daily, Disp: , Rfl:     Misc. Devices MISC, Rolling walker with seat S/P right patellofemoral knee replacement, DOS: 2/20/18, Disp: 1 Device, Rfl: 0    vitamin D3 (CHOLECALCIFEROL) 400 units TABS tablet, Take 400 Units by mouth daily, Disp: , Rfl:     orphenadrine (NORFLEX) 100 MG extended release tablet, Take 100 mg by mouth 2 times daily, Disp: , Rfl:     montelukast (SINGULAIR) 10 MG tablet, Take 10 mg by mouth nightly, Disp: , Rfl: 1    hydrOXYzine (VISTARIL) 50 MG capsule, Take 25 mg by mouth 3 times daily as needed for Itching , Disp: , Rfl:     OXcarbazepine (TRILEPTAL) 150 MG tablet, Take 450 mg by mouth nightly, Disp: , Rfl:     amitriptyline (ELAVIL) 100 MG tablet, Take 100 mg by mouth nightly, Disp: , Rfl:     SUMAtriptan (IMITREX) 100 MG tablet, Take 100 mg by mouth once as needed for Migraine, Disp: , Rfl:     omeprazole (PRILOSEC) 20 MG delayed release capsule, Take 40 mg by mouth daily , Disp: , Rfl:     albuterol sulfate  (90 BASE) MCG/ACT inhaler, Inhale 2 puffs into the lungs every 6 hours as needed for Wheezing, Disp: , Rfl:     budesonide-formoterol (SYMBICORT) 160-4.5 MCG/ACT AERO, Inhale 2 puffs into the lungs 2 times daily, Disp: , Rfl:     acetaminophen (RA ACETAMINOPHEN EX ST) 500 MG tablet, take 1 tablet by mouth every 6 hours if needed for pain, Disp: 120 tablet, Rfl: 2    Misc.  Devices MISC, Shower chair S/P right patellofemoral knee replacement, DOS: 2/20/18, Disp: 1 Device, Rfl: 0    Potassium 75 MG TABS, Take 75 mg by mouth 2 times daily, Disp: , Rfl:     Current Facility-Administered Medications:     lidocaine (XYLOCAINE) 2 % jelly, , Topical, PRN, Aria Freeze, DO    Facility-Administered Medications Ordered in Other Encounters:     methacholine (PROVOCHOLINE) inhaler solution 100 mg, 100 mg, Inhalation, Once, Zonia Pan MD    lidocaine (XYLOCAINE) 2 % jelly, , Topical, PRN, Amanda Chambers, APRN - CNP    Family History   Problem Relation Age of Onset    Kidney Disease Mother         ? ?? stone     Cancer Father         unknown    Heart Disease Father     Seizures Father     Migraines Father     Coronary Art Dis Father     Migraines Sister     Cervical Cancer Sister     Lung Cancer Maternal Grandmother     Seizures Son     Diabetes Maternal Uncle     Diabetes Maternal Cousin     Lung Cancer Paternal Grandfather     Anxiety Disorder Daughter        Social History     Social History    Marital status:      Spouse name: N/A    Number of children: 4    Years of education: N/A     Occupational History    Not on file. Social History Main Topics    Smoking status: Former Smoker     Packs/day: 0.50     Types: Cigarettes     Start date: 1991     Quit date: 12/2017    Smokeless tobacco: Never Used      Comment: every 3 days 10 cigs pt as of 11/16/17    Alcohol use No    Drug use: No    Sexual activity: Yes     Partners: Male     Other Topics Concern    Not on file     Social History Narrative    No narrative on file       Review of Systems:  Review of Systems   Constitution: Negative for chills and fever. Cardiovascular: Negative for chest pain. Respiratory: Negative for cough and shortness of breath. Musculoskeletal: Positive for back pain. Gastrointestinal: Negative for constipation. Neurological: Positive for numbness.         Left leg to foot       Physical Exam:  /76   Pulse 83   Temp 98 °F (36.7 °C) (Oral)   Resp 16   Ht 5' 2\" (1.575 m)   Wt 186 lb (84.4 kg)   LMP 09/08/2015   BMI 34.02 kg/m²     Physical

## 2018-07-10 ENCOUNTER — HOSPITAL ENCOUNTER (OUTPATIENT)
Dept: PHYSICAL THERAPY | Age: 43
Setting detail: THERAPIES SERIES
Discharge: HOME | End: 2018-07-10
Payer: MEDICARE

## 2018-07-10 PROCEDURE — 97016 VASOPNEUMATIC DEVICE THERAPY: CPT

## 2018-07-10 PROCEDURE — 97110 THERAPEUTIC EXERCISES: CPT

## 2018-07-10 NOTE — FLOWSHEET NOTE
20x   x   Heel slides 20x 2 lb Last 5 reps with overpressure by PTA orange slide x   SAQ 20x 2 lb  x   Bridge    --          Prone       Hip extension 20x 2 lb  x   Knee flexion 20x 2 lb  x   Other:     Manual Therapy: Knee flexion ROM in supine and prone    Specific Instructions for next treatment:     Treatment Charges: Mins Units   [x]  Modalities 15  1   [x]  Ther Exercise 55 4   []  Manual Therapy     []  Ther Activities     []  Aquatics     []  Vasocompression     []  Other     Total Treatment time 70 mins 5       Assessment: [x] Progressing toward goals. Slow pace with exercises this date. Patient was able to tolerate 2# weights today. Minimal cues for exercise progressions, patient with good knowledge of exercise techniques. [] Other:    STG: (to be met in 9 treatments)  1. ? Pain: 4/10 R knee pain with ambulation--7/10  2. ? ROM: Right knee flexion 0-120 degrees to improve functional mobility.--2-115   3. ? Strength: 4/5 right knee flexion and extension to improve joint stability.--4+/5  4. ? Function: LEFS to 64% impaired or better to improve ADLs.--80%   5. Independent with Home Exercise Programs--progress     LTG: (to be met in 18 treatments)  1. Patient will be able to ambulate community distances without AD. MET. 2. Patient will be able to climb stairs without UE support. MET but using UE support.       Pt. Education:  [x] Yes  [] No  [x] Reviewed Prior HEP/Ed  Method of Education: [x] Verbal  [] Demo  [] Written  Comprehension of Education:  [x] Verbalizes understanding. [] Demonstrates understanding. [] Needs review. [x] Demonstrates/verbalizes HEP/Ed previously given. Plan: [] Continue per plan of care.    [x] Other: Discharge to HEP      Frequency:  3 x/week for 18 visits    Time In: 1:18 pm           Time Out: 2:28 pm     Electronically signed by: Jonathan Wayne    Patient treated and note written by Sheng Collazo, Student Physical Therapist Assistant under supervision of Chari Dan

## 2018-07-11 NOTE — DISCHARGE SUMMARY
[x] [facility]        Outpatient Physical                Therapy       [address]       Phone: [phone]       Fax: [phone] [] [facility]       Promotion at [address] [facility]       Phone: [phone]       Fax: [phone] [] [facility]     [address] [facility]     Phone: [phone]     Fax:  [phone]     Physical Therapy Discharge Note    Date: 2018      Patient: Curly Kramer  : 1975  MRN: [id number]    Physician: Yesica Aguilar DO         Insurance: [employer] 30 visits  Medical Diagnosis: Patellofemoral arthritis of Right knee M17.11 s/p patellofemoral replacement.                Rehab Codes: M25.661, M25.561  Onset date:18                Next Dr's appt.:   Visit# / total visits:              Cancels/No Shows:   Date of initial visit: 4/3/18             Date of final visit: 18    Subjective:  Pain:  [x] Yes  [] No  Location: Right knee Pain Rating: (0-10 scale) 8/10  Pain altered Tx:  [] No  [] Yes  Action:  Comments: Patient reports improved R knee function, but is still having high degree of pain. Objective:  Test Measurements:  ROM: R knee flexion: 0-115 degrees  MMT: 4+/5  Function: LEFS 80% impaired    Assessment:  Patient has made fair progress with physical therapy. Patient's high level of pain has limited exercise progression asnd functional activity. Patient still has mild quad weakness, but ROM and gait have normalized. Discharging patient to [facility] to address remaining deficits. Advised to f/u with orho and PCP for pain control. STG: (to be met in 9 treatments)  1. ? Pain: 4/10 R knee pain with ambulation. Not Met  2. ? ROM: Right knee flexion 0-120 degrees to improve functional mobility. MET  3. ? Strength: 4/5 right knee flexion and extension to improve joint stability.  MET  4. ? Function: LEFS to 64% impaired or better to improve ADLs. Not Met  5. Independent with Home Exercise Programs.      LTG: (to be met in 18 treatments)  1. Patient will be able to ambulate community distances without AD. MET  2. Patient will be able to climb stairs without UE support. Progress    Treatment to Date:  [x] Therapeutic Exercise    [] Modalities:  [] Therapeutic Activity     [] Ultrasound  [] Electrical Stimulation  [x] Gait Training     [] Massage       [] Lumbar/Cervical Traction  [] Neuromuscular Re-education [] Cold/hotpack [] Iontophoresis: 4 mg/mL  [x] Instruction in Home Exercise Program                     Dexamethasone Sodium  [x] Manual Therapy             Phosphate 40-80 mAmin  [] Aquatic Therapy                   [x] Vasocompression/    [] Other:             Game Ready    Discharge Status:     [] Pt recovered from conditions. Treatment goals were met. [x] Pt received maximum benefit. No further therapy indicated at this time. [] Pt to continue exercise/home instructions independently. [] Therapy interrupted due to:    [] Pt has 2 or more no shows/cancels, is discontinued per our policy. [] Pt has completed prescribed number of treatment sessions. [] Other:         Electronically signed by Teo Fink. JULIO CESAR Abraham on 7/11/2018 at 9:45 AM      If you have any questions or concerns, please don't hesitate to call.    Thank you for your referral.

## 2018-07-14 ENCOUNTER — HOSPITAL ENCOUNTER (EMERGENCY)
Age: 43
Discharge: HOME | End: 2018-07-14
Attending: EMERGENCY MEDICINE
Payer: MEDICARE

## 2018-07-14 ENCOUNTER — APPOINTMENT (OUTPATIENT)
Dept: GENERAL RADIOLOGY | Age: 43
End: 2018-07-14
Payer: MEDICARE

## 2018-07-14 VITALS
HEART RATE: 100 BPM | BODY MASS INDEX: 33.31 KG/M2 | RESPIRATION RATE: 18 BRPM | SYSTOLIC BLOOD PRESSURE: 113 MMHG | WEIGHT: 181 LBS | DIASTOLIC BLOOD PRESSURE: 67 MMHG | OXYGEN SATURATION: 100 % | TEMPERATURE: 98.8 F | HEIGHT: 62 IN

## 2018-07-14 DIAGNOSIS — G43.909 MIGRAINE WITHOUT STATUS MIGRAINOSUS, NOT INTRACTABLE, UNSPECIFIED MIGRAINE TYPE: Primary | ICD-10-CM

## 2018-07-14 DIAGNOSIS — R07.9 CHEST PAIN, UNSPECIFIED TYPE: ICD-10-CM

## 2018-07-14 LAB
ABSOLUTE EOS #: 0.11 K/UL (ref 0–0.44)
ABSOLUTE IMMATURE GRANULOCYTE: 0.04 K/UL (ref 0–0.3)
ABSOLUTE LYMPH #: 2.73 K/UL (ref 1.1–3.7)
ABSOLUTE MONO #: 0.51 K/UL (ref 0.1–1.2)
ANION GAP SERPL CALCULATED.3IONS-SCNC: 12 MMOL/L (ref 9–17)
BASOPHILS # BLD: 0 % (ref 0–2)
BASOPHILS ABSOLUTE: <0.03 K/UL (ref 0–0.2)
BUN BLDV-MCNC: 12 MG/DL (ref 6–20)
BUN/CREAT BLD: ABNORMAL (ref 9–20)
CALCIUM SERPL-MCNC: 9.1 MG/DL (ref 8.6–10.4)
CHLORIDE BLD-SCNC: 108 MMOL/L (ref 98–107)
CO2: 20 MMOL/L (ref 20–31)
CREAT SERPL-MCNC: 1.06 MG/DL (ref 0.5–0.9)
DIFFERENTIAL TYPE: ABNORMAL
EKG ATRIAL RATE: 84 BPM
EKG P AXIS: 26 DEGREES
EKG P-R INTERVAL: 130 MS
EKG Q-T INTERVAL: 356 MS
EKG QRS DURATION: 82 MS
EKG QTC CALCULATION (BAZETT): 420 MS
EKG R AXIS: 29 DEGREES
EKG T AXIS: -12 DEGREES
EKG VENTRICULAR RATE: 84 BPM
EOSINOPHILS RELATIVE PERCENT: 1 % (ref 1–4)
GFR AFRICAN AMERICAN: >60 ML/MIN
GFR NON-AFRICAN AMERICAN: 56 ML/MIN
GFR SERPL CREATININE-BSD FRML MDRD: ABNORMAL ML/MIN/{1.73_M2}
GFR SERPL CREATININE-BSD FRML MDRD: ABNORMAL ML/MIN/{1.73_M2}
GLUCOSE BLD-MCNC: 84 MG/DL (ref 70–99)
HCT VFR BLD CALC: 42 % (ref 36.3–47.1)
HEMOGLOBIN: 13.1 G/DL (ref 11.9–15.1)
IMMATURE GRANULOCYTES: 0 %
LYMPHOCYTES # BLD: 27 % (ref 24–43)
MCH RBC QN AUTO: 27.6 PG (ref 25.2–33.5)
MCHC RBC AUTO-ENTMCNC: 31.2 G/DL (ref 28.4–34.8)
MCV RBC AUTO: 88.6 FL (ref 82.6–102.9)
MONOCYTES # BLD: 5 % (ref 3–12)
NRBC AUTOMATED: 0 PER 100 WBC
PDW BLD-RTO: 13.9 % (ref 11.8–14.4)
PLATELET # BLD: 325 K/UL (ref 138–453)
PLATELET ESTIMATE: ABNORMAL
PMV BLD AUTO: 10.5 FL (ref 8.1–13.5)
POC TROPONIN I: 0 NG/ML (ref 0–0.1)
POC TROPONIN INTERP: NORMAL
POTASSIUM SERPL-SCNC: 3.6 MMOL/L (ref 3.7–5.3)
RBC # BLD: 4.74 M/UL (ref 3.95–5.11)
RBC # BLD: ABNORMAL 10*6/UL
SEG NEUTROPHILS: 67 % (ref 36–65)
SEGMENTED NEUTROPHILS ABSOLUTE COUNT: 6.82 K/UL (ref 1.5–8.1)
SODIUM BLD-SCNC: 140 MMOL/L (ref 135–144)
WBC # BLD: 10.2 K/UL (ref 3.5–11.3)
WBC # BLD: ABNORMAL 10*3/UL

## 2018-07-14 PROCEDURE — 96375 TX/PRO/DX INJ NEW DRUG ADDON: CPT

## 2018-07-14 PROCEDURE — 85025 COMPLETE CBC W/AUTO DIFF WBC: CPT

## 2018-07-14 PROCEDURE — 99284 EMERGENCY DEPT VISIT MOD MDM: CPT

## 2018-07-14 PROCEDURE — 2580000003 HC RX 258: Performed by: PHYSICIAN ASSISTANT

## 2018-07-14 PROCEDURE — 71046 X-RAY EXAM CHEST 2 VIEWS: CPT

## 2018-07-14 PROCEDURE — 96374 THER/PROPH/DIAG INJ IV PUSH: CPT

## 2018-07-14 PROCEDURE — 80048 BASIC METABOLIC PNL TOTAL CA: CPT

## 2018-07-14 PROCEDURE — 84484 ASSAY OF TROPONIN QUANT: CPT

## 2018-07-14 PROCEDURE — 93005 ELECTROCARDIOGRAM TRACING: CPT

## 2018-07-14 PROCEDURE — 6360000002 HC RX W HCPCS: Performed by: PHYSICIAN ASSISTANT

## 2018-07-14 RX ORDER — DEXAMETHASONE SODIUM PHOSPHATE 10 MG/ML
10 INJECTION INTRAMUSCULAR; INTRAVENOUS ONCE
Status: COMPLETED | OUTPATIENT
Start: 2018-07-14 | End: 2018-07-14

## 2018-07-14 RX ORDER — DIPHENHYDRAMINE HYDROCHLORIDE 50 MG/ML
25 INJECTION INTRAMUSCULAR; INTRAVENOUS ONCE
Status: COMPLETED | OUTPATIENT
Start: 2018-07-14 | End: 2018-07-14

## 2018-07-14 RX ORDER — 0.9 % SODIUM CHLORIDE 0.9 %
1000 INTRAVENOUS SOLUTION INTRAVENOUS ONCE
Status: COMPLETED | OUTPATIENT
Start: 2018-07-14 | End: 2018-07-14

## 2018-07-14 RX ORDER — METOCLOPRAMIDE HYDROCHLORIDE 5 MG/ML
10 INJECTION INTRAMUSCULAR; INTRAVENOUS ONCE
Status: COMPLETED | OUTPATIENT
Start: 2018-07-14 | End: 2018-07-14

## 2018-07-14 RX ADMIN — SODIUM CHLORIDE 1000 ML: 9 INJECTION, SOLUTION INTRAVENOUS at 19:37

## 2018-07-14 RX ADMIN — METOCLOPRAMIDE 10 MG: 5 INJECTION, SOLUTION INTRAMUSCULAR; INTRAVENOUS at 19:37

## 2018-07-14 RX ADMIN — DIPHENHYDRAMINE HYDROCHLORIDE 25 MG: 50 INJECTION, SOLUTION INTRAMUSCULAR; INTRAVENOUS at 19:37

## 2018-07-14 RX ADMIN — DEXAMETHASONE SODIUM PHOSPHATE 10 MG: 10 INJECTION INTRAMUSCULAR; INTRAVENOUS at 19:37

## 2018-07-14 ASSESSMENT — PAIN DESCRIPTION - PAIN TYPE: TYPE: ACUTE PAIN

## 2018-07-14 ASSESSMENT — PAIN DESCRIPTION - LOCATION: LOCATION: HEAD

## 2018-07-14 ASSESSMENT — ENCOUNTER SYMPTOMS
COUGH: 0
NAUSEA: 0
SHORTNESS OF BREATH: 0
COLOR CHANGE: 0
DIARRHEA: 0
VOMITING: 0
BACK PAIN: 0
ABDOMINAL PAIN: 0

## 2018-07-14 ASSESSMENT — PAIN DESCRIPTION - FREQUENCY: FREQUENCY: CONTINUOUS

## 2018-07-14 ASSESSMENT — PAIN DESCRIPTION - DESCRIPTORS: DESCRIPTORS: THROBBING

## 2018-07-14 ASSESSMENT — PAIN DESCRIPTION - ONSET: ONSET: ON-GOING

## 2018-07-14 ASSESSMENT — PAIN DESCRIPTION - PROGRESSION: CLINICAL_PROGRESSION: GRADUALLY WORSENING

## 2018-07-14 ASSESSMENT — PAIN SCALES - GENERAL: PAINLEVEL_OUTOF10: 9

## 2018-07-14 NOTE — ED NOTES
Pt to ED c/o migraine. Pt stating she has had a migraine for 5 days and was seen at [facility] yesterday where she was given IV morphine and benadryl with relief. Pt reporting she sees her neurologist next week and saw her pcp earlier this week for migraine symptoms. Pt reports photophobia. Pt placed on bp cuff and pulse ox. Pt resting on cart with call light within reach. NAD noted, RR even and NL. Family at bedside, will continue to monitor.      Elier Cho RN  07/14/18 [id number]

## 2018-07-15 NOTE — ED PROVIDER NOTES
 Chronic back pain     Chronic kidney disease     stage 3    Chronic neck pain     Constipation     COPD (chronic obstructive pulmonary disease) (HCC)     Dental crown present     has dental clearance    Depression     Fall     landed on tailbone, exacrbated back pain and headaches    GERD (gastroesophageal reflux disease)     Heartburn     History of blood transfusion 2012    no reaction    Hyperglycemia     DIET CONTROLED    Insomnia     Kidney stones     Knee pain, right     Low blood pressure     Low vitamin D level     Migraine     Numbness     bilat. legs    Palpitations     Sprain of lumbosacral (joint) (ligament) 8/14/2013    Staghorn kidney stones     L side Kidney stone  and R side    Tachycardia     HR: 130's with chest pain at times, sees Cardiologist @ [facility]/ pt. can't remember name dr Zeynep Leos cardiologist appt for cc next week    Wears glasses     Wears glasses        SURGICAL HISTORY           Procedure Laterality Date    ANKLE FRACTURE SURGERY Right 1998    CYSTOSCOPY  04/15/14    CYSTOSCOPY Right 4/29/14    with rt ureteral stent removal (Dr Meliza Page)   [facility]  05/23/2014    ENDOMETRIAL ABLATION  1/23/14    [address] [facility]  02/20/2017    gastric sleeve    GASTRIC BYPASS SURGERY      sleeve    HAND SURGERY Right     cyst removal    HYSTERECTOMY  9-8-15    RONNY with BSO    KNEE ARTHROPLASTY Right 02/20/2018    KNEE ARTHROSCOPY Right 09/12/2017    rt knee scope    KNEE SURGERY Left 12/16/13    LAPAROSCOPY  05/23/2014    pelvic exploratory    LITHOTRIPSY Right 04/15/14    LITHOTRIPSY  2014    MYRINGOTOMY Bilateral     NERVE BLOCK  09-19-13    KENALOG 40 MG    NERVE BLOCK  10/4/13    Left MBNB #2, Decadron 10mg    NERVE BLOCK  12/8/15    duramorph, celestone 9mg, duramorph 1.25mg    NERVE BLOCK  5/24/16    duramorph 1.5  decadron 7mg    NERVE BLOCK  11/21/2016    duramorph 1mg  celestone 9mg    NERVE BLOCK  08/02/2017    duramorph morphine 1.5mg decadron 7.5mg    NERVE BLOCK  06/05/2018    duramorph- decadron 7 mg, duramorph 1.5 mg    OTHER SURGICAL HISTORY  age 27    Essure contraceptive implants     ID KNEE SCOPE,DIAGNOSTIC Right 9/12/2017    KNEE ARTHROSCOPY, LATERAL RELEASE PATELLA RETINACULUM   ARTHROSCOPIC BOVIE, performed by Paris Larry DO at [address] [place] [place] Right 2/20/2018    NAVIO ROBOTIC TOTAL KNEE PATELLOFEMORAL  ARTHROPLASTY - [facility] &  NEPH, NSA=FEMORAL NERVE BLOCK, SPINAL VS GENERAL performed by Paris Larry DO at [address] [facility] ARTHROSCOPY Right     AUG 3,2015    SLEEVE GASTRECTOMY N/A 2/20/2017    GASTRECTOMY SLEEVE LAPAROSCOPIC XI ROBOTIC, ENDOSEALER  performed by Mendel Seller, MD at [address] Left 4/17/12    Left Nephrectomy - staghorn calculus    UMBILICAL HERNIA REPAIR  2007    URETER STENT PLACEMENT Right 04/15/14    removed 2 weeks later    WISDOM TOOTH EXTRACTION      WRIST SURGERY Right 2002    right ganglion cystectomy       CURRENT MEDICATIONS       Discharge Medication List as of 7/14/2018  8:51 PM      CONTINUE these medications which have NOT CHANGED    Details   acetaminophen-codeine (TYLENOL #3) 300-30 MG per tablet Take 1 tablet by mouth every 6 hours as needed for Pain. [facility] Historical Med      diclofenac sodium 1 % GEL Apply 2 g topically 2 times daily, Topical, 2 TIMES DAILY, Historical Med      ondansetron (ZOFRAN) 4 MG tablet take 1 tablet by mouth every 8 hours if needed for nausea and vomiting, Disp-30 tablet, R-1Normal      acetaminophen (RA ACETAMINOPHEN EX ST) 500 MG tablet take 1 tablet by mouth every 6 hours if needed for pain, Disp-120 tablet, R-2Normal      venlafaxine (EFFEXOR XR) 37.5 MG extended release capsule Take 37.5 mg by mouthHistorical Med      topiramate (TOPAMAX) 100 MG tablet Take 100 mg by mouthHistorical Med      LATUDA 80 MG TABS tablet take 1 tablet by mouth at bedtime with food AT LEAAST [address], R-0, DAWHistorical Med      !! OXcarbazepine (TRILEPTAL) 300 MG tablet take 1 tablet by mouth at bedtime, R-0Historical Med      polyethylene glycol (GLYCOLAX) powder Take 17 g by mouth dailyHistorical Med      promethazine (PHENERGAN) 25 MG tablet Take 25 mg by mouth every 6 hours as needed for NauseaHistorical Med      Multiple Vitamins-Minerals (WOMENS ONE DAILY) TABS Take 1 tablet by mouth dailyHistorical Med      !! Misc. Devices MISC Disp-1 Device, R-0, NO PRINTRolling walker with seat S/P right patellofemoral knee replacement, DOS: 2/20/18      !! Misc. Devices MISC Disp-1 Device, R-0, NO PRINTShower chair S/P right patellofemoral knee replacement, DOS: 2/20/18      vitamin D3 (CHOLECALCIFEROL) 400 units TABS tablet Take 400 Units by mouth dailyHistorical Med      orphenadrine (NORFLEX) 100 MG extended release tablet Take 100 mg by mouth 2 times dailyHistorical Med      montelukast (SINGULAIR) 10 MG tablet Take 10 mg by mouth nightly, R-1Historical Med      hydrOXYzine (VISTARIL) 50 MG capsule Take 25 mg by mouth 3 times daily as needed for Itching Historical Med      !! OXcarbazepine (TRILEPTAL) 150 MG tablet Take 450 mg by mouth nightly      amitriptyline (ELAVIL) 100 MG tablet Take 100 mg by mouth nightly      SUMAtriptan (IMITREX) 100 MG tablet Take 100 mg by mouth once as needed for Migraine      Potassium 75 MG TABS Take 75 mg by mouth 2 times daily      omeprazole (PRILOSEC) 20 MG delayed release capsule Take 40 mg by mouth daily Historical Med      albuterol sulfate  (90 BASE) MCG/ACT inhaler Inhale 2 puffs into the lungs every 6 hours as needed for Wheezing      budesonide-formoterol (SYMBICORT) 160-4.5 MCG/ACT AERO Inhale 2 puffs into the lungs 2 times daily       ! ! - Potential duplicate medications found. Please discuss with provider.           ALLERGIES     Ibuprofen; Claritin [loratadine]; and Tape [adhesive tape]  Reviewed  FAMILY HISTORY distension and no mass. There is no tenderness. There is no rebound and no guarding. Musculoskeletal: Normal range of motion. Neurological: She is alert and oriented to person, place, and time. She has normal reflexes. She displays normal reflexes. No cranial nerve deficit. She exhibits normal muscle tone. Coordination normal.   Romberg is negative. Cerebellar function testing is normal.   Skin: Skin is warm and dry. She is not diaphoretic. Psychiatric: She has a normal mood and affect. Her behavior is normal. Judgment and thought content normal.         DIAGNOSTIC RESULTS     EKG: All EKG's are interpreted by Bhupinder Edwards PA-C in the absence of a cardiologist.    Reviewed by attending physician. RADIOLOGY:   Non-plain film images such as CT, Ultrasound and MRI are read by the radiologist. Plain radiographic images are visualized and preliminarily interpreted by Bhupinder Edwards PA-C with the below findings:    See below. Interpretation per the Radiologist below, if available at the time of this note:    XR CHEST STANDARD (2 VW)   Final Result   No acute findings. No change.                LABS:  Labs Reviewed   CBC WITH AUTO DIFFERENTIAL - Abnormal; Notable for the following:        Result Value    Seg Neutrophils 67 (*)     All other components within normal limits   BASIC METABOLIC PANEL - Abnormal; Notable for the following:     CREATININE 1.06 (*)     Potassium 3.6 (*)     Chloride 108 (*)     GFR Non- 56 (*)     All other components within normal limits   POCT TROPONIN   POCT TROPONIN           EMERGENCY DEPARTMENT COURSE and DIFFERENTIAL DIAGNOSIS/MDM:   Vitals:    Vitals:    07/14/18 1852 07/14/18 1928   BP: 115/73 113/67   Pulse: 100    Resp: 18    Temp: 98.8 °F (37.1 °C)    TempSrc: Oral    SpO2: 100% 100%   Weight: 181 lb (82.1 kg)    Height: 5' 2\" (1.575 m)        This is a 65-year-old female presenting to the emergency department complaining of her typical migraine headache

## 2018-08-06 ENCOUNTER — OFFICE VISIT (OUTPATIENT)
Dept: ORTHOPEDIC SURGERY | Age: 43
End: 2018-08-06
Payer: MEDICARE

## 2018-08-06 VITALS — WEIGHT: 176.2 LBS | BODY MASS INDEX: 32.42 KG/M2 | HEIGHT: 62 IN

## 2018-08-06 DIAGNOSIS — Z96.651 S/P TOTAL KNEE ARTHROPLASTY, RIGHT: Primary | ICD-10-CM

## 2018-08-06 DIAGNOSIS — M25.561 RIGHT KNEE PAIN, UNSPECIFIED CHRONICITY: ICD-10-CM

## 2018-08-06 PROCEDURE — 1036F TOBACCO NON-USER: CPT | Performed by: STUDENT IN AN ORGANIZED HEALTH CARE EDUCATION/TRAINING PROGRAM

## 2018-08-06 PROCEDURE — G8417 CALC BMI ABV UP PARAM F/U: HCPCS | Performed by: STUDENT IN AN ORGANIZED HEALTH CARE EDUCATION/TRAINING PROGRAM

## 2018-08-06 PROCEDURE — G8427 DOCREV CUR MEDS BY ELIG CLIN: HCPCS | Performed by: STUDENT IN AN ORGANIZED HEALTH CARE EDUCATION/TRAINING PROGRAM

## 2018-08-06 PROCEDURE — 99213 OFFICE O/P EST LOW 20 MIN: CPT | Performed by: STUDENT IN AN ORGANIZED HEALTH CARE EDUCATION/TRAINING PROGRAM

## 2018-08-06 RX ORDER — ACETAMINOPHEN 500 MG
500 TABLET ORAL EVERY 6 HOURS PRN
Qty: 120 TABLET | Refills: 3 | Status: SHIPPED | OUTPATIENT
Start: 2018-08-06 | End: 2019-01-21 | Stop reason: SDUPTHER

## 2018-08-06 NOTE — PROGRESS NOTES
[address] [place], [place] [address]francisco Naqvi [address]  Dept: [phone]  Dept Fax: [phone]        Ambulatory Follow Up    Subjective:   Veena Antonio is a 39y.o. year old female who presents to our office today for routine followup regarding her   1. S/P patellofemoral arthroplasty    2. Right knee pain, unspecified chronicity    . Chief Complaint   Patient presents with    Knee Pain     right        HPI   Veena Antonio  is a 39y.o. year old female who presents to our office today for 5 month postoperative follow up after having undergone a right patellofemoral arthroplasty on 2/20/18. Patient is complaining of continued joint swelling and anterior knee pain. Patient states that she feels her knee gives out at times and has a popping sensation when extending her knee. The patient has completed her outpatient physical therapy. She uses Voltaren gel at night with minimal relief. She denies numbness or tingling in the right lower extremity. Review of Systems   Constitutional: Negative for chills, fatigue and fever. Respiratory: Negative for cough, chest tightness and shortness of breath. Cardiovascular: Negative for chest pain, palpitations and leg swelling. Gastrointestinal: Negative for abdominal pain, diarrhea, nausea and vomiting. Musculoskeletal: Positive for arthralgias, joint swelling and myalgias. Skin: Negative for pallor, rash and wound. Neurological: Negative for dizziness, light-headedness and numbness. I have reviewed the CC, HPI, ROS, PMH, FHX, Social History. I agree with the documentation provided by other staff, residents, and/or medical students and have reviewed their documentation prior to providing my signature indicating agreement. Objective :   General: Veena Antonio is a 39 y.o. female who is alert and oriented and sitting comfortably in our office. Ortho Exam  RLE: surgical incision well healed.  no signs of 1     Standing Expiration Date:   8/7/2019    Marcos Hoyos MD, Pain Management [facility]     Referral Priority:   Routine     Referral Type:   Consult for Advice and Opinion     Referral Reason:   Specialty Services Required     Referred to Provider:   Pranav Guerrero MD     Requested Specialty:   Anesthesiology     Number of Visits Requested:   1       Electronically signed by Rachel Phillips DO   Orthopedic Surgery Resident PGY-1  [address]  8/7/2018 at 5:39 PM

## 2018-08-07 ASSESSMENT — ENCOUNTER SYMPTOMS
SHORTNESS OF BREATH: 0
ABDOMINAL PAIN: 0
COUGH: 0
CHEST TIGHTNESS: 0
VOMITING: 0
NAUSEA: 0
DIARRHEA: 0

## 2018-08-15 ENCOUNTER — HOSPITAL ENCOUNTER (OUTPATIENT)
Dept: PAIN MANAGEMENT | Age: 43
Discharge: HOME | End: 2018-08-15
Payer: MEDICARE

## 2018-08-15 VITALS
RESPIRATION RATE: 20 BRPM | BODY MASS INDEX: 32.39 KG/M2 | DIASTOLIC BLOOD PRESSURE: 78 MMHG | HEIGHT: 62 IN | HEART RATE: 74 BPM | SYSTOLIC BLOOD PRESSURE: 121 MMHG | TEMPERATURE: 98.3 F | WEIGHT: 176 LBS

## 2018-08-15 DIAGNOSIS — M51.26 HERNIATION OF LUMBAR INTERVERTEBRAL DISC WITHOUT MYELOPATHY: Primary | Chronic | ICD-10-CM

## 2018-08-15 DIAGNOSIS — M25.561 PATELLOFEMORAL ARTHRALGIA OF RIGHT KNEE: ICD-10-CM

## 2018-08-15 PROCEDURE — 99214 OFFICE O/P EST MOD 30 MIN: CPT | Performed by: ANESTHESIOLOGY

## 2018-08-15 PROCEDURE — 99214 OFFICE O/P EST MOD 30 MIN: CPT

## 2018-08-15 ASSESSMENT — PAIN DESCRIPTION - LOCATION: LOCATION: BACK;KNEE

## 2018-08-15 ASSESSMENT — PAIN DESCRIPTION - PAIN TYPE: TYPE: CHRONIC PAIN

## 2018-08-15 ASSESSMENT — PAIN DESCRIPTION - DESCRIPTORS: DESCRIPTORS: CONSTANT;THROBBING

## 2018-08-15 ASSESSMENT — PAIN DESCRIPTION - FREQUENCY: FREQUENCY: CONTINUOUS

## 2018-08-15 ASSESSMENT — PAIN DESCRIPTION - ORIENTATION: ORIENTATION: LOWER

## 2018-08-15 ASSESSMENT — PAIN DESCRIPTION - ONSET: ONSET: ON-GOING

## 2018-08-15 ASSESSMENT — PAIN DESCRIPTION - PROGRESSION: CLINICAL_PROGRESSION: NOT CHANGED

## 2018-08-15 ASSESSMENT — PAIN SCALES - GENERAL: PAINLEVEL_OUTOF10: 8

## 2018-08-15 NOTE — PROGRESS NOTES
BMI 35.0-35.9,adult    Bipolar I disorder (HCC)    Impingement syndrome of shoulder region    Internal derangement of knee    Mixed anxiety depressive disorder    Obesity    History of tobacco use    Osteoarthritis of both knees    Arthritis    Osteoarthritis of knee    Patellar maltracking    Synovitis of knee    Arthralgia of shoulder    Atypical migraine    Neck pain    Chest pain    Chondromalacia of patella    Chronic constipation    Chronic kidney disease, stage III (moderate)    Fatigue    Psychophysiological insomnia    Laceration of finger    Migraine without aura and without status migrainosus, not intractable    Movement disorder    Palpitations    Pins and needles sensation    Sleep dysfunction with arousal disturbance    Vitamin D deficiency    Patellofemoral arthritis of right knee    Patella, chondromalacia, right    Chronic fatigue    L-S radiculopathy    L4-L5 disc bulge    Chronic back pain       Plan of Care  LBP:  Schedule TF at L5/S1  : left  Right knee : geniculate N block: diagnostic      I have made any additions and/or corrections, if necessary, to the above scribed information. I have reviewed and agree with the above information.

## 2018-08-22 ENCOUNTER — HOSPITAL ENCOUNTER (OUTPATIENT)
Dept: PAIN MANAGEMENT | Age: 43
Discharge: HOME | End: 2018-08-22
Payer: MEDICARE

## 2018-08-22 VITALS
DIASTOLIC BLOOD PRESSURE: 78 MMHG | OXYGEN SATURATION: 100 % | HEART RATE: 55 BPM | RESPIRATION RATE: 16 BRPM | SYSTOLIC BLOOD PRESSURE: 125 MMHG

## 2018-08-22 PROCEDURE — 64483 NJX AA&/STRD TFRM EPI L/S 1: CPT

## 2018-08-22 PROCEDURE — 64483 NJX AA&/STRD TFRM EPI L/S 1: CPT | Performed by: ANESTHESIOLOGY

## 2018-08-22 PROCEDURE — 6360000002 HC RX W HCPCS

## 2018-08-22 PROCEDURE — 2580000003 HC RX 258: Performed by: ANESTHESIOLOGY

## 2018-08-22 PROCEDURE — 6360000002 HC RX W HCPCS: Performed by: ANESTHESIOLOGY

## 2018-08-22 PROCEDURE — 6360000004 HC RX CONTRAST MEDICATION

## 2018-08-22 PROCEDURE — 2500000003 HC RX 250 WO HCPCS

## 2018-08-22 RX ORDER — MIDAZOLAM HYDROCHLORIDE 1 MG/ML
2 INJECTION INTRAMUSCULAR; INTRAVENOUS ONCE
Status: DISCONTINUED | OUTPATIENT
Start: 2018-08-22 | End: 2018-08-23 | Stop reason: HOSPADM

## 2018-08-22 RX ORDER — SODIUM CHLORIDE, SODIUM LACTATE, POTASSIUM CHLORIDE, CALCIUM CHLORIDE 600; 310; 30; 20 MG/100ML; MG/100ML; MG/100ML; MG/100ML
INJECTION, SOLUTION INTRAVENOUS CONTINUOUS
Status: DISCONTINUED | OUTPATIENT
Start: 2018-08-22 | End: 2018-08-23 | Stop reason: HOSPADM

## 2018-08-22 RX ORDER — FENTANYL CITRATE 50 UG/ML
INJECTION, SOLUTION INTRAMUSCULAR; INTRAVENOUS
Status: COMPLETED | OUTPATIENT
Start: 2018-08-22 | End: 2018-08-22

## 2018-08-22 RX ORDER — FENTANYL CITRATE 50 UG/ML
50 INJECTION, SOLUTION INTRAMUSCULAR; INTRAVENOUS EVERY 5 MIN PRN
Status: DISCONTINUED | OUTPATIENT
Start: 2018-08-22 | End: 2018-08-23 | Stop reason: HOSPADM

## 2018-08-22 RX ORDER — BUPIVACAINE HYDROCHLORIDE 2.5 MG/ML
5 INJECTION, SOLUTION EPIDURAL; INFILTRATION; INTRACAUDAL ONCE
Status: DISCONTINUED | OUTPATIENT
Start: 2018-08-22 | End: 2018-08-23 | Stop reason: HOSPADM

## 2018-08-22 RX ORDER — LIDOCAINE HYDROCHLORIDE 10 MG/ML
5 INJECTION, SOLUTION INFILTRATION; PERINEURAL ONCE
Status: DISCONTINUED | OUTPATIENT
Start: 2018-08-22 | End: 2018-08-23 | Stop reason: HOSPADM

## 2018-08-22 RX ORDER — MIDAZOLAM HYDROCHLORIDE 1 MG/ML
INJECTION INTRAMUSCULAR; INTRAVENOUS
Status: COMPLETED | OUTPATIENT
Start: 2018-08-22 | End: 2018-08-22

## 2018-08-22 RX ORDER — DEXAMETHASONE SODIUM PHOSPHATE 10 MG/ML
10 INJECTION, SOLUTION INTRAMUSCULAR; INTRAVENOUS ONCE
Status: DISCONTINUED | OUTPATIENT
Start: 2018-08-22 | End: 2018-08-23 | Stop reason: HOSPADM

## 2018-08-22 RX ADMIN — FENTANYL CITRATE 100 MCG: 50 INJECTION INTRAMUSCULAR; INTRAVENOUS at 10:42

## 2018-08-22 RX ADMIN — MIDAZOLAM HYDROCHLORIDE 2 MG: 1 INJECTION, SOLUTION INTRAMUSCULAR; INTRAVENOUS at 10:43

## 2018-08-22 RX ADMIN — SODIUM CHLORIDE, POTASSIUM CHLORIDE, SODIUM LACTATE AND CALCIUM CHLORIDE: 600; 310; 30; 20 INJECTION, SOLUTION INTRAVENOUS at 10:45

## 2018-08-22 ASSESSMENT — PAIN - FUNCTIONAL ASSESSMENT
PAIN_FUNCTIONAL_ASSESSMENT: 0-10

## 2018-08-22 ASSESSMENT — PAIN DESCRIPTION - DESCRIPTORS: DESCRIPTORS: CONSTANT;THROBBING

## 2018-08-29 ENCOUNTER — HOSPITAL ENCOUNTER (OUTPATIENT)
Dept: PAIN MANAGEMENT | Age: 43
Discharge: HOME | End: 2018-08-29
Payer: MEDICARE

## 2018-08-29 VITALS
OXYGEN SATURATION: 98 % | DIASTOLIC BLOOD PRESSURE: 67 MMHG | HEIGHT: 62 IN | TEMPERATURE: 98.2 F | WEIGHT: 176 LBS | SYSTOLIC BLOOD PRESSURE: 112 MMHG | RESPIRATION RATE: 16 BRPM | BODY MASS INDEX: 32.39 KG/M2 | HEART RATE: 65 BPM

## 2018-08-29 DIAGNOSIS — G89.29 CHRONIC PAIN OF RIGHT KNEE: ICD-10-CM

## 2018-08-29 DIAGNOSIS — M25.561 CHRONIC PAIN OF RIGHT KNEE: ICD-10-CM

## 2018-08-29 DIAGNOSIS — M25.561 PATELLOFEMORAL ARTHRALGIA OF RIGHT KNEE: Primary | ICD-10-CM

## 2018-08-29 PROCEDURE — 6360000002 HC RX W HCPCS: Performed by: ANESTHESIOLOGY

## 2018-08-29 PROCEDURE — 64450 NJX AA&/STRD OTHER PN/BRANCH: CPT | Performed by: ANESTHESIOLOGY

## 2018-08-29 PROCEDURE — 2500000003 HC RX 250 WO HCPCS

## 2018-08-29 PROCEDURE — 64450 NJX AA&/STRD OTHER PN/BRANCH: CPT

## 2018-08-29 PROCEDURE — 2580000003 HC RX 258: Performed by: ANESTHESIOLOGY

## 2018-08-29 RX ORDER — SODIUM CHLORIDE, SODIUM LACTATE, POTASSIUM CHLORIDE, CALCIUM CHLORIDE 600; 310; 30; 20 MG/100ML; MG/100ML; MG/100ML; MG/100ML
500 INJECTION, SOLUTION INTRAVENOUS CONTINUOUS
Status: DISCONTINUED | OUTPATIENT
Start: 2018-08-29 | End: 2018-08-30 | Stop reason: HOSPADM

## 2018-08-29 RX ORDER — MIDAZOLAM HYDROCHLORIDE 1 MG/ML
INJECTION INTRAMUSCULAR; INTRAVENOUS
Status: COMPLETED | OUTPATIENT
Start: 2018-08-29 | End: 2018-08-29

## 2018-08-29 RX ORDER — FENTANYL CITRATE 50 UG/ML
INJECTION, SOLUTION INTRAMUSCULAR; INTRAVENOUS
Status: COMPLETED | OUTPATIENT
Start: 2018-08-29 | End: 2018-08-29

## 2018-08-29 RX ORDER — FENTANYL CITRATE 50 UG/ML
25 INJECTION, SOLUTION INTRAMUSCULAR; INTRAVENOUS
Status: DISCONTINUED | OUTPATIENT
Start: 2018-08-29 | End: 2018-08-30 | Stop reason: HOSPADM

## 2018-08-29 RX ORDER — MIDAZOLAM HYDROCHLORIDE 1 MG/ML
0.5 INJECTION INTRAMUSCULAR; INTRAVENOUS
Status: DISCONTINUED | OUTPATIENT
Start: 2018-08-29 | End: 2018-08-30 | Stop reason: HOSPADM

## 2018-08-29 RX ORDER — LIDOCAINE HYDROCHLORIDE 10 MG/ML
5 INJECTION, SOLUTION EPIDURAL; INFILTRATION; INTRACAUDAL; PERINEURAL ONCE
Status: DISCONTINUED | OUTPATIENT
Start: 2018-08-29 | End: 2018-08-30 | Stop reason: HOSPADM

## 2018-08-29 RX ADMIN — FENTANYL CITRATE 50 MCG: 50 INJECTION INTRAMUSCULAR; INTRAVENOUS at 09:19

## 2018-08-29 RX ADMIN — SODIUM CHLORIDE, POTASSIUM CHLORIDE, SODIUM LACTATE AND CALCIUM CHLORIDE 500 ML: 600; 310; 30; 20 INJECTION, SOLUTION INTRAVENOUS at 09:11

## 2018-08-29 RX ADMIN — MIDAZOLAM HYDROCHLORIDE 1 MG: 1 INJECTION, SOLUTION INTRAMUSCULAR; INTRAVENOUS at 09:19

## 2018-08-29 ASSESSMENT — PAIN - FUNCTIONAL ASSESSMENT
PAIN_FUNCTIONAL_ASSESSMENT: 0-10
PAIN_FUNCTIONAL_ASSESSMENT: 0-10

## 2018-08-29 ASSESSMENT — PAIN DESCRIPTION - DESCRIPTORS: DESCRIPTORS: CONSTANT;THROBBING

## 2018-09-11 ENCOUNTER — HOSPITAL ENCOUNTER (OUTPATIENT)
Dept: PAIN MANAGEMENT | Age: 43
Discharge: HOME | End: 2018-09-11
Payer: MEDICARE

## 2018-09-11 VITALS
DIASTOLIC BLOOD PRESSURE: 85 MMHG | HEIGHT: 62 IN | WEIGHT: 176 LBS | TEMPERATURE: 98 F | SYSTOLIC BLOOD PRESSURE: 123 MMHG | HEART RATE: 77 BPM | BODY MASS INDEX: 32.39 KG/M2 | RESPIRATION RATE: 16 BRPM

## 2018-09-11 DIAGNOSIS — M17.0 PRIMARY OSTEOARTHRITIS OF BOTH KNEES: Primary | ICD-10-CM

## 2018-09-11 DIAGNOSIS — M54.42 CHRONIC BILATERAL LOW BACK PAIN WITH LEFT-SIDED SCIATICA: Chronic | ICD-10-CM

## 2018-09-11 DIAGNOSIS — M51.26 LUMBAR DISC HERNIATION: Chronic | ICD-10-CM

## 2018-09-11 DIAGNOSIS — G89.29 CHRONIC BILATERAL LOW BACK PAIN WITH LEFT-SIDED SCIATICA: Chronic | ICD-10-CM

## 2018-09-11 PROCEDURE — 99214 OFFICE O/P EST MOD 30 MIN: CPT | Performed by: ANESTHESIOLOGY

## 2018-09-11 PROCEDURE — 99214 OFFICE O/P EST MOD 30 MIN: CPT

## 2018-09-11 ASSESSMENT — PAIN DESCRIPTION - FREQUENCY: FREQUENCY: CONTINUOUS

## 2018-09-11 ASSESSMENT — ENCOUNTER SYMPTOMS: HEARTBURN: 0

## 2018-09-11 ASSESSMENT — PAIN DESCRIPTION - PAIN TYPE: TYPE: CHRONIC PAIN

## 2018-09-11 ASSESSMENT — PAIN DESCRIPTION - DESCRIPTORS: DESCRIPTORS: CONSTANT;THROBBING

## 2018-09-11 ASSESSMENT — PAIN DESCRIPTION - ONSET: ONSET: ON-GOING

## 2018-09-11 ASSESSMENT — PAIN DESCRIPTION - LOCATION: LOCATION: KNEE

## 2018-09-11 ASSESSMENT — PAIN DESCRIPTION - PROGRESSION: CLINICAL_PROGRESSION: NOT CHANGED

## 2018-09-11 ASSESSMENT — PAIN DESCRIPTION - ORIENTATION: ORIENTATION: RIGHT

## 2018-09-11 ASSESSMENT — PAIN DESCRIPTION - DIRECTION: RADIATING_TOWARDS: RT KNEE

## 2018-09-11 ASSESSMENT — PAIN SCALES - GENERAL: PAINLEVEL_OUTOF10: 7

## 2018-09-11 NOTE — PROGRESS NOTES
TABS Take 1 tablet by mouth daily      Misc. Devices MISC Rolling walker with seat  S/P right patellofemoral knee replacement, DOS: 2/20/18 1 Device 0    Misc.  Devices [id number] [facility] Shower chair  S/P right patellofemoral knee replacement, DOS: 2/20/18 1 Device 0    vitamin D3 (CHOLECALCIFEROL) 400 units TABS tablet Take 400 Units by mouth daily      orphenadrine (NORFLEX) 100 MG extended release tablet Take 100 mg by mouth 2 times daily      montelukast (SINGULAIR) 10 MG tablet Take 10 mg by mouth nightly  1    hydrOXYzine (VISTARIL) 50 MG capsule Take 25 mg by mouth 3 times daily as needed for Itching       OXcarbazepine (TRILEPTAL) 150 MG tablet Take 450 mg by mouth nightly      amitriptyline (ELAVIL) 100 MG tablet Take 100 mg by mouth nightly      SUMAtriptan (IMITREX) 100 MG tablet Take 100 mg by mouth once as needed for Migraine      Potassium 75 MG TABS Take 75 mg by mouth 2 times daily      omeprazole (PRILOSEC) 20 MG delayed release capsule Take 40 mg by mouth daily       albuterol sulfate  (90 BASE) MCG/ACT inhaler Inhale 2 puffs into the lungs every 6 hours as needed for Wheezing      budesonide-formoterol (SYMBICORT) 160-4.5 MCG/ACT AERO Inhale 2 puffs into the lungs 2 times daily       Current Facility-Administered Medications   Medication Dose Route Frequency Provider Last Rate Last Dose    lidocaine (XYLOCAINE) 2 % jelly   Topical PRN Elan Hinson DO         Facility-Administered Medications Ordered in Other Encounters   Medication Dose Route Frequency Provider Last Rate Last Dose    methacholine (PROVOCHOLINE) inhaler solution 100 mg  100 mg Inhalation Once Shiloh Kyle MD        lidocaine (XYLOCAINE) 2 % jelly   Topical PRN KAYKAY Loyd - CALE           Allergies  Ibuprofen; Claritin [loratadine]; and Tape [adhesive tape]    Family History  family history includes Anxiety Disorder in her daughter; Cancer in her father; Cervical Cancer in her sister; Coronary Art Dis in her father; Diabetes in her maternal cousin and maternal uncle; Heart Disease in her father; Kidney Disease in her mother; Randol Ades in her maternal grandmother and paternal grandfather; Migraines in her father and sister; Seizures in her father and son. Social History  Social History     Social History    Marital status:      Spouse name: N/A    Number of children: 3    Years of education: N/A     Social History Main Topics    Smoking status: Former Smoker     Packs/day: 0.50     Types: Cigarettes     Start date: 1991     Quit date: 12/2017    Smokeless tobacco: Never Used      Comment: every 3 days 10 cigs pt as of 11/16/17    Alcohol use No    Drug use: No    Sexual activity: Yes     Partners: Male     Other Topics Concern    None     Social History Narrative    None      reports that she does not use drugs. REVIEW OF SYSTEMS:  Review of Systems   Constitutional: Negative for fever. Cardiovascular: Negative for chest pain. Gastrointestinal: Negative for heartburn. Musculoskeletal: Positive for joint pain. Skin: Negative for rash. Endo/Heme/Allergies: Does not bruise/bleed easily. Objective:  Vital signs: (most recent): Blood pressure 123/85, pulse 77, temperature 98 °F (36.7 °C), resp. rate 16, height 5' 2\" (1.575 m), weight 176 lb (79.8 kg), last menstrual period 09/08/2015, not currently breastfeeding. No fever. Assessment & Plan      - History of chronic lower back pain with radiation down left leg associated with intermittent left leg numbness and progressive weakness and leg. Patient had an MRI lumbar spine that showed L4-L5 level lumbar disc herniation into the left foramina.   In past she had tried different modalities including medications and therapy  Patient had a caudal epidural injection in June 2018 without significant improvement  She recently received a left L5 transforaminal epidural steroid injection report some improvement in pain but continued worsening of the left leg numbness and weakness  She denies any loss of bladder or bowel control. No history of fever chills or weight loss. In past she followed with Dr. Gregory Araujo was not advise any surgery. EXAMINATION: MRI OF THE LUMBAR SPINE WITHOUT CONTRAST, 5/15/2018 9:39 am    L4-L5: Mild broad-based disc bulge without significant central canal stenosis. Disc bulge extends into the left foramina causing minimal foraminal stenosis. Right foramina is patent. - History of chronic intractable migraine for which she is followed with neurologist and is on several different medications and also receives Botox injection every 3 months    - History of chronic bilateral knee pain, right more than left, diagnosed with primary knee osteoarthritis, had right knee arthroscopic surgery followed by our partial total knee replacement. Patient reported no significant improvement in right knee pain after these 2 surgeries. For the surgery she have tried and failed different modalities including medications topical cream therapy and injections. She is referred by orthopedic physician for consideration of genicular nerve block. Patient had a diagnostic genicular nerve block of week ago today she is here for a postprocedure follow-up. Patient reports 60% improvement in her pain after the procedure lasting for the day of the procedure. Physical Therapy Daily Treatment Note     Date:  2018  Patient Name:  John Wang                       :  1975                     MRN: [id number]  Physician: Baldo Metzger DO         Insurance: [employer] 30 visits  Medical Diagnosis: Patellofemoral arthritis of Right knee M17.11 s/p patellofemoral replacement.                 Rehab Codes: P04.580, M25.561  Onset date:18                Next 's appt.:   Visit# / total visits:                   Cancels/No Shows:     No other interim changes in health history since last

## 2018-09-18 ENCOUNTER — HOSPITAL ENCOUNTER (OUTPATIENT)
Dept: PAIN MANAGEMENT | Age: 43
Discharge: HOME | End: 2018-09-18
Payer: MEDICARE

## 2018-09-18 VITALS
RESPIRATION RATE: 20 BRPM | HEART RATE: 78 BPM | OXYGEN SATURATION: 99 % | SYSTOLIC BLOOD PRESSURE: 122 MMHG | DIASTOLIC BLOOD PRESSURE: 67 MMHG | TEMPERATURE: 98.2 F

## 2018-09-18 DIAGNOSIS — G89.29 CHRONIC BILATERAL LOW BACK PAIN WITH LEFT-SIDED SCIATICA: ICD-10-CM

## 2018-09-18 DIAGNOSIS — M54.42 CHRONIC BILATERAL LOW BACK PAIN WITH LEFT-SIDED SCIATICA: ICD-10-CM

## 2018-09-18 DIAGNOSIS — M25.562 LEFT KNEE PAIN, UNSPECIFIED CHRONICITY: ICD-10-CM

## 2018-09-18 PROCEDURE — 2500000003 HC RX 250 WO HCPCS

## 2018-09-18 PROCEDURE — 64450 NJX AA&/STRD OTHER PN/BRANCH: CPT | Performed by: ANESTHESIOLOGY

## 2018-09-18 PROCEDURE — 64450 NJX AA&/STRD OTHER PN/BRANCH: CPT

## 2018-09-18 PROCEDURE — 6360000002 HC RX W HCPCS: Performed by: ANESTHESIOLOGY

## 2018-09-18 PROCEDURE — 99152 MOD SED SAME PHYS/QHP 5/>YRS: CPT | Performed by: ANESTHESIOLOGY

## 2018-09-18 PROCEDURE — 6360000004 HC RX CONTRAST MEDICATION

## 2018-09-18 RX ORDER — MIDAZOLAM HYDROCHLORIDE 1 MG/ML
INJECTION INTRAMUSCULAR; INTRAVENOUS
Status: COMPLETED | OUTPATIENT
Start: 2018-09-18 | End: 2018-09-18

## 2018-09-18 RX ADMIN — MIDAZOLAM HYDROCHLORIDE 2 MG: 1 INJECTION, SOLUTION INTRAMUSCULAR; INTRAVENOUS at 10:08

## 2018-09-18 ASSESSMENT — PAIN - FUNCTIONAL ASSESSMENT
PAIN_FUNCTIONAL_ASSESSMENT: 0-10
PAIN_FUNCTIONAL_ASSESSMENT: 0-10

## 2018-09-18 ASSESSMENT — PAIN DESCRIPTION - DESCRIPTORS: DESCRIPTORS: CONSTANT;ACHING;STABBING

## 2018-09-18 NOTE — H&P
Office visit dated September 11, 2018 in Epic with all record elements of H&P  Patient is is scheduled today for diagnostic right sided second genicular nerve block for chronic right knee pain and knee arthritis  No interim changes in health history  Risk of procedure discussed with patient  Risk of sedation discussed with patient  Informed consent obtained  ASA classification 3  Mallampati classification 3

## 2018-09-26 ENCOUNTER — TELEPHONE (OUTPATIENT)
Dept: PAIN MANAGEMENT | Age: 43
End: 2018-09-26

## 2018-10-06 ENCOUNTER — HOSPITAL ENCOUNTER (EMERGENCY)
Age: 43
Discharge: HOME | End: 2018-10-06
Attending: EMERGENCY MEDICINE
Payer: MEDICARE

## 2018-10-06 ENCOUNTER — APPOINTMENT (OUTPATIENT)
Dept: GENERAL RADIOLOGY | Age: 43
End: 2018-10-06
Payer: MEDICARE

## 2018-10-06 VITALS
HEIGHT: 62 IN | OXYGEN SATURATION: 100 % | BODY MASS INDEX: 31.65 KG/M2 | TEMPERATURE: 98.4 F | HEART RATE: 97 BPM | DIASTOLIC BLOOD PRESSURE: 83 MMHG | SYSTOLIC BLOOD PRESSURE: 125 MMHG | RESPIRATION RATE: 16 BRPM | WEIGHT: 172 LBS

## 2018-10-06 DIAGNOSIS — M25.561 RIGHT KNEE PAIN, UNSPECIFIED CHRONICITY: Primary | ICD-10-CM

## 2018-10-06 PROCEDURE — 99283 EMERGENCY DEPT VISIT LOW MDM: CPT

## 2018-10-06 PROCEDURE — 73562 X-RAY EXAM OF KNEE 3: CPT

## 2018-10-06 PROCEDURE — 6370000000 HC RX 637 (ALT 250 FOR IP): Performed by: PHYSICIAN ASSISTANT

## 2018-10-06 RX ORDER — HYDROCODONE BITARTRATE AND ACETAMINOPHEN 5; 325 MG/1; MG/1
1 TABLET ORAL ONCE
Status: COMPLETED | OUTPATIENT
Start: 2018-10-06 | End: 2018-10-06

## 2018-10-06 RX ADMIN — HYDROCODONE BITARTRATE AND ACETAMINOPHEN 1 TABLET: 5; 325 TABLET ORAL at 17:41

## 2018-10-06 ASSESSMENT — ENCOUNTER SYMPTOMS
COLOR CHANGE: 0
NAUSEA: 0
ABDOMINAL PAIN: 0
VOMITING: 0
WHEEZING: 0
COUGH: 0

## 2018-10-06 ASSESSMENT — PAIN SCALES - GENERAL
PAINLEVEL_OUTOF10: 9
PAINLEVEL_OUTOF10: 9

## 2018-10-06 ASSESSMENT — PAIN DESCRIPTION - FREQUENCY: FREQUENCY: CONTINUOUS

## 2018-10-06 ASSESSMENT — PAIN DESCRIPTION - PROGRESSION: CLINICAL_PROGRESSION: GRADUALLY WORSENING

## 2018-10-06 ASSESSMENT — PAIN DESCRIPTION - LOCATION: LOCATION: KNEE

## 2018-10-06 ASSESSMENT — PAIN DESCRIPTION - PAIN TYPE: TYPE: ACUTE PAIN

## 2018-10-06 ASSESSMENT — PAIN DESCRIPTION - DESCRIPTORS: DESCRIPTORS: THROBBING

## 2018-10-06 ASSESSMENT — PAIN DESCRIPTION - ORIENTATION: ORIENTATION: RIGHT

## 2018-10-06 ASSESSMENT — PAIN DESCRIPTION - ONSET: ONSET: ON-GOING

## 2018-10-06 NOTE — ED PROVIDER NOTES
right; Low blood pressure; Low vitamin D level; Migraine; Numbness; Palpitations; Sprain of lumbosacral (joint) (ligament); Staghorn kidney stones; Tachycardia; Wears glasses; and Wears glasses. has a past surgical history that includes Ankle fracture surgery (Right, 1998); Wrist surgery (Right, 2002); total nephrectomy (Left, 4/17/12); laparoscopy (05/23/2014); Dilation and curettage of uterus (05/23/2014); myringotomy (Bilateral); other surgical history (age 27); Nerve Block (34-93-33); Nerve Block (10/4/13); knee surgery (Left, 12/16/13); Cystoscopy (04/15/14); Ureter stent placement (Right, 04/15/14); Lithotripsy (Right, 04/15/14); Cystoscopy (Right, 4/29/14); Endometrial ablation (1/23/14); Hand surgery (Right); Hysterectomy (9-8-15); Nerve Block (12/8/15); Nerve Block (5/24/16); Nerve Block (11/21/2016); Sleeve Gastrectomy (N/A, 2/20/2017); Nerve Block (08/02/2017); Gastric bypass surgery (02/20/2017); Umbilical hernia repair (2007); Shoulder arthroscopy (Right); Knee arthroscopy (Right, 09/12/2017); pr knee scope,diagnostic (Right, 9/12/2017); Lithotripsy (2014); [place] tooth extraction; Gastric bypass surgery; Knee Arthroplasty (Right, 02/20/2018); pr total knee arthroplasty (Right, 2/20/2018); Nerve Block (06/05/2018); Nerve Block (Left, 08/22/2018); Nerve Block (Right, 08/29/2018); and Nerve Block (Right, 09/18/2018). Social History     Social History    Marital status:      Spouse name: N/A    Number of children: 4    Years of education: N/A     Occupational History    Not on file.      Social History Main Topics    Smoking status: Former Smoker     Packs/day: 0.50     Types: Cigarettes     Start date: 1991     Quit date: 12/2017    Smokeless tobacco: Never Used      Comment: litte less then 1/2 mariola/day    Alcohol use No    Drug use: No    Sexual activity: Yes     Partners: Male     Other Topics Concern    Not on file     Social History Narrative    No narrative on file Rolling walker with seat  S/P right patellofemoral knee replacement, DOS: 2/20/18 1/31/18   Shantell Gutierrez DO   [facility] [place] [id number] [facility] Shower chair  S/P right patellofemoral knee replacement, DOS: 2/20/18 1/31/18   Lio Lozano, DO   vitamin D3 (CHOLECALCIFEROL) 400 units TABS tablet Take 400 Units by mouth daily    Historical Provider, MD   orphenadrine (NORFLEX) 100 MG extended release tablet Take 100 mg by mouth 2 times daily 8/10/17   Historical Provider, MD   montelukast (SINGULAIR) 10 MG tablet Take 10 mg by mouth nightly 8/7/17   Historical Provider, MD   hydrOXYzine (VISTARIL) 50 MG capsule Take 25 mg by mouth 3 times daily as needed for Itching     Historical Provider, MD   OXcarbazepine (TRILEPTAL) 150 MG tablet Take 450 mg by mouth nightly    Historical Provider, MD   amitriptyline (ELAVIL) 100 MG tablet Take 100 mg by mouth nightly    Historical Provider, MD   SUMAtriptan (IMITREX) 100 MG tablet Take 100 mg by mouth once as needed for Migraine    Historical Provider, MD   Potassium 75 MG TABS Take 75 mg by mouth 2 times daily    Historical Provider, MD   omeprazole (PRILOSEC) 20 MG delayed release capsule Take 40 mg by mouth daily     Historical Provider, MD   albuterol sulfate  (90 BASE) MCG/ACT inhaler Inhale 2 puffs into the lungs every 6 hours as needed for Wheezing    Historical Provider, MD   budesonide-formoterol (SYMBICORT) 160-4.5 MCG/ACT AERO Inhale 2 puffs into the lungs 2 times daily    Historical Provider, MD       patient's medication list has been reviewed as entered by the nursing staff. REVIEW OF SYSTEMS    (2-9 systems for level 4, 10 or more for level 5)      Review of Systems   Constitutional: Negative for chills and fever. Respiratory: Negative for cough and wheezing. Cardiovascular: Negative for chest pain. Gastrointestinal: Negative for abdominal pain, nausea and vomiting. Musculoskeletal: Positive for arthralgias and myalgias.  Negative for joint swelling. Skin: Negative for color change and wound. PHYSICAL EXAM   (up to 7 for level 4, 8 or more for level 5)      INITIAL VITALS:  height is 5' 2\" (1.575 m) and weight is 172 lb (78 kg). Her oral temperature is 98.4 °F (36.9 °C). Her blood pressure is 125/83 and her pulse is 97. Her respiration is 16 and oxygen saturation is 100%. Physical Exam   Constitutional: She is oriented to person, place, and time. She appears well-developed and well-nourished. HENT:   Head: Normocephalic and atraumatic. Right Ear: External ear normal.   Left Ear: External ear normal.   Eyes: Right eye exhibits no discharge. Left eye exhibits no discharge. No scleral icterus. Neck: No tracheal deviation present. Pulmonary/Chest: Effort normal. No stridor. No respiratory distress. Musculoskeletal: Normal range of motion. She exhibits no edema. There is a well-healed scar over the midline right knee. There is no swelling. Patient with pain with flexion and extension, there is some valgus stress. There is no clicking noted. There is no erythema noted. There is pain to the popliteal fossa. There is no pain down the pretibial area. Neurological: She is alert and oriented to person, place, and time. Coordination normal.   Skin: Skin is warm and dry. She is not diaphoretic. Psychiatric: She has a normal mood and affect. Her behavior is normal.       DIFFERENTIAL  DIAGNOSIS       Exacerbation of chronic knee, fracture    PLAN (LABS / IMAGING / EKG):  Orders Placed This Encounter   Procedures    XR KNEE RIGHT (3 VIEWS)    Crutches    Apply ace wrap       MEDICATIONS ORDERED:  Orders Placed This Encounter   Medications    HYDROcodone-acetaminophen (NORCO) 5-325 MG per tablet 1 tablet       Controlled Substances Monitoring: Attestation: The Prescription Monitoring Report for this patient was reviewed today.  King Savana PA-C)  Documentation:  (Chronic prescriptions for Tylenol 3) (King Savana

## 2018-10-06 NOTE — ED NOTES
Ace wrap applied. Crutch teaching, given crutches. Discharge instructions given. Verbalized understanding. All questions answered.        Helga Herrera RN  10/06/18 0264

## 2018-10-08 ENCOUNTER — OFFICE VISIT (OUTPATIENT)
Dept: NEUROSURGERY | Age: 43
End: 2018-10-08
Payer: MEDICARE

## 2018-10-08 ENCOUNTER — OFFICE VISIT (OUTPATIENT)
Dept: ORTHOPEDIC SURGERY | Age: 43
End: 2018-10-08
Payer: MEDICARE

## 2018-10-08 VITALS — BODY MASS INDEX: 31.28 KG/M2 | HEIGHT: 62 IN | WEIGHT: 170 LBS

## 2018-10-08 VITALS
WEIGHT: 170 LBS | DIASTOLIC BLOOD PRESSURE: 73 MMHG | BODY MASS INDEX: 31.28 KG/M2 | HEIGHT: 62 IN | SYSTOLIC BLOOD PRESSURE: 102 MMHG | HEART RATE: 76 BPM

## 2018-10-08 DIAGNOSIS — Z96.651 S/P TOTAL KNEE ARTHROPLASTY, RIGHT: Primary | ICD-10-CM

## 2018-10-08 DIAGNOSIS — G62.9 NEUROPATHY: Primary | ICD-10-CM

## 2018-10-08 DIAGNOSIS — M70.50 PES ANSERINE BURSITIS: ICD-10-CM

## 2018-10-08 PROCEDURE — 99203 OFFICE O/P NEW LOW 30 MIN: CPT | Performed by: NEUROLOGICAL SURGERY

## 2018-10-08 PROCEDURE — G8428 CUR MEDS NOT DOCUMENT: HCPCS | Performed by: STUDENT IN AN ORGANIZED HEALTH CARE EDUCATION/TRAINING PROGRAM

## 2018-10-08 PROCEDURE — G8484 FLU IMMUNIZE NO ADMIN: HCPCS | Performed by: STUDENT IN AN ORGANIZED HEALTH CARE EDUCATION/TRAINING PROGRAM

## 2018-10-08 PROCEDURE — 99213 OFFICE O/P EST LOW 20 MIN: CPT | Performed by: STUDENT IN AN ORGANIZED HEALTH CARE EDUCATION/TRAINING PROGRAM

## 2018-10-08 PROCEDURE — G8417 CALC BMI ABV UP PARAM F/U: HCPCS | Performed by: NEUROLOGICAL SURGERY

## 2018-10-08 PROCEDURE — 20610 DRAIN/INJ JOINT/BURSA W/O US: CPT | Performed by: STUDENT IN AN ORGANIZED HEALTH CARE EDUCATION/TRAINING PROGRAM

## 2018-10-08 PROCEDURE — G8427 DOCREV CUR MEDS BY ELIG CLIN: HCPCS | Performed by: NEUROLOGICAL SURGERY

## 2018-10-08 PROCEDURE — G8417 CALC BMI ABV UP PARAM F/U: HCPCS | Performed by: STUDENT IN AN ORGANIZED HEALTH CARE EDUCATION/TRAINING PROGRAM

## 2018-10-08 PROCEDURE — G8484 FLU IMMUNIZE NO ADMIN: HCPCS | Performed by: NEUROLOGICAL SURGERY

## 2018-10-08 PROCEDURE — 1036F TOBACCO NON-USER: CPT | Performed by: NEUROLOGICAL SURGERY

## 2018-10-08 PROCEDURE — 1036F TOBACCO NON-USER: CPT | Performed by: STUDENT IN AN ORGANIZED HEALTH CARE EDUCATION/TRAINING PROGRAM

## 2018-10-08 ASSESSMENT — ENCOUNTER SYMPTOMS
BACK PAIN: 1
NAUSEA: 0
CHEST TIGHTNESS: 0
VOMITING: 0
WHEEZING: 0
SHORTNESS OF BREATH: 0

## 2018-10-08 NOTE — PROGRESS NOTES
venlafaxine (EFFEXOR XR) 37.5 MG extended release capsule Take 37.5 mg by mouth      topiramate (TOPAMAX) 100 MG tablet Take 100 mg by mouth      LATUDA 80 MG TABS tablet take 1 tablet by mouth at bedtime with food AT LEAAST 350 CALORIE  0    OXcarbazepine (TRILEPTAL) 300 MG tablet take 1 tablet by mouth at bedtime  0    polyethylene glycol (GLYCOLAX) powder Take 17 g by mouth daily      promethazine (PHENERGAN) 25 MG tablet Take 25 mg by mouth every 6 hours as needed for Nausea      Multiple Vitamins-Minerals (WOMENS ONE DAILY) TABS Take 1 tablet by mouth daily      Misc. Devices MISC Rolling walker with seat  S/P right patellofemoral knee replacement, DOS: 2/20/18 1 Device 0    Misc.  Devices [id number] [facility] Shower chair  S/P right patellofemoral knee replacement, DOS: 2/20/18 1 Device 0    vitamin D3 (CHOLECALCIFEROL) 400 units TABS tablet Take 400 Units by mouth daily      orphenadrine (NORFLEX) 100 MG extended release tablet Take 100 mg by mouth 2 times daily      montelukast (SINGULAIR) 10 MG tablet Take 10 mg by mouth nightly  1    hydrOXYzine (VISTARIL) 50 MG capsule Take 25 mg by mouth 3 times daily as needed for Itching       OXcarbazepine (TRILEPTAL) 150 MG tablet Take 450 mg by mouth nightly      amitriptyline (ELAVIL) 100 MG tablet Take 100 mg by mouth nightly      SUMAtriptan (IMITREX) 100 MG tablet Take 100 mg by mouth once as needed for Migraine      Potassium 75 MG TABS Take 75 mg by mouth 2 times daily      omeprazole (PRILOSEC) 20 MG delayed release capsule Take 40 mg by mouth daily       albuterol sulfate  (90 BASE) MCG/ACT inhaler Inhale 2 puffs into the lungs every 6 hours as needed for Wheezing      budesonide-formoterol (SYMBICORT) 160-4.5 MCG/ACT AERO Inhale 2 puffs into the lungs 2 times daily       Current Facility-Administered Medications on File Prior to Visit   Medication Dose Route Frequency Provider Last Rate Last Dose    lidocaine (XYLOCAINE) 2 % jelly   Topical PRN atraumatic. Neck supple. Chest: regular rate; pulses equal  Abdomen: Soft nontender nondistended. Normoactive bowel sounds. Neurologic Exam awake alert oriented ×3 cranial nerves II-12 are intact. 5 out of 5 bilateral iliopsoas quadriceps plantar flexion was flexed and EHL biceps triceps deltoid and . Sensation intact light touch in all extremities. Reflexes globally 2 out of 4 patellar's Achilles biceps triceps brachial radialis. No Ashley's or clonus present. Straight leg raise negative bilaterally. Negative Jessica bilaterally. Studies Review:     MRI lumbar spine noncontrast shows a very minimal annular tear with small disc protrusion left paracentral at L4 5. There is no significant foraminal or central stenosis. No significant degenerative changes and maintained lordosis. Cervical x-rays performed a few years ago show reversal of lordosis but otherwise no significant degenerative changes. LE EMG is negative    Assessment and Plan:      1. Neuropathy          Plan: I do not see any clinical radiographic or electrodiagnostic evidence of radiculopathy in this patient. I've explained her that the minimal disc protrusion and annular tear at L4 5 cannot explain a diffuse stocking distribution numbness in her left and especially not in her right leg. Additionally there is no EMG evidence of radiculitis. Considering the lack of long track signs or any other adjunct symptoms such as weakness or bowel bladder dysfunction I do not see any role for more cephalad imaging of the spine. I will refer her to neurology for workup of medical causes of her neuropathy. Followup: No Follow-up on file. Prescriptions Ordered:  No orders of the defined types were placed in this encounter.        Orders Placed:  Orders Placed This Encounter   Procedures   Darlyn Gilmore MD, Neurology [place]     Referral Priority:   Routine     Referral Type:   Consult for Advice and Opinion     Referral

## 2018-10-09 ENCOUNTER — HOSPITAL ENCOUNTER (OUTPATIENT)
Dept: PAIN MANAGEMENT | Age: 43
Discharge: HOME | End: 2018-10-09
Payer: MEDICARE

## 2018-10-09 VITALS
SYSTOLIC BLOOD PRESSURE: 106 MMHG | TEMPERATURE: 98.6 F | DIASTOLIC BLOOD PRESSURE: 70 MMHG | WEIGHT: 170 LBS | RESPIRATION RATE: 14 BRPM | HEART RATE: 80 BPM | HEIGHT: 62 IN | BODY MASS INDEX: 31.28 KG/M2

## 2018-10-09 DIAGNOSIS — M17.0 PRIMARY OSTEOARTHRITIS OF BOTH KNEES: Chronic | ICD-10-CM

## 2018-10-09 DIAGNOSIS — G89.29 CHRONIC BILATERAL LOW BACK PAIN WITH LEFT-SIDED SCIATICA: Primary | Chronic | ICD-10-CM

## 2018-10-09 DIAGNOSIS — M54.42 CHRONIC BILATERAL LOW BACK PAIN WITH LEFT-SIDED SCIATICA: Primary | Chronic | ICD-10-CM

## 2018-10-09 PROCEDURE — 99213 OFFICE O/P EST LOW 20 MIN: CPT | Performed by: ANESTHESIOLOGY

## 2018-10-09 PROCEDURE — 99214 OFFICE O/P EST MOD 30 MIN: CPT

## 2018-10-09 ASSESSMENT — PAIN DESCRIPTION - DESCRIPTORS: DESCRIPTORS: CONSTANT;ACHING;STABBING;THROBBING

## 2018-10-09 ASSESSMENT — ENCOUNTER SYMPTOMS
COUGH: 0
HEARTBURN: 0

## 2018-10-09 ASSESSMENT — PAIN DESCRIPTION - ORIENTATION: ORIENTATION: RIGHT

## 2018-10-09 ASSESSMENT — PAIN SCALES - GENERAL: PAINLEVEL_OUTOF10: 9

## 2018-10-09 ASSESSMENT — PAIN DESCRIPTION - PAIN TYPE: TYPE: CHRONIC PAIN

## 2018-10-09 ASSESSMENT — PAIN DESCRIPTION - LOCATION: LOCATION: KNEE

## 2018-10-09 NOTE — PROGRESS NOTES
[facility]  Patient Pain Assessment   Follow Up  No att. providers found    Primary Care Physician: William Lau MD    Chief complaint:   Chief Complaint   Patient presents with    Knee Pain   . HISTORY OF PRESENT ILLNESS:  Latasha Cannon is 39 y.o. female with    HPI    - History of chronic lower back pain with radiation down left leg and  MRI lumbar spine showed L4-L5 level disc herniation  Patient failed conservative management with medications and therapy  She had left-sided L5 transforaminal epidural steroid injection in August 2018 with minimal improvement in her symptoms. [facility] any [facility] and have referred her to neurology. - History of chronic bilateral knee pain, right more than left  She is diagnosed with chronic bilateral knee osteoarthritis  Patient had right knee patellofemoral arthroplasty in January this year. She continued to have persistent postop right knee pain. She had diagnostic genicular nerve block and today she is here for post procedure follow-up  She reported no significant change in her symptoms or any period of time after the diagnostic nerve block. Since the incident injection she was seen again by orthopedic surgeon and received cortisone injection in her right knee. She do have a follow-up appointment with orthopedic department. She stated they are considering her for total knee arthroplasty    - History of chronic intractable migraine for which she is followed with neurology and receives Botox injections  Symptoms are currently well managed       Current Pain Assessment  Pain Assessment  Pain Assessment: 0-10  Pain Level: 9  Pain Type: Chronic pain  Pain Location: Knee  Pain Orientation: Right  Pain Descriptors: Constant, Aching, Stabbing, Throbbing        Associated Symptoms  1 Associated symptoms: weakness  2.  Red Flags:    Chills No              Weight Loss :No              Loss of Bladder Control :No              Loss of Bowel Control: No  3. BMI   31.2    Previous management history:  1. Previous diagnostic workup: X-ray 10/6/2018   2. Previous non interventional treatments tried:                  Physical Therapy: Yes finished in June                Chiropractic Treatments: No  3. Previous Medications tried:  - NSAID's : Yes  - Neurontin: No  - Lyrica :No  - Trycyclic antidepressant: Ramila Cass / Pamelor): No   - Cymbalta: No  - Opioids (Ultram / Vicodin / Percocet / Morphine / Dilaudid / Oramorph/ Fentanyl etc.):No  4. Previous Interventional pain procedures tried: right knee genicular x 2   5. Legal Issues related to pain complaint:NA  6. Last UDS :  7.Was it as anticipated?:NA          Past Medical History      Diagnosis Date    Anemia     Anxiety     Asthma     appt with pulmonologist 2/1/18    Bipolar 1 disorder ([facility] [address]     Blackout spell     Body piercing     X2 ABOVE AND BELOW LIP     Cervicalgia 8/14/2013    Chest pain     Chronic back pain     Chronic kidney disease     stage 3    Chronic neck pain     Constipation     COPD (chronic obstructive pulmonary disease) ([facility] [address] [facility]     Dental crown present     has dental clearance    Depression     Fall     landed on tailbone, exacrbated back pain and headaches    GERD (gastroesophageal reflux disease)     Heartburn     History of blood transfusion 2012    no reaction    Hyperglycemia     DIET CONTROLED    Insomnia     Kidney stones     Knee pain, right     Low blood pressure     Low vitamin D level     Migraine     Numbness     bilat. legs    Palpitations     Sprain of lumbosacral (joint) (ligament) 8/14/2013    Staghorn kidney stones     L side Kidney stone  and R side    Tachycardia     HR: 130's with chest pain at times, sees Cardiologist @ [address] [facility]/ pt. can't remember name dr Maen Blum cardiologist appt for cc next week    Wears glasses     Wears glasses        Surgical History  Past GASTRECTOMY SLEEVE LAPAROSCOPIC XI ROBOTIC, ENDOSEALER  performed by Toan Hernandez MD at [address] Left 4/17/12    Left Nephrectomy - staghorn calculus    UMBILICAL HERNIA REPAIR  2007    URETER STENT PLACEMENT Right 04/15/14    removed 2 weeks later    WISDOM TOOTH EXTRACTION      WRIST SURGERY Right 2002    right ganglion cystectomy       Medications  Current Outpatient Prescriptions   Medication Sig Dispense Refill    acetaminophen (TYLENOL) 500 MG tablet Take 1 tablet by mouth every 6 hours as needed for Pain 120 tablet 3    acetaminophen-codeine (TYLENOL #3) 300-30 MG per tablet Take 1 tablet by mouth every 6 hours as needed for Pain. Charla Tena diclofenac sodium 1 % GEL Apply 2 g topically 2 times daily      ondansetron (ZOFRAN) 4 MG tablet take 1 tablet by mouth every 8 hours if needed for nausea and vomiting 30 tablet 1    venlafaxine (EFFEXOR XR) 37.5 MG extended release capsule Take 37.5 mg by mouth      topiramate (TOPAMAX) 100 MG tablet Take 100 mg by mouth      LATUDA 80 MG TABS tablet take 1 tablet by mouth at bedtime with food AT LEAAST 350 CALORIE  0    OXcarbazepine (TRILEPTAL) 300 MG tablet take 1 tablet by mouth at bedtime  0    polyethylene glycol (GLYCOLAX) powder Take 17 g by mouth daily      promethazine (PHENERGAN) 25 MG tablet Take 25 mg by mouth every 6 hours as needed for Nausea      Multiple Vitamins-Minerals (WOMENS ONE DAILY) TABS Take 1 tablet by mouth daily      Misc. Devices MISC Rolling walker with seat  S/P right patellofemoral knee replacement, DOS: 2/20/18 1 Device 0    Misc.  Devices [address] [facility] Shower chair  S/P right patellofemoral knee replacement, DOS: 2/20/18 1 Device 0    vitamin D3 (CHOLECALCIFEROL) 400 units TABS tablet Take 400 Units by mouth daily      orphenadrine (NORFLEX) 100 MG extended release tablet Take 100 mg by mouth 2 times daily      montelukast (SINGULAIR) 10 MG tablet Take 10 mg by mouth nightly  1    hydrOXYzine (VISTARIL)

## 2018-10-11 ENCOUNTER — OFFICE VISIT (OUTPATIENT)
Dept: NEUROLOGY | Age: 43
End: 2018-10-11
Payer: MEDICARE

## 2018-10-11 VITALS
HEART RATE: 96 BPM | SYSTOLIC BLOOD PRESSURE: 112 MMHG | DIASTOLIC BLOOD PRESSURE: 80 MMHG | HEIGHT: 62 IN | BODY MASS INDEX: 31.28 KG/M2 | WEIGHT: 170 LBS

## 2018-10-11 DIAGNOSIS — R20.2 PARESTHESIA OF LEFT LEG: ICD-10-CM

## 2018-10-11 DIAGNOSIS — G43.009 MIGRAINE WITHOUT AURA AND WITHOUT STATUS MIGRAINOSUS, NOT INTRACTABLE: ICD-10-CM

## 2018-10-11 DIAGNOSIS — M51.26 HERNIATION OF LUMBAR INTERVERTEBRAL DISC WITHOUT MYELOPATHY: Chronic | ICD-10-CM

## 2018-10-11 DIAGNOSIS — R20.2 PARESTHESIA OF ARM: Primary | ICD-10-CM

## 2018-10-11 DIAGNOSIS — M54.17 L-S RADICULOPATHY: ICD-10-CM

## 2018-10-11 PROCEDURE — G8484 FLU IMMUNIZE NO ADMIN: HCPCS | Performed by: FAMILY MEDICINE

## 2018-10-11 PROCEDURE — G8427 DOCREV CUR MEDS BY ELIG CLIN: HCPCS | Performed by: FAMILY MEDICINE

## 2018-10-11 PROCEDURE — 99204 OFFICE O/P NEW MOD 45 MIN: CPT | Performed by: FAMILY MEDICINE

## 2018-10-11 PROCEDURE — G8417 CALC BMI ABV UP PARAM F/U: HCPCS | Performed by: FAMILY MEDICINE

## 2018-10-11 PROCEDURE — 1036F TOBACCO NON-USER: CPT | Performed by: FAMILY MEDICINE

## 2018-10-16 ENCOUNTER — HOSPITAL ENCOUNTER (OUTPATIENT)
Dept: PHYSICAL THERAPY | Age: 43
Setting detail: THERAPIES SERIES
Discharge: HOME | End: 2018-10-16
Payer: MEDICARE

## 2018-10-16 PROCEDURE — 97161 PT EVAL LOW COMPLEX 20 MIN: CPT

## 2018-10-16 PROCEDURE — 97110 THERAPEUTIC EXERCISES: CPT

## 2018-10-16 NOTE — CONSULTS
[x] [facility] [address]  [address]  [phone]  F: [phone]     Physical Therapy Lower Extremity Evaluation    Date:  10/16/2018  Patient: Dagoberto   : 1975  MRN: [id number]  Physician: Mahendra Grant DO   Insurance: [place] [employer]age  Medical Diagnosis:  S/P total knee arthroplasty, right (N07.383)   Rehab Codes: stiffness right knee (M25.661); pain right knee (M25.561); muscle weakness (M62.81); disturbance of skin sensation (R20.8); muscle wekness (M62.81)  Onset date: 2018 (knee surgery)  Next 's appt.: 2018    Subjective:   CC:pain right knee  HPI: (onset date) had increased painin the right knee following being kicked by a child, patient had injection in the right knee and since that time the pain in the knee has stayed the same and not improved or worrseed according to the patient    PMHx: [] Unremarkable [] Diabetes [] HTN  [] Pacemaker   [] MI/Heart Problems [] Cancer [] Arthritis [] Other:              [] Refer to full medical chart  In EPIC   Tests: [x] X-Ray: [] MRI:  [x] Other: EMG/NCV    Medications: [x] Refer to full medical record [] None [] Other:  Allergies:      [x] Refer to full medical record [] None [x] Other:Adhesives    Function:  Hand Dominance  [x] Right  [] Left  Working:  [] Normal Duty  [] Light Duty  [] Off D/T Condition  [] Retired    [] Not Employed    []  Disability  [] Other:           Return to work:   Job/ADL Description: hobbies: cleaning, walking, vaping, board games      Pain:  [x] Yes  [] No Location: medial and lateral knee joint and under the knee cap Pain Rating: (0-10 scale) 9/10  Pain altered Tx:  [] Yes  [] No  Action:  Symptoms:  [] Improving [] Worsening [x] Same  Better:  [] AM    [] PM    [] Sit    [] Rise/Sit    []Stand    [] Walk    [] Lying    [x] Other:ice, elevating   Worse: [] AM    [] PM    [] Sit    [] Rise/Sit    [x] Prolonged Stand    [x] Prolonged Walk    []

## 2018-10-19 ENCOUNTER — HOSPITAL ENCOUNTER (OUTPATIENT)
Dept: PHYSICAL THERAPY | Age: 43
Setting detail: THERAPIES SERIES
Discharge: HOME | End: 2018-10-19
Payer: MEDICARE

## 2018-10-19 NOTE — FLOWSHEET NOTE
[x] [facility]) [facility] &  Therapy  [address]    [phone]  F: [phone] [] [address]  [address]   [address]  P: [phone]  F: [phone] [] [place]  [address] [facility]  P: [phone]  F: [phone] [] [address]  [facility]   [address]   [place]: [phone]  F: [phone] [] [facility]  [address] [facility] [address]  [place]: [phone]   F: [phone]      Physical Therapy Cancel/No Show note    Date: 10/19/2018  Patient: Jef Clark  : 1975  MRN: [id number]    Cancels/No Shows to date:     For today's appointment patient:  []  Cancelled  []  Rescheduled appointment  [x]  No-show     Reason given by patient:  []  Patient ill  []  Conflicting appointment  []  No transportation    []  Conflict with work  []  No reason given  []  Weather related  []  Other:      Comments:      [x]  Next appointment was confirmed on voicemail    Electronically signed by: Valentino Fake, PTA

## 2018-10-23 ENCOUNTER — HOSPITAL ENCOUNTER (OUTPATIENT)
Dept: PHYSICAL THERAPY | Age: 43
Setting detail: THERAPIES SERIES
Discharge: HOME | End: 2018-10-23
Payer: MEDICARE

## 2018-10-23 PROCEDURE — 97110 THERAPEUTIC EXERCISES: CPT

## 2018-10-24 ENCOUNTER — HOSPITAL ENCOUNTER (OUTPATIENT)
Dept: PHYSICAL THERAPY | Age: 43
Setting detail: THERAPIES SERIES
Discharge: HOME | End: 2018-10-24
Payer: MEDICARE

## 2018-10-24 PROCEDURE — 97016 VASOPNEUMATIC DEVICE THERAPY: CPT

## 2018-10-24 PROCEDURE — 97110 THERAPEUTIC EXERCISES: CPT

## 2018-10-25 ENCOUNTER — PROCEDURE VISIT (OUTPATIENT)
Dept: NEUROLOGY | Age: 43
End: 2018-10-25
Payer: MEDICARE

## 2018-10-25 DIAGNOSIS — R20.0 NUMBNESS: Primary | ICD-10-CM

## 2018-10-25 PROCEDURE — 11100 PR BIOPSY OF SKIN LESION: CPT | Performed by: PSYCHIATRY & NEUROLOGY

## 2018-10-25 PROCEDURE — 11101 PR BIOPSY, EACH ADDED LESION: CPT | Performed by: PSYCHIATRY & NEUROLOGY

## 2018-10-29 ENCOUNTER — HOSPITAL ENCOUNTER (OUTPATIENT)
Dept: PHYSICAL THERAPY | Age: 43
Setting detail: THERAPIES SERIES
Discharge: HOME | End: 2018-10-29
Payer: MEDICARE

## 2018-10-29 PROCEDURE — 97110 THERAPEUTIC EXERCISES: CPT

## 2018-11-02 ENCOUNTER — HOSPITAL ENCOUNTER (OUTPATIENT)
Dept: PHYSICAL THERAPY | Age: 43
Setting detail: THERAPIES SERIES
Discharge: HOME | End: 2018-11-02
Payer: MEDICARE

## 2018-11-06 ENCOUNTER — HOSPITAL ENCOUNTER (OUTPATIENT)
Dept: PHYSICAL THERAPY | Age: 43
Setting detail: THERAPIES SERIES
Discharge: HOME | End: 2018-11-06
Payer: MEDICARE

## 2018-11-12 ENCOUNTER — HOSPITAL ENCOUNTER (OUTPATIENT)
Dept: GENERAL RADIOLOGY | Age: 43
Discharge: HOME | End: 2018-11-14
Payer: MEDICARE

## 2018-11-12 ENCOUNTER — HOSPITAL ENCOUNTER (OUTPATIENT)
Age: 43
Discharge: HOME | End: 2018-11-14
Payer: MEDICARE

## 2018-11-12 DIAGNOSIS — Z87.442 PERSONAL HISTORY OF KIDNEY STONES: ICD-10-CM

## 2018-11-12 PROCEDURE — 74018 RADEX ABDOMEN 1 VIEW: CPT

## 2018-11-14 ENCOUNTER — HOSPITAL ENCOUNTER (OUTPATIENT)
Dept: PHYSICAL THERAPY | Age: 43
Setting detail: THERAPIES SERIES
Discharge: HOME | End: 2018-11-14
Payer: MEDICARE

## 2018-11-14 PROCEDURE — 97110 THERAPEUTIC EXERCISES: CPT

## 2018-11-21 ENCOUNTER — HOSPITAL ENCOUNTER (OUTPATIENT)
Dept: PHYSICAL THERAPY | Age: 43
Setting detail: THERAPIES SERIES
Discharge: HOME | End: 2018-11-21
Payer: MEDICARE

## 2018-11-21 PROCEDURE — 97110 THERAPEUTIC EXERCISES: CPT

## 2018-11-21 NOTE — FLOWSHEET NOTE
Short arc quadriceps  20 reps      Heel slides 20 reps  5 reps with overpressure             Sitting        Long arc quadriceps with ball squeeze 20 reps               Sidelying        Hip adduction 10 reps      Hip abduction 20 reps      Clamshells 20 reps  reviewed   Reverse clamshells 20 reps  reviewed            Prone        Knee flexion    Ribs bruised refused             Other: patient's boyfriend was present during entire session today    Specific Instructions for next treatment:    Treatment Charges: Mins Units   []  Modalities     [x]  Ther Exercise 27 2   []  Manual Therapy     []  Ther Activities     []  Aquatics     []  Vasocompression     []  Other     Total Treatment time 27 mins 2       Assessment: [x] Progressing toward goals. [] No change. [x] Other: Added overpressure with knee flexion this date to decrease stiffness. Reviewed clamshells and reverse clamshells as exercise is still new to patient. Patient had good knowledge of exercise techniques and progressions without complaints. STG: (to be met in 11 treatments)  1. ? Pain: right knee will be 5/10 with ambulation   2. 3/10 or less pain when standing  3. ? ROM:right knee flexion 115 °  4. ? Strength:5-/5 strength or better in the right quadriceps  5. Increased strength of the right VMO to 4+/5 or better  6. ? Function:patient will have a Lower Extremity Functional scale score of 70% or less  7. Independent with Home Exercise Programs                  Patient goals:hoping to get strength in my knee    Pt. Education:  [x] Yes  [] No  [x] Reviewed Prior HEP/Ed  Method of Education: [x] Verbal  [x] Demo  [] Written  Comprehension of Education:  [x] Verbalizes understanding. [x] Demonstrates understanding. [x] Needs review. [] Demonstrates/verbalizes HEP/Ed previously given. Plan: [x] Continue per plan of care.    [] Other:      Time In: 8:49 am            Time Out: 9:16 am    Electronically signed by:  Sheridan Roberto PTA

## 2018-11-26 ENCOUNTER — OFFICE VISIT (OUTPATIENT)
Dept: ORTHOPEDIC SURGERY | Age: 43
End: 2018-11-26
Payer: MEDICARE

## 2018-11-26 ENCOUNTER — HOSPITAL ENCOUNTER (OUTPATIENT)
Dept: PHYSICAL THERAPY | Age: 43
Setting detail: THERAPIES SERIES
Discharge: HOME | End: 2018-11-26
Payer: MEDICARE

## 2018-11-26 VITALS — WEIGHT: 174.6 LBS | BODY MASS INDEX: 32.13 KG/M2 | HEIGHT: 62 IN

## 2018-11-26 DIAGNOSIS — M70.50 PES ANSERINE BURSITIS: ICD-10-CM

## 2018-11-26 DIAGNOSIS — S83.411A SPRAIN OF MEDIAL COLLATERAL LIGAMENT OF RIGHT KNEE, INITIAL ENCOUNTER: ICD-10-CM

## 2018-11-26 DIAGNOSIS — Z96.651 S/P TOTAL KNEE ARTHROPLASTY, RIGHT: Primary | ICD-10-CM

## 2018-11-26 DIAGNOSIS — M25.561 RIGHT KNEE PAIN, UNSPECIFIED CHRONICITY: ICD-10-CM

## 2018-11-26 PROCEDURE — 99213 OFFICE O/P EST LOW 20 MIN: CPT | Performed by: STUDENT IN AN ORGANIZED HEALTH CARE EDUCATION/TRAINING PROGRAM

## 2018-11-26 PROCEDURE — G8427 DOCREV CUR MEDS BY ELIG CLIN: HCPCS | Performed by: STUDENT IN AN ORGANIZED HEALTH CARE EDUCATION/TRAINING PROGRAM

## 2018-11-26 PROCEDURE — G8417 CALC BMI ABV UP PARAM F/U: HCPCS | Performed by: STUDENT IN AN ORGANIZED HEALTH CARE EDUCATION/TRAINING PROGRAM

## 2018-11-26 PROCEDURE — 1036F TOBACCO NON-USER: CPT | Performed by: STUDENT IN AN ORGANIZED HEALTH CARE EDUCATION/TRAINING PROGRAM

## 2018-11-26 PROCEDURE — G8484 FLU IMMUNIZE NO ADMIN: HCPCS | Performed by: STUDENT IN AN ORGANIZED HEALTH CARE EDUCATION/TRAINING PROGRAM

## 2018-11-26 PROCEDURE — 97110 THERAPEUTIC EXERCISES: CPT

## 2018-11-26 RX ORDER — METHYLPREDNISOLONE 4 MG/1
TABLET ORAL
Qty: 1 KIT | Refills: 1 | Status: SHIPPED | OUTPATIENT
Start: 2018-11-26 | End: 2018-12-02

## 2018-11-26 ASSESSMENT — ENCOUNTER SYMPTOMS
VOMITING: 0
ABDOMINAL PAIN: 0
EYE PAIN: 0
WHEEZING: 0
COUGH: 0
SHORTNESS OF BREATH: 0
ABDOMINAL DISTENTION: 0
NAUSEA: 0
COLOR CHANGE: 0

## 2018-11-26 NOTE — PROGRESS NOTES
have reviewed the CC, HPI, ROS, PMH, FHX, Social History. I agree with the documentation provided by other staff, residents, and/or medical students and have reviewed their documentation prior to providing my signature indicating agreement. Objective :   General: Tulio Pabon is a 39 y.o. female who is alert and oriented and sitting comfortably in our office. Neuro: alert. oriented  Eyes: Extra-ocular muscles intact  Mouth: Oral mucosa moist. No perioral lesions  Pulm: Respirations unlabored and regular. Skin: warm, well perfused  Psych:   Patient has good fund of knowledge and displays understanging of exam, diagnosis, and plan. RLE:  Well-healed scar with mild keloid formation  Tenderness palpation along medial femoral condyle and along course of the MCL  Tenderness palpation and swelling about pes ansa  Knee:  No erythema/abrasion/laceration. Normal alignment with no gross leg length discrepancy. Able to bear weight with no antalgic gait. Thigh circumference and size grossly equal to contralateral.    No warmth/deformity/effusion  ROM from 0-120 degrees without pain. Stable in varus and valgus stressing  Moderate pain with valgus stress  Compartments soft. EHL/FHL/TA/GS complex motor intact. Sural, saphenous, superificial/deep peroneal, and plantar nerve distribution SILT. Dorsalis pedis/posterior tibial pulses 2+ with BCR. Assessment:      1. S/P patellofemoral arthroplasty    2. Pes anserine bursitis    3. Right knee pain, unspecified chronicity    4. Sprain of medial collateral ligament of right knee, initial encounter       Plan:      1. Discussed the patient that she has a complex problem that patients typically have issues with even after surgery  2. Described patient that she still maintains a improvement for up to a year after surgery but once he gets a year that we'll likely be the maximum extent of benefit from surgery  3.   Given the patient's continued symptoms of buckling knee instability I recommend a further prescription of physical therapy with continued vacation the exercises  4. Also given the diffuse swelling medially along the MCL as well as pes ansa I will give the patient a Medrol Dosepak with 1 refill. 5. F/u 4-6 weeks    Follow up:Return in about 6 weeks (around 1/7/2019). Orders Placed This Encounter   Medications    methylPREDNISolone (MEDROL DOSEPACK) 4 MG tablet     Sig: Take by mouth. Dispense:  1 kit     Refill:  1          No orders of the defined types were placed in this encounter.       Chalino Gasca DO  PGY-3, Department of [address]  5:56 PM 11/26/2018

## 2018-11-26 NOTE — FLOWSHEET NOTE
[x] [facility]       Outpatient Physical        Therapy       [address]       Phone: [phone]       Fax: [phone] [] [facility] at [address] [facility]       Phone: [phone]       Fax: [phone] [] [address] [place] [address] Promotion  [address] [place] [address]   Phone: [phone]   Fax:  [phone]     Physical Therapy Daily Treatment Note    Date:  2018  Patient Name:  Fredy Shankar    :  1975  MRN: [id number]  Physician: Brittani Mesa DO                                Insurance: [place] Advantage  Medical Diagnosis:  S/P total knee arthroplasty, right (J89.196)                 Rehab Codes: stiffness right knee (M25.661); pain right knee (M25.561); muscle weakness (M62.81); disturbance of skin sensation (R20.8); muscle wekness (M62.81)  Onset date: 2018 (knee surgery)                     Next Dr's appt.: 2018  Visit# / total visits:   Cancels/No Shows:     Subjective:    Pain:  [x] Yes  [] No Location: medial and lateral knee joint and under the knee cap Pain Rating: (0-10 scale) 8/10  Pain altered Tx:  [x] No  [] Yes  Action:  Comments: Did something to her knee on Thanksgiving day, unsure what happened. Thinks increased pain came from crossing knee over the other, tearing feeling. Left knee on medial aspect is bothering her. States that she is seeing the doctor today and hoping for another injection. Objective:  Modalities: Game ready to right knee supine, purple wedge under legs, 38 degrees, medium pressure for 15 minutes--patient declined this date    Specific MD instructions: S/p patellofemoral arthroplasty right. Please work on quad strengthening and ROM.   Precautions:   Exercises:  Exercise   right knee Reps/ Time Weight/ Level Comments   Standing       TKE 20x red    HR    20x     3 way hip  15x  Decreased reps   Marching    15x  Decreased reps   Hamstring curls  15x  Decreased reps   Gastroc stretch    3x 15 sec          Supine        Straight leg raise  15 reps      Straight leg raise with external rotation   10 reps      Short arc quadriceps  20 reps      Heel slides 20 reps  5 reps with overpressure             Sitting        Long arc quadriceps with ball squeeze 20 reps               Sidelying        Hip adduction 10 reps      Hip abduction 15 reps   decreased reps   Clamshells 15 reps  Decreased reps   Reverse clamshells 15 reps  Decreased reps            Prone        Knee flexion    Ribs bruised refused             Other: patient's boyfriend was present during entire session today. ROM 2-114    Specific Instructions for next treatment:    Treatment Charges: Mins Units   []  Modalities     [x]  Ther Exercise 32 2   []  Manual Therapy     []  Ther Activities     []  Aquatics     []  Vasocompression     []  Other     Total Treatment time 32 mins 2       Assessment: [x] Progressing toward goals. [] No change. [x] Other: Patient decreased reps on most exercises this date stating knee is painful. Cues for exercise progressions, ROM measurements taken this date. Verbal cues for exercise progressions, patient has good knowledge. STG: (to be met in 11 treatments)  1. ? Pain: right knee will be 5/10 with ambulation   2. 3/10 or less pain when standing  3. ? ROM:right knee flexion 115 °  4. ? Strength:5-/5 strength or better in the right quadriceps  5. Increased strength of the right VMO to 4+/5 or better  6. ? Function:patient will have a Lower Extremity Functional scale score of 70% or less  7. Independent with Home Exercise Programs                  Patient goals:hoping to get strength in my knee    Pt. Education:  [x] Yes  [] No  [x] Reviewed Prior HEP/Ed  Method of Education: [x] Verbal  [x] Demo  [] Written  Comprehension of Education:  [x] Verbalizes understanding. [x] Demonstrates understanding. [x] Needs review.   [] Demonstrates/verbalizes HEP/Ed

## 2018-11-29 ENCOUNTER — HOSPITAL ENCOUNTER (OUTPATIENT)
Dept: PHYSICAL THERAPY | Age: 43
Setting detail: THERAPIES SERIES
Discharge: HOME | End: 2018-11-29
Payer: MEDICARE

## 2018-12-04 ENCOUNTER — HOSPITAL ENCOUNTER (OUTPATIENT)
Dept: PHYSICAL THERAPY | Age: 43
Setting detail: THERAPIES SERIES
Discharge: HOME | End: 2018-12-04
Payer: MEDICARE

## 2018-12-06 ENCOUNTER — HOSPITAL ENCOUNTER (OUTPATIENT)
Dept: PHYSICAL THERAPY | Age: 43
Setting detail: THERAPIES SERIES
Discharge: HOME | End: 2018-12-06
Payer: MEDICARE

## 2018-12-06 ENCOUNTER — OFFICE VISIT (OUTPATIENT)
Dept: NEUROLOGY | Age: 43
End: 2018-12-06
Payer: MEDICARE

## 2018-12-06 VITALS
BODY MASS INDEX: 32.02 KG/M2 | DIASTOLIC BLOOD PRESSURE: 79 MMHG | HEART RATE: 105 BPM | SYSTOLIC BLOOD PRESSURE: 113 MMHG | WEIGHT: 174 LBS | HEIGHT: 62 IN

## 2018-12-06 DIAGNOSIS — G25.81 RESTLESS LEG SYNDROME: ICD-10-CM

## 2018-12-06 DIAGNOSIS — G43.009 MIGRAINE WITHOUT AURA AND WITHOUT STATUS MIGRAINOSUS, NOT INTRACTABLE: ICD-10-CM

## 2018-12-06 DIAGNOSIS — N11.8 XANTHOGRANULOMATOUS PYELONEPHRITIS: ICD-10-CM

## 2018-12-06 DIAGNOSIS — Z90.5 STATUS POST NEPHRECTOMY: ICD-10-CM

## 2018-12-06 DIAGNOSIS — M51.26 HERNIATION OF LUMBAR INTERVERTEBRAL DISC WITHOUT MYELOPATHY: Chronic | ICD-10-CM

## 2018-12-06 DIAGNOSIS — R20.2 LEFT LEG PARESTHESIAS: Primary | ICD-10-CM

## 2018-12-06 PROCEDURE — 99214 OFFICE O/P EST MOD 30 MIN: CPT | Performed by: FAMILY MEDICINE

## 2018-12-06 PROCEDURE — 97110 THERAPEUTIC EXERCISES: CPT

## 2018-12-06 RX ORDER — ROPINIROLE 0.25 MG/1
0.25 TABLET, FILM COATED ORAL NIGHTLY
Qty: 30 TABLET | Refills: 1 | Status: SHIPPED | OUTPATIENT
Start: 2018-12-06 | End: 2019-02-18 | Stop reason: SDUPTHER

## 2018-12-06 RX ORDER — ROPINIROLE 0.25 MG/1
0.25 TABLET, FILM COATED ORAL 3 TIMES DAILY
Qty: 30 TABLET | Refills: 1 | Status: SHIPPED | OUTPATIENT
Start: 2018-12-06 | End: 2018-12-06 | Stop reason: SDUPTHER

## 2018-12-06 NOTE — PROGRESS NOTES
I have discussed the case of Mikie Aase including pertinent history and exam findings with the resident. I have seen and examined the patient and the key elements of the encounter have been performed by me. I agree with the assessment, plan and orders as documented by the resident with changes made to the note. History:  Mikie Aase 39 y.o. female with PMH of Gastric bypass surgery, Staghorn calculi, chronic lower back pain, migraine headaches, restless leg syndrome was seen in the clinic for L side numbness since 2012. Patient endorses constant paresthesias in the LLE and intermittent in the left face and LUE. She endorses worsening of her paresthesias since then. She also c./o intermittent cramping and restless feeling in her both legs for last couple of months that is worse during the night time. Patient is currently taking Topamax and Amitriptyline for migraine headache. She denies any improvement in her paresthesias or headaches with Amitriptyline. Topamax was started almost a year before, denies any paresthesias after taking the dose of Topamax. Prior workup  MRI brain 1/22/2016: Negative for infarct or intracranial mass or hemorrhage  MRA head 2/11/2016: no large vessel occlusion or stenosis seen. No vascular malformations  MRV head 2/11/2016. No evidence of dural venous sinus thrombosis. MRI L-spine 5/14/2018: No significant degenerative changes  MRI cervical spine 8/21/2013: Some reversal of cervical lordosis otherwise unremarkable study. EMG nerve conduction studies: Bilateral lower extremities 8/20/2018: Unremarkable  Skin punch biopsy: 10/25/2018: Normal study    Neuro exam   AAOx3, cranial nerves intact, muscle strength grossly 5/5, sensations decreased to light touch and pin prick on the LLE, rest all intact, Proprioception and vibration intact. reflexes and gait intact.      Impression and Plan: Ms. Mikie Aase is a 39 y.o. female with PMH of Gastric bypass surgery, Staghorn

## 2018-12-07 NOTE — PROGRESS NOTES
Neurology Resident Progress Note      SUBJECTIVE:  This is a follow-up neurology progress note. The patient was seen and examined and the chart was reviewed.        Scheduled Medications      Briefly: This is a 38 y/o R handed F who came here for a follow up of Lsided paresthesias that started about 2010. She had EMG in 2013 that was neg. She recently had EMG of her legs that was neg. MRI lumbar spine:consistent with mild L4-L5 disc bulge. She did follow up with neurosurgery with no surgical intervention planned. She had Vit. B12 and folate checked in jan 2018 that were neg. Her magnesium level was low. MRI brain (1/22/16) and it was negative for ischemic insult, intracranial mass lesions, hemorrhage. MRA head (2/11/16): No evidence for intracranial large vessel occlusion or arteriovenous more formation. MRV head (2/11/16): No definite evidence for dural venous sinus thrombosis.   MRI Lumbar spine 05/14/18: neg  MRI Cervical spine 08/21/13: some reversal of cervical lordosis otherwise unremarkable study. She also had recent NCS/EMG of bilateral LE on 8/20/18 and it was unremarkable.   Skin punch biopsy :10/25/18: normal study       Past Medical History:   Diagnosis Date    Anemia     Anxiety     Asthma     appt with pulmonologist 2/1/18    Bipolar 1 disorder ([address]     Blackout spell     Body piercing     X2 ABOVE AND BELOW LIP     Cervicalgia 8/14/2013    Chest pain     Chronic back pain     Chronic kidney disease     stage 3    Chronic neck pain     Constipation     COPD (chronic obstructive pulmonary disease) ([address]     Dental crown present     has dental clearance    Depression     Fall     landed on tailbone, exacrbated back pain and headaches    GERD (gastroesophageal reflux disease)     Heartburn     History of blood transfusion 2012    no reaction    Hyperglycemia     DIET CONTROLED    Insomnia     Kidney stones     Knee pain, right     Low blood pressure     Low vitamin D

## 2018-12-11 ENCOUNTER — HOSPITAL ENCOUNTER (OUTPATIENT)
Dept: PHYSICAL THERAPY | Age: 43
Setting detail: THERAPIES SERIES
Discharge: HOME | End: 2018-12-11
Payer: MEDICARE

## 2018-12-11 PROCEDURE — 97110 THERAPEUTIC EXERCISES: CPT

## 2018-12-11 PROCEDURE — 97016 VASOPNEUMATIC DEVICE THERAPY: CPT

## 2018-12-11 NOTE — FLOWSHEET NOTE
reps with overpressure             Sitting        Long arc quadriceps with ball squeeze 15 reps 1#             Sidelying        Hip adduction 10 reps 1#    Hip abduction 15 reps 1#    Clamshells 15 reps 1#    Reverse clamshells 15 reps 1#              Other: patient's boyfriend was present during entire session today. Specific Instructions for next treatment: progress reps, add exercises    Treatment Charges: Mins Units   []  Modalities     [x]  Ther Exercise 34 2   []  Manual Therapy     []  Ther Activities     []  Aquatics     [x]  Vasocompression 15 1   []  Other     Total Treatment time 34 mins 2       Assessment: [x] Progressing toward goals. [] No change. [x] Other: Added weights to all exercises this date to increase knee strength. Patient completed exercises without increased complaints. Minimal cues for exercise technique and progressions, patient has fairly good knowledge. Patient requested game ready this date. STG: (to be met in 11 treatments)  1. ? Pain: right knee will be 5/10 with ambulation   2. 3/10 or less pain when standing  3. ? ROM:right knee flexion 115 °  4. ? Strength:5-/5 strength or better in the right quadriceps  5. Increased strength of the right VMO to 4+/5 or better  6. ? Function:patient will have a Lower Extremity Functional scale score of 70% or less  7. Independent with Home Exercise Programs                  Patient goals:hoping to get strength in my knee    Pt. Education:  [x] Yes  [] No  [x] Reviewed Prior HEP/Ed  Method of Education: [x] Verbal  [x] Demo  [] Written  Comprehension of Education:  [x] Verbalizes understanding. [x] Demonstrates understanding. [x] Needs review. [] Demonstrates/verbalizes HEP/Ed previously given. Plan: [x] Continue per plan of care.    [] Other:      Time In: 3:11 pm            Time Out: 4:00 pm    Electronically signed by:  Alli Villaseñor PTA

## 2018-12-12 ENCOUNTER — HOSPITAL ENCOUNTER (OUTPATIENT)
Dept: PHYSICAL THERAPY | Age: 43
Setting detail: THERAPIES SERIES
Discharge: HOME | End: 2018-12-12
Payer: MEDICARE

## 2018-12-12 NOTE — FLOWSHEET NOTE
[x] [address]  [address]    [phone]  F: [phone] [] [address]  [facility]   [address]  P: [phone]  F: [phone] [] [place]  [address]  P: [phone]  F: [phone] [] [address] [facility]   [address]   [place]: [phone]  F: [phone] [] [place] [address] [facility] [address]  [place]: [phone]   F: [phone]      Physical Therapy Cancel/No Show note    Date: 2018  Patient: Dagoberto   : 1975  MRN: [id number]    Cancels/No Shows to date:     Total:     For today's appointment patient:  [x]  Cancelled  []  Rescheduled appointment  []  No-show     Reason given by patient:  []  Patient ill  []  Conflicting appointment  []  No transportation    []  Conflict with work  []  No reason given  []  Weather related  [x]  Other:     Comments: family situation     [x]  Next appointment was scheduled    Electronically signed by: Les Vail PTA

## 2018-12-18 ENCOUNTER — HOSPITAL ENCOUNTER (OUTPATIENT)
Dept: PHYSICAL THERAPY | Age: 43
Setting detail: THERAPIES SERIES
Discharge: HOME | End: 2018-12-18
Payer: MEDICARE

## 2018-12-18 PROCEDURE — 97110 THERAPEUTIC EXERCISES: CPT

## 2018-12-18 NOTE — DISCHARGE SUMMARY
[x] [facility]  [address]  [phone]  F: [phone] [] [address]  [place] [address]   [address]  P: [phone]  F: [phone] [] [address]  Therapy  [address] [facility]  P: [phone]  F: [phone] [] [address]  [facility]   [address]   [place]: [phone]  F: [phone]     Physical Therapy Discharge Note    Date: 2018      Patient: Jus Tracy  : 1975  MRN: [id number]    Physician: Deidra Rich DO                                Insurance: [employer]  Medical Diagnosis:  S/P total knee arthroplasty, right (M80.127)                 Rehab Codes: stiffness right knee (M25.661); pain right knee (M25.561); muscle weakness (M62.81); disturbance of skin sensation (R20.8); muscle wekness (M62.81)  Onset date: 2018 (knee surgery)                     Next Dr's appt.: 2018  Visit# / total visits: 10/11                  Cancels/No Shows:    Total:   Date of initial visit: 10/16/18              Date of final visit: 18      Subjective:  Pain:  [x] Yes  [] No  Location: medial and lateral knee joint and under the knee cap  Pain Rating: (0-10 scale) 8/10  Pain altered Tx:  [x] No  [] Yes  Action:  Comments: Patient still stating no change in pain rating. Would like today to be her last visit. Objective:  Test Measurements: AROM right knee flexion: 110 degrees, PROM 125 degrees  Function:  Strength 4/5 right knee. LEFS score of 76% functionally impaired. Assessment:  STG: (to be met in 11 treatments)  1. ? Pain: right knee will be 5/10 with ambulation--knee pain with ambulation 8/10, still cant put full weight on it per patient.   2. 3/10 or less pain when standing--knee pain when standing 8/10  3. ? ROM:right knee

## 2018-12-28 ENCOUNTER — HOSPITAL ENCOUNTER (EMERGENCY)
Age: 43
Discharge: HOME | End: 2018-12-28
Attending: EMERGENCY MEDICINE
Payer: MEDICARE

## 2018-12-28 ENCOUNTER — APPOINTMENT (OUTPATIENT)
Dept: GENERAL RADIOLOGY | Age: 43
End: 2018-12-28
Payer: MEDICARE

## 2018-12-28 VITALS
HEIGHT: 62 IN | SYSTOLIC BLOOD PRESSURE: 105 MMHG | RESPIRATION RATE: 16 BRPM | DIASTOLIC BLOOD PRESSURE: 64 MMHG | WEIGHT: 170 LBS | HEART RATE: 81 BPM | BODY MASS INDEX: 31.28 KG/M2 | TEMPERATURE: 98.4 F | OXYGEN SATURATION: 100 %

## 2018-12-28 DIAGNOSIS — S92.901A CLOSED FRACTURE OF RIGHT FOOT, INITIAL ENCOUNTER: Primary | ICD-10-CM

## 2018-12-28 PROCEDURE — 6370000000 HC RX 637 (ALT 250 FOR IP): Performed by: NURSE PRACTITIONER

## 2018-12-28 PROCEDURE — 73630 X-RAY EXAM OF FOOT: CPT

## 2018-12-28 PROCEDURE — 99283 EMERGENCY DEPT VISIT LOW MDM: CPT

## 2018-12-28 RX ORDER — ACETAMINOPHEN AND CODEINE PHOSPHATE 300; 30 MG/1; MG/1
1 TABLET ORAL 3 TIMES DAILY PRN
Qty: 12 TABLET | Refills: 0 | Status: SHIPPED | OUTPATIENT
Start: 2018-12-28 | End: 2018-12-31

## 2018-12-28 RX ORDER — ACETAMINOPHEN AND CODEINE PHOSPHATE 300; 30 MG/1; MG/1
1 TABLET ORAL ONCE
Status: COMPLETED | OUTPATIENT
Start: 2018-12-28 | End: 2018-12-28

## 2018-12-28 RX ADMIN — ACETAMINOPHEN AND CODEINE PHOSPHATE 1 TABLET: 300; 30 TABLET ORAL at 14:57

## 2018-12-28 ASSESSMENT — ENCOUNTER SYMPTOMS
ABDOMINAL PAIN: 0
WHEEZING: 0
RHINORRHEA: 0
COUGH: 0
SHORTNESS OF BREATH: 0
SORE THROAT: 0
DIARRHEA: 0
SINUS PRESSURE: 0
VOMITING: 0
NAUSEA: 0
COLOR CHANGE: 0
CONSTIPATION: 0

## 2018-12-28 ASSESSMENT — PAIN SCALES - GENERAL
PAINLEVEL_OUTOF10: 10
PAINLEVEL_OUTOF10: 10

## 2018-12-28 ASSESSMENT — PAIN SCALES - WONG BAKER: WONGBAKER_NUMERICALRESPONSE: 10

## 2018-12-28 ASSESSMENT — PAIN DESCRIPTION - LOCATION: LOCATION: FOOT

## 2018-12-28 ASSESSMENT — PAIN DESCRIPTION - ORIENTATION: ORIENTATION: RIGHT

## 2018-12-28 ASSESSMENT — PAIN DESCRIPTION - DESCRIPTORS: DESCRIPTORS: CONSTANT;THROBBING

## 2018-12-28 ASSESSMENT — PAIN DESCRIPTION - FREQUENCY: FREQUENCY: CONTINUOUS

## 2018-12-31 ENCOUNTER — OFFICE VISIT (OUTPATIENT)
Dept: ORTHOPEDIC SURGERY | Age: 43
End: 2018-12-31
Payer: MEDICARE

## 2018-12-31 DIAGNOSIS — S92.314A NONDISPLACED FRACTURE OF FIRST METATARSAL BONE, RIGHT FOOT, INITIAL ENCOUNTER FOR CLOSED FRACTURE: Primary | ICD-10-CM

## 2018-12-31 PROCEDURE — G8484 FLU IMMUNIZE NO ADMIN: HCPCS | Performed by: ORTHOPAEDIC SURGERY

## 2018-12-31 PROCEDURE — 1036F TOBACCO NON-USER: CPT | Performed by: ORTHOPAEDIC SURGERY

## 2018-12-31 PROCEDURE — G8417 CALC BMI ABV UP PARAM F/U: HCPCS | Performed by: ORTHOPAEDIC SURGERY

## 2018-12-31 PROCEDURE — G8427 DOCREV CUR MEDS BY ELIG CLIN: HCPCS | Performed by: ORTHOPAEDIC SURGERY

## 2018-12-31 PROCEDURE — 99213 OFFICE O/P EST LOW 20 MIN: CPT | Performed by: ORTHOPAEDIC SURGERY

## 2018-12-31 RX ORDER — HYDROCODONE BITARTRATE AND ACETAMINOPHEN 5; 325 MG/1; MG/1
1 TABLET ORAL EVERY 8 HOURS PRN
Qty: 21 TABLET | Refills: 0 | Status: SHIPPED | OUTPATIENT
Start: 2018-12-31 | End: 2019-01-07 | Stop reason: SDUPTHER

## 2019-01-07 DIAGNOSIS — S92.314A NONDISPLACED FRACTURE OF FIRST METATARSAL BONE, RIGHT FOOT, INITIAL ENCOUNTER FOR CLOSED FRACTURE: ICD-10-CM

## 2019-01-07 RX ORDER — HYDROCODONE BITARTRATE AND ACETAMINOPHEN 5; 325 MG/1; MG/1
1 TABLET ORAL EVERY 12 HOURS PRN
Qty: 14 TABLET | Refills: 0 | Status: SHIPPED | OUTPATIENT
Start: 2019-01-07 | End: 2019-01-14

## 2019-01-08 ENCOUNTER — HOSPITAL ENCOUNTER (OUTPATIENT)
Dept: WOMENS IMAGING | Age: 44
Discharge: HOME | End: 2019-01-10
Payer: MEDICARE

## 2019-01-08 DIAGNOSIS — Z12.31 SCREENING MAMMOGRAM, ENCOUNTER FOR: ICD-10-CM

## 2019-01-08 PROCEDURE — 77067 SCR MAMMO BI INCL CAD: CPT

## 2019-01-16 RX ORDER — ONDANSETRON 4 MG/1
TABLET, FILM COATED ORAL
Qty: 30 TABLET | Refills: 0 | Status: SHIPPED | OUTPATIENT
Start: 2019-01-16

## 2019-01-21 ENCOUNTER — OFFICE VISIT (OUTPATIENT)
Dept: ORTHOPEDIC SURGERY | Age: 44
End: 2019-01-21
Payer: MEDICARE

## 2019-01-21 ENCOUNTER — APPOINTMENT (OUTPATIENT)
Dept: GENERAL RADIOLOGY | Age: 44
End: 2019-01-21
Payer: MEDICARE

## 2019-01-21 ENCOUNTER — HOSPITAL ENCOUNTER (EMERGENCY)
Age: 44
Discharge: HOME | End: 2019-01-21
Attending: EMERGENCY MEDICINE
Payer: MEDICARE

## 2019-01-21 VITALS
HEIGHT: 62 IN | DIASTOLIC BLOOD PRESSURE: 76 MMHG | SYSTOLIC BLOOD PRESSURE: 119 MMHG | HEART RATE: 86 BPM | BODY MASS INDEX: 31.28 KG/M2 | RESPIRATION RATE: 18 BRPM | WEIGHT: 170 LBS | OXYGEN SATURATION: 99 % | TEMPERATURE: 98.1 F

## 2019-01-21 VITALS — WEIGHT: 169.97 LBS | BODY MASS INDEX: 31.28 KG/M2 | HEIGHT: 62 IN

## 2019-01-21 DIAGNOSIS — S92.314A NONDISPLACED FRACTURE OF FIRST METATARSAL BONE, RIGHT FOOT, INITIAL ENCOUNTER FOR CLOSED FRACTURE: Primary | ICD-10-CM

## 2019-01-21 DIAGNOSIS — M79.671 RIGHT FOOT PAIN: ICD-10-CM

## 2019-01-21 DIAGNOSIS — S92.911A: ICD-10-CM

## 2019-01-21 DIAGNOSIS — S69.92XA INJURY OF LEFT THUMB, INITIAL ENCOUNTER: Primary | ICD-10-CM

## 2019-01-21 PROCEDURE — 73130 X-RAY EXAM OF HAND: CPT

## 2019-01-21 PROCEDURE — G8484 FLU IMMUNIZE NO ADMIN: HCPCS | Performed by: STUDENT IN AN ORGANIZED HEALTH CARE EDUCATION/TRAINING PROGRAM

## 2019-01-21 PROCEDURE — 99213 OFFICE O/P EST LOW 20 MIN: CPT | Performed by: STUDENT IN AN ORGANIZED HEALTH CARE EDUCATION/TRAINING PROGRAM

## 2019-01-21 PROCEDURE — G8417 CALC BMI ABV UP PARAM F/U: HCPCS | Performed by: STUDENT IN AN ORGANIZED HEALTH CARE EDUCATION/TRAINING PROGRAM

## 2019-01-21 PROCEDURE — G8427 DOCREV CUR MEDS BY ELIG CLIN: HCPCS | Performed by: STUDENT IN AN ORGANIZED HEALTH CARE EDUCATION/TRAINING PROGRAM

## 2019-01-21 PROCEDURE — 1036F TOBACCO NON-USER: CPT | Performed by: STUDENT IN AN ORGANIZED HEALTH CARE EDUCATION/TRAINING PROGRAM

## 2019-01-21 PROCEDURE — 99283 EMERGENCY DEPT VISIT LOW MDM: CPT

## 2019-01-21 RX ORDER — ACETAMINOPHEN 500 MG
500 TABLET ORAL EVERY 6 HOURS PRN
Qty: 120 TABLET | Refills: 3 | Status: SHIPPED | OUTPATIENT
Start: 2019-01-21 | End: 2019-02-28 | Stop reason: SDUPTHER

## 2019-01-21 ASSESSMENT — ENCOUNTER SYMPTOMS
ABDOMINAL DISTENTION: 0
CHEST TIGHTNESS: 0
VOMITING: 0
SHORTNESS OF BREATH: 0
WHEEZING: 0
EYE REDNESS: 0
BLOOD IN STOOL: 0
RHINORRHEA: 0
COUGH: 0
EYE DISCHARGE: 0
ABDOMINAL PAIN: 0
SORE THROAT: 0
SHORTNESS OF BREATH: 0
DIARRHEA: 0
NAUSEA: 0

## 2019-01-21 ASSESSMENT — PAIN SCALES - GENERAL: PAINLEVEL_OUTOF10: 8

## 2019-01-27 PROBLEM — S63.622A SPRAIN OF INTERPHALANGEAL JOINT OF LEFT THUMB: Status: ACTIVE | Noted: 2019-01-27

## 2019-02-10 DIAGNOSIS — G25.81 RESTLESS LEG SYNDROME: ICD-10-CM

## 2019-02-18 DIAGNOSIS — G25.81 RESTLESS LEG SYNDROME: ICD-10-CM

## 2019-02-28 RX ORDER — ACETAMINOPHEN 500 MG
TABLET ORAL
Qty: 120 TABLET | Refills: 2 | Status: SHIPPED | OUTPATIENT
Start: 2019-02-28 | End: 2019-11-03

## 2019-03-19 ENCOUNTER — OFFICE VISIT (OUTPATIENT)
Dept: BARIATRICS/WEIGHT MGMT | Age: 44
End: 2019-03-19
Payer: MEDICARE

## 2019-03-19 VITALS
BODY MASS INDEX: 33.23 KG/M2 | HEIGHT: 61 IN | SYSTOLIC BLOOD PRESSURE: 122 MMHG | RESPIRATION RATE: 20 BRPM | WEIGHT: 176 LBS | DIASTOLIC BLOOD PRESSURE: 70 MMHG | HEART RATE: 70 BPM

## 2019-03-19 DIAGNOSIS — G89.29 CHRONIC PAIN OF RIGHT KNEE: Primary | ICD-10-CM

## 2019-03-19 DIAGNOSIS — M17.11 PRIMARY OSTEOARTHRITIS OF RIGHT KNEE: ICD-10-CM

## 2019-03-19 DIAGNOSIS — M25.561 CHRONIC PAIN OF RIGHT KNEE: Primary | ICD-10-CM

## 2019-03-19 DIAGNOSIS — M17.0 PRIMARY OSTEOARTHRITIS OF BOTH KNEES: ICD-10-CM

## 2019-03-19 PROCEDURE — G8417 CALC BMI ABV UP PARAM F/U: HCPCS | Performed by: SURGERY

## 2019-03-19 PROCEDURE — 99213 OFFICE O/P EST LOW 20 MIN: CPT | Performed by: SURGERY

## 2019-03-19 PROCEDURE — G8484 FLU IMMUNIZE NO ADMIN: HCPCS | Performed by: SURGERY

## 2019-03-19 PROCEDURE — G8427 DOCREV CUR MEDS BY ELIG CLIN: HCPCS | Performed by: SURGERY

## 2019-03-19 PROCEDURE — 1036F TOBACCO NON-USER: CPT | Performed by: SURGERY

## 2019-03-19 RX ORDER — ZOLPIDEM TARTRATE 5 MG/1
5 TABLET ORAL NIGHTLY PRN
COMMUNITY

## 2019-03-25 ENCOUNTER — HOSPITAL ENCOUNTER (OUTPATIENT)
Age: 44
Discharge: HOME | End: 2019-03-25
Payer: MEDICARE

## 2019-03-25 DIAGNOSIS — G89.29 CHRONIC PAIN OF RIGHT KNEE: ICD-10-CM

## 2019-03-25 DIAGNOSIS — M25.561 CHRONIC PAIN OF RIGHT KNEE: ICD-10-CM

## 2019-03-25 DIAGNOSIS — M17.0 PRIMARY OSTEOARTHRITIS OF BOTH KNEES: ICD-10-CM

## 2019-03-25 DIAGNOSIS — M17.11 PRIMARY OSTEOARTHRITIS OF RIGHT KNEE: ICD-10-CM

## 2019-03-25 LAB
ABSOLUTE EOS #: 0.2 K/UL (ref 0–0.4)
ABSOLUTE IMMATURE GRANULOCYTE: ABNORMAL K/UL (ref 0–0.3)
ABSOLUTE LYMPH #: 2.1 K/UL (ref 1–4.8)
ABSOLUTE MONO #: 0.4 K/UL (ref 0.1–1.3)
ALBUMIN SERPL-MCNC: 4.4 G/DL (ref 3.5–5.2)
ALBUMIN/GLOBULIN RATIO: ABNORMAL (ref 1–2.5)
ALP BLD-CCNC: 145 U/L (ref 35–104)
ALT SERPL-CCNC: 11 U/L (ref 5–33)
ANION GAP SERPL CALCULATED.3IONS-SCNC: 14 MMOL/L (ref 9–17)
AST SERPL-CCNC: 10 U/L
BASOPHILS # BLD: 1 % (ref 0–2)
BASOPHILS ABSOLUTE: 0 K/UL (ref 0–0.2)
BILIRUB SERPL-MCNC: 0.19 MG/DL (ref 0.3–1.2)
BUN BLDV-MCNC: 15 MG/DL (ref 6–20)
BUN/CREAT BLD: ABNORMAL (ref 9–20)
CALCIUM SERPL-MCNC: 10.1 MG/DL (ref 8.6–10.4)
CHLORIDE BLD-SCNC: 107 MMOL/L (ref 98–107)
CHOLESTEROL/HDL RATIO: 4.8
CHOLESTEROL: 276 MG/DL
CO2: 22 MMOL/L (ref 20–31)
CREAT SERPL-MCNC: 1.03 MG/DL (ref 0.5–0.9)
DIFFERENTIAL TYPE: ABNORMAL
EOSINOPHILS RELATIVE PERCENT: 3 % (ref 0–4)
FERRITIN: 38 UG/L (ref 13–150)
FOLATE: 8.9 NG/ML
GFR AFRICAN AMERICAN: >60 ML/MIN
GFR NON-AFRICAN AMERICAN: 58 ML/MIN
GFR SERPL CREATININE-BSD FRML MDRD: ABNORMAL ML/MIN/{1.73_M2}
GFR SERPL CREATININE-BSD FRML MDRD: ABNORMAL ML/MIN/{1.73_M2}
GLUCOSE BLD-MCNC: 79 MG/DL (ref 70–99)
HCT VFR BLD CALC: 38.7 % (ref 36–46)
HDLC SERPL-MCNC: 57 MG/DL
HEMOGLOBIN: 12.8 G/DL (ref 12–16)
IMMATURE GRANULOCYTES: ABNORMAL %
IRON SATURATION: 20 % (ref 20–55)
IRON: 76 UG/DL (ref 37–145)
LDL CHOLESTEROL: 171 MG/DL (ref 0–130)
LYMPHOCYTES # BLD: 31 % (ref 24–44)
MAGNESIUM: 1.9 MG/DL (ref 1.6–2.6)
MCH RBC QN AUTO: 29.9 PG (ref 26–34)
MCHC RBC AUTO-ENTMCNC: 33 G/DL (ref 31–37)
MCV RBC AUTO: 90.5 FL (ref 80–100)
MONOCYTES # BLD: 5 % (ref 1–7)
NRBC AUTOMATED: ABNORMAL PER 100 WBC
PDW BLD-RTO: 15.3 % (ref 11.5–14.9)
PLATELET # BLD: 307 K/UL (ref 150–450)
PLATELET ESTIMATE: ABNORMAL
PMV BLD AUTO: 8.7 FL (ref 6–12)
POTASSIUM SERPL-SCNC: 3.7 MMOL/L (ref 3.7–5.3)
PTH INTACT: 39.14 PG/ML (ref 15–65)
RBC # BLD: 4.28 M/UL (ref 4–5.2)
RBC # BLD: ABNORMAL 10*6/UL
SEG NEUTROPHILS: 60 % (ref 36–66)
SEGMENTED NEUTROPHILS ABSOLUTE COUNT: 4.1 K/UL (ref 1.3–9.1)
SODIUM BLD-SCNC: 143 MMOL/L (ref 135–144)
TOTAL IRON BINDING CAPACITY: 388 UG/DL (ref 250–450)
TOTAL PROTEIN: 7.6 G/DL (ref 6.4–8.3)
TRIGL SERPL-MCNC: 240 MG/DL
TSH SERPL DL<=0.05 MIU/L-ACNC: 3.86 MIU/L (ref 0.3–5)
UNSATURATED IRON BINDING CAPACITY: 312 UG/DL (ref 112–347)
VITAMIN B-12: 294 PG/ML (ref 232–1245)
VITAMIN D 25-HYDROXY: 34.3 NG/ML (ref 30–100)
VLDLC SERPL CALC-MCNC: ABNORMAL MG/DL (ref 1–30)
WBC # BLD: 6.8 K/UL (ref 3.5–11)
WBC # BLD: ABNORMAL 10*3/UL

## 2019-03-25 PROCEDURE — 80061 LIPID PANEL: CPT

## 2019-03-25 PROCEDURE — 83550 IRON BINDING TEST: CPT

## 2019-03-25 PROCEDURE — 80053 COMPREHEN METABOLIC PANEL: CPT

## 2019-03-25 PROCEDURE — 82746 ASSAY OF FOLIC ACID SERUM: CPT

## 2019-03-25 PROCEDURE — 83735 ASSAY OF MAGNESIUM: CPT

## 2019-03-25 PROCEDURE — 84630 ASSAY OF ZINC: CPT

## 2019-03-25 PROCEDURE — 82306 VITAMIN D 25 HYDROXY: CPT

## 2019-03-25 PROCEDURE — 82728 ASSAY OF FERRITIN: CPT

## 2019-03-25 PROCEDURE — 82607 VITAMIN B-12: CPT

## 2019-03-25 PROCEDURE — 36415 COLL VENOUS BLD VENIPUNCTURE: CPT

## 2019-03-25 PROCEDURE — 84590 ASSAY OF VITAMIN A: CPT

## 2019-03-25 PROCEDURE — 83540 ASSAY OF IRON: CPT

## 2019-03-25 PROCEDURE — 85025 COMPLETE CBC W/AUTO DIFF WBC: CPT

## 2019-03-25 PROCEDURE — 84425 ASSAY OF VITAMIN B-1: CPT

## 2019-03-25 PROCEDURE — 83970 ASSAY OF PARATHORMONE: CPT

## 2019-03-25 PROCEDURE — 84443 ASSAY THYROID STIM HORMONE: CPT

## 2019-03-28 LAB — ZINC: 70 UG/DL (ref 60–120)

## 2019-03-29 LAB
RETINYL PALMITATE: 0.02 MG/L (ref 0–0.1)
VITAMIN A LEVEL: 1.27 MG/L (ref 0.3–1.2)
VITAMIN A, INTERP: ABNORMAL
VITAMIN B1 WHOLE BLOOD: 67 NMOL/L (ref 70–180)
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
## 2019-04-02 RX ORDER — ROPINIROLE 0.25 MG/1
TABLET, FILM COATED ORAL
Qty: 30 TABLET | Refills: 1 | Status: SHIPPED | OUTPATIENT
Start: 2019-04-02 | End: 2019-11-03

## 2019-08-02 RX ORDER — ROPINIROLE 0.25 MG/1
TABLET, FILM COATED ORAL
Qty: 30 TABLET | Refills: 1 | Status: SHIPPED | OUTPATIENT
Start: 2019-08-02 | End: 2020-02-13

## 2019-11-03 ENCOUNTER — HOSPITAL ENCOUNTER (EMERGENCY)
Age: 44
Discharge: HOME | End: 2019-11-03
Attending: EMERGENCY MEDICINE
Payer: MEDICARE

## 2019-11-03 ENCOUNTER — APPOINTMENT (OUTPATIENT)
Dept: CT IMAGING | Age: 44
End: 2019-11-03
Payer: MEDICARE

## 2019-11-03 VITALS
DIASTOLIC BLOOD PRESSURE: 75 MMHG | RESPIRATION RATE: 18 BRPM | WEIGHT: 160 LBS | HEIGHT: 62 IN | BODY MASS INDEX: 29.44 KG/M2 | TEMPERATURE: 98.9 F | OXYGEN SATURATION: 100 % | HEART RATE: 100 BPM | SYSTOLIC BLOOD PRESSURE: 138 MMHG

## 2019-11-03 DIAGNOSIS — S09.90XA INJURY OF HEAD, INITIAL ENCOUNTER: ICD-10-CM
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
## 2019-11-03 DIAGNOSIS — S16.1XXA STRAIN OF NECK MUSCLE, INITIAL ENCOUNTER: ICD-10-CM

## 2019-11-03 DIAGNOSIS — T07.XXXA CONTUSION, MULTIPLE SITES: ICD-10-CM

## 2019-11-03 PROCEDURE — 90715 TDAP VACCINE 7 YRS/> IM: CPT | Performed by: NURSE PRACTITIONER

## 2019-11-03 PROCEDURE — 72125 CT NECK SPINE W/O DYE: CPT

## 2019-11-03 PROCEDURE — 70486 CT MAXILLOFACIAL W/O DYE: CPT

## 2019-11-03 PROCEDURE — 6360000002 HC RX W HCPCS: Performed by: NURSE PRACTITIONER

## 2019-11-03 PROCEDURE — 6370000000 HC RX 637 (ALT 250 FOR IP): Performed by: NURSE PRACTITIONER

## 2019-11-03 PROCEDURE — 70450 CT HEAD/BRAIN W/O DYE: CPT

## 2019-11-03 PROCEDURE — 99284 EMERGENCY DEPT VISIT MOD MDM: CPT

## 2019-11-03 PROCEDURE — 90471 IMMUNIZATION ADMIN: CPT | Performed by: NURSE PRACTITIONER

## 2019-11-03 RX ORDER — METHOCARBAMOL 750 MG/1
750 TABLET, FILM COATED ORAL 3 TIMES DAILY
Qty: 30 TABLET | Refills: 0 | Status: SHIPPED | OUTPATIENT
Start: 2019-11-03 | End: 2021-06-30

## 2019-11-03 RX ORDER — ACETAMINOPHEN 500 MG
1000 TABLET ORAL ONCE
Status: COMPLETED | OUTPATIENT
Start: 2019-11-03 | End: 2019-11-03

## 2019-11-03 RX ADMIN — ACETAMINOPHEN 1000 MG: 500 TABLET ORAL at 22:11

## 2019-11-03 RX ADMIN — TETANUS TOXOID, REDUCED DIPHTHERIA TOXOID AND ACELLULAR PERTUSSIS VACCINE, ADSORBED 0.5 ML: 5; 2.5; 8; 8; 2.5 SUSPENSION INTRAMUSCULAR at 22:12

## 2019-11-03 ASSESSMENT — ENCOUNTER SYMPTOMS
SHORTNESS OF BREATH: 0
COLOR CHANGE: 1
VOMITING: 0
VOICE CHANGE: 0
SORE THROAT: 0
PHOTOPHOBIA: 0
EYE PAIN: 0
FACIAL SWELLING: 1
ABDOMINAL PAIN: 0
TROUBLE SWALLOWING: 0
NAUSEA: 0
BACK PAIN: 0

## 2019-11-03 ASSESSMENT — PAIN SCALES - GENERAL: PAINLEVEL_OUTOF10: 8

## 2019-11-13 ENCOUNTER — HOSPITAL ENCOUNTER (EMERGENCY)
Age: 44
Discharge: HOME | End: 2019-11-13
Attending: EMERGENCY MEDICINE
Payer: MEDICARE

## 2019-11-13 ENCOUNTER — APPOINTMENT (OUTPATIENT)
Dept: GENERAL RADIOLOGY | Age: 44
End: 2019-11-13
Payer: MEDICARE

## 2019-11-13 VITALS
RESPIRATION RATE: 18 BRPM | TEMPERATURE: 98.4 F | DIASTOLIC BLOOD PRESSURE: 84 MMHG | OXYGEN SATURATION: 100 % | HEART RATE: 105 BPM | SYSTOLIC BLOOD PRESSURE: 130 MMHG

## 2019-11-13 DIAGNOSIS — M25.561 ACUTE PAIN OF RIGHT KNEE: Primary | ICD-10-CM

## 2019-11-13 PROCEDURE — 6370000000 HC RX 637 (ALT 250 FOR IP): Performed by: STUDENT IN AN ORGANIZED HEALTH CARE EDUCATION/TRAINING PROGRAM

## 2019-11-13 PROCEDURE — 73590 X-RAY EXAM OF LOWER LEG: CPT

## 2019-11-13 PROCEDURE — 73562 X-RAY EXAM OF KNEE 3: CPT

## 2019-11-13 PROCEDURE — 99284 EMERGENCY DEPT VISIT MOD MDM: CPT

## 2019-11-13 RX ORDER — ACETAMINOPHEN 325 MG/1
650 TABLET ORAL ONCE
Status: COMPLETED | OUTPATIENT
Start: 2019-11-13 | End: 2019-11-13

## 2019-11-13 RX ORDER — ACETAMINOPHEN 325 MG/1
650 TABLET ORAL EVERY 8 HOURS PRN
Qty: 30 TABLET | Refills: 0 | Status: SHIPPED | OUTPATIENT
Start: 2019-11-13

## 2019-11-13 RX ORDER — LIDOCAINE 4 G/G
1 PATCH TOPICAL ONCE
Status: DISCONTINUED | OUTPATIENT
Start: 2019-11-13 | End: 2019-11-13 | Stop reason: HOSPADM

## 2019-11-13 RX ORDER — LIDOCAINE 4 G/G
1 PATCH TOPICAL DAILY
Qty: 10 PATCH | Refills: 0 | Status: SHIPPED | OUTPATIENT
Start: 2019-11-13 | End: 2019-11-23

## 2019-11-13 RX ADMIN — ACETAMINOPHEN 650 MG: 325 TABLET ORAL at 13:49

## 2019-11-13 ASSESSMENT — PAIN DESCRIPTION - LOCATION: LOCATION: KNEE

## 2019-11-13 ASSESSMENT — ENCOUNTER SYMPTOMS
SHORTNESS OF BREATH: 0
COUGH: 0
RHINORRHEA: 0
SORE THROAT: 0

## 2019-11-13 ASSESSMENT — PAIN SCALES - GENERAL
PAINLEVEL_OUTOF10: 9
PAINLEVEL_OUTOF10: 9

## 2019-11-13 ASSESSMENT — PAIN DESCRIPTION - DESCRIPTORS: DESCRIPTORS: DISCOMFORT

## 2019-11-13 ASSESSMENT — PAIN DESCRIPTION - ORIENTATION: ORIENTATION: RIGHT

## 2019-11-18 ENCOUNTER — HOSPITAL ENCOUNTER (OUTPATIENT)
Dept: GENERAL RADIOLOGY | Age: 44
Discharge: HOME | End: 2019-11-20
Payer: MEDICARE

## 2019-11-18 ENCOUNTER — OFFICE VISIT (OUTPATIENT)
Dept: ORTHOPEDIC SURGERY | Age: 44
End: 2019-11-18
Payer: MEDICARE

## 2019-11-18 ENCOUNTER — HOSPITAL ENCOUNTER (OUTPATIENT)
Age: 44
Discharge: HOME | End: 2019-11-18
Payer: MEDICARE

## 2019-11-18 ENCOUNTER — TELEPHONE (OUTPATIENT)
Dept: UROLOGY | Age: 44
End: 2019-11-18

## 2019-11-18 VITALS — BODY MASS INDEX: 29.44 KG/M2 | HEIGHT: 62 IN | WEIGHT: 160 LBS

## 2019-11-18 DIAGNOSIS — Z87.442 HISTORY OF KIDNEY STONES: ICD-10-CM

## 2019-11-18 DIAGNOSIS — Z96.651 S/P TOTAL KNEE ARTHROPLASTY, RIGHT: ICD-10-CM

## 2019-11-18 DIAGNOSIS — M23.91 INTERNAL DERANGEMENT OF RIGHT KNEE: Primary | ICD-10-CM

## 2019-11-18 DIAGNOSIS — Z87.442 HISTORY OF KIDNEY STONES: Primary | ICD-10-CM

## 2019-11-18 PROCEDURE — 20610 DRAIN/INJ JOINT/BURSA W/O US: CPT | Performed by: STUDENT IN AN ORGANIZED HEALTH CARE EDUCATION/TRAINING PROGRAM

## 2019-11-18 PROCEDURE — G8417 CALC BMI ABV UP PARAM F/U: HCPCS | Performed by: STUDENT IN AN ORGANIZED HEALTH CARE EDUCATION/TRAINING PROGRAM

## 2019-11-18 PROCEDURE — G8484 FLU IMMUNIZE NO ADMIN: HCPCS | Performed by: STUDENT IN AN ORGANIZED HEALTH CARE EDUCATION/TRAINING PROGRAM

## 2019-11-18 PROCEDURE — G8427 DOCREV CUR MEDS BY ELIG CLIN: HCPCS | Performed by: STUDENT IN AN ORGANIZED HEALTH CARE EDUCATION/TRAINING PROGRAM

## 2019-11-18 PROCEDURE — 74018 RADEX ABDOMEN 1 VIEW: CPT

## 2019-11-18 PROCEDURE — 99213 OFFICE O/P EST LOW 20 MIN: CPT | Performed by: STUDENT IN AN ORGANIZED HEALTH CARE EDUCATION/TRAINING PROGRAM

## 2019-11-18 PROCEDURE — 1036F TOBACCO NON-USER: CPT | Performed by: STUDENT IN AN ORGANIZED HEALTH CARE EDUCATION/TRAINING PROGRAM

## 2019-11-18 RX ORDER — BUPIVACAINE HYDROCHLORIDE 2.5 MG/ML
2 INJECTION, SOLUTION INFILTRATION; PERINEURAL ONCE
Status: COMPLETED | OUTPATIENT
Start: 2019-11-18 | End: 2019-11-19

## 2019-11-18 RX ORDER — METHYLPREDNISOLONE ACETATE 80 MG/ML
80 INJECTION, SUSPENSION INTRA-ARTICULAR; INTRALESIONAL; INTRAMUSCULAR; SOFT TISSUE ONCE
Status: COMPLETED | OUTPATIENT
Start: 2019-11-18 | End: 2019-11-19

## 2019-11-19 RX ADMIN — BUPIVACAINE HYDROCHLORIDE 5 MG: 2.5 INJECTION, SOLUTION INFILTRATION; PERINEURAL at 08:39

## 2019-11-19 RX ADMIN — METHYLPREDNISOLONE ACETATE 80 MG: 80 INJECTION, SUSPENSION INTRA-ARTICULAR; INTRALESIONAL; INTRAMUSCULAR; SOFT TISSUE at 08:39

## 2019-11-21 ENCOUNTER — TELEPHONE (OUTPATIENT)
Dept: UROLOGY | Age: 44
End: 2019-11-21

## 2019-12-19 ENCOUNTER — TELEPHONE (OUTPATIENT)
Dept: UROLOGY | Age: 44
End: 2019-12-19

## 2020-01-18 ENCOUNTER — HOSPITAL ENCOUNTER (EMERGENCY)
Age: 45
Discharge: HOME | End: 2020-01-18
Attending: EMERGENCY MEDICINE
Payer: MEDICARE

## 2020-01-18 VITALS
TEMPERATURE: 98.4 F | OXYGEN SATURATION: 98 % | BODY MASS INDEX: 27.31 KG/M2 | HEIGHT: 64 IN | RESPIRATION RATE: 16 BRPM | WEIGHT: 160 LBS | HEART RATE: 88 BPM | SYSTOLIC BLOOD PRESSURE: 127 MMHG | DIASTOLIC BLOOD PRESSURE: 77 MMHG

## 2020-01-18 PROCEDURE — 99283 EMERGENCY DEPT VISIT LOW MDM: CPT

## 2020-01-18 RX ORDER — LIDOCAINE 50 MG/G
1 PATCH TOPICAL DAILY
Qty: 5 PATCH | Refills: 0 | Status: SHIPPED | OUTPATIENT
Start: 2020-01-18 | End: 2020-01-23

## 2020-01-18 RX ORDER — LIDOCAINE 50 MG/G
1 PATCH TOPICAL DAILY
Qty: 5 PATCH | Refills: 0 | Status: SHIPPED | OUTPATIENT
Start: 2020-01-18 | End: 2020-01-18 | Stop reason: SDUPTHER

## 2020-01-18 ASSESSMENT — ENCOUNTER SYMPTOMS
CONSTIPATION: 0
DIARRHEA: 0
SORE THROAT: 0
ABDOMINAL PAIN: 0
VOMITING: 0
WHEEZING: 0
SHORTNESS OF BREATH: 0
CHEST TIGHTNESS: 0
BLOOD IN STOOL: 0
NAUSEA: 0

## 2020-01-18 ASSESSMENT — PAIN DESCRIPTION - DESCRIPTORS: DESCRIPTORS: ACHING

## 2020-01-18 ASSESSMENT — PAIN DESCRIPTION - FREQUENCY: FREQUENCY: CONTINUOUS

## 2020-01-18 ASSESSMENT — PAIN DESCRIPTION - PAIN TYPE: TYPE: ACUTE PAIN;CHRONIC PAIN

## 2020-01-18 ASSESSMENT — PAIN DESCRIPTION - LOCATION: LOCATION: SHOULDER

## 2020-01-18 ASSESSMENT — PAIN SCALES - GENERAL: PAINLEVEL_OUTOF10: 8

## 2020-01-18 ASSESSMENT — PAIN DESCRIPTION - ORIENTATION: ORIENTATION: RIGHT

## 2020-01-18 ASSESSMENT — PAIN DESCRIPTION - ONSET: ONSET: ON-GOING

## 2020-01-18 NOTE — ED PROVIDER NOTES
[facility] [address] [facility]     Emergency Department     Faculty Attestation    I performed a history and physical examination of the patient and discussed management with the resident. I reviewed the residents note and agree with the documented findings and plan of care. Any areas of disagreement are noted on the chart. I was personally present for the key portions of any procedures. I have documented in the chart those procedures where I was not present during the key portions. I have reviewed the emergency nurses triage note. I agree with the chief complaint, past medical history, past surgical history, allergies, medications, social and family history as documented unless otherwise noted below. For Physician Assistant/ Nurse Practitioner cases/documentation I have personally evaluated this patient and have completed at least one if not all key elements of the E/M (history, physical exam, and MDM). Additional findings are as noted. Chronic right shoulder pain prior surgery does not currently have any hardware, increasing pain over the last 4 days. No injury no trauma she can recall. On exam full range of motion no deformity is not warm or hot to the touch. Distally strong pulses, Intact to radial, median, ulnar nerves for motor and sensation in the affected hand, intact sensation over the deltoid. Denies chest pain or shortness of breath no other symptoms.   Do not feel needs imaging, will discharge home, follow-up with orthopedics        Critical Care     none    Haley Reagan MD, [facility]  Attending Emergency  Physician             Haley Reagan MD  01/18/20 [id number]

## 2020-01-18 NOTE — ED PROVIDER NOTES
[facility] ED  Emergency Department Encounter  Emergency Medicine Resident     Pt Name: Destini Lazaro  MRN: [id number]  [place] 1975  Date of evaluation: 1/18/20  PCP:  KAYKAY Quintero [id number]       Chief Complaint   Patient presents with    Shoulder Pain     right shoulder pain for 4 days, unsure if re injury to area. Ongoing known surgical repair with known rotator cuff tear. Not doing any current therapy for known rotator cuff tear. Told to get another MRI in May. Sees ortho at [address] [place] [address]  (Location/Symptom, Timing/Onset, Context/Setting, Quality, Duration, Modifying Factors,Severity.)      Destini Lazaro is a 65-year-old female who presents with right shoulder pain. Patient presents with 4 days of right shoulder pain, unknown mechanism of injury. Patient had a right arm orthopedic surgery in February, right rotator cuff tear in May, hardware from right arm surgery was removed in August.  Patient follows with orthopedics from [facility] for continued management of her right rotator cuff tear. 4 days ago, patient does not know what caused the worsening of right shoulder pain, but it was associated with localized edema. No other complaints. Been treating with ice, Voltaren gel, lidocaine patch, Tylenol, Zanaflex with minimal relief. Patient denies any symptomatic symptoms, to include fever, chills, nausea, vomiting, any injuries or wounds.   Patient called orthopedics,    PAST MEDICAL / SURGICAL / SOCIAL / FAMILY HISTORY      has a past medical history of Anemia, Anxiety, Asthma, Bipolar 1 disorder ([address] Blackout spell, Body piercing, Cervicalgia, Chest pain, Chronic back pain, Chronic kidney disease, Chronic neck pain, Constipation, COPD (chronic obstructive pulmonary disease) ([address] Dental crown present, Depression, Fall, GERD (gastroesophageal reflux disease), Heartburn, History of blood transfusion, Hyperglycemia, Insomnia, Packs/day: 0.50     Types: Cigarettes     Start date:      Last attempt to quit: 2017     Years since quittin.1    Smokeless tobacco: Never Used    Tobacco comment: litte less then 1/2 mariola/day   Substance and Sexual Activity    Alcohol use: No    Drug use: No    Sexual activity: Yes     Partners: Male   Lifestyle    Physical activity:     Days per week: Not on file     Minutes per session: Not on file    Stress: Not on file   Relationships    Social connections:     Talks on phone: Not on file     Gets together: Not on file     Attends Muslim service: Not on file     Active member of club or organization: Not on file     Attends meetings of clubs or organizations: Not on file     Relationship status: Not on file    Intimate partner violence:     Fear of current or ex partner: Not on file     Emotionally abused: Not on file     Physically abused: Not on file     Forced sexual activity: Not on file   Other Topics Concern    Not on file   Social History Narrative    Not on file       Family History   Problem Relation Age of Onset    Kidney Disease Mother         ? ?? stone     Cancer Father         unknown    Heart Disease Father     Seizures Father     Migraines Father     Coronary Art Dis Father     Migraines Sister     Cervical Cancer Sister     Lung Cancer Maternal Grandmother     Seizures Son     Diabetes Maternal Uncle     Diabetes Maternal Cousin     Lung Cancer Paternal Grandfather     Anxiety Disorder Daughter         Allergies:  Ibuprofen; Claritin [loratadine]; and Tape [adhesive tape]    Home Medications:  Prior to Admission medications    Medication Sig Start Date End Date Taking?  Authorizing Provider   diclofenac sodium 1 % GEL Apply 2 g topically 4 times daily as needed for Pain 20  Yes Nichol Martínez MD   lidocaine (LIDODERM) 5 % Place 1 patch onto the skin daily for 5 days 12 hours on, 12 hours off. 20 Yes Nichol Martínez MD   acetaminophen (TYLENOL) 325 MG tablet Take 2 tablets by mouth every 8 hours as needed for Pain 11/13/19   Krystal Force, DO   methocarbamol (ROBAXIN-750) 750 MG tablet Take 1 tablet by mouth 3 times daily   WARNING:  May cause drowsiness.  May impair ability to operate vehicles or machinery.  Do not use in combination with alcohol. 11/3/19   Lukasz Hinds, APRN - CNP   rOPINIRole (REQUIP) 0.25 MG tablet take 1 tablet by mouth at bedtime 8/2/19   Jeff Jackson MD   zolpidem (AMBIEN) 5 MG tablet Take 5 mg by mouth nightly as needed for Sleep. Historical Provider, MD   diclofenac sodium (VOLTAREN) 1 % GEL Apply 4 g topically 4 times daily 1/21/19 11/3/19  Ezio Enrike, DO   ondansetron [facility]) 4 MG tablet take 1 tablet by mouth 8 hours if needed for nausea and vomiting 1/16/19   MD Samreen Meza [place] (AIMOVIG SC) Inject into the skin    Historical Provider, MD   venlafaxine (EFFEXOR XR) 37.5 MG extended release capsule Take 75 mg by mouth  2/8/18   Historical Provider, MD   topiramate (TOPAMAX) 100 MG tablet Take 100 mg by mouth 2/16/18 11/3/19  Historical Provider, MD   OXcarbazepine (TRILEPTAL) 300 MG tablet take 1 tablet by mouth at bedtime 1/30/18   Historical Provider, MD   polyethylene glycol (GLYCOLAX) powder Take 17 g by mouth daily    Historical Provider, MD   promethazine (PHENERGAN) 25 MG tablet Take 25 mg by mouth every 6 hours as needed for Nausea    Historical Provider, MD   Multiple Vitamins-Minerals (WOMENS ONE DAILY) TABS Take 1 tablet by mouth daily    Historical Provider, MD   Misc. Devices MISC Rolling walker with seat  S/P right patellofemoral knee replacement, DOS: 2/20/18 1/31/18   Raul Sanchez, DO   Misc.  Devices [id number] [facility] Shower chair  S/P right patellofemoral knee replacement, DOS: 2/20/18 1/31/18   Lio Joy, DO   vitamin D3 (CHOLECALCIFEROL) 400 units TABS tablet Take 400 Units by mouth daily    Historical Provider, MD   montelukast (SINGULAIR) 10 MG tablet orthopedics for further assessment and management of her chronic right torn rotator cuff. PROCEDURES:  None    CONSULTS:  None    CRITICAL CARE:  Please see attending note    FINAL IMPRESSION      1.  Right shoulder pain, unspecified chronicity          DISPOSITION / PLAN     DISPOSITION    Discharged home    PATIENT REFERRED TO:  KAYKAY Medrano - CNP  [address] [place] [address], [id number]  [address] [facility] [id number] [phone]    In 3 days      [facility]  [address] [facility] [address] [phone]    As needed, If symptoms worsen      DISCHARGE MEDICATIONS:  Discharge Medication List as of 1/18/2020  5:07 PM      START taking these medications    Details   lidocaine (LIDODERM) 5 % Place 1 patch onto the skin daily for 5 days 12 hours on, 12 hours off., Disp-5 patch, R-0Print             Jannie Castellanos MD  Emergency Medicine Resident    (Please note that portions of this note were completed with a voice recognition program.Efforts were made to edit the dictations but occasionally words are mis-transcribed.)        Jannie Castellanos MD  Resident  01/19/20 [id number]

## 2020-02-13 ENCOUNTER — OFFICE VISIT (OUTPATIENT)
Dept: UROLOGY | Age: 45
End: 2020-02-13
Payer: MEDICARE

## 2020-02-13 VITALS
BODY MASS INDEX: 29.44 KG/M2 | WEIGHT: 160 LBS | SYSTOLIC BLOOD PRESSURE: 125 MMHG | DIASTOLIC BLOOD PRESSURE: 79 MMHG | HEART RATE: 83 BPM | HEIGHT: 62 IN

## 2020-02-13 PROBLEM — R10.9 FLANK PAIN: Status: ACTIVE | Noted: 2020-02-13

## 2020-02-13 PROBLEM — Z87.442 HISTORY OF KIDNEY STONES: Status: ACTIVE | Noted: 2020-02-13

## 2020-02-13 PROBLEM — N39.0 RECURRENT URINARY TRACT INFECTION: Status: ACTIVE | Noted: 2020-02-13

## 2020-02-13 LAB
APPEARANCE FLUID: ABNORMAL
BILIRUBIN, POC: ABNORMAL
BLOOD URINE, POC: ABNORMAL
CLARITY, POC: CLEAR
COLOR, POC: YELLOW
GLUCOSE URINE, POC: ABNORMAL
KETONES, POC: ABNORMAL
LEUKOCYTE EST, POC: ABNORMAL
NITRITE, POC: ABNORMAL
PH, POC: 5.5
PROTEIN, POC: ABNORMAL
SPECIFIC GRAVITY, POC: >=1.03
UROBILINOGEN, POC: 0.2

## 2020-02-13 PROCEDURE — 1036F TOBACCO NON-USER: CPT | Performed by: SPECIALIST

## 2020-02-13 PROCEDURE — 99212 OFFICE O/P EST SF 10 MIN: CPT

## 2020-02-13 PROCEDURE — 99215 OFFICE O/P EST HI 40 MIN: CPT | Performed by: SPECIALIST

## 2020-02-13 PROCEDURE — G8484 FLU IMMUNIZE NO ADMIN: HCPCS | Performed by: SPECIALIST

## 2020-02-13 PROCEDURE — 81002 URINALYSIS NONAUTO W/O SCOPE: CPT | Performed by: SPECIALIST

## 2020-02-13 PROCEDURE — G8417 CALC BMI ABV UP PARAM F/U: HCPCS | Performed by: SPECIALIST

## 2020-02-13 PROCEDURE — G8427 DOCREV CUR MEDS BY ELIG CLIN: HCPCS | Performed by: SPECIALIST

## 2020-02-13 NOTE — PROGRESS NOTES
Gerhard Salazar MD, [facility]  [facility] Consultation / New Patient Visit    Patient:  Manjit Brooks  YOB: 1975  Date: 2/13/2020  Consult requested from Cristian Krause MD  for purpose of evaluation of right flank pain. HISTORY OF PRESENT ILLNESS:   The patient is a 38 y.o. female who presents today for follow-up for the following problem(s): Right flank pain this is been ongoing for 2 days  Overall the problem(s) : are worsening. Associated Symptoms: No dysuria, gross hematuria. 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 No flank pain or gross hematuria to suggest recurrent kidney stones. Her 10/17/17 KUB showed no evidence of recurrent stones. She is seeing Dr. Dewitt Wagener at [facility] for recurrent UTI's. Additional Hx:  She presented to the emergency department 2 days ago at [facility].  Cr was 1.17, she was afebrile without leukocytosis and notes were reviewed. She did not have imaging at that visit. Urinalysis macroscopic point of contact was negative for hemoglobin nitrites or leukocyte esterase. She is making urine. Take keflex to recurrent UTIs.   (Patient's old records, notes and chart reviewed and summarized above.)    Urinalysis today:  Results for POC orders placed in visit on 02/13/20   POCT Urine Dipstick   Result Value Ref Range    Color, UA yellow     Clarity, UA clear     Glucose, UA POC neg     Bilirubin, UA neg     Ketones, UA neg     Spec Grav, UA >=1.030     Blood, UA POC neg     pH, UA 5.5     Protein, UA POC neg     Urobilinogen, UA 0.2     Leukocytes, UA neg     Nitrite, UA neg     Appearance, Fluid         Last BUN and creatinine:  Lab Results   Component Value Date    BUN 15 03/25/2019 Lab Results   Component Value Date    CREATININE 1.03 (H) 03/25/2019       Imaging Reviewed during this Office Visit:   (results were independently reviewed by physician and radiology report verified)    PAST MEDICAL, FAMILY AND SOCIAL HISTORY:  Past Medical History:   Diagnosis Date    Anemia     Anxiety     Asthma     appt with pulmonologist 2/1/18    Bipolar 1 disorder ([facility]     Blackout spell     Body piercing     X2 ABOVE AND BELOW LIP     Cervicalgia 8/14/2013    Chest pain     Chronic back pain     Chronic kidney disease     stage 3    Chronic neck pain     Constipation     COPD (chronic obstructive pulmonary disease) ([facility]     Dental crown present     has dental clearance    Depression     Fall     landed on tailbone, exacrbated back pain and headaches    GERD (gastroesophageal reflux disease)     Heartburn     History of blood transfusion 2012    no reaction    Hyperglycemia     DIET CONTROLED    Insomnia     Kidney stones     Knee pain, right     Low blood pressure     Low vitamin D level     Migraine     Numbness     bilat. legs    Palpitations     Sprain of lumbosacral (joint) (ligament) 8/14/2013    Staghorn kidney stones     L side Kidney stone  and R side    Tachycardia     HR: 130's with chest pain at times, sees Cardiologist @ [address] [facility]/ pt. can't remember name dr Otoniel Dodson cardiologist appt for cc next week    Wears glasses     Wears glasses      Past Surgical History:   Procedure Laterality Date    ANKLE FRACTURE SURGERY Right [address] SURGERY  02/26/2019    [facility]    CHOLECYSTECTOMY  12/26/2019    laproscopic    CYSTOSCOPY  04/15/14    CYSTOSCOPY Right 4/29/14    with rt ureteral stent removal (Dr Bell Salvador)   [facility]  05/23/2014    ENDOMETRIAL ABLATION  1/23/14    [facility] -  [address] [facility]  02/20/2017    gastric sleeve    GASTRIC BYPASS SURGERY      sleeve    HAND SURGERY Right     cyst removal  HYSTERECTOMY  9-8-15    RONNY with BSO    KNEE ARTHROPLASTY Right 02/20/2018    KNEE ARTHROSCOPY Right 09/12/2017    rt knee scope    KNEE SURGERY Left 12/16/13    LAPAROSCOPY  05/23/2014    pelvic exploratory    LITHOTRIPSY Right 04/15/14    LITHOTRIPSY  2014    MYRINGOTOMY Bilateral     NERVE BLOCK  09-19-13    KENALOG 40 MG    NERVE BLOCK  10/4/13    Left MBNB #2, Decadron 10mg    NERVE BLOCK  12/8/15    duramorph, celestone 9mg, duramorph 1.25mg    NERVE BLOCK  5/24/16    duramorph 1.5  decadron 7mg    NERVE BLOCK  11/21/2016    duramorph 1mg  celestone 9mg    NERVE BLOCK  08/02/2017    duramorph morphine 1.5mg decadron 7.5mg    NERVE BLOCK  06/05/2018    duramorph- decadron 7 mg, duramorph 1.5 mg    NERVE BLOCK Left 08/22/2018    LEFT LUMBAR TRANSFORAMINAL EPIDURAL DECADRON 20MG    NERVE BLOCK Right 08/29/2018    right genicular block, marcaine . 25%    NERVE BLOCK Right 09/18/2018    right genicular knee nerve block #2 no steroids    OTHER SURGICAL HISTORY  age 27    Essure contraceptive implants     MA KNEE SCOPE,DIAGNOSTIC Right 9/12/2017    KNEE ARTHROSCOPY, LATERAL RELEASE PATELLA RETINACULUM   ARTHROSCOPIC BOVIE, performed by Kassandra Ku DO at [address] Right 2/20/2018    NAVIO ROBOTIC TOTAL KNEE PATELLOFEMORAL  ARTHROPLASTY - NUNEZ &  NEPHEW, NSA=FEMORAL NERVE BLOCK, SPINAL VS GENERAL performed by Kassandra Ku DO at [address] ARTHROSCOPY Right     AUG 3,2015    SLEEVE GASTRECTOMY N/A 2/20/2017    GASTRECTOMY SLEEVE LAPAROSCOPIC XI ROBOTIC, ENDOSEALER  performed by Kyra Huerta MD at [address] Left 4/17/12    Left Nephrectomy - staghorn calculus    UMBILICAL HERNIA REPAIR  2007    URETER STENT PLACEMENT Right 04/15/14    removed 2 weeks later    WISDOM TOOTH EXTRACTION      WRIST SURGERY Right 2002    right ganglion cystectomy     Family History   Problem Relation Age of Onset    Kidney Disease Mother         ? ?? stone     Cancer Father         unknown    Heart Disease Father     Seizures Father     Migraines Father     Coronary Art Dis Father     Migraines Sister     Cervical Cancer Sister     Lung Cancer Maternal Grandmother     Seizures Son     Diabetes Maternal Uncle     Diabetes Maternal Cousin     Lung Cancer Paternal Grandfather     Anxiety Disorder Daughter      Outpatient Medications Marked as Taking for the 2/13/20 encounter (Office Visit) with Italo Caballero MD   Medication Sig Dispense Refill    diclofenac sodium 1 % GEL Apply 2 g topically 4 times daily as needed for Pain 1 Tube 0    acetaminophen (TYLENOL) 325 MG tablet Take 2 tablets by mouth every 8 hours as needed for Pain 30 tablet 0    methocarbamol (ROBAXIN-750) 750 MG tablet Take 1 tablet by mouth 3 times daily   WARNING:  May cause drowsiness.  May impair ability to operate vehicles or machinery.  Do not use in combination with alcohol. 30 tablet 0    zolpidem (AMBIEN) 5 MG tablet Take 5 mg by mouth nightly as needed for Sleep.       ondansetron (ZOFRAN) 4 MG tablet take 1 tablet by mouth 8 hours if needed for nausea and vomiting 30 tablet 0    Erenumab-aooe (AIMOVIG SC) Inject into the skin      venlafaxine (EFFEXOR XR) 37.5 MG extended release capsule Take 75 mg by mouth       OXcarbazepine (TRILEPTAL) 300 MG tablet take 1 tablet by mouth at bedtime  0    polyethylene glycol (GLYCOLAX) powder Take 17 g by mouth daily      promethazine (PHENERGAN) 25 MG tablet Take 25 mg by mouth every 6 hours as needed for Nausea      Multiple Vitamins-Minerals (WOMENS ONE DAILY) TABS Take 1 tablet by mouth daily      vitamin D3 (CHOLECALCIFEROL) 400 units TABS tablet Take 400 Units by mouth daily      montelukast (SINGULAIR) 10 MG tablet Take 10 mg by mouth nightly  1    OXcarbazepine (TRILEPTAL) 150 MG tablet Take 450 mg by mouth nightly      amitriptyline (ELAVIL) 100 MG tablet Take 100 mg by mouth nightly      SUMAtriptan (IMITREX) 100 MG tablet Take 100 mg by mouth once as needed for Migraine      Potassium 75 MG TABS Take 75 mg by mouth 2 times daily      omeprazole (PRILOSEC) 20 MG delayed release capsule Take 40 mg by mouth daily       albuterol sulfate  (90 BASE) MCG/ACT inhaler Inhale 2 puffs into the lungs every 6 hours as needed for Wheezing      budesonide-formoterol (SYMBICORT) 160-4.5 MCG/ACT AERO Inhale 2 puffs into the lungs 2 times daily         Ibuprofen; Claritin [loratadine]; and Tape [adhesive tape]  Social History     Tobacco Use   Smoking Status Former Smoker    Packs/day: 0.50    Types: Cigarettes    Start date:     Last attempt to quit: 2017    Years since quittin.2   Smokeless Tobacco Never Used   Tobacco Comment    litte less then 1/2 mariola/day       Social History     Substance and Sexual Activity   Alcohol Use No       REVIEW OF SYSTEMS (obtained by ancillary medical staff, reviewed by physician and agree):  Constitutional: negative  Eyes: negative  Respiratory: negative  Cardiovascular: negative  Gastrointestinal: Positive for nausea  negative  Genitourinary: positive for frequency  Musculoskeletal: positive for  myalgias  Skin: negative   Neurological: positive for tremor and numbness  Hematological/Lymphatic: positive for easily bruising   Psychological: negative    Physical Exam:    This a 38 y.o. female      Vitals:    20 0856   BP: 125/79   Pulse: 83     Patient is a 38 y.o. female in no acute distress and alert and oriented to person, place and time. I do not see any skin lesions on her flank. Abd: She does have a soft abdomen that is nondistended and nontender  Kidneys: Positive right CVA tenderness. She also has tenderness and worsening of pain with trunk rotation towards the right    Assessment and Plan      1. Flank pain    2. Solitary right kidney    3. Recurrent urinary tract infection    4.  History of kidney stones           Plan:      Return in

## 2020-02-13 NOTE — LETTER
[facility]    2/13/20    Patient: Lisa Suarez  YOB: 1975    Dear Shavon Harding, APRN - CNP,    I had the pleasure of seeing one of your patients, Viktoria Orellana today in the office today. Below are the relevant portions of my assessment and plan of care. IMPRESSION:  1. Flank pain    2. Solitary right kidney    3. Recurrent urinary tract infection    4. History of kidney stones      PLAN:  Return in about 1 year (around 2/13/2021). Her flank pain is consistent with musculoskeletal pain and she recently had a CT scan on December 27, 2020 which was reviewed independently by myself as well as the radiology report there is no renal stones. Recurrent UTIs: Continue Keflex as written per ID for history of recurrent UTIs    Thank you for allowing me to participate in the care of this patient. I will keep you updated on this patient's follow up and I look forward to serving you and your patients again in the future. Lilliam Lazaro MD, FACS

## 2020-02-17 ENCOUNTER — TELEPHONE (OUTPATIENT)
Dept: BARIATRICS/WEIGHT MGMT | Age: 45
End: 2020-02-17

## 2020-09-13 ENCOUNTER — HOSPITAL ENCOUNTER (EMERGENCY)
Age: 45
Discharge: HOME | End: 2020-09-14
Payer: MEDICARE

## 2020-09-13 VITALS
BODY MASS INDEX: 32.2 KG/M2 | OXYGEN SATURATION: 100 % | HEIGHT: 62 IN | RESPIRATION RATE: 18 BRPM | WEIGHT: 175 LBS | HEART RATE: 100 BPM | TEMPERATURE: 98.8 F | SYSTOLIC BLOOD PRESSURE: 126 MMHG | DIASTOLIC BLOOD PRESSURE: 79 MMHG

## 2020-09-13 PROCEDURE — 99283 EMERGENCY DEPT VISIT LOW MDM: CPT

## 2020-09-13 ASSESSMENT — PAIN SCALES - GENERAL: PAINLEVEL_OUTOF10: 9

## 2020-09-13 ASSESSMENT — PAIN DESCRIPTION - LOCATION: LOCATION: NECK;HEAD

## 2020-09-13 ASSESSMENT — PAIN DESCRIPTION - ORIENTATION: ORIENTATION: LEFT

## 2020-09-13 ASSESSMENT — PAIN DESCRIPTION - PAIN TYPE: TYPE: ACUTE PAIN

## 2020-09-13 ASSESSMENT — PAIN DESCRIPTION - DESCRIPTORS: DESCRIPTORS: ACHING;CONSTANT

## 2020-09-14 PROCEDURE — 96372 THER/PROPH/DIAG INJ SC/IM: CPT

## 2020-09-14 PROCEDURE — 6360000002 HC RX W HCPCS: Performed by: NURSE PRACTITIONER

## 2020-09-14 PROCEDURE — 6370000000 HC RX 637 (ALT 250 FOR IP): Performed by: NURSE PRACTITIONER

## 2020-09-14 RX ORDER — LIDOCAINE 4 G/G
1 PATCH TOPICAL ONCE
Status: DISCONTINUED | OUTPATIENT
Start: 2020-09-14 | End: 2020-09-14 | Stop reason: HOSPADM

## 2020-09-14 RX ORDER — HYDROCODONE BITARTRATE AND ACETAMINOPHEN 5; 325 MG/1; MG/1
1 TABLET ORAL ONCE
Status: COMPLETED | OUTPATIENT
Start: 2020-09-14 | End: 2020-09-14

## 2020-09-14 RX ORDER — ORPHENADRINE CITRATE 30 MG/ML
60 INJECTION INTRAMUSCULAR; INTRAVENOUS ONCE
Status: COMPLETED | OUTPATIENT
Start: 2020-09-14 | End: 2020-09-14

## 2020-09-14 RX ORDER — DEXAMETHASONE SODIUM PHOSPHATE 10 MG/ML
10 INJECTION INTRAMUSCULAR; INTRAVENOUS ONCE
Status: COMPLETED | OUTPATIENT
Start: 2020-09-14 | End: 2020-09-14

## 2020-09-14 RX ORDER — MORPHINE SULFATE 4 MG/ML
4 INJECTION, SOLUTION INTRAMUSCULAR; INTRAVENOUS ONCE
Status: DISCONTINUED | OUTPATIENT
Start: 2020-09-14 | End: 2020-09-14

## 2020-09-14 RX ORDER — LIDOCAINE 50 MG/G
1 PATCH TOPICAL DAILY
Qty: 10 PATCH | Refills: 0 | Status: SHIPPED | OUTPATIENT
Start: 2020-09-14 | End: 2020-09-24

## 2020-09-14 RX ADMIN — DEXAMETHASONE SODIUM PHOSPHATE 10 MG: 10 INJECTION INTRAMUSCULAR; INTRAVENOUS at 00:07

## 2020-09-14 RX ADMIN — HYDROCODONE BITARTRATE AND ACETAMINOPHEN 1 TABLET: 5; 325 TABLET ORAL at 00:07

## 2020-09-14 RX ADMIN — ORPHENADRINE CITRATE 60 MG: 30 INJECTION INTRAMUSCULAR; INTRAVENOUS at 00:07

## 2020-09-14 ASSESSMENT — ENCOUNTER SYMPTOMS
VOMITING: 0
SINUS PRESSURE: 0
COLOR CHANGE: 0
SORE THROAT: 0
ABDOMINAL PAIN: 0
SHORTNESS OF BREATH: 0
DIARRHEA: 0
WHEEZING: 0
CONSTIPATION: 0
RHINORRHEA: 0
COUGH: 0
NAUSEA: 0

## 2020-09-14 ASSESSMENT — PAIN SCALES - GENERAL: PAINLEVEL_OUTOF10: 9

## 2020-09-14 NOTE — ED PROVIDER NOTES
[address] [place] [address] ED  eMERGENCY dEPARTMENT eNCOUnter      Pt Name: Katelin Moreno  MRN: [id number]  [place] 1975  Date of evaluation: 9/13/2020  Provider: [address] [place] [address] NP, KAYKAY Eduardo [id number]       Chief Complaint   Patient presents with    Headache    Neck Pain         HISTORY OF PRESENT ILLNESS  (Location/Symptom, Timing/Onset, Context/Setting, Quality, Duration, Modifying Factors, Severity.)   Katelin Moreno is a 54 y.o. female who presents to the emergency department today by private vehicle for evaluation of left-sided neck discomfort and headache. Patient states that she has had this pain in her neck. She is it started all across her low neck but now it is in the left side of her neck. She states is causing her to have a tension headache. She Alfredo takes Flexeril for pain at home. She was seen at [facility] for the same thing last night was placed on tizanidine. She states it is causing a headache and she Already takes Imitrex. She denies fevers or chills    Nursing Notes were reviewed. ALLERGIES     Ibuprofen; Claritin [loratadine]; and Tape [adhesive tape]    CURRENT MEDICATIONS       Discharge Medication List as of 9/14/2020 12:31 AM      CONTINUE these medications which have NOT CHANGED    Details   diclofenac sodium 1 % GEL Apply 2 g topically 4 times daily as needed for Pain, Topical, 4 TIMES DAILY PRN Starting Sat 1/18/2020, Disp-1 Tube, R-0, Print      acetaminophen (TYLENOL) 325 MG tablet Take 2 tablets by mouth every 8 hours as needed for Pain, Disp-30 tablet, R-0Print      methocarbamol (ROBAXIN-750) 750 MG tablet Take 1 tablet by mouth 3 times daily   WARNING:  May cause drowsiness.  May impair ability to operate vehicles or machinery.  Do not use in combination with alcohol., Disp-30 tablet, R-0Print      zolpidem (AMBIEN) 5 MG tablet Take 5 mg by mouth nightly as needed for Sleep. Historical Med      ondansetron (ZOFRAN) 4 MG tablet take 1 tablet by mouth 8 hours if needed for nausea and vomiting, Disp-30 tablet, R-0Normal      Erenumab-aooe (AIMOVIG SC) Inject into the skinHistorical Med      venlafaxine (EFFEXOR XR) 37.5 MG extended release capsule Take 75 mg by mouth Historical Med      topiramate (TOPAMAX) 100 MG tablet Take 100 mg by mouthHistorical Med      !! OXcarbazepine (TRILEPTAL) 300 MG tablet take 1 tablet by mouth at bedtime, R-0Historical Med      polyethylene glycol (GLYCOLAX) powder Take 17 g by mouth dailyHistorical Med      promethazine (PHENERGAN) 25 MG tablet Take 25 mg by mouth every 6 hours as needed for NauseaHistorical Med      Multiple Vitamins-Minerals (WOMENS ONE DAILY) TABS Take 1 tablet by mouth dailyHistorical Med      vitamin D3 (CHOLECALCIFEROL) 400 units TABS tablet Take 400 Units by mouth dailyHistorical Med      montelukast (SINGULAIR) 10 MG tablet Take 10 mg by mouth nightly, R-1Historical Med      !! OXcarbazepine (TRILEPTAL) 150 MG tablet Take 450 mg by mouth nightly      amitriptyline (ELAVIL) 100 MG tablet Take 100 mg by mouth nightly      SUMAtriptan (IMITREX) 100 MG tablet Take 100 mg by mouth once as needed for Migraine      Potassium 75 MG TABS Take 75 mg by mouth 2 times daily      omeprazole (PRILOSEC) 20 MG delayed release capsule Take 40 mg by mouth daily Historical Med      albuterol sulfate  (90 BASE) MCG/ACT inhaler Inhale 2 puffs into the lungs every 6 hours as needed for Wheezing      budesonide-formoterol (SYMBICORT) 160-4.5 MCG/ACT AERO Inhale 2 puffs into the lungs 2 times daily       ! ! - Potential duplicate medications found. Please discuss with provider.           PAST MEDICAL HISTORY         Diagnosis Date    Anemia     Anxiety     Asthma     appt with pulmonologist 2/1/18    Bipolar 1 disorder ([facility] [address]     Blackout spell     Body piercing     X2 ABOVE AND BELOW LIP     Cervicalgia 8/14/2013    Chest pain     Chronic back pain     Chronic kidney disease     stage 3    Chronic neck pain     Constipation     COPD (chronic obstructive pulmonary disease) (HCC)     Dental crown present     has dental clearance    Depression     Fall     landed on tailbone, exacrbated back pain and headaches    GERD (gastroesophageal reflux disease)     Heartburn     History of blood transfusion 2012    no reaction    Hyperglycemia     DIET CONTROLED    Insomnia     Kidney stones     Knee pain, right     Low blood pressure     Low vitamin D level     Migraine     Numbness     bilat. legs    Palpitations     Sprain of lumbosacral (joint) (ligament) 8/14/2013    Staghorn kidney stones     L side Kidney stone  and R side    Tachycardia     HR: 130's with chest pain at times, sees Cardiologist @ [address]/ pt. can't remember name dr Rita Barriga cardiologist appt for cc next week    Wears glasses     Wears glasses        SURGICAL HISTORY           Procedure Laterality Date    ANKLE FRACTURE SURGERY Right [address] SURGERY  02/26/2019    RUST    CHOLECYSTECTOMY  12/26/2019    laproscopic    CYSTOSCOPY  04/15/14    CYSTOSCOPY Right 4/29/14    with rt ureteral stent removal (Dr Gabriella Acosta)   [address] [place] [address]  05/23/2014    ENDOMETRIAL ABLATION  1/23/14    [address] [facility]  02/20/2017    gastric sleeve    GASTRIC BYPASS SURGERY      sleeve    HAND SURGERY Right     cyst removal    HYSTERECTOMY  9-8-15    RONNY with BSO    KNEE ARTHROPLASTY Right 02/20/2018    KNEE ARTHROSCOPY Right 09/12/2017    rt knee scope    KNEE SURGERY Left 12/16/13    LAPAROSCOPY  05/23/2014    pelvic exploratory    LITHOTRIPSY Right 04/15/14    LITHOTRIPSY  2014    MYRINGOTOMY Bilateral     NERVE BLOCK  09-19-13    KENALOG 40 MG    NERVE BLOCK  10/4/13    Left MBNB #2, Decadron 10mg    NERVE BLOCK  12/8/15    duramorph, celestone 9mg, duramorph 1.25mg    NERVE BLOCK  5/24/16    duramorph 1.5  decadron 7mg    NERVE BLOCK  11/21/2016    duramorph 1mg  celestone 9mg  NERVE BLOCK  2017    duramorph morphine 1.5mg decadron 7.5mg    NERVE BLOCK  2018    duramorph- decadron 7 mg, duramorph 1.5 mg    NERVE BLOCK Left 2018    LEFT LUMBAR TRANSFORAMINAL EPIDURAL DECADRON 20MG    NERVE BLOCK Right 2018    right genicular block, marcaine . 25%    NERVE BLOCK Right 2018    right genicular knee nerve block #2 no steroids    OTHER SURGICAL HISTORY  age 27    Essure contraceptive implants     MD KNEE SCOPE,DIAGNOSTIC Right 2017    KNEE ARTHROSCOPY, LATERAL RELEASE PATELLA RETINACULUM   ARTHROSCOPIC BOVIE, performed by Christine Dexter DO at [facility] Right 2018    NAVIO ROBOTIC TOTAL KNEE PATELLOFEMORAL  ARTHROPLASTY - NUNEZ &  NEPHEW, NSA=FEMORAL NERVE BLOCK, SPINAL VS GENERAL performed by Christine Dexter DO at [address] [facility] ARTHROSCOPY Right     AUG 3,2015    SLEEVE GASTRECTOMY N/A 2017    GASTRECTOMY SLEEVE LAPAROSCOPIC XI ROBOTIC, ENDOSEALER  performed by Kateryna Andersen MD at [address] Left 12    Left Nephrectomy - staghorn calculus    UMBILICAL HERNIA REPAIR      URETER STENT PLACEMENT Right 04/15/14    removed 2 weeks later    WISDOM TOOTH EXTRACTION      WRIST SURGERY Right     right ganglion cystectomy         FAMILY HISTORY           Problem Relation Age of Onset    Kidney Disease Mother         ? ?? stone     Cancer Father         unknown    Heart Disease Father     Seizures Father     Migraines Father     Coronary Art Dis Father     Migraines Sister     Cervical Cancer Sister     Lung Cancer Maternal Grandmother     Seizures Son     Diabetes Maternal Uncle     Diabetes Maternal Cousin     Lung Cancer Paternal Grandfather     Anxiety Disorder Daughter      Family Status   Relation Name Status    Mother  Alive    Father      Sister  Alive    Brother  Alive    MGM  Alive    MGF  Alive    Son  Alive    MUnc     Nini  Alive    PGM  Alive    PGF      Son 2 Alive    Pb  Alive        SOCIAL HISTORY      reports that she has been smoking cigarettes. She started smoking about 29 years ago. She has been smoking about 0.50 packs per day. She has never used smokeless tobacco. She reports that she does not drink alcohol or use drugs. REVIEW OF SYSTEMS    (2-9 systems for level 4, 10 or more for level 5)     Review of Systems   Constitutional: Negative for chills, fever and unexpected weight change. HENT: Negative for congestion, rhinorrhea, sinus pressure and sore throat. Respiratory: Negative for cough, shortness of breath and wheezing. Cardiovascular: Negative for chest pain and palpitations. Gastrointestinal: Negative for abdominal pain, constipation, diarrhea, nausea and vomiting. Genitourinary: Negative for dysuria and hematuria. Musculoskeletal: Negative for arthralgias and myalgias. Skin: Negative for color change and rash. Neurological: Negative for dizziness, weakness and headaches. Hematological: Negative for adenopathy. Except as noted above the remainder of the review of systems was reviewed and negative. PHYSICAL EXAM    (up to 7 for level 4, 8 or more for level 5)     ED Triage Vitals [20 2352]   BP Temp Temp Source Pulse Resp SpO2 Height Weight   126/79 98.8 °F (37.1 °C) Oral 100 18 100 % 5' 2\" (1.575 m) 175 lb (79.4 kg)       Physical Exam  Vitals signs reviewed. Constitutional:       Appearance: She is well-developed. HENT:      Head: Normocephalic and atraumatic. Eyes:      Conjunctiva/sclera: Conjunctivae normal.      Pupils: Pupils are equal, round, and reactive to light. Neck:      Musculoskeletal: Normal range of motion and neck supple. Cardiovascular:      Rate and Rhythm: Normal rate and regular rhythm. Pulmonary:      Effort: Pulmonary effort is normal. No respiratory distress. Breath sounds: Normal breath sounds.  No stridor. Abdominal:      General: Bowel sounds are normal.      Palpations: Abdomen is soft. Musculoskeletal: Normal range of motion. Back:       Comments: Tension and tightness over the left paraspinal neck. No midline cervical tenderness   Lymphadenopathy:      Cervical: No cervical adenopathy. Skin:     General: Skin is warm and dry. Findings: No rash. Neurological:      Mental Status: She is alert and oriented to person, place, and time. LABS:  Labs Reviewed - No data to display    All other labs were within normal range or not returned as of this dictation. EMERGENCY DEPARTMENT COURSE and DIFFERENTIAL DIAGNOSIS/MDM:   Vitals:    Vitals:    09/13/20 2352   BP: 126/79   Pulse: 100   Resp: 18   Temp: 98.8 °F (37.1 °C)   TempSrc: Oral   SpO2: 100%   Weight: 175 lb (79.4 kg)   Height: 5' 2\" (1.575 m)       Medical Decision Making: We will discharge home on lidocaine patches. Follow-up with primary care physician. Medications   lidocaine 4 % external patch 1 patch (1 patch Transdermal Patch Applied 9/14/20 0007)   orphenadrine (NORFLEX) injection 60 mg (60 mg Intramuscular Given 9/14/20 0007)   dexamethasone (DECADRON) injection 10 mg (10 mg Oral Given 9/14/20 0007)   HYDROcodone-acetaminophen (NORCO) 5-325 MG per tablet 1 tablet (1 tablet Oral Given 9/14/20 0007)     FINAL IMPRESSION      1. Torticollis    2.  Tension headache          DISPOSITION/PLAN   DISPOSITION Decision To Discharge 09/14/2020 12:30:54 AM      PATIENT REFERRED TO:   KAYKAY Salmeron - [address], [address]  [address]  [phone]    Schedule an appointment as soon as possible for a visit       [facility] ED  [address] [facility]  [phone]    If symptoms worsen      DISCHARGE MEDICATIONS:     Discharge Medication List as of 9/14/2020 12:31 AM      START taking these medications    Details   lidocaine (LIDODERM) 5 % Place 1 patch onto the skin daily for 10 days 12 hours on, 12 hours off., Disp-10 patch,R-0Print                 (Please note that portions of this note were completed with a voice recognition program.  Efforts were made to edit the dictations but occasionally words are mis-transcribed.)    Amina Wilson NP, APRN - CNP  Certified Nurse Practitioner          KAYKAY Tapia CNP  09/14/20 0105       Hardik Stacy MD  09/15/20 1264

## 2020-10-19 ENCOUNTER — OFFICE VISIT (OUTPATIENT)
Dept: ORTHOPEDIC SURGERY | Age: 45
End: 2020-10-19
Payer: MEDICARE

## 2020-10-19 VITALS — HEIGHT: 62 IN | WEIGHT: 175 LBS | BODY MASS INDEX: 32.2 KG/M2

## 2020-10-19 PROCEDURE — 20610 DRAIN/INJ JOINT/BURSA W/O US: CPT | Performed by: PHYSICIAN ASSISTANT

## 2020-10-19 PROCEDURE — 4004F PT TOBACCO SCREEN RCVD TLK: CPT | Performed by: PHYSICIAN ASSISTANT

## 2020-10-19 PROCEDURE — G8484 FLU IMMUNIZE NO ADMIN: HCPCS | Performed by: PHYSICIAN ASSISTANT

## 2020-10-19 PROCEDURE — G8427 DOCREV CUR MEDS BY ELIG CLIN: HCPCS | Performed by: PHYSICIAN ASSISTANT

## 2020-10-19 PROCEDURE — 99213 OFFICE O/P EST LOW 20 MIN: CPT | Performed by: PHYSICIAN ASSISTANT

## 2020-10-19 PROCEDURE — G8417 CALC BMI ABV UP PARAM F/U: HCPCS | Performed by: PHYSICIAN ASSISTANT

## 2020-10-19 RX ORDER — TIZANIDINE 2 MG/1
TABLET ORAL
COMMUNITY
Start: 2020-09-28 | End: 2021-06-30

## 2020-10-19 RX ORDER — ARIPIPRAZOLE 20 MG/1
TABLET ORAL
COMMUNITY

## 2020-10-19 RX ORDER — PRAMIPEXOLE DIHYDROCHLORIDE 0.12 MG/1
0.12 TABLET ORAL NIGHTLY
COMMUNITY
Start: 2020-08-31

## 2020-10-19 RX ORDER — BUPIVACAINE HYDROCHLORIDE 2.5 MG/ML
2 INJECTION, SOLUTION INFILTRATION; PERINEURAL ONCE
Status: COMPLETED | OUTPATIENT
Start: 2020-10-19 | End: 2020-10-19

## 2020-10-19 RX ORDER — METHYLPREDNISOLONE ACETATE 80 MG/ML
80 INJECTION, SUSPENSION INTRA-ARTICULAR; INTRALESIONAL; INTRAMUSCULAR; SOFT TISSUE ONCE
Status: COMPLETED | OUTPATIENT
Start: 2020-10-19 | End: 2020-10-19

## 2020-10-19 RX ADMIN — BUPIVACAINE HYDROCHLORIDE 5 MG: 2.5 INJECTION, SOLUTION INFILTRATION; PERINEURAL at 14:52

## 2020-10-19 RX ADMIN — METHYLPREDNISOLONE ACETATE 80 MG: 80 INJECTION, SUSPENSION INTRA-ARTICULAR; INTRALESIONAL; INTRAMUSCULAR; SOFT TISSUE at 14:52

## 2020-10-19 ASSESSMENT — ENCOUNTER SYMPTOMS
COUGH: 0
VOMITING: 0
COLOR CHANGE: 0
SHORTNESS OF BREATH: 0

## 2020-10-19 NOTE — PROGRESS NOTES
for cough and shortness of breath. Cardiovascular: Negative for chest pain. Gastrointestinal: Negative for vomiting. Musculoskeletal: Positive for arthralgias (right knee). Negative for joint swelling and myalgias. Skin: Negative for color change. Neurological: Negative for weakness and numbness. Psychiatric/Behavioral: Negative for sleep disturbance. Objective :   Ht 5' 2\" (1.575 m)   Wt 175 lb (79.4 kg)   LMP 09/08/2015   BMI 32.01 kg/m²  Body mass index is 32.01 kg/m². General: Alex Leahy is a 54 y.o. female who is alert and oriented and sitting comfortably in our office. Ortho Exam  MS: Patient and ambulates independently with a mild limp noted to the right lower extremity. Evaluation of the right knee reveals well-healed surgical incision over the anterior aspect of the right knee. There is no erythema, ecchymosis, skin lesions or signs of infection present. Patient has full range of motion of the right knee with discomfort noted along the medial joint line and full flexion and in full extension. Mild to moderate quadriceps atrophy noted on the right when compared to the left. Tenderness palpation noted along the medial joint line and medial border of the patella. Sensation is intact to light touch of the right lower extremity without focal deficits present. DP pulse 2+, PT pulse 2+, BCR right foot. Neuro: alert and oriented to person and place. Eyes: Extra-ocular muscles intact  Mouth: Oral mucosa moist. No perioral lesions  Pulm: Respirations unlabored and regular. Symmetric chest excursion without outward deformity is noted. Skin: warm, well perfused  Psych:   Patient has good fund of knowledge and displays understanging of exam, diagnosis, and plan.     Radiology:     Xr Knee Right (min 4 Views)    Result Date: 10/19/2020  History: Right knee pain Findings: Standing AP, lateral, merchant, tunnel view x-rays of the right knee done the office today shows patellofemoral joint arthroplasty in good position without complications. No further evidence of fracture, subluxation, dislocation, radiopaque foreign body, radiopaque tumors noted. Impression: Right patellofemoral arthroplasty in good position as described above. Procedure:  KNEE INJECTION PROCEDURE NOTE:  The patient was identified. The right knee was confirmed with the patient. After a sterile prep with Betadine the knee was injected using a lateral joint line approach with a mixture of 2 mL of 0.25% Marcaine and 80 mg of Depo-Medrol. Patient tolerated the procedure well without post injection complications. I instructed the patient to call our office immediately if they have any swelling or increased pain at the injection site. Assessment:      1. Patellofemoral arthritis of right knee    2. S/P patellofemoral arthroplasty       Plan: Today in office we discussed etiology and natural history of right knee pain s/p patellofemoral arthroplasty in 2018. I personally reviewed the patient's x-rays from today with her in office. The treatment options may include activity modification, oral anti-inflammatories, bracing, injections, advanced imaging, physical therapy and/or surgical intervention. The patient would like to proceed with:  1. Right knee corticosteroid injection -  I spoke with Dr. Socorro Harris and he ok'd a right knee corticosteroid injection. 2. Outpatient Physical therapy primarily for quadriceps and hamstring strengthening. 3. PTO brace for right knee. 4. Activity modification as needed. The patient will follow up in 6 weeks, or sooner if needed. We discussed that the patient should call us with any questions or concerns. The patient voiced her understanding. Follow up:Return in about 6 weeks (around 11/30/2020).       Orders Placed This Encounter   Medications    methylPREDNISolone acetate (DEPO-MEDROL) injection 80 mg    bupivacaine (MARCAINE) 0.25 % injection

## 2021-03-02 ENCOUNTER — TELEPHONE (OUTPATIENT)
Dept: BARIATRICS/WEIGHT MGMT | Age: 46
End: 2021-03-02

## 2021-06-10 ENCOUNTER — HOSPITAL ENCOUNTER (EMERGENCY)
Age: 46
Discharge: HOME | End: 2021-06-10
Attending: EMERGENCY MEDICINE
Payer: MEDICARE

## 2021-06-10 VITALS
TEMPERATURE: 98.3 F | SYSTOLIC BLOOD PRESSURE: 112 MMHG | HEART RATE: 79 BPM | RESPIRATION RATE: 16 BRPM | HEIGHT: 62 IN | WEIGHT: 184 LBS | BODY MASS INDEX: 33.86 KG/M2 | DIASTOLIC BLOOD PRESSURE: 67 MMHG | OXYGEN SATURATION: 100 %

## 2021-06-10 DIAGNOSIS — M79.605 BILATERAL LEG PAIN: Primary | ICD-10-CM

## 2021-06-10 DIAGNOSIS — M79.604 BILATERAL LEG PAIN: Primary | ICD-10-CM

## 2021-06-10 PROCEDURE — 6360000002 HC RX W HCPCS: Performed by: STUDENT IN AN ORGANIZED HEALTH CARE EDUCATION/TRAINING PROGRAM

## 2021-06-10 PROCEDURE — 96374 THER/PROPH/DIAG INJ IV PUSH: CPT

## 2021-06-10 PROCEDURE — 96375 TX/PRO/DX INJ NEW DRUG ADDON: CPT

## 2021-06-10 PROCEDURE — 99283 EMERGENCY DEPT VISIT LOW MDM: CPT

## 2021-06-10 RX ORDER — DIPHENHYDRAMINE HYDROCHLORIDE 50 MG/ML
25 INJECTION INTRAMUSCULAR; INTRAVENOUS ONCE
Status: COMPLETED | OUTPATIENT
Start: 2021-06-10 | End: 2021-06-10

## 2021-06-10 RX ORDER — PROCHLORPERAZINE EDISYLATE 5 MG/ML
10 INJECTION INTRAMUSCULAR; INTRAVENOUS ONCE
Status: COMPLETED | OUTPATIENT
Start: 2021-06-10 | End: 2021-06-10

## 2021-06-10 RX ORDER — KETOROLAC TROMETHAMINE 30 MG/ML
30 INJECTION, SOLUTION INTRAMUSCULAR; INTRAVENOUS ONCE
Status: COMPLETED | OUTPATIENT
Start: 2021-06-10 | End: 2021-06-10

## 2021-06-10 RX ADMIN — DIPHENHYDRAMINE HYDROCHLORIDE 25 MG: 50 INJECTION INTRAMUSCULAR; INTRAVENOUS at 22:49

## 2021-06-10 RX ADMIN — KETOROLAC TROMETHAMINE 30 MG: 30 INJECTION, SOLUTION INTRAMUSCULAR at 22:50

## 2021-06-10 RX ADMIN — PROCHLORPERAZINE EDISYLATE 10 MG: 5 INJECTION INTRAMUSCULAR; INTRAVENOUS at 22:50

## 2021-06-10 ASSESSMENT — PAIN SCALES - GENERAL
PAINLEVEL_OUTOF10: 10
PAINLEVEL_OUTOF10: 10

## 2021-06-11 ASSESSMENT — ENCOUNTER SYMPTOMS
NAUSEA: 0
BACK PAIN: 1
PHOTOPHOBIA: 0
VOMITING: 0
ABDOMINAL PAIN: 0
SHORTNESS OF BREATH: 0
CHEST TIGHTNESS: 0

## 2021-06-11 NOTE — ED PROVIDER NOTES
[address] [facility] ED     Emergency Department     Faculty Attestation        I performed a history and physical examination of the patient and discussed management with the resident. I reviewed the residents note and agree with the documented findings and plan of care. Any areas of disagreement are noted on the chart. I was personally present for the key portions of any procedures. I have documented in the chart those procedures where I was not present during the key portions. I have reviewed the emergency nurses triage note. I agree with the chief complaint, past medical history, past surgical history, allergies, medications, social and family history as documented unless otherwise noted below. Documentation of the HPI, Physical Exam and Medical Decision Making performed by medical students or scribes is based on my personal performance of the HPI, PE and MDM. For Physician Assistant/ Nurse Practitioner cases/documentation I have have had a face to face evaluation with this patient and have completed at least one if not all key elements of the E/M (history, physical exam, and MDM). Additional findings are as noted. Vital Signs: LMP 09/08/2015   PCP:  KAYKAY Torres - [facility]    Pertinent Comments:     History: This is a 70-year-old female who presents today with bilateral leg pain. She relates that she has been dealing with neuropathy for some time. But she feels like her legs feel jumpy and painful. Her medication for this is not working. Exam: Patient's vitals are stable. She is resting comfortably on the cot when I arrived to the room. Her exam is benign for me at this time. Critical Care  None    (Please note that portions of this note were completed with a voice recognition program.  Efforts were made to edit the dictations but occasionally words are mis-transcribed.)    Marisabel Minor M.D.   Attending Emergency Medicine Physician        Coni Georges Saturnino Bennett MD  06/17/21 [id number]

## 2021-06-11 NOTE — ED PROVIDER NOTES
[address]  Emergency Department Encounter  EmergencyMedicine Resident     Pt Salvatore Roberts  MRN: [id number]  [place] 1975  Date of evaluation: 6/11/21  PCP:  KAYKAY Blair CNP    CHIEF COMPLAINT       Chief Complaint   Patient presents with    Leg Pain       HISTORY OF PRESENT ILLNESS  (Location/Symptom, Timing/Onset, Context/Setting, Quality, Duration, Modifying Factors, Severity.)      Rafita Hill is a 54 y.o. female who presents with bilateral leg pain and cramping. Patient reports history of restless leg syndrome, chronic lower back pain nerve impingement. States that she also has migraine at this time. Reports frequent migraines and states that this pain feels similar to prior migraines. Denies any recent head injury, seizure-like activity, vision changes, numbness or weakness    PAST MEDICAL / SURGICAL / SOCIAL / FAMILY HISTORY      has a past medical history of Anemia, Anxiety, Asthma, Bipolar 1 disorder ([address] Blackout spell, Body piercing, Cervicalgia, Chest pain, Chronic back pain, Chronic kidney disease, Chronic neck pain, Constipation, COPD (chronic obstructive pulmonary disease) ([address] Dental crown present, Depression, Fall, GERD (gastroesophageal reflux disease), Heartburn, History of blood transfusion, Hyperglycemia, Insomnia, Kidney stones, Knee pain, right, Low blood pressure, Low vitamin D level, Migraine, Numbness, Palpitations, Sprain of lumbosacral (joint) (ligament), Staghorn kidney stones, Tachycardia, Wears glasses, and Wears glasses. has a past surgical history that includes Ankle fracture surgery (Right, 1998); Wrist surgery (Right, 2002); total nephrectomy (Left, 4/17/12); laparoscopy (05/23/2014); Dilation and curettage of uterus (05/23/2014); myringotomy (Bilateral); other surgical history (age 27); Nerve Block (66-41-19); Nerve Block (10/4/13); knee surgery (Left, 12/16/13); Cystoscopy (04/15/14);  Ureter stent placement (Right, 04/15/14); Lithotripsy (Right, 04/15/14); Cystoscopy (Right, 4/29/14); Endometrial ablation (1/23/14); Hand surgery (Right); Hysterectomy (9-8-15); Nerve Block (12/8/15); Nerve Block (5/24/16); Nerve Block (11/21/2016); Sleeve Gastrectomy (N/A, 2/20/2017); Nerve Block (08/02/2017); Gastric bypass surgery (02/20/2017); Umbilical hernia repair (2007); Shoulder arthroscopy (Right); Knee arthroscopy (Right, 09/12/2017); pr knee scope,diagnostic (Right, 9/12/2017); Lithotripsy (2014); [place] tooth extraction; Gastric bypass surgery; Knee Arthroplasty (Right, 02/20/2018); pr total knee arthroplasty (Right, 2/20/2018); Nerve Block (06/05/2018); Nerve Block (Left, 08/22/2018); Nerve Block (Right, 08/29/2018); Nerve Block (Right, 09/18/2018); Arm Surgery (02/26/2019); and Cholecystectomy (12/26/2019). Social History     Socioeconomic History    Marital status:      Spouse name: Not on file    Number of children: 3    Years of education: Not on file    Highest education level: Not on file   Occupational History    Not on file   Tobacco Use    Smoking status: Current Every Day Smoker     Packs/day: 0.50     Types: Cigarettes     Start date: 12     Last attempt to quit: 12/2017     Years since quitting: 3.5    Smokeless tobacco: Never Used    Tobacco comment: litte less then 1/2 mariola/day   Vaping Use    Vaping Use: Never used   Substance and Sexual Activity    Alcohol use: No    Drug use: No    Sexual activity: Yes     Partners: Male   Other Topics Concern    Not on file   Social History Narrative    Not on file     Social Determinants of Health     Financial Resource Strain:     Difficulty of Paying Living Expenses:    Food Insecurity:     Worried About Running Out of Food in the Last Year:     Ran Out of Food in the Last Year:    Transportation Needs:     Lack of Transportation (Medical):      Lack of Transportation (Non-Medical):    Physical Activity:     Days of Exercise per Week:     Minutes of Exercise per Session:    Stress:     Feeling of Stress :    Social Connections:     Frequency of Communication with Friends and Family:     Frequency of Social Gatherings with Friends and Family:     Attends Holiness Services:     Active Member of Clubs or Organizations:     Attends Club or Organization Meetings:     Marital Status:    Intimate Partner Violence:     Fear of Current or Ex-Partner:     Emotionally Abused:     Physically Abused:     Sexually Abused:        Family History   Problem Relation Age of Onset    Kidney Disease Mother         ? ?? stone     Cancer Father         unknown    Heart Disease Father     Seizures Father     Migraines Father     Coronary Art Dis Father     Migraines Sister     Cervical Cancer Sister     Lung Cancer Maternal Grandmother     Seizures Son     Diabetes Maternal Uncle     Diabetes Maternal Cousin     Lung Cancer Paternal Grandfather     Anxiety Disorder Daughter        Allergies:  Ibuprofen, Claritin [loratadine], and Tape [adhesive tape]    Home Medications:  Prior to Admission medications    Medication Sig Start Date End Date Taking? Authorizing Provider   tiZANidine (ZANAFLEX) 2 MG tablet tizanidine 2 mg tablet 9/28/20   Historical Provider, MD   pramipexole (MIRAPEX) 0.125 MG tablet Take 0.125 mg by mouth nightly 8/31/20   Historical Provider, MD   ARIPiprazole (ABILIFY) 20 MG tablet aripiprazole 20 mg tablet    Historical Provider, MD   diclofenac sodium 1 % GEL Apply 2 g topically 4 times daily as needed for Pain 1/18/20   Elsy Whaley MD   acetaminophen (TYLENOL) 325 MG tablet Take 2 tablets by mouth every 8 hours as needed for Pain 11/13/19   Violet De La Torre DO   methocarbamol (ROBAXIN-750) 750 MG tablet Take 1 tablet by mouth 3 times daily   WARNING:  May cause drowsiness.  May impair ability to operate vehicles or machinery.  Do not use in combination with alcohol.  11/3/19   Valarie Almeida, APRN - CNP   zolpidem (AMBIEN) 5 MG tablet Take 5 mg by mouth nightly as needed for Sleep.     Historical Provider, MD   ondansetron (ZOFRAN) 4 MG tablet take 1 tablet by mouth 8 hours if needed for nausea and vomiting 1/16/19   MD Herman Ash (AIMOVIG SC) Inject into the skin    Historical Provider, MD   venlafaxine (EFFEXOR XR) 37.5 MG extended release capsule Take 75 mg by mouth  2/8/18   Historical Provider, MD   topiramate (TOPAMAX) 100 MG tablet Take 100 mg by mouth 2/16/18 10/19/20  Historical Provider, MD   OXcarbazepine (TRILEPTAL) 300 MG tablet take 1 tablet by mouth at bedtime 1/30/18   Historical Provider, MD   polyethylene glycol (GLYCOLAX) powder Take 17 g by mouth daily    Historical Provider, MD   promethazine (PHENERGAN) 25 MG tablet Take 25 mg by mouth every 6 hours as needed for Nausea    Historical Provider, MD   Multiple Vitamins-Minerals (WOMENS ONE DAILY) TABS Take 1 tablet by mouth daily    Historical Provider, MD   vitamin D3 (CHOLECALCIFEROL) 400 units TABS tablet Take 400 Units by mouth daily    Historical Provider, MD   montelukast (SINGULAIR) 10 MG tablet Take 10 mg by mouth nightly 8/7/17   Historical Provider, MD   OXcarbazepine (TRILEPTAL) 150 MG tablet Take 450 mg by mouth nightly    Historical Provider, MD   amitriptyline (ELAVIL) 100 MG tablet Take 100 mg by mouth nightly    Historical Provider, MD   SUMAtriptan (IMITREX) 100 MG tablet Take 100 mg by mouth once as needed for Migraine    Historical Provider, MD   Potassium 75 MG TABS Take 75 mg by mouth 2 times daily    Historical Provider, MD   omeprazole (PRILOSEC) 20 MG delayed release capsule Take 40 mg by mouth daily     Historical Provider, MD   albuterol sulfate  (90 BASE) MCG/ACT inhaler Inhale 2 puffs into the lungs every 6 hours as needed for Wheezing    Historical Provider, MD   budesonide-formoterol (SYMBICORT) 160-4.5 MCG/ACT AERO Inhale 2 puffs into the lungs 2 times daily    Historical Provider, MD       REVIEW OF SYSTEMS (2-9 systems for level 4, 10 or more for level 5)      Review of Systems   Constitutional: Negative for fever. Eyes: Negative for photophobia and visual disturbance. Respiratory: Negative for chest tightness and shortness of breath. Gastrointestinal: Negative for abdominal pain, nausea and vomiting. Genitourinary: Negative for dysuria and flank pain. Musculoskeletal: Positive for back pain. Skin: Negative for rash. Neurological: Positive for headaches. Negative for seizures, syncope, weakness and numbness. Psychiatric/Behavioral: Negative for confusion. PHYSICAL EXAM   (up to 7 for level 4, 8 or more for level 5)      INITIAL VITALS:   /67   Pulse 79   Temp 98.3 °F (36.8 °C)   Resp 16   Ht 5' 2\" (1.575 m)   Wt 184 lb (83.5 kg)   LMP 09/08/2015   SpO2 100%   BMI 33.65 kg/m²     Physical Exam  Constitutional:       General: She is not in acute distress. Appearance: She is not toxic-appearing. HENT:      Head: Normocephalic and atraumatic. Eyes:      Extraocular Movements: Extraocular movements intact. Pupils: Pupils are equal, round, and reactive to light. Cardiovascular:      Rate and Rhythm: Normal rate. Pulmonary:      Effort: Pulmonary effort is normal.   Musculoskeletal:      Right lower leg: No edema. Left lower leg: No edema. Skin:     Findings: No erythema or rash. Neurological:      Mental Status: She is alert. Sensory: No sensory deficit. Motor: No weakness. Gait: Gait normal.         DIFFERENTIAL  DIAGNOSIS     PLAN (LABS / IMAGING / EKG):  No orders of the defined types were placed in this encounter.       MEDICATIONS ORDERED:  Orders Placed This Encounter   Medications    ketorolac (TORADOL) injection 30 mg    prochlorperazine (COMPAZINE) injection 10 mg    diphenhydrAMINE (BENADRYL) injection 25 mg       DDX: Migraine, tension headache, restless leg syndrome, peripheral neuropathy, lumbar radiculopathy    DIAGNOSTIC RESULTS / EMERGENCY DEPARTMENT COURSE / MDM   LAB RESULTS:  No results found for this visit on 06/10/21. IMPRESSION/ ED Course: 58-year-old female no acute distress with chronic bilateral lower extremity paresthesias and chronic migraines presenting with neuropathic type pain bilateral legs as well as migraine headache. Treated emergency department with Toradol, Compazine, Benadryl. On reevaluation patient states she feels significantly improved and has follow-up on outpatient basis with her physician tomorrow. Patient instructed to keep her appointment and was also given strict ED return precautions. She verbalized understanding of and agreement with the discharge plan. CONSULTS:  None    CRITICAL CARE:  Please see attending note    FINAL IMPRESSION      1.  Bilateral leg pain          DISPOSITION / PLAN     DISPOSITION Decision To Discharge 06/10/2021 11:15:50 PM      PATIENT REFERRED TO:  KAYKAY Saxena CNP, [id number] [address]  [phone]    Schedule an appointment as soon as possible for a visit in 1 week  For re-evaluation      DISCHARGE MEDICATIONS:  Discharge Medication List as of 6/10/2021 11:24 PM          Kayley Arias DO  Emergency Medicine Resident    (Please note that portions of thisnote were completed with a voice recognition program.  Efforts were made to edit the dictations but occasionally words are mis-transcribed.)        Kayley Arias DO  Resident  06/11/21 [id number]

## 2021-06-11 NOTE — ED NOTES
Mode of arrival (squad #, walk in, police, etc) : Walk in        Chief complaint(s): Leg cramp        Arrival Note (brief scenario, treatment PTA, etc).: Pt. Reports she has had leg cramping for the last 3 days but it became unbearable today after she had a nerve block performed by her pain management dr. Devon Henning states she has chronic issues with her legs/        C= \"Have you ever felt that you should Cut down on your drinking? \"  No  A= \"Have people Annoyed you by criticizing your drinking? \"  No  G= \"Have you ever felt bad or Guilty about your drinking? \"  No  E= \"Have you ever had a drink as an Eye-opener first thing in the morning to steady your nerves or to help a hangover? \"  No      Deferred []      Reason for deferring: N/A    *If yes to two or more: probable alcohol abuse. Megan Emanuel RN  06/10/21 1929

## 2021-06-18 ENCOUNTER — HOSPITAL ENCOUNTER (EMERGENCY)
Age: 46
Discharge: HOME | End: 2021-06-18
Attending: EMERGENCY MEDICINE
Payer: MEDICARE

## 2021-06-18 VITALS
WEIGHT: 184 LBS | RESPIRATION RATE: 16 BRPM | SYSTOLIC BLOOD PRESSURE: 134 MMHG | DIASTOLIC BLOOD PRESSURE: 78 MMHG | HEART RATE: 73 BPM | TEMPERATURE: 97.1 F | HEIGHT: 62 IN | BODY MASS INDEX: 33.86 KG/M2

## 2021-06-18 DIAGNOSIS — M54.50 ACUTE EXACERBATION OF CHRONIC LOW BACK PAIN: ICD-10-CM

## 2021-06-18 DIAGNOSIS — G89.29 ACUTE EXACERBATION OF CHRONIC LOW BACK PAIN: ICD-10-CM

## 2021-06-18 DIAGNOSIS — S60.221A CONTUSION OF RIGHT HAND, INITIAL ENCOUNTER: Primary | ICD-10-CM

## 2021-06-18 PROCEDURE — 99283 EMERGENCY DEPT VISIT LOW MDM: CPT

## 2021-06-18 PROCEDURE — 96372 THER/PROPH/DIAG INJ SC/IM: CPT

## 2021-06-18 PROCEDURE — 6370000000 HC RX 637 (ALT 250 FOR IP): Performed by: STUDENT IN AN ORGANIZED HEALTH CARE EDUCATION/TRAINING PROGRAM

## 2021-06-18 PROCEDURE — 6360000002 HC RX W HCPCS: Performed by: STUDENT IN AN ORGANIZED HEALTH CARE EDUCATION/TRAINING PROGRAM

## 2021-06-18 RX ORDER — ORPHENADRINE CITRATE 30 MG/ML
60 INJECTION INTRAMUSCULAR; INTRAVENOUS ONCE
Status: COMPLETED | OUTPATIENT
Start: 2021-06-18 | End: 2021-06-18

## 2021-06-18 RX ORDER — LIDOCAINE 4 G/G
1 PATCH TOPICAL ONCE
Status: DISCONTINUED | OUTPATIENT
Start: 2021-06-18 | End: 2021-06-18 | Stop reason: HOSPADM

## 2021-06-18 RX ORDER — ACETAMINOPHEN 500 MG
1000 TABLET ORAL ONCE
Status: COMPLETED | OUTPATIENT
Start: 2021-06-18 | End: 2021-06-18

## 2021-06-18 RX ADMIN — ACETAMINOPHEN 1000 MG: 500 TABLET ORAL at 17:39

## 2021-06-18 RX ADMIN — ORPHENADRINE CITRATE 60 MG: 60 INJECTION INTRAMUSCULAR; INTRAVENOUS at 17:39

## 2021-06-18 ASSESSMENT — PAIN DESCRIPTION - LOCATION: LOCATION: HAND;BACK

## 2021-06-18 ASSESSMENT — ENCOUNTER SYMPTOMS
BACK PAIN: 1
ABDOMINAL DISTENTION: 0
EYE PAIN: 0
NAUSEA: 0
EYE ITCHING: 0
SINUS PAIN: 0
CONSTIPATION: 0
ABDOMINAL PAIN: 0
SHORTNESS OF BREATH: 0
COUGH: 0
DIARRHEA: 0
SINUS PRESSURE: 0
SORE THROAT: 0

## 2021-06-18 ASSESSMENT — PAIN DESCRIPTION - PAIN TYPE: TYPE: ACUTE PAIN

## 2021-06-18 ASSESSMENT — PAIN SCALES - GENERAL
PAINLEVEL_OUTOF10: 10
PAINLEVEL_OUTOF10: 10

## 2021-06-18 ASSESSMENT — PAIN DESCRIPTION - ORIENTATION: ORIENTATION: RIGHT
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
## 2021-06-18 NOTE — ED NOTES
Dr. Sy Donis at bedside to wrap pt's R hand and prepare her for discharge     Trina Fields RN  06/18/21 [id number]
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
## 2021-06-24 ENCOUNTER — FOLLOWUP TELEPHONE ENCOUNTER (OUTPATIENT)
Dept: PSYCHIATRY | Age: 46
End: 2021-06-24
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
## 2021-06-25 ENCOUNTER — CASE MANAGEMENT (OUTPATIENT)
Dept: PSYCHIATRY | Age: 46
End: 2021-06-25

## 2021-06-25 NOTE — PROGRESS NOTES
I was given this referral on 6/24/21, and attempted to contact the patient at 1530 hrs, but to no avail. Follow up contact information was left on voice mail. Today, I attempted to contact the patient again but to no avail. Second follow up contact was left on voice mail. I will wait until the patient contacts me back for assistance.

## 2021-06-30 ENCOUNTER — HOSPITAL ENCOUNTER (EMERGENCY)
Age: 46
Discharge: HOME | End: 2021-06-30
Attending: EMERGENCY MEDICINE
Payer: MEDICARE

## 2021-06-30 VITALS
DIASTOLIC BLOOD PRESSURE: 84 MMHG | RESPIRATION RATE: 16 BRPM | HEART RATE: 82 BPM | BODY MASS INDEX: 33.86 KG/M2 | OXYGEN SATURATION: 100 % | TEMPERATURE: 97.3 F | SYSTOLIC BLOOD PRESSURE: 134 MMHG | HEIGHT: 62 IN | WEIGHT: 184 LBS

## 2021-06-30 DIAGNOSIS — M79.604 BILATERAL LEG PAIN: Primary | ICD-10-CM

## 2021-06-30 DIAGNOSIS — M79.605 BILATERAL LEG PAIN: Primary | ICD-10-CM

## 2021-06-30 LAB
ANION GAP SERPL CALCULATED.3IONS-SCNC: 8 MMOL/L (ref 9–17)
BUN BLDV-MCNC: 13 MG/DL (ref 6–20)
BUN/CREAT BLD: ABNORMAL (ref 9–20)
CALCIUM SERPL-MCNC: 9.6 MG/DL (ref 8.6–10.4)
CHLORIDE BLD-SCNC: 106 MMOL/L (ref 98–107)
CO2: 28 MMOL/L (ref 20–31)
CREAT SERPL-MCNC: 0.91 MG/DL (ref 0.5–0.9)
GFR AFRICAN AMERICAN: >60 ML/MIN
GFR NON-AFRICAN AMERICAN: >60 ML/MIN
GFR SERPL CREATININE-BSD FRML MDRD: ABNORMAL ML/MIN/{1.73_M2}
GFR SERPL CREATININE-BSD FRML MDRD: ABNORMAL ML/MIN/{1.73_M2}
GLUCOSE BLD-MCNC: 80 MG/DL (ref 70–99)
MAGNESIUM: 2 MG/DL (ref 1.6–2.6)
POTASSIUM SERPL-SCNC: 3.9 MMOL/L (ref 3.7–5.3)
SODIUM BLD-SCNC: 142 MMOL/L (ref 135–144)

## 2021-06-30 PROCEDURE — 80048 BASIC METABOLIC PNL TOTAL CA: CPT

## 2021-06-30 PROCEDURE — 96372 THER/PROPH/DIAG INJ SC/IM: CPT

## 2021-06-30 PROCEDURE — 6360000002 HC RX W HCPCS: Performed by: STUDENT IN AN ORGANIZED HEALTH CARE EDUCATION/TRAINING PROGRAM

## 2021-06-30 PROCEDURE — 99282 EMERGENCY DEPT VISIT SF MDM: CPT

## 2021-06-30 PROCEDURE — 83735 ASSAY OF MAGNESIUM: CPT

## 2021-06-30 RX ORDER — ORPHENADRINE CITRATE 30 MG/ML
60 INJECTION INTRAMUSCULAR; INTRAVENOUS ONCE
Status: COMPLETED | OUTPATIENT
Start: 2021-06-30 | End: 2021-06-30

## 2021-06-30 RX ORDER — ORPHENADRINE CITRATE 100 MG/1
100 TABLET, EXTENDED RELEASE ORAL 2 TIMES DAILY
Qty: 14 TABLET | Refills: 0 | Status: SHIPPED | OUTPATIENT
Start: 2021-06-30

## 2021-06-30 RX ADMIN — ORPHENADRINE CITRATE 60 MG: 60 INJECTION INTRAMUSCULAR; INTRAVENOUS at 19:56

## 2021-06-30 ASSESSMENT — ENCOUNTER SYMPTOMS
VOMITING: 0
SHORTNESS OF BREATH: 0
RHINORRHEA: 0
COUGH: 0
NAUSEA: 0
ABDOMINAL PAIN: 0

## 2021-06-30 ASSESSMENT — PAIN DESCRIPTION - LOCATION: LOCATION: LEG

## 2021-06-30 ASSESSMENT — PAIN SCALES - GENERAL: PAINLEVEL_OUTOF10: 10

## 2021-06-30 ASSESSMENT — PAIN DESCRIPTION - ORIENTATION: ORIENTATION: RIGHT;LEFT

## 2021-06-30 NOTE — ED NOTES
Patient presents to ED with c/o bilat leg pain, chronic condition with current flare up of 2 days, patient reports pain 10/10, numbness and tingling. Patient alert and oriented x 3, resp e/u, skin CHINTAN, VIPIN.      Raimundo Null RN  06/30/21 [id number]

## 2021-07-01 NOTE — ED PROVIDER NOTES
[facility]     Emergency Department     Faculty Attestation    I performed a history and physical examination of the patient and discussed management with the resident. I reviewed the residents note and agree with the documented findings including all diagnostic interpretations and plan of care. Any areas of disagreement are noted on the chart. I was personally present for the key portions of any procedures. I have documented in the chart those procedures where I was not present during the key portions. I have reviewed the emergency nurses triage note. I agree with the chief complaint, past medical history, past surgical history, allergies, medications, social and family history as documented unless otherwise noted below. Documentation of the HPI, Physical Exam and Medical Decision Making performed by scribes is based on my personal performance of the HPI, PE and MDM. For Physician Assistant/ Nurse Practitioner cases/documentation I have personally evaluated this patient and have completed at least one if not all key elements of the E/M (history, physical exam, and MDM). Additional findings are as noted. This patient was evaluated in the Emergency Department for symptoms described in the history of present illness. He/she was evaluated in the context of the global COVID-19 pandemic, which necessitated consideration that the patient might be at risk for infection with the SARS-CoV-2 virus that causes COVID-19. Institutional protocols and algorithms that pertain to the evaluation of patients at risk for COVID-19 are in a state of rapid change based on information released by regulatory bodies including the CDC and federal and state organizations. These policies and algorithms were followed during the patient's care in the ED. Primary Care Physician: KAYKAY Krishna - CNP    History:  This is a 51 y.o. female who presents to the Emergency Department with complaint of bilateral leg pain. Associated with low back pain. No loss of bowel or bladder control. Able to ambulate. No red flag symptoms for back pain or historical features concerning    Physical:     height is 5' 2\" (1.575 m) and weight is 184 lb (83.5 kg). Her oral temperature is 97.3 °F (36.3 °C). Her blood pressure is 134/84 and her pulse is 82. Her respiration is 16 and oxygen saturation is 100%. 51 y.o. female no acute distress, appears uncomfortable but ambulatory without difficulty. Some tenderness to palpation paraspinals lumbar    Impression: Low back pain    Plan: Check BMP to evaluate for electrolyte abnormality specifically potassium derangement for cramping muscular type pain. Symptomatic treatment.       Jameel Shore MD, Mak Matthews  Attending Emergency Physician        Brandon Quintana MD  07/01/21 5715

## 2021-07-01 NOTE — ED PROVIDER NOTES
[facility]  Emergency Department Encounter  Emergency Medicine Resident     Pt Name: Rebekah Simmons  MRN: [id number]  [place] 1975  Date of evaluation: 6/30/21  PCP:  KAYKAY Monreal CNP    CHIEF COMPLAINT       Chief Complaint   Patient presents with    Leg Pain       HISTORY OF PRESENT ILLNESS  (Location/Symptom, Timing/Onset, Context/Setting, Quality, Duration, Modifying Factors, Severity.)    Rebekah Simmons is a 51 y.o. female who presents with bilateral lower extremity pain. Patient states she has chronic bilateral lower extremity pain secondary to \"nerve damage\". States she has had this issue for several years and is on multiple medications for but noted that over the past 2 days this is worsened. Denies any pain to her back, states the pain is in bilateral thighs, wraps around to hamstrings and then down to her toes. States pain is always there and does not change with exertion. Does not change with position. Denies any trauma. Denies other complaints. PPE Worn:  Gloves: Yes  Eye Protection: Goggles  Mask: Surgical Mask  Gown: NO    PAST MEDICAL / SURGICAL / SOCIAL / FAMILY HISTORY    has a past medical history of Anemia, Anxiety, Asthma, Bipolar 1 disorder ([address] Blackout spell, Body piercing, Cervicalgia, Chest pain, Chronic back pain, Chronic kidney disease, Chronic neck pain, Constipation, COPD (chronic obstructive pulmonary disease) ([address] Dental crown present, Depression, Fall, GERD (gastroesophageal reflux disease), Heartburn, History of blood transfusion, Hyperglycemia, Insomnia, Kidney stones, Knee pain, right, Low blood pressure, Low vitamin D level, Migraine, Numbness, Palpitations, Sprain of lumbosacral (joint) (ligament), [place]ag[place] kidney stones, Tachycardia, Wears glasses, and Wears glasses. has a past surgical history that includes Ankle fracture surgery (Right, 1998);  Wrist surgery (Right, 2002); total nephrectomy (Left, 4/17/12); laparoscopy (05/23/2014); Dilation and curettage of uterus (05/23/2014); myringotomy (Bilateral); other surgical history (age 27); Nerve Block (10-01-23); Nerve Block (10/4/13); knee surgery (Left, 12/16/13); Cystoscopy (04/15/14); Ureter stent placement (Right, 04/15/14); Lithotripsy (Right, 04/15/14); Cystoscopy (Right, 4/29/14); Endometrial ablation (1/23/14); Hand surgery (Right); Hysterectomy (9-8-15); Nerve Block (12/8/15); Nerve Block (5/24/16); Nerve Block (11/21/2016); Sleeve Gastrectomy (N/A, 2/20/2017); Nerve Block (08/02/2017); Gastric bypass surgery (02/20/2017); Umbilical hernia repair (2007); Shoulder arthroscopy (Right); Knee arthroscopy (Right, 09/12/2017); pr knee scope,diagnostic (Right, 9/12/2017); Lithotripsy (2014); [place] tooth extraction; Gastric bypass surgery; Knee Arthroplasty (Right, 02/20/2018); pr total knee arthroplasty (Right, 2/20/2018); Nerve Block (06/05/2018); Nerve Block (Left, 08/22/2018); Nerve Block (Right, 08/29/2018); Nerve Block (Right, 09/18/2018); Arm Surgery (02/26/2019); and Cholecystectomy (12/26/2019).     Social History     Socioeconomic History    Marital status:      Spouse name: Not on file    Number of children: 3    Years of education: Not on file    Highest education level: Not on file   Occupational History    Not on file   Tobacco Use    Smoking status: Current Every Day Smoker     Packs/day: 0.50     Types: Cigarettes     Start date: 11     Last attempt to quit: 12/2017     Years since quitting: 3.5    Smokeless tobacco: Never Used    Tobacco comment: litte less then 1/2 mariola/day   Vaping Use    Vaping Use: Never used   Substance and Sexual Activity    Alcohol use: No    Drug use: No    Sexual activity: Yes     Partners: Male   Other Topics Concern    Not on file   Social History Narrative    Not on file     Social Determinants of Health     Financial Resource Strain:     Difficulty of Paying Living Expenses:    Food Insecurity:     Worried About Jaquan Paul,    zolpidem (AMBIEN) 5 MG tablet Take 5 mg by mouth nightly as needed for Sleep.     Historical Provider, MD   ondansetron (ZOFRAN) 4 MG tablet take 1 tablet by mouth 8 hours if needed for nausea and vomiting 1/16/19   MD Tori Patricia (AIMOVIG SC) Inject into the skin    Historical Provider, MD   venlafaxine (EFFEXOR XR) 37.5 MG extended release capsule Take 75 mg by mouth  2/8/18   Historical Provider, MD   topiramate (TOPAMAX) 100 MG tablet Take 100 mg by mouth 2/16/18 10/19/20  Historical Provider, MD   OXcarbazepine (TRILEPTAL) 300 MG tablet take 1 tablet by mouth at bedtime 1/30/18   Historical Provider, MD   polyethylene glycol (GLYCOLAX) powder Take 17 g by mouth daily    Historical Provider, MD   promethazine (PHENERGAN) 25 MG tablet Take 25 mg by mouth every 6 hours as needed for Nausea    Historical Provider, MD   Multiple Vitamins-Minerals (WOMENS ONE DAILY) TABS Take 1 tablet by mouth daily    Historical Provider, MD   vitamin D3 (CHOLECALCIFEROL) 400 units TABS tablet Take 400 Units by mouth daily    Historical Provider, MD   montelukast (SINGULAIR) 10 MG tablet Take 10 mg by mouth nightly 8/7/17   Historical Provider, MD   OXcarbazepine (TRILEPTAL) 150 MG tablet Take 450 mg by mouth nightly    Historical Provider, MD   amitriptyline (ELAVIL) 100 MG tablet Take 100 mg by mouth nightly    Historical Provider, MD   SUMAtriptan (IMITREX) 100 MG tablet Take 100 mg by mouth once as needed for Migraine    Historical Provider, MD   Potassium 75 MG TABS Take 75 mg by mouth 2 times daily    Historical Provider, MD   omeprazole (PRILOSEC) 20 MG delayed release capsule Take 40 mg by mouth daily     Historical Provider, MD   albuterol sulfate  (90 BASE) MCG/ACT inhaler Inhale 2 puffs into the lungs every 6 hours as needed for Wheezing    Historical Provider, MD   budesonide-formoterol (SYMBICORT) 160-4.5 MCG/ACT AERO Inhale 2 puffs into the lungs 2 times daily    Historical Provider, MD       REVIEW OF SYSTEMS    (2-9 systems for level 4, 10 or more for level 5)    Review of Systems   Constitutional: Negative for chills, fatigue and fever. HENT: Negative for congestion and rhinorrhea. Respiratory: Negative for cough and shortness of breath. Cardiovascular: Negative for chest pain. Gastrointestinal: Negative for abdominal pain, nausea and vomiting. Musculoskeletal: Negative for myalgias. BLE pain   Neurological: Negative for headaches. All other systems reviewed and are negative. PHYSICAL EXAM   (up to 7 for level 4, 8 or more for level 5)    INITIAL VITALS:   ED Triage Vitals [06/30/21 1919]   BP Temp Temp Source Pulse Resp SpO2 Height Weight   134/84 97.3 °F (36.3 °C) Oral 82 16 100 % 5' 2\" (1.575 m) 184 lb (83.5 kg)       Physical Exam  Vitals and nursing note reviewed. Constitutional:       General: She is not in acute distress. Appearance: She is well-developed. She is not ill-appearing. Cardiovascular:      Rate and Rhythm: Normal rate and regular rhythm. Pulses:           Radial pulses are 2+ on the right side and 2+ on the left side. Dorsalis pedis pulses are 2+ on the right side and 2+ on the left side. Posterior tibial pulses are 2+ on the right side and 2+ on the left side. Heart sounds: Normal heart sounds. No murmur heard. Pulmonary:      Effort: Pulmonary effort is normal. No respiratory distress. Breath sounds: Normal breath sounds. No decreased breath sounds. Abdominal:      General: There is no distension. Palpations: Abdomen is soft. Tenderness: There is no abdominal tenderness. Musculoskeletal:         General: Tenderness present. No swelling, deformity or signs of injury. Cervical back: No tenderness or bony tenderness. Thoracic back: No tenderness or bony tenderness. Lumbar back: No tenderness or bony tenderness. Right hip: Normal. Normal range of motion.       Left hip: Normal. Normal range of motion. Right knee: Normal. Normal range of motion. Left knee: Normal. Normal range of motion. Right lower leg: No edema. Left lower leg: No edema. Right ankle: Normal. Normal range of motion. Left ankle: Normal. Normal range of motion. Skin:     General: Skin is warm and dry. Capillary Refill: Capillary refill takes less than 2 seconds. Neurological:      General: No focal deficit present. Mental Status: She is alert and oriented to person, place, and time. DIFFERENTIAL  DIAGNOSIS   PLAN (LABS / IMAGING / EKG):  Orders Placed This Encounter   Procedures    BASIC METABOLIC PANEL    MAGNESIUM       MEDICATIONS ORDERED:  Orders Placed This Encounter   Medications    orphenadrine (NORFLEX) injection 60 mg    orphenadrine (NORFLEX) 100 MG extended release tablet     Sig: Take 1 tablet by mouth 2 times daily     Dispense:  14 tablet     Refill:  0         DIAGNOSTIC RESULTS / EMERGENCYDEPARTMENT COURSE / MDM   LABS:  Labs Reviewed   BASIC METABOLIC PANEL - Abnormal; Notable for the following components:       Result Value    CREATININE 0.91 (*)     Anion Gap 8 (*)     All other components within normal limits   MAGNESIUM       RADIOLOGY:  No results found. Impression:  Patient with bilateral lower extremity pain. Chronic issue. Gotten worse over the past few days. Patient states this often happens when her \"electrolytes are out of balance\". Has a history of nephrectomy. Takes both potassium and magnesium supplements. States that she is on muscle relaxants at home but she is unsure what they are working. States she would be willing to try Norflex. Will give patient a IM shot of Norflex. Will check BMP, magnesium. Normal range of motion of bilateral lower extremities all joints tested. Tenderness with any palpation through the musculature but does not worsen the pain.       EMERGENCY DEPARTMENT COURSE:   Pain improved after Norflex. BMP magnesium within normal limits. Slightly elevated creatinine which can be treated patient's nephrectomy. Discussed findings with patient. States that she would like to try Norflex at home. Instructed patient that she needs to discontinue the Robaxin and the tizanidine and follow-up with her primary care provider and/or her pain doctor for further prescriptions of Norflex. Will discharge patient with Norflex, strict return precautions given. MDM  Number of Diagnoses or Management Options  Bilateral leg pain: established, worsening     Amount and/or Complexity of Data Reviewed  Clinical lab tests: ordered and reviewed  Review and summarize past medical records: yes  Discuss the patient with other providers: yes  Independent visualization of images, tracings, or specimens: yes    Risk of Complications, Morbidity, and/or Mortality  Presenting problems: low  Diagnostic procedures: low  Management options: low    Patient Progress  Patient progress: stable      PROCEDURES:  none    CONSULTS:  None    CRITICAL CARE:  Please see attending note    FINAL IMPRESSION     1. Bilateral leg pain          DISPOSITION / PLAN   DISPOSITION Decision To Discharge 06/30/2021 08:35:59 PM      Evaluation and treatment course in the ED, and plan of care upon discharge was discussed in length with the patient. Patient had no further questions prior to being discharged and was instructed to return to the ED for new or worsening symptoms. Any changes to existing medications or new prescriptions were reviewed with patient and they expressed understanding of how to correctly take their medications and the possible side effects.     PATIENT REFERRED TO:  KAYKAY Wright - CNP  [place], [id number]  [address] [facility] [address]  [phone]    Schedule an appointment as soon as possible for a visit         DISCHARGE MEDICATIONS:  New Prescriptions    ORPHENADRINE (NORFLEX) 100 MG EXTENDED RELEASE TABLET    Take 1 tablet by mouth 2 times daily       Ольга Bingham DO  Emergency Medicine Resident Physician, PGY-3    (Please note that portions of this note were completed with a voice recognition program.  Efforts were made to edit the dictations but occasionally words are mis-transcribed.)          Ольга Bingham MD  06/30/21 2045

## 2021-08-17 ENCOUNTER — HOSPITAL ENCOUNTER (OUTPATIENT)
Dept: PSYCHIATRY | Age: 46
Setting detail: THERAPIES SERIES
Discharge: HOME | End: 2021-08-17
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
## 2021-10-14 DIAGNOSIS — M17.11 PATELLOFEMORAL ARTHRITIS OF RIGHT KNEE: Primary | ICD-10-CM

## 2021-10-15 ENCOUNTER — OFFICE VISIT (OUTPATIENT)
Dept: ORTHOPEDIC SURGERY | Age: 46
End: 2021-10-15
Payer: MEDICARE

## 2021-10-15 VITALS — WEIGHT: 188 LBS | HEIGHT: 62 IN | BODY MASS INDEX: 34.6 KG/M2

## 2021-10-15 DIAGNOSIS — M17.11 PATELLOFEMORAL ARTHRITIS OF RIGHT KNEE: Primary | ICD-10-CM

## 2021-10-15 DIAGNOSIS — Z96.651 S/P TOTAL KNEE ARTHROPLASTY, RIGHT: ICD-10-CM

## 2021-10-15 PROCEDURE — 4004F PT TOBACCO SCREEN RCVD TLK: CPT | Performed by: PHYSICIAN ASSISTANT

## 2021-10-15 PROCEDURE — G8484 FLU IMMUNIZE NO ADMIN: HCPCS | Performed by: PHYSICIAN ASSISTANT

## 2021-10-15 PROCEDURE — G8417 CALC BMI ABV UP PARAM F/U: HCPCS | Performed by: PHYSICIAN ASSISTANT

## 2021-10-15 PROCEDURE — 99213 OFFICE O/P EST LOW 20 MIN: CPT | Performed by: PHYSICIAN ASSISTANT

## 2021-10-15 PROCEDURE — G8427 DOCREV CUR MEDS BY ELIG CLIN: HCPCS | Performed by: PHYSICIAN ASSISTANT

## 2021-10-15 RX ORDER — METHYLPREDNISOLONE 4 MG/1
TABLET ORAL
Qty: 1 KIT | Refills: 0 | Status: SHIPPED | OUTPATIENT
Start: 2021-10-15 | End: 2021-10-21

## 2021-10-15 ASSESSMENT — ENCOUNTER SYMPTOMS
COLOR CHANGE: 0
SHORTNESS OF BREATH: 0
COUGH: 0
VOMITING: 0

## 2021-10-15 NOTE — PROGRESS NOTES
[facility]  [facility]  [address] [place] [id number]  Dept: [phone]  Dept Fax: [phone]        Ambulatory Follow Up      Subjective:   Gelacio Dixon is a 51y.o. year old female who presents to our office today for routine followup regarding her   1. Patellofemoral arthritis of right knee    2. S/P patellofemoral arthroplasty        Chief Complaint   Patient presents with    Knee Pain     right     Date of surgery: 2/20/2018 right patellofemoral knee arthroplasty with robotic assistance    HPI Gelacio Dixon  is a 51 y. o.female who presents today in follow for continued right knee pain status post right patellofemoral knee arthroplasty with robotic assistance completed on 2/20/2018 with Dr. Melanie Galindo DO. The patient was last seen on 10/19/2020 and underwent treatment in the form of right knee corticosteroid injection and referral to outpatient physical therapy for bilateral lower extremity strengthening primarily the right lower extremity quadriceps and hamstring to help with adequate patellar tracking. The patient notes that she did not go to outpatient physical therapy but that the injection improved her discomfort for approximately 2 to 3 months. Patient notes that approximately 10 days ago she donated blood plasma and \"blacked out\" and fell causing immediate right knee pain. Patient notes pain primarily along the medial aspect of the right knee. Patient is now working as a nurses aide and states that she is having difficulty with her job due to right knee discomfort. Other than the fall 10 days ago she denies trauma or injury to the right lower extremity since her last appointment on 10/19/2020. She denies numbness and tingling in the right lower extremity. Review of Systems   Constitutional: Negative for activity change and fever. HENT: Negative for sneezing. Respiratory: Negative for cough and shortness of breath.     Cardiovascular: generate a document that actually reflects the content of the visit, the document can still have some errors including those of syntax and sound a like substitutions which may escape proof reading. In such instances, actual meaning can be extrapolated by contextual diversion.      Electronically signed by Elaina Devlin PA-C on 10/15/2021 at 4:12 PM

## 2021-12-07 ENCOUNTER — TELEPHONE (OUTPATIENT)
Dept: PAIN MANAGEMENT | Age: 46
End: 2021-12-07

## 2022-01-30 ENCOUNTER — HOSPITAL ENCOUNTER (EMERGENCY)
Age: 47
Discharge: HOME | End: 2022-01-30
Attending: EMERGENCY MEDICINE
Payer: MEDICARE

## 2022-01-30 ENCOUNTER — APPOINTMENT (OUTPATIENT)
Dept: GENERAL RADIOLOGY | Age: 47
End: 2022-01-30
Payer: MEDICARE

## 2022-01-30 ENCOUNTER — APPOINTMENT (OUTPATIENT)
Dept: CT IMAGING | Age: 47
End: 2022-01-30
Payer: MEDICARE

## 2022-01-30 VITALS
DIASTOLIC BLOOD PRESSURE: 80 MMHG | WEIGHT: 176 LBS | TEMPERATURE: 98.5 F | RESPIRATION RATE: 18 BRPM | SYSTOLIC BLOOD PRESSURE: 120 MMHG | HEIGHT: 62 IN | OXYGEN SATURATION: 99 % | HEART RATE: 74 BPM | BODY MASS INDEX: 32.39 KG/M2

## 2022-01-30 DIAGNOSIS — J02.9 PHARYNGITIS, UNSPECIFIED ETIOLOGY: ICD-10-CM

## 2022-01-30 DIAGNOSIS — H70.003: Primary | ICD-10-CM

## 2022-01-30 DIAGNOSIS — J03.80 ACUTE TONSILLITIS DUE TO OTHER SPECIFIED ORGANISMS: ICD-10-CM

## 2022-01-30 LAB
ABSOLUTE EOS #: 0.08 K/UL (ref 0–0.44)
ABSOLUTE IMMATURE GRANULOCYTE: 0.09 K/UL (ref 0–0.3)
ABSOLUTE LYMPH #: 2.89 K/UL (ref 1.1–3.7)
ABSOLUTE MONO #: 0.56 K/UL (ref 0.1–1.2)
ANION GAP SERPL CALCULATED.3IONS-SCNC: 13 MMOL/L (ref 9–17)
BASOPHILS # BLD: 0 % (ref 0–2)
BASOPHILS ABSOLUTE: 0.04 K/UL (ref 0–0.2)
BUN BLDV-MCNC: 9 MG/DL (ref 6–20)
BUN/CREAT BLD: ABNORMAL (ref 9–20)
CALCIUM SERPL-MCNC: 9.8 MG/DL (ref 8.6–10.4)
CHLORIDE BLD-SCNC: 105 MMOL/L (ref 98–107)
CO2: 22 MMOL/L (ref 20–31)
CREAT SERPL-MCNC: 1.08 MG/DL (ref 0.5–0.9)
DIFFERENTIAL TYPE: ABNORMAL
DIRECT EXAM: NORMAL
EOSINOPHILS RELATIVE PERCENT: 1 % (ref 1–4)
GFR AFRICAN AMERICAN: >60 ML/MIN
GFR NON-AFRICAN AMERICAN: 54 ML/MIN
GFR SERPL CREATININE-BSD FRML MDRD: ABNORMAL ML/MIN/{1.73_M2}
GFR SERPL CREATININE-BSD FRML MDRD: ABNORMAL ML/MIN/{1.73_M2}
GLUCOSE BLD-MCNC: 117 MG/DL (ref 70–99)
HCT VFR BLD CALC: 43.1 % (ref 36.3–47.1)
HEMOGLOBIN: 13.8 G/DL (ref 11.9–15.1)
IMMATURE GRANULOCYTES: 1 %
LYMPHOCYTES # BLD: 29 % (ref 24–43)
Lab: NORMAL
MCH RBC QN AUTO: 28.9 PG (ref 25.2–33.5)
MCHC RBC AUTO-ENTMCNC: 32 G/DL (ref 28.4–34.8)
MCV RBC AUTO: 90.4 FL (ref 82.6–102.9)
MONOCYTES # BLD: 6 % (ref 3–12)
NRBC AUTOMATED: 0 PER 100 WBC
PDW BLD-RTO: 13.3 % (ref 11.8–14.4)
PLATELET # BLD: 365 K/UL (ref 138–453)
PLATELET ESTIMATE: ABNORMAL
PMV BLD AUTO: 11.1 FL (ref 8.1–13.5)
POTASSIUM SERPL-SCNC: 3.7 MMOL/L (ref 3.7–5.3)
RBC # BLD: 4.77 M/UL (ref 3.95–5.11)
RBC # BLD: ABNORMAL 10*6/UL
SEG NEUTROPHILS: 63 % (ref 36–65)
SEGMENTED NEUTROPHILS ABSOLUTE COUNT: 6.25 K/UL (ref 1.5–8.1)
SODIUM BLD-SCNC: 140 MMOL/L (ref 135–144)
SPECIMEN DESCRIPTION: NORMAL
TROPONIN INTERP: NORMAL
TROPONIN INTERP: NORMAL
TROPONIN T: NORMAL NG/ML
TROPONIN T: NORMAL NG/ML
TROPONIN, HIGH SENSITIVITY: 7 NG/L (ref 0–14)
TROPONIN, HIGH SENSITIVITY: 7 NG/L (ref 0–14)
WBC # BLD: 9.9 K/UL (ref 3.5–11.3)
WBC # BLD: ABNORMAL 10*3/UL

## 2022-01-30 PROCEDURE — 85025 COMPLETE CBC W/AUTO DIFF WBC: CPT

## 2022-01-30 PROCEDURE — 84484 ASSAY OF TROPONIN QUANT: CPT

## 2022-01-30 PROCEDURE — 87804 INFLUENZA ASSAY W/OPTIC: CPT

## 2022-01-30 PROCEDURE — 96374 THER/PROPH/DIAG INJ IV PUSH: CPT

## 2022-01-30 PROCEDURE — 6360000002 HC RX W HCPCS

## 2022-01-30 PROCEDURE — 6360000004 HC RX CONTRAST MEDICATION

## 2022-01-30 PROCEDURE — 80048 BASIC METABOLIC PNL TOTAL CA: CPT

## 2022-01-30 PROCEDURE — 71045 X-RAY EXAM CHEST 1 VIEW: CPT

## 2022-01-30 PROCEDURE — 99284 EMERGENCY DEPT VISIT MOD MDM: CPT

## 2022-01-30 PROCEDURE — 70491 CT SOFT TISSUE NECK W/DYE: CPT

## 2022-01-30 PROCEDURE — 93005 ELECTROCARDIOGRAM TRACING: CPT

## 2022-01-30 RX ORDER — DEXAMETHASONE SODIUM PHOSPHATE 4 MG/ML
4 INJECTION, SOLUTION INTRA-ARTICULAR; INTRALESIONAL; INTRAMUSCULAR; INTRAVENOUS; SOFT TISSUE ONCE
Status: COMPLETED | OUTPATIENT
Start: 2022-01-30 | End: 2022-01-30

## 2022-01-30 RX ORDER — CLINDAMYCIN HYDROCHLORIDE 300 MG/1
300 CAPSULE ORAL 3 TIMES DAILY
Qty: 30 CAPSULE | Refills: 0 | Status: SHIPPED | OUTPATIENT
Start: 2022-01-30 | End: 2022-02-09

## 2022-01-30 RX ADMIN — DEXAMETHASONE SODIUM PHOSPHATE 4 MG: 4 INJECTION, SOLUTION INTRAMUSCULAR; INTRAVENOUS at 13:36

## 2022-01-30 RX ADMIN — IOPAMIDOL 75 ML: 755 INJECTION, SOLUTION INTRAVENOUS at 11:32

## 2022-01-30 ASSESSMENT — ENCOUNTER SYMPTOMS
FACIAL SWELLING: 0
CHOKING: 0
TROUBLE SWALLOWING: 1
RHINORRHEA: 1
SORE THROAT: 1
EYE DISCHARGE: 0
WHEEZING: 0
CHEST TIGHTNESS: 0
CONSTIPATION: 0
COUGH: 1
EYE PAIN: 0
SHORTNESS OF BREATH: 0
EYE ITCHING: 0
DIARRHEA: 0
VOMITING: 0
SINUS PAIN: 0
ABDOMINAL PAIN: 0
PHOTOPHOBIA: 0
NAUSEA: 0

## 2022-01-30 ASSESSMENT — PAIN DESCRIPTION - LOCATION: LOCATION: EAR

## 2022-01-30 ASSESSMENT — PAIN SCALES - GENERAL: PAINLEVEL_OUTOF10: 10

## 2022-01-30 ASSESSMENT — PAIN DESCRIPTION - ORIENTATION: ORIENTATION: RIGHT

## 2022-01-30 ASSESSMENT — PAIN DESCRIPTION - PAIN TYPE: TYPE: ACUTE PAIN

## 2022-01-30 NOTE — Clinical Note
Hector Salguero was seen and treated in our emergency department on 2022. She may return to work on 2022. If you have any questions or concerns, please don't hesitate to call.       Olga Boyer MD

## 2022-01-30 NOTE — ED NOTES
Pt present to the ED with C/O having strep throat. Pt went to PCP for her sore throat and was DX with strep throat. Pt stated she has been taking antibiotics since Tuesday. Pt stated on Tuesday she had a fever of  102. 1. Pt stated she has taking tylenol for her fever. Pt is experiencing pain in her right ear and on her right side. Pt denies injury to right side. Will continue to monitor and reassess.      Kem Carbajal RN  01/30/22 [id number]

## 2022-01-30 NOTE — ED NOTES
Per Krysten Vázquez in the lab, the flu swab is being processed at this time. MD morales.       Fernande Oppenheim, SAMMI  01/30/22 [id number]

## 2022-01-30 NOTE — ED NOTES
The following labs labeled with pt sticker and tubed to lab:     [] Blue     [x] Lavender   [] on ice  [x] Green/yellow  [] Green/black [] on ice  [] Yellow  [] Red  [] Pink      [] COVID-19 swab    [] Rapid  [] PCR  [] Peds Viral Panel     [] Urine Sample  [] Pelvic Cultures  [] Blood Cultures          Pamela Burns RN  01/30/22 7507

## 2022-01-30 NOTE — ED PROVIDER NOTES
[facility] ED  Emergency Department Encounter  EmergencyMedicine Resident     Pt Name:Marycruz Hayward  MRN: [id number]  [place] 1975  Date of evaluation: 1/30/22  PCP:  KAYKAY Willingham CNP    This patient was evaluated in the Emergency Department for symptoms described in the history of present illness. The patient was evaluated in the context of the global COVID-19 pandemic, which necessitated consideration that the patient might be at risk for infection with the SARS-CoV-2 virus that causes COVID-19. Institutional protocols and algorithms that pertain to the evaluation of patients at risk for COVID-19 are in a state of rapid change based on information released by regulatory bodies including the CDC and federal and state organizations. These policies and algorithms were followed during the patient's care in the ED. CHIEF COMPLAINT       Chief Complaint   Patient presents with    Pharyngitis      strep throat        HISTORY OF PRESENT ILLNESS  (Location/Symptom, Timing/Onset, Context/Setting, Quality, Duration, Modifying Factors, Severity.)      Arelis Moise is a 51 y.o. female who presents with sore throat right ear pain and right chest pain with coughing ongoing for about a week. She saw her PCP on 1/25/2022, with strep throat was negative and she was started on Augmentin. She had resolution of her fever with persistence other symptoms. History of asthma on Symbicort daily and albuterol inhaler as needed, recurrent ear infections, recurrent pharyngitis and tonsillitis; used to follow an ENT doctor in the past but has not had any visit the last 2 to 3 years. Denies fever, shortness of breath, palpitations, pedal edema, calf pain, history of DVT, diarrhea, vomiting, abdominal pain, visual changes sinus pain. She is vaccinated against Covid, pending booster dose. She is sexually active denies oral sex.  Total Hysterectomy in 2016    [facility],[address] / SURGICAL / SOCIAL / FAMILY HISTORY      has a past medical history of Anemia, Anxiety, Asthma, Bipolar 1 disorder ([facility] [address] Blackout spell, Body piercing, Cervicalgia, Chest pain, Chronic back pain, Chronic kidney disease, Chronic neck pain, Constipation, COPD (chronic obstructive pulmonary disease) ([facility] [address] Dental crown present, Depression, Fall, GERD (gastroesophageal reflux disease), Heartburn, History of blood transfusion, Hyperglycemia, Insomnia, Kidney stones, Knee pain, right, Low blood pressure, Low vitamin D level, Migraine, Numbness, Palpitations, Sprain of lumbosacral (joint) (ligament), [place] kidney stones, Tachycardia, Wears glasses, and Wears glasses. has a past surgical history that includes Ankle fracture surgery (Right, 1998); Wrist surgery (Right, 2002); total nephrectomy (Left, 4/17/12); laparoscopy (05/23/2014); Dilation and curettage of uterus (05/23/2014); myringotomy (Bilateral); other surgical history (age 27); Nerve Block (30-05[birth year]); Nerve Block (10/4/13); knee surgery (Left, 12/16/13); Cystoscopy (04/15/14); Ureter stent placement (Right, 04/15/14); Lithotripsy (Right, 04/15/14); Cystoscopy (Right, 4/29/14); Endometrial ablation (1/23/14); Hand surgery (Right); Hysterectomy (9-8-15); Nerve Block (12/8/15); Nerve Block (5/24/16); Nerve Block (11/21/2016); Sleeve Gastrectomy (N/A, 2/20/2017); Nerve Block (08/02/2017); Gastric bypass surgery (02/20/2017); Umbilical hernia repair (2007); Shoulder arthroscopy (Right); Knee arthroscopy (Right, 09/12/2017); pr knee scope,diagnostic (Right, 9/12/2017); Lithotripsy (2014); [place] tooth extraction; Gastric bypass surgery; Knee Arthroplasty (Right, 02/20/2018); pr total knee arthroplasty (Right, 2/20/2018); Nerve Block (06/05/2018); Nerve Block (Left, 08/22/2018); Nerve Block (Right, 08/29/2018); Nerve Block (Right, 09/18/2018); Arm Surgery (02/26/2019); and Cholecystectomy (12/26/2019).       Social History     Socioeconomic History    Marital status:      Spouse name: Not on file    Number of children: 3    Years of education: Not on file    Highest education level: Not on file   Occupational History    Not on file   Tobacco Use    Smoking status: Current Every Day Smoker     Packs/day: 0.50     Types: Cigarettes     Start date:      Last attempt to quit: 2017     Years since quittin.1    Smokeless tobacco: Never Used    Tobacco comment: litte less then 1/2 mariola/day   Vaping Use    Vaping Use: Never used   Substance and Sexual Activity    Alcohol use: No    Drug use: No    Sexual activity: Yes     Partners: Male   Other Topics Concern    Not on file   Social History Narrative    Not on file     Social Determinants of Health     Financial Resource Strain:     Difficulty of Paying Living Expenses: Not on file   Food Insecurity:     Worried About Running Out of Food in the Last Year: Not on file    Mihaela of Food in the Last Year: Not on file   Transportation Needs:     Lack of Transportation (Medical): Not on file    Lack of Transportation (Non-Medical):  Not on file   Physical Activity:     Days of Exercise per Week: Not on file    Minutes of Exercise per Session: Not on file   Stress:     Feeling of Stress : Not on file   Social Connections:     Frequency of Communication with Friends and Family: Not on file    Frequency of Social Gatherings with Friends and Family: Not on file    Attends Spiritism Services: Not on file    Active Member of [address] [place] [address] or Organizations: Not on file    Attends Club or Organization Meetings: Not on file    Marital Status: Not on file   Intimate Partner Violence:     Fear of Current or Ex-Partner: Not on file    Emotionally Abused: Not on file    Physically Abused: Not on file    Sexually Abused: Not on file   Housing Stability:     Unable to Pay for Housing in the Last Year: Not on file    Number of [place] in the Last Year: Not on file    Unstable Housing in the Last Year: Not on file       Family History   Problem Relation Age of Onset    Kidney Disease Mother         ? ?? stone     Cancer Father         unknown    Heart Disease Father     Seizures Father     Migraines Father     Coronary Art Dis Father     Migraines Sister     Cervical Cancer Sister     Lung Cancer Maternal Grandmother     Seizures Son     Diabetes Maternal Uncle     Diabetes Maternal Cousin     Lung Cancer Paternal Grandfather     Anxiety Disorder Daughter        Allergies:  Ibuprofen, Claritin [loratadine], and Tape [adhesive tape]    Home Medications:  Prior to Admission medications    Medication Sig Start Date End Date Taking? Authorizing Provider   clindamycin (CLEOCIN) 300 MG capsule Take 1 capsule by mouth 3 times daily for 10 days 1/30/22 2/9/22 Yes Michelle Montiel MD   orphenadrine (NORFLEX) 100 MG extended release tablet Take 1 tablet by mouth 2 times daily 6/30/21   Jorje Quinones MD   pramipexole (MIRAPEX) 0.125 MG tablet Take 0.125 mg by mouth nightly 8/31/20   Historical Provider, MD   ARIPiprazole (ABILIFY) 20 MG tablet aripiprazole 20 mg tablet    Historical Provider, MD   diclofenac sodium 1 % GEL Apply 2 g topically 4 times daily as needed for Pain 1/18/20   Stevie Hidalgo MD   acetaminophen (TYLENOL) 325 MG tablet Take 2 tablets by mouth every 8 hours as needed for Pain 11/13/19   Eduin Ozuna DO   zolpidem (AMBIEN) 5 MG tablet Take 5 mg by mouth nightly as needed for Sleep.     Historical Provider, MD   ondansetron (ZOFRAN) 4 MG tablet take 1 tablet by mouth 8 hours if needed for nausea and vomiting 1/16/19   Eduardo Quinatna MD   Sunday Tessie (AIMOVIG SC) Inject into the skin    Historical Provider, MD   venlafaxine (EFFEXOR XR) 37.5 MG extended release capsule Take 75 mg by mouth  2/8/18   Historical Provider, MD   topiramate (TOPAMAX) 100 MG tablet Take 100 mg by mouth 2/16/18 10/19/20  Historical Provider, MD   OXcarbazepine (TRILEPTAL) 300 MG tablet take 1 tablet by mouth at bedtime 1/30/18   Historical Provider, MD   polyethylene glycol (GLYCOLAX) powder Take 17 g by mouth daily    Historical Provider, MD   promethazine (PHENERGAN) 25 MG tablet Take 25 mg by mouth every 6 hours as needed for Nausea    Historical Provider, MD   Multiple Vitamins-Minerals (WOMENS ONE DAILY) TABS Take 1 tablet by mouth daily    Historical Provider, MD   vitamin D3 (CHOLECALCIFEROL) 400 units TABS tablet Take 400 Units by mouth daily    Historical Provider, MD   montelukast (SINGULAIR) 10 MG tablet Take 10 mg by mouth nightly 8/7/17   Historical Provider, MD   OXcarbazepine (TRILEPTAL) 150 MG tablet Take 450 mg by mouth nightly    Historical Provider, MD   amitriptyline (ELAVIL) 100 MG tablet Take 100 mg by mouth nightly    Historical Provider, MD   SUMAtriptan (IMITREX) 100 MG tablet Take 100 mg by mouth once as needed for Migraine    Historical Provider, MD   Potassium 75 MG TABS Take 75 mg by mouth 2 times daily    Historical Provider, MD   omeprazole (PRILOSEC) 20 MG delayed release capsule Take 40 mg by mouth daily     Historical Provider, MD   albuterol sulfate  (90 BASE) MCG/ACT inhaler Inhale 2 puffs into the lungs every 6 hours as needed for Wheezing    Historical Provider, MD   budesonide-formoterol (SYMBICORT) 160-4.5 MCG/ACT AERO Inhale 2 puffs into the lungs 2 times daily    Historical Provider, MD       REVIEW OF SYSTEMS    (2-9 systems for level 4, 10 or more for level 5)      Review of Systems   Constitutional: Positive for appetite change. Negative for activity change, chills, fatigue and fever. HENT: Positive for congestion, ear pain, hearing loss, mouth sores, rhinorrhea, sore throat, tinnitus and trouble swallowing. Negative for dental problem, drooling, ear discharge, facial swelling, nosebleeds and sinus pain. Eyes: Negative for photophobia, pain, discharge, itching and visual disturbance. Respiratory: Positive for cough.  Negative for choking, chest tightness, shortness of breath and wheezing. Cardiovascular: Positive for chest pain. Negative for palpitations and leg swelling. Gastrointestinal: Negative for abdominal pain, constipation, diarrhea, nausea and vomiting. Musculoskeletal: Positive for arthralgias and myalgias. Neurological: Positive for headaches. Negative for dizziness, seizures, syncope, facial asymmetry, weakness and light-headedness. Psychiatric/Behavioral: Negative for confusion. The patient is not nervous/anxious. PHYSICAL EXAM   (up to 7 for level 4, 8 or more for level 5)      INITIAL VITALS:   /80   Pulse 74   Temp 98.5 °F (36.9 °C) (Oral)   Resp 18   Ht 5' 2\" (1.575 m)   Wt 176 lb (79.8 kg)   LMP 09/08/2015   SpO2 99%   BMI 32.19 kg/m²     Physical Exam  Constitutional:       General: She is not in acute distress. Appearance: She is well-developed. She is not ill-appearing. HENT:      Right Ear: Tenderness present. No drainage or swelling. A middle ear effusion is present. Tympanic membrane is not erythematous. Left Ear: No drainage, swelling or tenderness. No middle ear effusion. Tympanic membrane is not erythematous. Mouth/Throat:      Mouth: Mucous membranes are moist.      Pharynx: Uvula midline. Posterior oropharyngeal erythema and uvula swelling present. No oropharyngeal exudate. Eyes:      Conjunctiva/sclera: Conjunctivae normal.      Pupils: Pupils are equal, round, and reactive to light. Cardiovascular:      Rate and Rhythm: Tachycardia present. Heart sounds: Normal heart sounds. Pulmonary:      Effort: No respiratory distress. Breath sounds: No stridor. No wheezing or rales. Abdominal:      General: Bowel sounds are normal. There is no distension. Palpations: Abdomen is soft. There is no mass. Musculoskeletal:      Cervical back: Normal range of motion. Lymphadenopathy:      Cervical: Cervical adenopathy present. Skin:     General: Skin is warm.    Neurological: General: No focal deficit present. Mental Status: She is alert and oriented to person, place, and time. DIFFERENTIAL  DIAGNOSIS     PLAN (LABS / IMAGING / EKG):  Orders Placed This Encounter   Procedures    RAPID INFLUENZA A/B ANTIGENS    XR CHEST PORTABLE    CT SOFT TISSUE NECK W CONTRAST    CBC WITH AUTO DIFFERENTIAL    Basic Metabolic Panel w/ Reflex to MG    Troponin    Troponin    Inpatient consult to Otolaryngology    EKG 12 Lead       MEDICATIONS ORDERED:  Orders Placed This Encounter   Medications    iopamidol (ISOVUE-370) 76 % injection 75 mL    dexamethasone (DECADRON) injection 4 mg    clindamycin (CLEOCIN) 300 MG capsule     Sig: Take 1 capsule by mouth 3 times daily for 10 days     Dispense:  30 capsule     Refill:  0       DDX: Pharyngitis, mid ear effusion, r/o retropharyngeal abscess    DIAGNOSTIC RESULTS / EMERGENCY DEPARTMENT COURSE / MDM   LAB RESULTS:  Results for orders placed or performed during the hospital encounter of 01/30/22   RAPID INFLUENZA A/B ANTIGENS    Specimen: Nasopharyngeal Swab   Result Value Ref Range    Specimen Description . NASOPHARYNGEAL SWAB     Special Requests NOT REPORTED     Direct Exam       NEGATIVE for Influenza A + B antigens. PCR testing to confirm this result is available upon request.  Specimen will be saved in the laboratory for 7 days. Please call [phone] if PCR testing is indicated.    CBC WITH AUTO DIFFERENTIAL   Result Value Ref Range    WBC 9.9 3.5 - 11.3 k/uL    RBC 4.77 3.95 - 5.11 m/uL    Hemoglobin 13.8 11.9 - 15.1 g/dL    Hematocrit 43.1 36.3 - 47.1 %    MCV 90.4 82.6 - 102.9 fL    MCH 28.9 25.2 - 33.5 pg    MCHC 32.0 28.4 - 34.8 g/dL    RDW 13.3 11.8 - 14.4 %    Platelets 101 602 - 296 k/uL    MPV 11.1 8.1 - 13.5 fL    NRBC Automated 0.0 0.0 per 100 WBC    Differential Type NOT REPORTED     Seg Neutrophils 63 36 - 65 %    Lymphocytes 29 24 - 43 %    Monocytes 6 3 - 12 % Eosinophils % 1 1 - 4 %    Basophils 0 0 - 2 %    Immature Granulocytes 1 (H) 0 %    Segs Absolute 6.25 1.50 - 8.10 k/uL    Absolute Lymph # 2.89 1.10 - 3.70 k/uL    Absolute Mono # 0.56 0.10 - 1.20 k/uL    Absolute Eos # 0.08 0.00 - 0.44 k/uL    Basophils Absolute 0.04 0.00 - 0.20 k/uL    Absolute Immature Granulocyte 0.09 0.00 - 0.30 k/uL    WBC Morphology NOT REPORTED     RBC Morphology NOT REPORTED     Platelet Estimate NOT REPORTED    Basic Metabolic Panel w/ Reflex to MG   Result Value Ref Range    Glucose 117 (H) 70 - 99 mg/dL    BUN 9 6 - 20 mg/dL    CREATININE 1.08 (H) 0.50 - 0.90 mg/dL    Bun/Cre Ratio NOT REPORTED 9 - 20    Calcium 9.8 8.6 - 10.4 mg/dL    Sodium 140 135 - 144 mmol/L    Potassium 3.7 3.7 - 5.3 mmol/L    Chloride 105 98 - 107 mmol/L    CO2 22 20 - 31 mmol/L    Anion Gap 13 9 - 17 mmol/L    GFR Non-African American 54 (L) >60 mL/min    GFR African American >60 >60 mL/min    GFR Comment          GFR Staging NOT REPORTED    Troponin   Result Value Ref Range    Troponin, High Sensitivity 7 0 - 14 ng/L    Troponin T NOT REPORTED <0.03 ng/mL    Troponin Interp NOT REPORTED    Troponin   Result Value Ref Range    Troponin, High Sensitivity 7 0 - 14 ng/L    Troponin T NOT REPORTED <0.03 ng/mL    Troponin Interp NOT REPORTED          RADIOLOGY:  No results found.        EKG  EKG Interpretation    Interpreted by emergency department physician    Rhythm: sinus tachycardia  Rate: tachycardia  Axis: normal  Ectopy: none  Conduction: normal  ST Segments: normal  T Waves: normal  Q Waves: none    Clinical Impression: sinus tachycardia    Ed MD Cathi     All EKG's are interpreted by the Emergency Department Physician who either signs or Co-signs this chart in the absence of a cardiologist.    IMPRESSION: Sinus Tachycardia      EMERGENCY DEPARTMENT COURSE:  ED Course as of 01/30/22 1400   Sun Jan 30, 2022   1108 CXR grossly normal.  [DA]   1120 Troponin and cbc normal [DA]   1151 EKG Interpretation   Interpreted by Skyla Barraza DO    Rhythm: sinus tachycardia  Rate:sinus tachycardia  Axis: normal  Ectopy: none  Conduction: normal  ST Segments: nonspecfic  T Waves: flattening laterally and anteriorly  Q Waves: none    Clinical Impression: nonspecific [WK]   1302 CT neck soft tissue Suggestion of mild bilateral tonsillitis, without evidence of PTA, cervical lymphadenopathy, mastoid effusions. [DA]   1306 Troponin, High Sensitivity: 7 [DA]   1330 Discussed case with Dr Lopez, who recommended a dose of decadron and po clindamycin. Will plan discharge [DA]      ED Course User Index  [DA] Morteza Trinidad MD  [WK] Skyla Barraza DO       PROCEDURES:      CONSULTS:  IP CONSULT TO OTOLARYNGOLOGY        FINAL IMPRESSION      1. Mastoid abscess, bilateral    2. Acute tonsillitis due to other specified organisms    3.  Pharyngitis, unspecified etiology          DISPOSITION / PLAN     DISPOSITION Decision To Discharge 01/30/2022 01:41:48 PM      PATIENT REFERRED TO:  [address]  [address] [facility] [address] [facility] [address] [phone]  Schedule an appointment as soon as possible for a visit in 1 week  As needed    Susanna Jones, APRN - [facility]  [address]  [address] [facility] [id number] [phone]    Schedule an appointment as soon as possible for a visit in 1 week        DISCHARGE MEDICATIONS:  Discharge Medication List as of 1/30/2022  1:48 PM      START taking these medications    Details   clindamycin (CLEOCIN) 300 MG capsule Take 1 capsule by mouth 3 times daily for 10 days, Disp-30 capsule, R-0Print             Morteza Trinidad MD  Emergency Medicine Resident    (Please note that portions of thisnote were completed with a voice recognition program.  Efforts were made to edit the dictations but occasionally words are mis-transcribed.)        Morteza Trinidad MD  Resident  01/30/22 0094

## 2022-01-30 NOTE — ED PROVIDER NOTES
[address] [facility]     Emergency Department     Faculty Note/ Attestation      Pt Name: Luis Rizzo                                       MRN: [id number]  [place] 1975  Date of evaluation: 1/30/2022    Patients PCP:    KAYKAY Rodriguez - CNP    Attestation  I performed a history and physical examination of the patient and discussed management with the resident. I reviewed the residents note and agree with the documented findings and plan of care. Any areas of disagreement are noted on the chart. I was personally present for the key portions of any procedures. I have documented in the chart those procedures where I was not present during the key portions. I have reviewed the emergency nurses triage note. I agree with the chief complaint, past medical history, past surgical history, allergies, medications, social and family history as documented unless otherwise noted below. For Physician Assistant/ Nurse Practitioner cases/documentation I have personally evaluated this patient and have completed at least one if not all key elements of the E/M (history, physical exam, and MDM). Additional findings are as noted. Initial Screens:             Vitals:    Vitals:    01/30/22 0945 01/30/22 0952 01/30/22 1053 01/30/22 1147   BP: (!) 145/89 (!) 140/103 123/76    Pulse: 116 116 97 88   Resp: 20 18 17    Temp: 98.7 °F (37.1 °C) 98.5 °F (36.9 °C)     TempSrc: Oral Oral     SpO2: 97% 96% 98%    Weight: 176 lb (79.8 kg)      Height: 5' 2\" (1.575 m)          CHIEF COMPLAINT       Chief Complaint   Patient presents with    Pharyngitis      strep throat        The pt is a 50 YO F with sore throat x 1 week. She had associated fevers chills aches and ear pain. The pt was seen by PCP and started on amoxicillin/clavulanic acid x  Days. Fever resolved but other sx have not. No difficulty speaking talking or swallowing. Recent COVID infection. No Teeth problems.           EMERGENCY DEPARTMENT COURSE:     -------------------------  BP: 123/76, Temp: 98.5 °F (36.9 °C), Pulse: 88, Resp: 17  Physical Exam  Constitutional:       Appearance: She is well-developed. She is not diaphoretic. HENT:      Head: Normocephalic and atraumatic. Right Ear: External ear normal.      Left Ear: External ear normal.   Eyes:      General: No scleral icterus. Right eye: No discharge. Left eye: No discharge. Neck:      Trachea: No tracheal deviation. Pulmonary:      Effort: Pulmonary effort is normal. No respiratory distress. Breath sounds: No stridor. Musculoskeletal:         General: Normal range of motion. Cervical back: Normal range of motion. Skin:     General: Skin is warm and dry. Comments: No leg swelling of the legs or tednerness   Neurological:      Mental Status: She is alert and oriented to person, place, and time.       Coordination: Coordination normal.   Psychiatric:         Behavior: Behavior normal.       Comments  The patient complains of sore throat   Based on history and physical exam they are unlikely to have pharyngitis given the patient has been tested and been on antibiotics more concerning is retropharyngeal abscess or mastoiditis given the infection less likely Ludewig's without any teeth or palatal elevation less likely epiglottitis with normal phonation no signs of foreign body patient is not orally sexually active      ED Course as of 01/30/22 1152   Sun Jan 30, 2022   1108 CXR grossly normal.  [DA]   1120 Troponin and cbc normal [DA]   1151 EKG Interpretation   Interpreted by Gissel Pagan DO    Rhythm: sinus tachycardia  Rate:sinus tachycardia  Axis: normal  Ectopy: none  Conduction: normal  ST Segments: nonspecfic  T Waves: flattening laterally and anteriorly  Q Waves: none    Clinical Impression: nonspecific [WK]      ED Course User Index  [DA] Caitlyn Munguia MD  [WK] Gissel Pagan DO   Given the bounce backs and the severity of the symptoms patient will have CT of the neck for mastoiditis/retropharyngeal abscess    Lana Chirinos DO,, DO, RDMS.   Attending Emergency Physician          Lana Chirinos DO  01/30/22 1156

## 2022-01-31 LAB
EKG ATRIAL RATE: 102 BPM
EKG P AXIS: 45 DEGREES
EKG P-R INTERVAL: 120 MS
EKG Q-T INTERVAL: 344 MS
EKG QRS DURATION: 84 MS
EKG QTC CALCULATION (BAZETT): 448 MS
EKG R AXIS: 43 DEGREES
EKG T AXIS: 62 DEGREES
EKG VENTRICULAR RATE: 102 BPM

## 2022-01-31 PROCEDURE — 93010 ELECTROCARDIOGRAM REPORT: CPT | Performed by: INTERNAL MEDICINE

## 2022-03-27 ENCOUNTER — HOSPITAL ENCOUNTER (EMERGENCY)
Age: 47
Discharge: HOME | End: 2022-03-27
Attending: EMERGENCY MEDICINE
Payer: MEDICARE

## 2022-03-27 VITALS
OXYGEN SATURATION: 100 % | TEMPERATURE: 98.4 F | SYSTOLIC BLOOD PRESSURE: 130 MMHG | DIASTOLIC BLOOD PRESSURE: 87 MMHG | HEART RATE: 87 BPM | RESPIRATION RATE: 18 BRPM

## 2022-03-27 DIAGNOSIS — G43.009 MIGRAINE WITHOUT AURA AND WITHOUT STATUS MIGRAINOSUS, NOT INTRACTABLE: Primary | ICD-10-CM

## 2022-03-27 LAB
ABSOLUTE EOS #: 0.15 K/UL (ref 0–0.44)
ABSOLUTE IMMATURE GRANULOCYTE: 0.04 K/UL (ref 0–0.3)
ABSOLUTE LYMPH #: 2.14 K/UL (ref 1.1–3.7)
ABSOLUTE MONO #: 0.54 K/UL (ref 0.1–1.2)
ANION GAP SERPL CALCULATED.3IONS-SCNC: 12 MMOL/L (ref 9–17)
BASOPHILS # BLD: 0 % (ref 0–2)
BASOPHILS ABSOLUTE: <0.03 K/UL (ref 0–0.2)
BUN BLDV-MCNC: 13 MG/DL (ref 6–20)
CALCIUM SERPL-MCNC: 10 MG/DL (ref 8.6–10.4)
CHLORIDE BLD-SCNC: 109 MMOL/L (ref 98–107)
CO2: 23 MMOL/L (ref 20–31)
CREAT SERPL-MCNC: 1.08 MG/DL (ref 0.5–0.9)
EOSINOPHILS RELATIVE PERCENT: 2 % (ref 1–4)
GFR AFRICAN AMERICAN: >60 ML/MIN
GFR NON-AFRICAN AMERICAN: 54 ML/MIN
GFR SERPL CREATININE-BSD FRML MDRD: ABNORMAL ML/MIN/{1.73_M2}
GLUCOSE BLD-MCNC: 91 MG/DL (ref 70–99)
HCT VFR BLD CALC: 44.4 % (ref 36.3–47.1)
HEMOGLOBIN: 13.8 G/DL (ref 11.9–15.1)
IMMATURE GRANULOCYTES: 1 %
LYMPHOCYTES # BLD: 26 % (ref 24–43)
MCH RBC QN AUTO: 28.2 PG (ref 25.2–33.5)
MCHC RBC AUTO-ENTMCNC: 31.1 G/DL (ref 28.4–34.8)
MCV RBC AUTO: 90.8 FL (ref 82.6–102.9)
MONOCYTES # BLD: 7 % (ref 3–12)
NRBC AUTOMATED: 0 PER 100 WBC
PDW BLD-RTO: 13.7 % (ref 11.8–14.4)
PLATELET # BLD: 296 K/UL (ref 138–453)
PMV BLD AUTO: 10.6 FL (ref 8.1–13.5)
POTASSIUM SERPL-SCNC: 4 MMOL/L (ref 3.7–5.3)
RBC # BLD: 4.89 M/UL (ref 3.95–5.11)
SEG NEUTROPHILS: 64 % (ref 36–65)
SEGMENTED NEUTROPHILS ABSOLUTE COUNT: 5.4 K/UL (ref 1.5–8.1)
SODIUM BLD-SCNC: 144 MMOL/L (ref 135–144)
WBC # BLD: 8.3 K/UL (ref 3.5–11.3)

## 2022-03-27 PROCEDURE — 99282 EMERGENCY DEPT VISIT SF MDM: CPT

## 2022-03-27 PROCEDURE — 6360000002 HC RX W HCPCS: Performed by: EMERGENCY MEDICINE

## 2022-03-27 PROCEDURE — 96361 HYDRATE IV INFUSION ADD-ON: CPT

## 2022-03-27 PROCEDURE — 80048 BASIC METABOLIC PNL TOTAL CA: CPT

## 2022-03-27 PROCEDURE — 85025 COMPLETE CBC W/AUTO DIFF WBC: CPT

## 2022-03-27 PROCEDURE — 2580000003 HC RX 258: Performed by: EMERGENCY MEDICINE

## 2022-03-27 PROCEDURE — 96375 TX/PRO/DX INJ NEW DRUG ADDON: CPT

## 2022-03-27 PROCEDURE — 96374 THER/PROPH/DIAG INJ IV PUSH: CPT

## 2022-03-27 RX ORDER — PROCHLORPERAZINE EDISYLATE 5 MG/ML
10 INJECTION INTRAMUSCULAR; INTRAVENOUS ONCE
Status: COMPLETED | OUTPATIENT
Start: 2022-03-27 | End: 2022-03-27

## 2022-03-27 RX ORDER — KETOROLAC TROMETHAMINE 15 MG/ML
15 INJECTION, SOLUTION INTRAMUSCULAR; INTRAVENOUS ONCE
Status: COMPLETED | OUTPATIENT
Start: 2022-03-27 | End: 2022-03-27

## 2022-03-27 RX ORDER — 0.9 % SODIUM CHLORIDE 0.9 %
1000 INTRAVENOUS SOLUTION INTRAVENOUS ONCE
Status: COMPLETED | OUTPATIENT
Start: 2022-03-27 | End: 2022-03-27

## 2022-03-27 RX ADMIN — KETOROLAC TROMETHAMINE 15 MG: 15 INJECTION, SOLUTION INTRAMUSCULAR; INTRAVENOUS at 14:36

## 2022-03-27 RX ADMIN — PROCHLORPERAZINE EDISYLATE 10 MG: 5 INJECTION INTRAMUSCULAR; INTRAVENOUS at 14:36

## 2022-03-27 RX ADMIN — SODIUM CHLORIDE 1000 ML: 9 INJECTION, SOLUTION INTRAVENOUS at 14:36

## 2022-03-27 ASSESSMENT — ENCOUNTER SYMPTOMS
SORE THROAT: 0
DIARRHEA: 0
SHORTNESS OF BREATH: 0
NAUSEA: 0
VOMITING: 0
ABDOMINAL PAIN: 0
CONSTIPATION: 0

## 2022-03-27 ASSESSMENT — PAIN DESCRIPTION - PAIN TYPE: TYPE: ACUTE PAIN

## 2022-03-27 ASSESSMENT — PAIN DESCRIPTION - LOCATION: LOCATION: HEAD

## 2022-03-27 ASSESSMENT — PAIN SCALES - GENERAL: PAINLEVEL_OUTOF10: 9

## 2022-03-27 NOTE — ED PROVIDER NOTES
[address]  ED  Emergency Department Encounter  EmergencyMedicine Resident     Pt Name:Marycruz Greene  MRN: [id number]  [place] 1975  Date of evaluation: 3/27/22  PCP:  KAYKAY Ugalde CNP    CHIEF COMPLAINT       Chief Complaint   Patient presents with    Migraine     neck pain       HISTORY OF PRESENT ILLNESS  (Location/Symptom, Timing/Onset, Context/Setting, Quality, Duration, Modifying Factors, Severity.)      Doc Gage is a 51 y.o. female who presents to the emergency department with area of right-sided headache which patient states is very similar to her prior episodes of migraines. Patient has a history of chronic migraines and is on multiple medications for which she is compliant including an injection which she obtained last week. Since Friday she has been having right-sided head pain with intermittent nausea and at one point 2 days ago she had some mild right-sided facial numbness which self resolved and currently denies any fever, chills, vision changes, HEENT symptoms other than chronic sinusitis, chest pain, shortness breath, abdominal pain, nausea or vomiting, problems with urination or bowel movements, or numbness or tingling anywhere. Endorses smoking. Chart review shows prior visits for migraines the most recent of which was treated with Compazine and fluids successfully.     PAST MEDICAL / SURGICAL / SOCIAL / FAMILY HISTORY      has a past medical history of Anemia, Anxiety, Asthma, Bipolar 1 disorder ([address] Blackout spell, Body piercing, Cervicalgia, Chest pain, Chronic back pain, Chronic kidney disease, Chronic neck pain, Constipation, COPD (chronic obstructive pulmonary disease) ([address] Dental crown present, Depression, Fall, GERD (gastroesophageal reflux disease), Heartburn, History of blood transfusion, Hyperglycemia, Insomnia, Kidney stones, Knee pain, right, Low blood pressure, Low vitamin D level, Migraine, Numbness, Palpitations, Sprain of lumbosacral (joint) (ligament), Staghorn kidney stones, Tachycardia, Wears glasses, and Wears glasses. has a past surgical history that includes Ankle fracture surgery (Right, ); Wrist surgery (Right, ); total nephrectomy (Left, 12); laparoscopy (2014); Dilation and curettage of uterus (2014); myringotomy (Bilateral); other surgical history (age 27); Nerve Block (); Nerve Block (10/4/13); knee surgery (Left, 13); Cystoscopy (04/15/14); Ureter stent placement (Right, 04/15/14); Lithotripsy (Right, 04/15/14); Cystoscopy (Right, 14); Endometrial ablation (14); Hand surgery (Right); Hysterectomy (9-8-15); Nerve Block (12/8/15); Nerve Block (16); Nerve Block (2016); Sleeve Gastrectomy (N/A, 2017); Nerve Block (2017); Gastric bypass surgery (2017); Umbilical hernia repair (); Shoulder arthroscopy (Right); Knee arthroscopy (Right, 2017); pr knee scope,diagnostic (Right, 2017); Lithotripsy (); Welch tooth extraction; Gastric bypass surgery; Knee Arthroplasty (Right, 2018); pr total knee arthroplasty (Right, 2018); Nerve Block (2018); Nerve Block (Left, 2018); Nerve Block (Right, 2018); Nerve Block (Right, 2018); Arm Surgery (2019); and Cholecystectomy (2019).     Social History     Socioeconomic History    Marital status:      Spouse name: Not on file    Number of children: 3    Years of education: Not on file    Highest education level: Not on file   Occupational History    Not on file   Tobacco Use    Smoking status: Current Every Day Smoker     Packs/day: 0.50     Types: Cigarettes     Start date:      Last attempt to quit: 2017     Years since quittin.3    Smokeless tobacco: Never Used    Tobacco comment: litte less then 1/2 mariola/day   Vaping Use    Vaping Use: Never used   Substance and Sexual Activity    Alcohol use: No    Drug use: No    Sexual activity: Yes     Partners: Male   Other Topics Concern    Not on file   Social History Narrative    Not on file     Social Determinants of Health     Financial Resource Strain:     Difficulty of Paying Living Expenses: Not on file   Food Insecurity:     Worried About [address] in the Last Year: Not on file    Mihaela of Food in the Last Year: Not on file   Transportation Needs:     Lack of Transportation (Medical): Not on file    Lack of Transportation (Non-Medical): Not on file   Physical Activity:     Days of Exercise per Week: Not on file    Minutes of Exercise per Session: Not on file   Stress:     Feeling of Stress : Not on file   Social Connections:     Frequency of Communication with Friends and Family: Not on file    Frequency of Social Gatherings with Friends and Family: Not on file    Attends Sabianist Services: Not on file    Active Member of [address] [place] [address] or Organizations: Not on file    Attends Club or Organization Meetings: Not on file    Marital Status: Not on file   Intimate Partner Violence:     Fear of Current or Ex-Partner: Not on file    Emotionally Abused: Not on file    Physically Abused: Not on file    Sexually Abused: Not on file   Housing Stability:     Unable to Pay for Housing in the Last Year: Not on file    Number of [place] in the Last Year: Not on file    Unstable Housing in the Last Year: Not on file       Family History   Problem Relation Age of Onset    Kidney Disease Mother         ? ?? stone     Cancer Father         unknown    Heart Disease Father     Seizures Father     Migraines Father     Coronary Art Dis Father     Migraines Sister     Cervical Cancer Sister     Lung Cancer Maternal Grandmother     Seizures Son     Diabetes Maternal Uncle     Diabetes Maternal Cousin     Lung Cancer Paternal Grandfather     Anxiety Disorder Daughter        Allergies:  Ibuprofen, Claritin [loratadine], and Tape [adhesive tape]    Home Medications:  Prior to Admission medications    Medication Sig Start Date End Date Taking? Authorizing Provider   orphenadrine (NORFLEX) 100 MG extended release tablet Take 1 tablet by mouth 2 times daily 6/30/21   Ravi Franks MD   pramipexole (MIRAPEX) 0.125 MG tablet Take 0.125 mg by mouth nightly 8/31/20   Historical Provider, MD   ARIPiprazole (ABILIFY) 20 MG tablet aripiprazole 20 mg tablet    Historical Provider, MD   diclofenac sodium 1 % GEL Apply 2 g topically 4 times daily as needed for Pain 1/18/20   Marvin Melo MD   acetaminophen (TYLENOL) 325 MG tablet Take 2 tablets by mouth every 8 hours as needed for Pain 11/13/19   Merlinda Sportsman, DO   zolpidem (AMBIEN) 5 MG tablet Take 5 mg by mouth nightly as needed for Sleep.     Historical Provider, MD   ondansetron (ZOFRAN) 4 MG tablet take 1 tablet by mouth 8 hours if needed for nausea and vomiting 1/16/19   MD Wesley Stockton (AIMOVIG SC) Inject into the skin    Historical Provider, MD   venlafaxine (EFFEXOR XR) 37.5 MG extended release capsule Take 75 mg by mouth  2/8/18   Historical Provider, MD   topiramate (TOPAMAX) 100 MG tablet Take 100 mg by mouth 2/16/18 10/19/20  Historical Provider, MD   OXcarbazepine (TRILEPTAL) 300 MG tablet take 1 tablet by mouth at bedtime 1/30/18   Historical Provider, MD   polyethylene glycol (GLYCOLAX) powder Take 17 g by mouth daily    Historical Provider, MD   promethazine (PHENERGAN) 25 MG tablet Take 25 mg by mouth every 6 hours as needed for Nausea    Historical Provider, MD   Multiple Vitamins-Minerals (WOMENS ONE DAILY) TABS Take 1 tablet by mouth daily    Historical Provider, MD   vitamin D3 (CHOLECALCIFEROL) 400 units TABS tablet Take 400 Units by mouth daily    Historical Provider, MD   montelukast (SINGULAIR) 10 MG tablet Take 10 mg by mouth nightly 8/7/17   Historical Provider, MD   OXcarbazepine (TRILEPTAL) 150 MG tablet Take 450 mg by mouth nightly    Historical Provider, MD amitriptyline (ELAVIL) 100 MG tablet Take 100 mg by mouth nightly    Historical Provider, MD   SUMAtriptan (IMITREX) 100 MG tablet Take 100 mg by mouth once as needed for Migraine    Historical Provider, MD   Potassium 75 MG TABS Take 75 mg by mouth 2 times daily    Historical Provider, MD   omeprazole (PRILOSEC) 20 MG delayed release capsule Take 40 mg by mouth daily     Historical Provider, MD   albuterol sulfate  (90 BASE) MCG/ACT inhaler Inhale 2 puffs into the lungs every 6 hours as needed for Wheezing    Historical Provider, MD   budesonide-formoterol (SYMBICORT) 160-4.5 MCG/ACT AERO Inhale 2 puffs into the lungs 2 times daily    Historical Provider, MD       REVIEW OF SYSTEMS    (2-9 systems for level 4, 10 or more for level 5)      Review of Systems   Constitutional: Negative for chills and fever. HENT: Negative for ear pain, hearing loss and sore throat. Eyes: Negative for visual disturbance. Respiratory: Negative for shortness of breath. Cardiovascular: Negative for chest pain. Gastrointestinal: Negative for abdominal pain, constipation, diarrhea, nausea and vomiting. Genitourinary: Negative for difficulty urinating and dysuria. Musculoskeletal: Positive for neck pain (Bilateral leg pain, patient states she sometimes gets this with her migraines). Negative for arthralgias and myalgias. Neurological: Positive for headaches. Negative for weakness and numbness. Psychiatric/Behavioral: Negative for agitation and confusion. PHYSICAL EXAM   (up to 7 for level 4, 8 or more for level 5)      INITIAL VITALS:   /87   Pulse 107   Temp 98.4 °F (36.9 °C) (Oral)   Resp 18   LMP 09/08/2015   SpO2 100%     Physical Exam  Vitals and nursing note reviewed. Constitutional:       General: She is not in acute distress. Appearance: Normal appearance. She is well-developed. She is not ill-appearing or diaphoretic. HENT:      Head: Normocephalic and atraumatic.       Right Ear: Ear canal and external ear normal.      Left Ear: Tympanic membrane, ear canal and external ear normal.      Ears:      Comments: Fluid behind the right eardrum, tympanostomy scar     Nose: Nose normal.      Mouth/Throat:      Mouth: Mucous membranes are moist.   Eyes:      Extraocular Movements: Extraocular movements intact. Conjunctiva/sclera: Conjunctivae normal.      Pupils: Pupils are equal, round, and reactive to light. Neck:      Trachea: No tracheal deviation. Cardiovascular:      Rate and Rhythm: Normal rate and regular rhythm. Heart sounds: Normal heart sounds. No murmur heard. No friction rub. No gallop. Pulmonary:      Effort: Pulmonary effort is normal. No respiratory distress. Breath sounds: Normal breath sounds. No wheezing, rhonchi or rales. Abdominal:      General: Abdomen is flat. There is no distension. Musculoskeletal:         General: No swelling, deformity or signs of injury. Normal range of motion. Cervical back: Normal range of motion and neck supple. Skin:     General: Skin is warm and dry. Coloration: Skin is not jaundiced. Findings: No bruising or lesion. Neurological:      General: No focal deficit present. Mental Status: She is alert and oriented to person, place, and time. Mental status is at baseline. Cranial Nerves: No cranial nerve deficit. Sensory: No sensory deficit. Motor: No weakness or abnormal muscle tone. INITIAL NIH STROKE SCALE    Time Performed:  3:09 PM    Administer stroke scale items in the order listed. Record performance in each category after each subscale exam. Do not go back and change scores. Follow directions provided for each exam technique. Scores should reflect what the patient does, not what the clinician thinks the patient can do. The clinician should record answers while administering the exam and work quickly.  Except where indicated, the patient should not be coached (i.e., repeated requests to patient to make a special effort). 1a.  Level of consciousness:  0 - alert; keenly responsive  1b. Level of consciousness questions:  0 - answers both questions correctly  1c. Level of consciousness questions:  0 - performs both tasks correctly  2. Best Gaze:  0 - normal  3. Visual:  0 - no visual loss  4. Facial Palsy:  0 - normal symmetric movement  5a. Motor left arm:  0 - no drift, limb holds 90 (or 45) degrees for full 10 seconds  5b. Motor right arm:  0 - no drift, limb holds 90 (or 45) degrees for full 10 seconds  6a. Motor left le - no drift; leg holds 30 degree position for full 5 seconds  6b. Motor right le - no drift; leg holds 30 degree position for full 5 seconds  7. Limb Ataxia:  0 - absent  8. Sensory:  0 - normal; no sensory loss  9. Best Language:  0 - no aphasia, normal  10. Dysarthria:  0 - normal  11. Extinction and Inattention:  0 - no abnormality    TOTAL:  0        DIFFERENTIAL  DIAGNOSIS     PLAN (LABS / IMAGING / EKG):  Orders Placed This Encounter   Procedures    CBC with Auto Differential    Basic Metabolic Panel w/ Reflex to MG       MEDICATIONS ORDERED:  Orders Placed This Encounter   Medications    0.9 % sodium chloride bolus    prochlorperazine (COMPAZINE) injection 10 mg    ketorolac (TORADOL) injection 15 mg       DDX: Blanca Caldwell is a 51 y.o. female who presents to the emergency department with right-sided headache for 2 days.  Differential diagnosis includes migraine, status migrainosus, tension headache    DIAGNOSTIC RESULTS / EMERGENCY DEPARTMENT COURSE / MDM   LAB RESULTS:  Results for orders placed or performed during the hospital encounter of 22   CBC with Auto Differential   Result Value Ref Range    WBC 8.3 3.5 - 11.3 k/uL    RBC 4.89 3.95 - 5.11 m/uL    Hemoglobin 13.8 11.9 - 15.1 g/dL    Hematocrit 44.4 36.3 - 47.1 %    MCV 90.8 82.6 - 102.9 fL    MCH 28.2 25.2 - 33.5 pg    MCHC 31.1 28.4 - 34.8 g/dL    RDW 13.7 11.8 - 14.4 %    Platelets 599 614 - 182 k/uL    MPV 10.6 8.1 - 13.5 fL    NRBC Automated 0.0 0.0 per 100 WBC    Seg Neutrophils 64 36 - 65 %    Lymphocytes 26 24 - 43 %    Monocytes 7 3 - 12 %    Eosinophils % 2 1 - 4 %    Basophils 0 0 - 2 %    Immature Granulocytes 1 (H) 0 %    Segs Absolute 5.40 1.50 - 8.10 k/uL    Absolute Lymph # 2.14 1.10 - 3.70 k/uL    Absolute Mono # 0.54 0.10 - 1.20 k/uL    Absolute Eos # 0.15 0.00 - 0.44 k/uL    Basophils Absolute <0.03 0.00 - 0.20 k/uL    Absolute Immature Granulocyte 0.04 0.00 - 0.30 k/uL   Basic Metabolic Panel w/ Reflex to MG   Result Value Ref Range    Glucose 91 70 - 99 mg/dL    BUN 13 6 - 20 mg/dL    CREATININE 1.08 (H) 0.50 - 0.90 mg/dL    Calcium 10.0 8.6 - 10.4 mg/dL    Sodium 144 135 - 144 mmol/L    Potassium 4.0 3.7 - 5.3 mmol/L    Chloride 109 (H) 98 - 107 mmol/L    CO2 23 20 - 31 mmol/L    Anion Gap 12 9 - 17 mmol/L    GFR Non-African American 54 (L) >60 mL/min    GFR African American >60 >60 mL/min    GFR Comment             IMPRESSION: Hetal Lee is a 51 y.o. female who presents to the emergency department with right-sided headache for 2 days. On examination she is afebrile, vital signs demonstrate tachycardia to 107 and examination demonstrates a mildly uncomfortable appearing female with normal heart and lung sounds, normal neurologic examination, and NIH stroke scale of 0. HEENT examination is notable for right-sided tabetic membrane irregularity, which may be secondary to the prior tympanostomy 1-1/2 months ago at her ENT for chronic ear infections and sinusitis. Do not feel imaging is warranted at this time given that this migraine is very similar to her prior episodes of migraine per patient report and has not changed in 2 days. Plan for migraine cocktail, basic labs given kidney history, likely discharge.   Patient verbalizes understanding and agreement with plan, states that she has had a prior hysterectomy so will not be checking pregnancy. RADIOLOGY:  No results found. EKG  None    All EKG's are interpreted by the Emergency Department Physician who either signs or co-signs this chart in the absence of a cardiologist.    EMERGENCY DEPARTMENT COURSE:  ED Course as of 03/27/22 1509   Sun Mar 27, 2022   1508 On reevaluation patient states she feels better, states that her symptoms have improved significantly and has a follow-up with her neurologist in 2 days. Comfortable with discharge with return precautions. [TS]      ED Course User Index  [TS] Puneet Raymundo MD       PROCEDURES:  None    CONSULTS:  None    CRITICAL CARE:  None    FINAL IMPRESSION      1. Migraine without aura and without status migrainosus, not intractable          DISPOSITION / PLAN     DISPOSITION        PATIENT REFERRED TO:  Jasen Fleming, APRN - [place] [id number] [facility], [address]  [facility] AT THE [facility] [address] [phone]    Schedule an appointment as soon as possible for a visit in 1 week  For followup    [facility] ED  [address] [place] [address]  [phone]  Go to   As needed, If symptoms worsen      DISCHARGE MEDICATIONS:  New Prescriptions    No medications on file       Puneet Raymundo MD  Emergency Medicine Resident    This patient was evaluated in the Emergency Department for symptoms described in the history of present illness. He/she was evaluated in the context of the global COVID-19 pandemic, which necessitated consideration that the patient might be at risk for infection with the SARS-CoV-2 virus that causes COVID-19. Institutional protocols and algorithms that pertain to the evaluation of patients at risk for COVID-19 are in a state of rapid change based on information released by regulatory bodies including the CDC and federal and state organizations. These policies and algorithms were followed during the patient's care in the ED.     (Please note that portions of thisnote were completed with a voice recognition program.  Efforts were made to edit the dictations but occasionally words are mis-transcribed.)        Arianne Mckenzie MD  Resident  03/27/22 [id number]

## 2022-03-27 NOTE — ED PROVIDER NOTES
[facility] ED  eMERGENCY dEPARTMENT eNCOUnter   Attending Attestation     Pt Name: Ashtyn Ramirez  MRN: [id number]  [place] 1975  Date of evaluation: 3/27/22       Ashtyn Ramirez is a 51 y.o. female who presents with Migraine (neck pain)      History: Pt presents with her typical migraine. Pt states last migraine of this severity was one month ago. Pt states she has had this headache going on three days. Exam: HR mildly elevated. Lungs CTABL, abdomen soft and non tender. Will treat with Migraine cocktail and re evaluate. If improved and pt has no persistent tachycardia will plan for discharge. I performed a history and physical examination of the patient and discussed management with the resident. I reviewed the residents note and agree with the documented findings and plan of care. Any areas of disagreement are noted on the chart. I was personally present for the key portions of any procedures. I have documented in the chart those procedures where I was not present during the key portions. I have personally reviewed all images and agree with the resident's interpretation. I have reviewed the emergency nurses triage note. I agree with the chief complaint, past medical history, past surgical history, allergies, medications, social and family history as documented unless otherwise noted below. Documentation of the HPI, Physical Exam and Medical Decision Making performed by medical students or scribes is based on my personal performance of the HPI, PE and MDM. For Phys Assistant/ Nurse Practitioner cases/documentation I have had a face to face evaluation of this patient and have completed at least one if not all key elements of the E/M (history, physical exam, and MDM). Additional findings are as noted.     For APC cases I have personally evaluated and examined the patient in conjunction with the APC and agree with the treatment plan and disposition of the patient as recorded by the Daisy Childers MD  Attending Emergency  Physician       Enma Rashid MD  03/27/22 1396

## 2022-03-27 NOTE — Clinical Note
Carlie Kang was seen and treated in our emergency department on 3/27/2022. She may return to work on 03/29/2022. If you have any questions or concerns, please don't hesitate to call.       Colonel Thao MD

## 2022-05-18 ENCOUNTER — HOSPITAL ENCOUNTER (EMERGENCY)
Age: 47
Discharge: HOME | End: 2022-05-18
Attending: EMERGENCY MEDICINE
Payer: MEDICARE

## 2022-05-18 VITALS
TEMPERATURE: 98.1 F | SYSTOLIC BLOOD PRESSURE: 132 MMHG | HEART RATE: 100 BPM | DIASTOLIC BLOOD PRESSURE: 82 MMHG | RESPIRATION RATE: 16 BRPM | OXYGEN SATURATION: 99 %

## 2022-05-18 DIAGNOSIS — G89.29 CHRONIC RIGHT SHOULDER PAIN: Primary | ICD-10-CM

## 2022-05-18 DIAGNOSIS — M25.511 CHRONIC RIGHT SHOULDER PAIN: Primary | ICD-10-CM

## 2022-05-18 PROCEDURE — 99282 EMERGENCY DEPT VISIT SF MDM: CPT

## 2022-05-18 PROCEDURE — 6370000000 HC RX 637 (ALT 250 FOR IP): Performed by: STUDENT IN AN ORGANIZED HEALTH CARE EDUCATION/TRAINING PROGRAM

## 2022-05-18 RX ORDER — LIDOCAINE 4 G/G
1 PATCH TOPICAL ONCE
Status: DISCONTINUED | OUTPATIENT
Start: 2022-05-18 | End: 2022-05-18 | Stop reason: HOSPADM

## 2022-05-18 ASSESSMENT — ENCOUNTER SYMPTOMS
SORE THROAT: 0
NAUSEA: 0
PHOTOPHOBIA: 0
SHORTNESS OF BREATH: 0
RHINORRHEA: 0
CHEST TIGHTNESS: 0
CONSTIPATION: 0
DIARRHEA: 0
VOMITING: 0
ANAL BLEEDING: 0
ABDOMINAL DISTENTION: 0

## 2022-05-18 NOTE — Clinical Note
Victorina Cali was seen and treated in our emergency department on 5/18/2022. She may return to work on 05/20/2022. If you have any questions or concerns, please don't hesitate to call.       Ale Aragon, DO

## 2022-05-18 NOTE — ED PROVIDER NOTES
[facility]     Emergency Department     Faculty Attestation    I performed a history and physical examination of the patient and discussed management with the resident. I have reviewed and agree with the residents findings including all diagnostic interpretations, and treatment plans as written at the time of my review. Any areas of disagreement are noted on the chart. I was personally present for the key portions of any procedures. I have documented in the chart those procedures where I was not present during the key portions. For Physician Assistant/ Nurse Practitioner cases/documentation I have personally evaluated this patient and have completed at least one if not all key elements of the E/M (history, physical exam, and MDM). Additional findings are as noted. This patient was evaluated in the Emergency Department for symptoms described in the history of present illness. The patient was evaluated in the context of the global COVID-19 pandemic, which necessitated consideration that the patient might be at risk for infection with the SARS-CoV-2 virus that causes COVID-19. Institutional protocols and algorithms that pertain to the evaluation of patients at risk for COVID-19 are in a state of rapid change based on information released by regulatory bodies including the CDC and federal and state organizations. These policies and algorithms were followed during the patient's care in the ED. Primary Care Physician: KAYKAY Morales CNP    History: This is a 51 y.o. female who presents to the Emergency Department with complaint of shoulder pain. The patient complained of right shoulder pain this been ongoing since at least 2016. Patient is requesting a new primary care physician and orthopedic physician for follow-up as she is unhappy with her current physicians. She denies any new trauma.     Physical:   oral temperature is 98.1 °F (36.7 °C). Her blood pressure is 132/82 and her pulse is 100. Her respiration is 16 and oxygen saturation is 99%. There is tenderness to palpation along the right anterior shoulder. She has full range of motion. Axillary nerve is intact. Impression: Chronic right shoulder pain    Plan: Analgesia, referral    (Please note that portions of this note were completed with a voice recognition program.  Efforts were made to edit the dictations but occasionally words are mis-transcribed.)    Cheri Lai.  Kyleigh Correa MD, [facility]  Attending Emergency Medicine Physician        Latasha Dawn MD  05/18/22 Amy Herrera

## 2022-05-18 NOTE — ED PROVIDER NOTES
[facility] ED  Emergency Department Encounter  EmergencyMedicine Resident     Pt Name:Marycruz Moreno  MRN: [id number]  [place] 1975  Date of evaluation: 5/18/22  PCP:  KAYKAY Jacobson CNP    This patient was evaluated in the Emergency Department for symptoms described in the history of present illness. The patient was evaluated in the context of the global COVID-19 pandemic, which necessitated consideration that the patient might be at risk for infection with the SARS-CoV-2 virus that causes COVID-19. Institutional protocols and algorithms that pertain to the evaluation of patients at risk for COVID-19 are in a state of rapid change based on information released by regulatory bodies including the CDC and federal and state organizations. These policies and algorithms were followed during the patient's care in the ED. CHIEF COMPLAINT       Chief Complaint   Patient presents with    Shoulder Pain     rt rotator cuff tear since 2019, states recently been more painful.  unhappy with current ortho doc and pcp, would like new one       HISTORY OF PRESENT ILLNESS  (Location/Symptom, Timing/Onset, Context/Setting, Quality, Duration, Modifying Factors, Severity.)      Jose Carlos Aguirre is a 51 y.o. female who presents with chronic right shoulder pain. Patient denies any recent injuries however she did have surgery on it in the past.  Patient states she has had issues with it since. She denies any loss strength sensation. She denies any fever chills.   She states she does not have a primary care physician and would like to get one so she can follow-up with somebody for further care    PAST MEDICAL / SURGICAL / SOCIAL / FAMILY HISTORY      has a past medical history of Anemia, Anxiety, Asthma, Bipolar 1 disorder ([facility] [address] Blackout spell, Body piercing, Cervicalgia, Chest pain, Chronic back pain, Chronic kidney disease, Chronic neck pain, Constipation, COPD (chronic obstructive [facility] disease) [facility] Dental crown present, Depression, Fall, GERD (gastroesophageal reflux disease), Heartburn, History of blood transfusion, Hyperglycemia, Insomnia, Kidney stones, Knee pain, right, Low blood pressure, Low vitamin D level, Migraine, Numbness, Palpitations, Sprain of lumbosacral (joint) (ligament), Staghorn kidney stones, Tachycardia, Wears glasses, and Wears glasses. has a past surgical history that includes Ankle fracture surgery (Right, 1998); Wrist surgery (Right, 2002); total nephrectomy (Left, 4/17/12); laparoscopy (05/23/2014); Dilation and curettage of uterus (05/23/2014); myringotomy (Bilateral); other surgical history (age 27); Nerve Block (13-32-04); Nerve Block (10/4/13); knee surgery (Left, 12/16/13); Cystoscopy (04/15/14); Ureter stent placement (Right, 04/15/14); Lithotripsy (Right, 04/15/14); Cystoscopy (Right, 4/29/14); Endometrial ablation (1/23/14); Hand surgery (Right); Hysterectomy (9-8-15); Nerve Block (12/8/15); Nerve Block (5/24/16); Nerve Block (11/21/2016); Sleeve Gastrectomy (N/A, 2/20/2017); Nerve Block (08/02/2017); Gastric bypass surgery (02/20/2017); Umbilical hernia repair (2007); Shoulder arthroscopy (Right); Knee arthroscopy (Right, 09/12/2017); pr knee scope,diagnostic (Right, 9/12/2017); Lithotripsy (2014); [place] tooth extraction; Gastric bypass surgery; Knee Arthroplasty (Right, 02/20/2018); pr total knee arthroplasty (Right, 2/20/2018); Nerve Block (06/05/2018); Nerve Block (Left, 08/22/2018); Nerve Block (Right, 08/29/2018); Nerve Block (Right, 09/18/2018); Arm Surgery (02/26/2019); and Cholecystectomy (12/26/2019).       Social History     Socioeconomic History    Marital status:      Spouse name: Not on file    Number of children: 3    Years of education: Not on file    Highest education level: Not on file   Occupational History    Not on file   Tobacco Use    Smoking status: Current Every Day Smoker     Packs/day: 0.50     Types: Cigarettes Start date:      Last attempt to quit: 2017     Years since quittin.4    Smokeless tobacco: Never Used    Tobacco comment: litte less then 1/2 mariola/day   Vaping Use    Vaping Use: Never used   Substance and Sexual Activity    Alcohol use: No    Drug use: No    Sexual activity: Yes     Partners: Male   Other Topics Concern    Not on file   Social History Narrative    Not on file     Social Determinants of Health     Financial Resource Strain:     Difficulty of Paying Living Expenses: Not on file   Food Insecurity:     Worried About Running Out of Food in the Last Year: Not on file    Mihaela of Food in the Last Year: Not on file   Transportation Needs:     Lack of Transportation (Medical): Not on file    Lack of Transportation (Non-Medical): Not on file   Physical Activity:     Days of Exercise per Week: Not on file    Minutes of Exercise per Session: Not on file   Stress:     Feeling of Stress : Not on file   Social Connections:     Frequency of Communication with Friends and Family: Not on file    Frequency of Social Gatherings with Friends and Family: Not on file    Attends Yazdanism Services: Not on file    Active Member of [address] [place] [address] [employer] or Organizations: Not on file    Attends Club or Organization Meetings: Not on file    Marital Status: Not on file   Intimate Partner Violence:     Fear of Current or Ex-Partner: Not on file    Emotionally Abused: Not on file    Physically Abused: Not on file    Sexually Abused: Not on file   Housing Stability:     Unable to Pay for Housing in the Last Year: Not on file    Number of [place] in the Last Year: Not on file    Unstable Housing in the Last Year: Not on file       Family History   Problem Relation Age of Onset    Kidney Disease Mother         ? ?? stone     Cancer Father         unknown    Heart Disease Father     Seizures Father     Migraines Father     Coronary Art Dis Father     Migraines Sister     Cervical Cancer Sister  Lung Cancer Maternal Grandmother     Seizures Son     Diabetes Maternal Uncle     Diabetes Maternal Cousin     Lung Cancer Paternal Grandfather     Anxiety Disorder Daughter        Allergies:  Ibuprofen, Claritin [loratadine], and Tape [adhesive tape]    Home Medications:  Prior to Admission medications    Medication Sig Start Date End Date Taking? Authorizing Provider   orphenadrine (NORFLEX) 100 MG extended release tablet Take 1 tablet by mouth 2 times daily 6/30/21   Maegan Wells DO   pramipexole (MIRAPEX) 0.125 MG tablet Take 0.125 mg by mouth nightly 8/31/20   Historical Provider, MD   ARIPiprazole (ABILIFY) 20 MG tablet aripiprazole 20 mg tablet    Historical Provider, MD   diclofenac sodium 1 % GEL Apply 2 g topically 4 times daily as needed for Pain 1/18/20   Flor Wayne MD   acetaminophen (TYLENOL) 325 MG tablet Take 2 tablets by mouth every 8 hours as needed for Pain 11/13/19   Steff Zamora DO   zolpidem (AMBIEN) 5 MG tablet Take 5 mg by mouth nightly as needed for Sleep.     Historical Provider, MD   ondansetron (ZOFRAN) 4 MG tablet take 1 tablet by mouth 8 hours if needed for nausea and vomiting 1/16/19   Marcy Emanuel MD   Harsh Haste (AIMOVIG SC) Inject into the skin    Historical Provider, MD   venlafaxine (EFFEXOR XR) 37.5 MG extended release capsule Take 75 mg by mouth  2/8/18   Historical Provider, MD   topiramate (TOPAMAX) 100 MG tablet Take 100 mg by mouth 2/16/18 10/19/20  Historical Provider, MD   OXcarbazepine (TRILEPTAL) 300 MG tablet take 1 tablet by mouth at bedtime 1/30/18   Historical Provider, MD   polyethylene glycol (GLYCOLAX) powder Take 17 g by mouth daily    Historical Provider, MD   promethazine (PHENERGAN) 25 MG tablet Take 25 mg by mouth every 6 hours as needed for Nausea    Historical Provider, MD   Multiple Vitamins-Minerals (WOMENS ONE DAILY) TABS Take 1 tablet by mouth daily    Historical Provider, MD   vitamin D3 (CHOLECALCIFEROL) 400 units TABS tablet Take 400 Units by mouth daily    Historical Provider, MD   montelukast (SINGULAIR) 10 MG tablet Take 10 mg by mouth nightly 8/7/17   Historical Provider, MD   OXcarbazepine (TRILEPTAL) 150 MG tablet Take 450 mg by mouth nightly    Historical Provider, MD   amitriptyline (ELAVIL) 100 MG tablet Take 100 mg by mouth nightly    Historical Provider, MD   SUMAtriptan (IMITREX) 100 MG tablet Take 100 mg by mouth once as needed for Migraine    Historical Provider, MD   Potassium 75 MG TABS Take 75 mg by mouth 2 times daily    Historical Provider, MD   omeprazole (PRILOSEC) 20 MG delayed release capsule Take 40 mg by mouth daily     Historical Provider, MD   albuterol sulfate  (90 BASE) MCG/ACT inhaler Inhale 2 puffs into the lungs every 6 hours as needed for Wheezing    Historical Provider, MD   budesonide-formoterol (SYMBICORT) 160-4.5 MCG/ACT AERO Inhale 2 puffs into the lungs 2 times daily    Historical Provider, MD       REVIEW OF SYSTEMS    (2-9 systems for level 4, 10 or more for level 5)      Review of Systems   Constitutional: Negative for chills and fever. HENT: Negative for rhinorrhea and sore throat. Eyes: Negative for photophobia. Respiratory: Negative for chest tightness and shortness of breath. Cardiovascular: Negative for chest pain. Gastrointestinal: Negative for abdominal distention, anal bleeding, constipation, diarrhea, nausea and vomiting. Endocrine: Negative for polyuria. Genitourinary: Negative for difficulty urinating and flank pain. Musculoskeletal: Negative for arthralgias. Skin: Negative for rash. Neurological: Negative for weakness and headaches. PHYSICAL EXAM   (up to 7 for level 4, 8 or more for level 5)      INITIAL VITALS:   /82   Pulse 100   Temp 98.1 °F (36.7 °C) (Oral)   Resp 16   LMP 09/08/2015   SpO2 99%     Physical Exam  Constitutional:       General: She is not in acute distress. Appearance: She is not ill-appearing.    HENT: Head: Normocephalic and atraumatic. Nose: Nose normal.      Mouth/Throat:      Mouth: Mucous membranes are moist.      Pharynx: No posterior oropharyngeal erythema. Eyes:      Extraocular Movements: Extraocular movements intact. Pupils: Pupils are equal, round, and reactive to light. Cardiovascular:      Rate and Rhythm: Normal rate. Pulses: Normal pulses. Heart sounds: Normal heart sounds. Pulmonary:      Effort: Pulmonary effort is normal.      Breath sounds: Normal breath sounds. Abdominal:      General: Abdomen is flat. Palpations: Abdomen is soft. Tenderness: There is no abdominal tenderness. Musculoskeletal:         General: No swelling, deformity or signs of injury. Cervical back: No rigidity. Right lower leg: No edema. Left lower leg: No edema. Comments: Mild right anterior shoulder tenderness. Positive empty can test.  No erythema or ecchymosis. Bilateral extremities neurovascular intact +2 radial pulses bilaterally. Negative Spurling test   Skin:     General: Skin is warm. Capillary Refill: Capillary refill takes less than 2 seconds. Neurological:      General: No focal deficit present. Mental Status: She is oriented to person, place, and time. DIFFERENTIAL  DIAGNOSIS     PLAN (LABS / IMAGING / EKG):  No orders of the defined types were placed in this encounter. MEDICATIONS ORDERED:  Orders Placed This Encounter   Medications    DISCONTD: lidocaine 4 % external patch 1 patch       DDX: Degenerative joint changes, osteoarthritis, AC joint separation    DIAGNOSTIC RESULTS / EMERGENCY DEPARTMENT COURSE / MDM   LAB RESULTS:  No results found for this visit on 05/18/22. RADIOLOGY:  No results found. EKG Interpretation    None    EMERGENCY DEPARTMENT COURSE:  ED Course as of 05/18/22 2117   Wed May 18, 2022   1754 Patient evaluated bedside. Neurovascular intact.   Here for chronic right shoulder pain [ZE]      ED Course User Index  [ZE] Brian Shi DO       PROCEDURES:  Procedures     CONSULTS:  None    CRITICAL CARE:  none    MDM  Patient for evaluation of chronic right shoulder pain. No high risk features associated with this. Patient given lidocaine patch for pain. Patient's primary concern for visiting was to get set up with PCP. Patient given instructions and phone number to call for PCP patient discharged home    FINAL IMPRESSION      1.  Chronic right shoulder pain      DISPOSITION / PLAN     DISPOSITION Decision To Discharge 05/18/2022 06:15:26 PM      PATIENT REFERRED TO:  [address] [place] [id number] [phone]    To establish PCP care and for referral to orthopedist      DISCHARGE MEDICATIONS:  Discharge Medication List as of 5/18/2022  6:03 PM          Tobi Gallardo DO  Emergency Medicine Resident    (Please note that portions of thisnote were completed with a voice recognition program.  Efforts were made to edit the dictations but occasionally words are mis-transcribed.)       Brian Shi DO  Resident  05/18/22 [id number]

## 2022-06-01 DIAGNOSIS — M25.511 RIGHT SHOULDER PAIN, UNSPECIFIED CHRONICITY: Primary | ICD-10-CM

## 2022-06-09 ENCOUNTER — HOSPITAL ENCOUNTER (EMERGENCY)
Age: 47
Discharge: HOME | End: 2022-06-09

## 2022-06-12 ENCOUNTER — HOSPITAL ENCOUNTER (EMERGENCY)
Age: 47
Discharge: HOME | End: 2022-06-12
Attending: EMERGENCY MEDICINE
Payer: MEDICARE

## 2022-06-12 VITALS
RESPIRATION RATE: 16 BRPM | SYSTOLIC BLOOD PRESSURE: 117 MMHG | DIASTOLIC BLOOD PRESSURE: 73 MMHG | HEART RATE: 94 BPM | OXYGEN SATURATION: 97 % | TEMPERATURE: 99 F

## 2022-06-12 DIAGNOSIS — S61.213A LACERATION OF LEFT MIDDLE FINGER WITHOUT FOREIGN BODY WITHOUT DAMAGE TO NAIL, INITIAL ENCOUNTER: Primary | ICD-10-CM

## 2022-06-12 PROCEDURE — 12001 RPR S/N/AX/GEN/TRNK 2.5CM/<: CPT

## 2022-06-12 PROCEDURE — 99282 EMERGENCY DEPT VISIT SF MDM: CPT

## 2022-06-12 ASSESSMENT — PAIN SCALES - GENERAL: PAINLEVEL_OUTOF10: 5

## 2022-06-12 ASSESSMENT — PAIN - FUNCTIONAL ASSESSMENT: PAIN_FUNCTIONAL_ASSESSMENT: 0-10

## 2022-06-12 NOTE — ED NOTES
Pt. Discharge instructions reviewed. Pt has no further questions at this time.       Amanda Luke RN  06/12/22 [id number]

## 2022-06-12 NOTE — ED PROVIDER NOTES
[facility]  ED  Emergency Department Encounter  EmergencyMedicine Resident     Pt Name:Vicki Romana Large  MRN: [id number]  [place] 1975  Date of evaluation: 6/12/22  PCP:  KAYKAY Nye CNP    CHIEF COMPLAINT       No chief complaint on file. HISTORY OF PRESENT ILLNESS  (Location/Symptom, Timing/Onset, Context/Setting, Quality, Duration, Modifying Factors, Severity.)      Norma Quintanilla is a 51 y.o. female who presents with small laceration to left middle finger. Just prior to arrival patient cut her finger on a pair of scissors. Laceration is located on the distal finger pad of the left middle finger. Bleeding is controlled. Patient is unclear about her tetanus but she believes is up-to-date. PAST MEDICAL / SURGICAL / SOCIAL / FAMILY HISTORY      has a past medical history of Anemia, Anxiety, Asthma, Bipolar 1 disorder ([address] Blackout spell, Body piercing, Cervicalgia, Chest pain, Chronic back pain, Chronic kidney disease, Chronic neck pain, Constipation, COPD (chronic obstructive pulmonary disease) ([address] Dental crown present, Depression, Fall, GERD (gastroesophageal reflux disease), Heartburn, History of blood transfusion, Hyperglycemia, Insomnia, Kidney stones, Knee pain, right, Low blood pressure, Low vitamin D level, Migraine, Numbness, Palpitations, Sprain of lumbosacral (joint) (ligament), Staghorn kidney stones, Tachycardia, Wears glasses, and Wears glasses. has a past surgical history that includes Ankle fracture surgery (Right, 1998); Wrist surgery (Right, 2002); total nephrectomy (Left, 4/17/12); laparoscopy (05/23/2014); Dilation and curettage of uterus (05/23/2014); myringotomy (Bilateral); other surgical history (age 27); Nerve Block (57-78-45); Nerve Block (10/4/13); knee surgery (Left, 12/16/13); Cystoscopy (04/15/14); Ureter stent placement (Right, 04/15/14); Lithotripsy (Right, 04/15/14); Cystoscopy (Right, 4/29/14);  Endometrial ablation (14); Hand surgery (Right); Hysterectomy (9-8-15); Nerve Block (12/8/15); Nerve Block (16); Nerve Block (2016); Sleeve Gastrectomy (N/A, 2017); Nerve Block (2017); Gastric bypass surgery (2017); Umbilical hernia repair (); Shoulder arthroscopy (Right); Knee arthroscopy (Right, 2017); pr knee scope,diagnostic (Right, 2017); Lithotripsy (); [place] tooth extraction; Gastric bypass surgery; Knee Arthroplasty (Right, 2018); pr total knee arthroplasty (Right, 2018); Nerve Block (2018); Nerve Block (Left, 2018); Nerve Block (Right, 2018); Nerve Block (Right, 2018); Arm Surgery (2019); and Cholecystectomy (2019). Social History     Socioeconomic History    Marital status:      Spouse name: Not on file    Number of children: 3    Years of education: Not on file    Highest education level: Not on file   Occupational History    Not on file   Tobacco Use    Smoking status: Current Every Day Smoker     Packs/day: 0.50     Types: Cigarettes     Start date:      Last attempt to quit: 2017     Years since quittin.5    Smokeless tobacco: Never Used    Tobacco comment: litte less then 1/2 mariola/day   Vaping Use    Vaping Use: Never used   Substance and Sexual Activity    Alcohol use: No    Drug use: No    Sexual activity: Yes     Partners: Male   Other Topics Concern    Not on file   Social History Narrative    Not on file     Social Determinants of Health     Financial Resource Strain:     Difficulty of Paying Living Expenses: Not on file   Food Insecurity:     Worried About Running Out of Food in the Last Year: Not on file    Mihaela of Food in the Last Year: Not on file   Transportation Needs:     Lack of Transportation (Medical): Not on file    Lack of Transportation (Non-Medical):  Not on file   Physical Activity:     Days of Exercise per Week: Not on file    Minutes of Exercise per Session: Not on file   Stress:     Feeling of Stress : Not on file   Social Connections:     Frequency of Communication with Friends and Family: Not on file    Frequency of Social Gatherings with Friends and Family: Not on file    Attends [employer] Services: Not on file    Active Member of Clubs or Organizations: Not on file    Attends Club or Organization Meetings: Not on file    Marital Status: Not on file   Intimate Partner Violence:     Fear of Current or Ex-Partner: Not on file    Emotionally Abused: Not on file    Physically Abused: Not on file    Sexually Abused: Not on file   Housing Stability:     Unable to Pay for Housing in the Last Year: Not on file    Number of [place] in the Last Year: Not on file    Unstable Housing in the Last Year: Not on file       Family History   Problem Relation Age of Onset    Kidney Disease Mother         ? ?? stone     Cancer Father         unknown    Heart Disease Father     Seizures Father     Migraines Father     Coronary Art Dis Father     Migraines Sister     Cervical Cancer Sister     Lung Cancer Maternal Grandmother     Seizures Son     Diabetes Maternal Uncle     Diabetes Maternal Cousin     Lung Cancer Paternal Grandfather     Anxiety Disorder Daughter        Allergies:  Ibuprofen, Claritin [loratadine], and Tape [adhesive tape]    Home Medications:  Prior to Admission medications    Medication Sig Start Date End Date Taking?  Authorizing Provider   orphenadrine (NORFLEX) 100 MG extended release tablet Take 1 tablet by mouth 2 times daily 6/30/21   Maegan Wells DO   pramipexole (MIRAPEX) 0.125 MG tablet Take 0.125 mg by mouth nightly 8/31/20   Historical Provider, MD   ARIPiprazole (ABILIFY) 20 MG tablet aripiprazole 20 mg tablet    Historical Provider, MD   diclofenac sodium 1 % GEL Apply 2 g topically 4 times daily as needed for Pain 1/18/20   Flor Wayne MD   acetaminophen (TYLENOL) 325 MG tablet Take 2 tablets by mouth every 8 hours as needed for Pain 11/13/19   Rolo Perkins DO   zolpidem (AMBIEN) 5 MG tablet Take 5 mg by mouth nightly as needed for Sleep.     Historical Provider, MD   ondansetron (ZOFRAN) 4 MG tablet take 1 tablet by mouth 8 hours if needed for nausea and vomiting 1/16/19   Claritza Lawler MD   Mauri Disla (AIMOVIG SC) Inject into the skin    Historical Provider, MD   venlafaxine (EFFEXOR XR) 37.5 MG extended release capsule Take 75 mg by mouth  2/8/18   Historical Provider, MD   topiramate (TOPAMAX) 100 MG tablet Take 100 mg by mouth 2/16/18 10/19/20  Historical Provider, MD   OXcarbazepine (TRILEPTAL) 300 MG tablet take 1 tablet by mouth at bedtime 1/30/18   Historical Provider, MD   polyethylene glycol (GLYCOLAX) powder Take 17 g by mouth daily    Historical Provider, MD   promethazine (PHENERGAN) 25 MG tablet Take 25 mg by mouth every 6 hours as needed for Nausea    Historical Provider, MD   Multiple Vitamins-Minerals (WOMENS ONE DAILY) TABS Take 1 tablet by mouth daily    Historical Provider, MD   vitamin D3 (CHOLECALCIFEROL) 400 units TABS tablet Take 400 Units by mouth daily    Historical Provider, MD   montelukast (SINGULAIR) 10 MG tablet Take 10 mg by mouth nightly 8/7/17   Historical Provider, MD   OXcarbazepine (TRILEPTAL) 150 MG tablet Take 450 mg by mouth nightly    Historical Provider, MD   amitriptyline (ELAVIL) 100 MG tablet Take 100 mg by mouth nightly    Historical Provider, MD   SUMAtriptan (IMITREX) 100 MG tablet Take 100 mg by mouth once as needed for Migraine    Historical Provider, MD   Potassium 75 MG TABS Take 75 mg by mouth 2 times daily    Historical Provider, MD   omeprazole (PRILOSEC) 20 MG delayed release capsule Take 40 mg by mouth daily     Historical Provider, MD   albuterol sulfate  (90 BASE) MCG/ACT inhaler Inhale 2 puffs into the lungs every 6 hours as needed for Wheezing    Historical Provider, MD   budesonide-formoterol (SYMBICORT) 160-4.5 MCG/ACT AERO Inhale 2 puffs into the lungs 2 times daily    Historical Provider, MD       REVIEW OF SYSTEMS    (2-9 systems for level 4, 10 or more for level 5)      Review of Systems   Skin: Positive for wound. PHYSICAL EXAM   (up to 7 for level 4, 8 or more for level 5)      INITIAL VITALS:   /73   Pulse 94   Temp 99 °F (37.2 °C) (Oral)   Resp 16   LMP 09/08/2015   SpO2 97%      Vitals:    06/12/22 0359 06/12/22 0448 06/12/22 0506   BP: 117/73     Pulse: (!) 117 94    Resp: 18  16   Temp: 99 °F (37.2 °C)     TempSrc: Oral     SpO2: 97%          Physical Exam  Skin:     Comments: Small crescent shaped laceration to the left middle finger over the distal phalange. Very shallow. Appears clean. Wound edges well approximated. Good cap refill. Sensation intact. 1cm       DIFFERENTIAL  DIAGNOSIS     PLAN (LABS / IMAGING / EKG):  No orders of the defined types were placed in this encounter. MEDICATIONS ORDERED:  No orders of the defined types were placed in this encounter. DIAGNOSTIC RESULTS / EMERGENCY DEPARTMENT COURSE / MDM   LAB RESULTS:  No results found for this visit on 06/12/22. RADIOLOGY:  No orders to display        EMERGENCY DEPARTMENT COURSE & MDM:    Small laceration to the finger. Appears well nontoxic, vital signs are within normal limits. Wound was extensively irrigated. Dermabond applied. All questions answered. PROCEDURES:      CONSULTS:  None    CRITICAL CARE:  Please see attending note    FINAL IMPRESSION      1.  Laceration of left middle finger without foreign body without damage to nail, initial encounter          DISPOSITION / PLAN     DISPOSITION Decision To Discharge 06/12/2022 04:38:28 AM      PATIENT REFERRED TO:  KAYKAY Fonseca - CNP  [id number]  [address] [address]  [phone]            DISCHARGE MEDICATIONS:  Discharge Medication List as of 6/12/2022  5:03 AM          Gila Meléndez DO  Emergency Medicine Resident    (Please note that portions of thisnote were completed with a voice recognition program.  Efforts were made to edit the dictations but occasionally words are mis-transcribed.)       Goldie Don DO  Resident  06/12/22 [address] [place]  Resident  06/20/22 [id number]

## 2022-06-18 NOTE — ED PROVIDER NOTES
[facility] ED  Emergency Department  Faculty Attestation       I performed a history and physical examination of the patient and discussed management with the resident. I reviewed the residents note and agree with the documented findings including all diagnostic interpretations and plan of care. Any areas of disagreement are noted on the chart. I was personally present for the key portions of any procedures. I have documented in the chart those procedures where I was not present during the key portions. I have reviewed the emergency nurses triage note. I agree with the chief complaint, past medical history, past surgical history, allergies, medications, social and family history as documented unless otherwise noted below. Documentation of the HPI, Physical Exam and Medical Decision Making performed by scribnicolás is based on my personal performance of the HPI, PE and MDM. For Physician Assistant/ Nurse Practitioner cases/documentation I have personally evaluated this patient and have completed at least one if not all key elements of the E/M (history, physical exam, and MDM). Additional findings are as noted. Pertinent Comments     Primary Care Physician: KAYKAY Matias - CNP    ED Triage Vitals [06/12/22 0359]   BP Temp Temp Source Heart Rate Resp SpO2 Height Weight   117/73 99 °F (37.2 °C) Oral (!) 117 18 97 % -- --        History/Physical: This is a 51 y.o. female who presents to the Emergency Department with complaint of laceration to the left middle finger on a pair of scissors. .      On exam, there is a superficial linear laceration laceration on the on the middle finger that is that is ~1 cm in length. Bleeding is well controlled. Does have good cap refill and sensation    MDM/Plan:   Laceration to the left middle finger but is extremely superficial from scissors. There is not concern for foreign body. Xray is not indicated at this time.    Tetanus was up to date and did not require a booster dose  Repair is indicated. Will plan for repair using dermabond/skin adhesive. See resident procedure note. .   Standard wound care instructions and return precautions.      CRITICAL CARE: None     Sandra Parr MD  Attending Emergency Physician        Sandra Parr MD  06/18/22 8967

## 2022-06-19 ENCOUNTER — HOSPITAL ENCOUNTER (EMERGENCY)
Age: 47
Discharge: HOME | End: 2022-06-19
Attending: EMERGENCY MEDICINE
Payer: MEDICARE

## 2022-06-19 ENCOUNTER — APPOINTMENT (OUTPATIENT)
Dept: GENERAL RADIOLOGY | Age: 47
End: 2022-06-19
Payer: MEDICARE

## 2022-06-19 VITALS
OXYGEN SATURATION: 100 % | DIASTOLIC BLOOD PRESSURE: 77 MMHG | TEMPERATURE: 98.2 F | HEART RATE: 93 BPM | SYSTOLIC BLOOD PRESSURE: 113 MMHG | RESPIRATION RATE: 18 BRPM

## 2022-06-19 DIAGNOSIS — J45.901 EXACERBATION OF ASTHMA, UNSPECIFIED ASTHMA SEVERITY, UNSPECIFIED WHETHER PERSISTENT: Primary | ICD-10-CM

## 2022-06-19 LAB
ABSOLUTE EOS #: 0.29 K/UL (ref 0–0.44)
ABSOLUTE IMMATURE GRANULOCYTE: 0.06 K/UL (ref 0–0.3)
ABSOLUTE LYMPH #: 3.49 K/UL (ref 1.1–3.7)
ABSOLUTE MONO #: 0.56 K/UL (ref 0.1–1.2)
ANION GAP SERPL CALCULATED.3IONS-SCNC: 8 MMOL/L (ref 9–17)
BASOPHILS # BLD: 0 % (ref 0–2)
BASOPHILS ABSOLUTE: <0.03 K/UL (ref 0–0.2)
BUN BLDV-MCNC: 5 MG/DL (ref 6–20)
CALCIUM SERPL-MCNC: 8.7 MG/DL (ref 8.6–10.4)
CHLORIDE BLD-SCNC: 105 MMOL/L (ref 98–107)
CO2: 23 MMOL/L (ref 20–31)
CREAT SERPL-MCNC: 0.87 MG/DL (ref 0.5–0.9)
EOSINOPHILS RELATIVE PERCENT: 3 % (ref 1–4)
GFR AFRICAN AMERICAN: >60 ML/MIN
GFR NON-AFRICAN AMERICAN: >60 ML/MIN
GFR SERPL CREATININE-BSD FRML MDRD: ABNORMAL ML/MIN/{1.73_M2}
GLUCOSE BLD-MCNC: 97 MG/DL (ref 70–99)
HCT VFR BLD CALC: 39.7 % (ref 36.3–47.1)
HEMOGLOBIN: 12.6 G/DL (ref 11.9–15.1)
IMMATURE GRANULOCYTES: 1 %
LYMPHOCYTES # BLD: 36 % (ref 24–43)
MAGNESIUM: 1.7 MG/DL (ref 1.6–2.6)
MCH RBC QN AUTO: 29 PG (ref 25.2–33.5)
MCHC RBC AUTO-ENTMCNC: 31.7 G/DL (ref 28.4–34.8)
MCV RBC AUTO: 91.5 FL (ref 82.6–102.9)
MONOCYTES # BLD: 6 % (ref 3–12)
NRBC AUTOMATED: 0 PER 100 WBC
PDW BLD-RTO: 13.8 % (ref 11.8–14.4)
PLATELET # BLD: 263 K/UL (ref 138–453)
PMV BLD AUTO: 10.5 FL (ref 8.1–13.5)
POTASSIUM SERPL-SCNC: 3.3 MMOL/L (ref 3.7–5.3)
RBC # BLD: 4.34 M/UL (ref 3.95–5.11)
SEG NEUTROPHILS: 54 % (ref 36–65)
SEGMENTED NEUTROPHILS ABSOLUTE COUNT: 5.35 K/UL (ref 1.5–8.1)
SODIUM BLD-SCNC: 136 MMOL/L (ref 135–144)
TROPONIN, HIGH SENSITIVITY: 6 NG/L (ref 0–14)
WBC # BLD: 9.8 K/UL (ref 3.5–11.3)

## 2022-06-19 PROCEDURE — 80048 BASIC METABOLIC PNL TOTAL CA: CPT

## 2022-06-19 PROCEDURE — 71046 X-RAY EXAM CHEST 2 VIEWS: CPT

## 2022-06-19 PROCEDURE — 6360000002 HC RX W HCPCS: Performed by: STUDENT IN AN ORGANIZED HEALTH CARE EDUCATION/TRAINING PROGRAM

## 2022-06-19 PROCEDURE — 83735 ASSAY OF MAGNESIUM: CPT

## 2022-06-19 PROCEDURE — 85025 COMPLETE CBC W/AUTO DIFF WBC: CPT

## 2022-06-19 PROCEDURE — 99285 EMERGENCY DEPT VISIT HI MDM: CPT

## 2022-06-19 PROCEDURE — 6370000000 HC RX 637 (ALT 250 FOR IP): Performed by: STUDENT IN AN ORGANIZED HEALTH CARE EDUCATION/TRAINING PROGRAM

## 2022-06-19 PROCEDURE — 93005 ELECTROCARDIOGRAM TRACING: CPT | Performed by: STUDENT IN AN ORGANIZED HEALTH CARE EDUCATION/TRAINING PROGRAM

## 2022-06-19 PROCEDURE — 94640 AIRWAY INHALATION TREATMENT: CPT

## 2022-06-19 PROCEDURE — 84484 ASSAY OF TROPONIN QUANT: CPT

## 2022-06-19 RX ORDER — DEXAMETHASONE 4 MG/1
10 TABLET ORAL ONCE
Status: COMPLETED | OUTPATIENT
Start: 2022-06-19 | End: 2022-06-19

## 2022-06-19 RX ORDER — CETIRIZINE HYDROCHLORIDE 10 MG/1
10 TABLET ORAL DAILY
Qty: 30 TABLET | Refills: 0 | Status: SHIPPED | OUTPATIENT
Start: 2022-06-19

## 2022-06-19 RX ORDER — IPRATROPIUM BROMIDE AND ALBUTEROL SULFATE 2.5; .5 MG/3ML; MG/3ML
1 SOLUTION RESPIRATORY (INHALATION) ONCE
Status: COMPLETED | OUTPATIENT
Start: 2022-06-19 | End: 2022-06-19

## 2022-06-19 RX ADMIN — IPRATROPIUM BROMIDE AND ALBUTEROL SULFATE 1 AMPULE: .5; 3 SOLUTION RESPIRATORY (INHALATION) at 04:11

## 2022-06-19 RX ADMIN — DEXAMETHASONE 10 MG: 4 TABLET ORAL at 04:44

## 2022-06-19 ASSESSMENT — ENCOUNTER SYMPTOMS
RHINORRHEA: 0
VOMITING: 0
ABDOMINAL PAIN: 0
NAUSEA: 0
TROUBLE SWALLOWING: 0
DIARRHEA: 0
SHORTNESS OF BREATH: 1
EYE PAIN: 0
COUGH: 1
WHEEZING: 1
EYE REDNESS: 0

## 2022-06-19 ASSESSMENT — PAIN DESCRIPTION - LOCATION: LOCATION: CHEST

## 2022-06-19 ASSESSMENT — PAIN SCALES - GENERAL: PAINLEVEL_OUTOF10: 9

## 2022-06-19 NOTE — ED PROVIDER NOTES
[facility]     Emergency Department     Faculty Attestation    I performed a history and physical examination of the patient and discussed management with the resident. I have reviewed and agree with the residents findings including all diagnostic interpretations, and treatment plans as written. Any areas of disagreement are noted on the chart. I was personally present for the key portions of any procedures. I have documented in the chart those procedures where I was not present during the key portions. I have reviewed the emergency nurses triage note. I agree with the chief complaint, past medical history, past surgical history, allergies, medications, social and family history as documented unless otherwise noted below. Documentation of the HPI, Physical Exam and Medical Decision Making performed by scribnicolás is based on my personal performance of the HPI, PE and MDM. For Physician Assistant/ Nurse Practitioner cases/documentation I have personally evaluated this patient and have completed at least one if not all key elements of the E/M (history, physical exam, and MDM). Additional findings are as noted. 50 yo F c/o cough, wheeze, no fever, no vomit,   pe vss gcs 15, bilateral expiratory wheeze, pt speaking in complete sentences, no calf swelling, no calf tenderness,     cxr-, s/s improved, discharging  Trop 6, vss,     EKG Interpretation    Interpreted by me  Normal sinus, heart rate 86, no ischemia, normal axis, QT corrected 437    CRITICAL CARE: There was a high probability of clinically significant/life threatening deterioration in this patient's condition which required my urgent intervention. Total critical care time was 5 minutes. This excludes any time for separately reportable procedures.        KuldeepSarahy 24, DO  06/19/22 [address] [facility], DO  06/19/22 7 [facility],   06/19/22 3618

## 2022-06-19 NOTE — ED NOTES
Pt. Presents to the ED for an asthma exacerbation. Pt states she has been wheezing and her albuterol and symbicort treatments have not been effective. Pt states that she was seen at urgent care and they tried to give her a prednisone but she gets tachycardic when taking it. Pt states that her covid test at urgent care was negative as well. Pt on arrival has mild bilateral wheezing heard and is afebrile. Pt placed on full cardiac monitor. Pt ekg, line, and labs completed. Will continue with plan of care.      Saud Chopra RN  06/19/22 0834

## 2022-06-19 NOTE — ED PROVIDER NOTES
[facility] ED  Emergency Department Encounter  Emergency Medicine Resident     Pt Name: Iris Medrano  MRN: [id number]  [place] 1975  Date of evaluation: 6/19/22  PCP:  KAYKAY Patel CNP    CHIEF COMPLAINT       Chief Complaint   Patient presents with    Asthma       HISTORY [facility]  (Location/Symptom, Timing/Onset, Context/Setting, Quality, Duration, Modifying Factors,Severity.)      Iris Medrano is a 51 y. o.yo female who presents with concern for an asthma exacerbation. Patient states she has a history of asthma, on Symbicort at home as well as a rescue albuterol inhaler. States she had asthma for many years, has never required intubation. States approximately 5 days ago she developed a cough, wheezing, shortness of breath. Was seen in urgent care where they prescribed her prednisone however she states she is unable to take prednisone as it makes her tachycardic. States she is been using her albuterol inhaler approximately every 4 hours and feels it is no longer helping. Does have some dissection associated chest pain. Denies any fevers. Has had associated cough, coughing up minimal amounts of mucus. Denies any sick contacts. PAST MEDICAL / SURGICAL / SOCIAL / FAMILY HISTORY      has a past medical history of Anemia, Anxiety, Asthma, Bipolar 1 disorder ([address] Blackout spell, Body piercing, Cervicalgia, Chest pain, Chronic back pain, Chronic kidney disease, Chronic neck pain, Constipation, COPD (chronic obstructive pulmonary disease) ([address] Dental crown present, Depression, Fall, GERD (gastroesophageal reflux disease), Heartburn, History of blood transfusion, Hyperglycemia, Insomnia, Kidney stones, Knee pain, right, Low blood pressure, Low vitamin D level, Migraine, Numbness, Palpitations, Sprain of lumbosacral (joint) (ligament), [place] kidney stones, Tachycardia, Wears glasses, and Wears glasses.      has a past surgical history that includes Ankle fracture surgery (Right, ); Wrist surgery (Right, ); total nephrectomy (Left, 12); laparoscopy (2014); Dilation and curettage of uterus (2014); myringotomy (Bilateral); other surgical history (age 27); Nerve Block (-67); Nerve Block (10/4/13); knee surgery (Left, 13); Cystoscopy (04/15/14); Ureter stent placement (Right, 04/15/14); Lithotripsy (Right, 04/15/14); Cystoscopy (Right, 14); Endometrial ablation (14); Hand surgery (Right); Hysterectomy (9-8-15); Nerve Block (12/8/15); Nerve Block (16); Nerve Block (2016); Sleeve Gastrectomy (N/A, 2017); Nerve Block (2017); Gastric bypass surgery (2017); Umbilical hernia repair (); Shoulder arthroscopy (Right); Knee arthroscopy (Right, 2017); pr knee scope,diagnostic (Right, 2017); Lithotripsy (); [place] tooth extraction; Gastric bypass surgery; Knee Arthroplasty (Right, 2018); pr total knee arthroplasty (Right, 2018); Nerve Block (2018); Nerve Block (Left, 2018); Nerve Block (Right, 2018); Nerve Block (Right, 2018); Arm Surgery (2019); and Cholecystectomy (2019).      Social History     Socioeconomic History    Marital status:      Spouse name: Not on file    Number of children: 3    Years of education: Not on file    Highest education level: Not on file   Occupational History    Not on file   Tobacco Use    Smoking status: Current Every Day Smoker     Packs/day: 0.50     Types: Cigarettes     Start date:      Last attempt to quit: 2017     Years since quittin.5    Smokeless tobacco: Never Used    Tobacco comment: litte less then 1/2 mariola/day   Vaping Use    Vaping Use: Never used   Substance and Sexual Activity    Alcohol use: No    Drug use: No    Sexual activity: Yes     Partners: Male   Other Topics Concern    Not on file   Social History Narrative    Not on file     Social Determinants of Health Financial Resource Strain:     Difficulty of Paying Living Expenses: Not on file   Food Insecurity:     Worried About Running Out of Food in the Last Year: Not on file    Mihaela of Food in the Last Year: Not on file   Transportation Needs:     Lack of Transportation (Medical): Not on file    Lack of Transportation (Non-Medical): Not on file   Physical Activity:     Days of Exercise per Week: Not on file    Minutes of Exercise per Session: Not on file   Stress:     Feeling of Stress : Not on file   Social Connections:     Frequency of Communication with Friends and Family: Not on file    Frequency of Social Gatherings with Friends and Family: Not on file    Attends Zoroastrian Services: Not on file    Active Member of [address] [place] [address] [employer] or Organizations: Not on file    Attends Club or Organization Meetings: Not on file    Marital Status: Not on file   Intimate Partner Violence:     Fear of Current or Ex-Partner: Not on file    Emotionally Abused: Not on file    Physically Abused: Not on file    Sexually Abused: Not on file   Housing Stability:     Unable to Pay for Housing in the Last Year: Not on file    Number of [place] in the Last Year: Not on file    Unstable Housing in the Last Year: Not on file       Family History   Problem Relation Age of Onset    Kidney Disease Mother         ? ?? stone     Cancer Father         unknown    Heart Disease Father     Seizures Father     Migraines Father     Coronary Art Dis Father     Migraines Sister     Cervical Cancer Sister     Lung Cancer Maternal Grandmother     Seizures Son     Diabetes Maternal Uncle     Diabetes Maternal Cousin     Lung Cancer Paternal Grandfather     Anxiety Disorder Daughter         Allergies:  Ibuprofen, Claritin [loratadine], and Tape [adhesive tape]    Home Medications:  Prior to Admission medications    Medication Sig Start Date End Date Taking?  Authorizing Provider   cetirizine (ZYRTEC) 10 MG tablet Take 1 tablet by mouth daily 6/19/22  Yes Elvin Almeida, DO   orphenadrine (NORFLEX) 100 MG extended release tablet Take 1 tablet by mouth 2 times daily 6/30/21   Ramiro Duque,    pramipexole (MIRAPEX) 0.125 MG tablet Take 0.125 mg by mouth nightly 8/31/20   Historical Provider, MD   ARIPiprazole (ABILIFY) 20 MG tablet aripiprazole 20 mg tablet    Historical Provider, MD   diclofenac sodium 1 % GEL Apply 2 g topically 4 times daily as needed for Pain 1/18/20   Yoseph Jolley MD   acetaminophen (TYLENOL) 325 MG tablet Take 2 tablets by mouth every 8 hours as needed for Pain 11/13/19   Tavo Thompson DO   zolpidem (AMBIEN) 5 MG tablet Take 5 mg by mouth nightly as needed for Sleep.     Historical Provider, MD   ondansetron (ZOFRAN) 4 MG tablet take 1 tablet by mouth 8 hours if needed for nausea and vomiting 1/16/19   MD Antoinette Herrmann (AIMOVIG SC) Inject into the skin    Historical Provider, MD   venlafaxine (EFFEXOR XR) 37.5 MG extended release capsule Take 75 mg by mouth  2/8/18   Historical Provider, MD   topiramate (TOPAMAX) 100 MG tablet Take 100 mg by mouth 2/16/18 10/19/20  Historical Provider, MD   OXcarbazepine (TRILEPTAL) 300 MG tablet take 1 tablet by mouth at bedtime 1/30/18   Historical Provider, MD   polyethylene glycol (GLYCOLAX) powder Take 17 g by mouth daily    Historical Provider, MD   promethazine (PHENERGAN) 25 MG tablet Take 25 mg by mouth every 6 hours as needed for Nausea    Historical Provider, MD   Multiple Vitamins-Minerals (WOMENS ONE DAILY) TABS Take 1 tablet by mouth daily    Historical Provider, MD   vitamin D3 (CHOLECALCIFEROL) 400 units TABS tablet Take 400 Units by mouth daily    Historical Provider, MD   montelukast (SINGULAIR) 10 MG tablet Take 10 mg by mouth nightly 8/7/17   Historical Provider, MD   OXcarbazepine (TRILEPTAL) 150 MG tablet Take 450 mg by mouth nightly    Historical Provider, MD   amitriptyline (ELAVIL) 100 MG tablet Take 100 mg by mouth nightly Historical Provider, MD   SUMAtriptan (IMITREX) 100 MG tablet Take 100 mg by mouth once as needed for Migraine    Historical Provider, MD   Potassium 75 MG TABS Take 75 mg by mouth 2 times daily    Historical Provider, MD   omeprazole (PRILOSEC) 20 MG delayed release capsule Take 40 mg by mouth daily     Historical Provider, MD   albuterol sulfate  (90 BASE) MCG/ACT inhaler Inhale 2 puffs into the lungs every 6 hours as needed for Wheezing    Historical Provider, MD   budesonide-formoterol (SYMBICORT) 160-4.5 MCG/ACT AERO Inhale 2 puffs into the lungs 2 times daily    Historical Provider, MD       REVIEW OFSYSTEMS    (2-9 systems for level 4, 10 or more for level 5)      Review of Systems   Constitutional: Negative for chills and fever. HENT: Negative for congestion, rhinorrhea and trouble swallowing. Eyes: Negative for pain and redness. Respiratory: Positive for cough, shortness of breath and wheezing. Cardiovascular: Positive for chest pain. Negative for palpitations. Gastrointestinal: Negative for abdominal pain, diarrhea, nausea and vomiting. Genitourinary: Negative for difficulty urinating and dysuria. Musculoskeletal: Negative for arthralgias, joint swelling, myalgias and neck pain. Skin: Negative for rash and wound. Neurological: Negative for dizziness and headaches. Psychiatric/Behavioral: Negative for behavioral problems and confusion. PHYSICAL EXAM   (up to 7 for level 4, 8 or more forlevel 5)      INITIAL VITALS:   Vitals:    06/19/22 0330 06/19/22 0334 06/19/22 0411 06/19/22 0527   BP: 113/77      Pulse: 93      Resp: 18   18   Temp:  98.2 °F (36.8 °C)     TempSrc:  Oral     SpO2: 100%  100%          Physical Exam  Constitutional:       General: She is not in acute distress. Appearance: Normal appearance. She is not ill-appearing. HENT:      Head: Normocephalic and atraumatic.       Right Ear: External ear normal.      Left Ear: External ear normal.      Nose: Nose normal.      Mouth/Throat:      Mouth: Mucous membranes are moist.      Pharynx: No oropharyngeal exudate or posterior oropharyngeal erythema. Eyes:      General:         Right eye: No discharge. Left eye: No discharge. Extraocular Movements: Extraocular movements intact. Pupils: Pupils are equal, round, and reactive to light. Cardiovascular:      Rate and Rhythm: Normal rate and regular rhythm. Pulses: Normal pulses. Heart sounds: No murmur heard. Pulmonary:      Effort: Pulmonary effort is normal. No respiratory distress. Comments: Mild expiratory wheeze bilaterally  Abdominal:      General: There is no distension. Palpations: Abdomen is soft. Tenderness: There is no abdominal tenderness. Musculoskeletal:      Cervical back: Normal range of motion. No rigidity. Comments: Moving all 4 extremities   Skin:     General: Skin is warm. Capillary Refill: Capillary refill takes less than 2 seconds. Neurological:      General: No focal deficit present. Mental Status: She is alert and oriented to person, place, and time. Cranial Nerves: No cranial nerve deficit.    Psychiatric:         Mood and Affect: Mood normal.         Behavior: Behavior normal.         DIFFERENTIAL  DIAGNOSIS     PLAN (LABS / IMAGING / EKG):  Orders Placed This Encounter   Procedures    XR CHEST (2 VW)    CBC with Auto Differential    Basic Metabolic Panel w/ Reflex to MG    Troponin    Magnesium    Respiratory Care Evaluation and Treat    EKG 12 Lead       MEDICATIONS ORDERED:  Orders Placed This Encounter   Medications    dexamethasone (DECADRON) tablet 10 mg    ipratropium-albuterol (DUONEB) nebulizer solution 1 ampule     Order Specific Question:   Initiate RT Bronchodilator Protocol     Answer:   No    cetirizine (ZYRTEC) 10 MG tablet     Sig: Take 1 tablet by mouth daily     Dispense:  30 tablet     Refill:  0       DDX: Asthma exacerbation, viral illness, pneumonia, bronchitis, ACS    Initial MDM/Plan: 51 y.o. female who presents with cough, shortness of breath, chest pain. Symptoms been present for 5 days. Was seen in urgent care for same symptoms, prescribed prednisone however she is unable to take prednisone because she states you have severe tachycardia. Patient well-appearing initial evaluation, afebrile, vital signs stable. Mild expiratory wheeze bilaterally. Will have respiratory evaluate and treat patient. Will obtain chest x-ray and EKG. Will perform basic labs including a troponin as patient does have associated chest pain. Will dose with Decadron as patient states she is able to take this steroid. Will reassess. DIAGNOSTIC RESULTS / EMERGENCYDEPARTMENT COURSE / MDM     LABS:  Labs Reviewed   CBC WITH AUTO DIFFERENTIAL - Abnormal; Notable for the following components:       Result Value    Immature Granulocytes 1 (*)     All other components within normal limits   BASIC METABOLIC PANEL W/ REFLEX TO MG FOR LOW K - Abnormal; Notable for the following components:    BUN 5 (*)     Potassium 3.3 (*)     Anion Gap 8 (*)     All other components within normal limits   TROPONIN   MAGNESIUM         RADIOLOGY:  XR CHEST (2 VW)    Result Date: 6/19/2022  EXAMINATION: TWO XRAY VIEWS OF THE CHEST 6/19/2022 4:27 am COMPARISON: 01/30/2022 HISTORY: ORDERING SYSTEM PROVIDED HISTORY: asthma exacerbation TECHNOLOGIST PROVIDED HISTORY: asthma exacerbation FINDINGS: Shallow inflation. The cardiomediastinal silhouette is within normal limits. There is no consolidation, pneumothorax or evidence for edema. No evidence for effusion. No acute osseous abnormality is identified. No acute airspace disease identified.        EKG  EKG Interpretation    Interpreted by me    Rhythm: normal sinus   Rate: 86  Axis: normal  Ectopy: none  Conduction: normal  ST Segments: no acute change  T Waves: no acute change  Q Waves: none    Clinical Impression: no acute changes and normal EKG    All EKG's are interpreted by the Emergency Department Physicianwho either signs or Co-signs this chart in the absence of a cardiologist.    EMERGENCY DEPARTMENT COURSE:  ED Course as of 06/19/22 0558   Sun Jun 19, 2022   0356 WBC: 9.8 [AB]   0356 Hemoglobin Quant: 12.6 [AB]   0419 Troponin, High Sensitivity: 6 [AB]   0419 Labs grossly unremarkable [AB]   0501 XR CHEST (2 VW)  IMPRESSION:  No acute airspace disease identified. [AB]   2427 Patient reevaluated. States she is feeling much better at this time. Will discharge patient home. Advised to continue taking her inhalers as prescribed. Given strict return precautions including if he develops any worsening symptoms, fevers, any other concerning symptoms. Advised to stop smoking. Advise follow-up with primary care doctor. Patient agreed with discharge plan at this time. [AB]      ED Course User Index  [AB] Marcos Hampton DO          PROCEDURES:  None    CONSULTS:  None    CRITICAL CARE:  See attending physician note    FINAL IMPRESSION      1.  Exacerbation of asthma, unspecified asthma severity, unspecified whether persistent          DISPOSITION / PLAN     DISPOSITION Decision To Discharge 06/19/2022 05:18:06 AM      PATIENT REFERRED TO:  [facility]  [address] [place] [address]  [phone]  Go to   If symptoms worsen    KAYKAY [place] - CNP  [address]  [address] [facility] [address]  [phone]    Schedule an appointment as soon as possible for a visit in 3 days        DISCHARGE MEDICATIONS:  Discharge Medication List as of 6/19/2022  5:19 AM          Aby Reyes DO  Emergency Medicine Resident    (Please note that portions of this note were completed with a voice recognition program.Efforts were made to edit the dictations but occasionally words are mis-transcribed.)        Marcos Hampton DO  Resident  06/19/22 9181

## 2022-06-20 LAB
EKG ATRIAL RATE: 86 BPM
EKG P AXIS: 37 DEGREES
EKG P-R INTERVAL: 134 MS
EKG Q-T INTERVAL: 366 MS
EKG QRS DURATION: 84 MS
EKG QTC CALCULATION (BAZETT): 437 MS
EKG R AXIS: 24 DEGREES
EKG T AXIS: 64 DEGREES
EKG VENTRICULAR RATE: 86 BPM

## 2022-06-20 PROCEDURE — 93010 ELECTROCARDIOGRAM REPORT: CPT | Performed by: INTERNAL MEDICINE

## 2022-06-23 ENCOUNTER — APPOINTMENT (OUTPATIENT)
Dept: GENERAL RADIOLOGY | Age: 47
End: 2022-06-23
Payer: MEDICARE

## 2022-06-23 ENCOUNTER — HOSPITAL ENCOUNTER (EMERGENCY)
Age: 47
Discharge: HOME | End: 2022-06-23
Attending: EMERGENCY MEDICINE
Payer: MEDICARE

## 2022-06-23 VITALS
TEMPERATURE: 98.8 F | OXYGEN SATURATION: 99 % | SYSTOLIC BLOOD PRESSURE: 113 MMHG | DIASTOLIC BLOOD PRESSURE: 74 MMHG | RESPIRATION RATE: 21 BRPM | HEART RATE: 89 BPM

## 2022-06-23 DIAGNOSIS — J06.9 VIRAL URI WITH COUGH: Primary | ICD-10-CM

## 2022-06-23 LAB
ABSOLUTE EOS #: 0.15 K/UL (ref 0–0.44)
ABSOLUTE IMMATURE GRANULOCYTE: 0.05 K/UL (ref 0–0.3)
ABSOLUTE LYMPH #: 3.99 K/UL (ref 1.1–3.7)
ABSOLUTE MONO #: 0.44 K/UL (ref 0.1–1.2)
ANION GAP SERPL CALCULATED.3IONS-SCNC: 11 MMOL/L (ref 9–17)
BASOPHILS # BLD: 1 % (ref 0–2)
BASOPHILS ABSOLUTE: 0.05 K/UL (ref 0–0.2)
BUN BLDV-MCNC: 8 MG/DL (ref 6–20)
CALCIUM SERPL-MCNC: 8.5 MG/DL (ref 8.6–10.4)
CHLORIDE BLD-SCNC: 109 MMOL/L (ref 98–107)
CO2: 20 MMOL/L (ref 20–31)
CREAT SERPL-MCNC: 0.87 MG/DL (ref 0.5–0.9)
EKG ATRIAL RATE: 91 BPM
EKG P AXIS: 22 DEGREES
EKG P-R INTERVAL: 122 MS
EKG Q-T INTERVAL: 330 MS
EKG QRS DURATION: 84 MS
EKG QTC CALCULATION (BAZETT): 405 MS
EKG R AXIS: 12 DEGREES
EKG T AXIS: 45 DEGREES
EKG VENTRICULAR RATE: 91 BPM
EOSINOPHILS RELATIVE PERCENT: 2 % (ref 1–4)
GFR AFRICAN AMERICAN: >60 ML/MIN
GFR NON-AFRICAN AMERICAN: >60 ML/MIN
GFR SERPL CREATININE-BSD FRML MDRD: ABNORMAL ML/MIN/{1.73_M2}
GLUCOSE BLD-MCNC: 87 MG/DL (ref 70–99)
HCT VFR BLD CALC: 37.5 % (ref 36.3–47.1)
HEMOGLOBIN: 12.1 G/DL (ref 11.9–15.1)
IMMATURE GRANULOCYTES: 1 %
LYMPHOCYTES # BLD: 48 % (ref 24–43)
MCH RBC QN AUTO: 29.6 PG (ref 25.2–33.5)
MCHC RBC AUTO-ENTMCNC: 32.3 G/DL (ref 28.4–34.8)
MCV RBC AUTO: 91.7 FL (ref 82.6–102.9)
MONOCYTES # BLD: 5 % (ref 3–12)
NRBC AUTOMATED: 0 PER 100 WBC
PDW BLD-RTO: 14.5 % (ref 11.8–14.4)
PLATELET # BLD: 284 K/UL (ref 138–453)
PMV BLD AUTO: 10.6 FL (ref 8.1–13.5)
POTASSIUM SERPL-SCNC: 4 MMOL/L (ref 3.7–5.3)
PRO-BNP: 201 PG/ML
RBC # BLD: 4.09 M/UL (ref 3.95–5.11)
RBC # BLD: ABNORMAL 10*6/UL
SARS-COV-2, RAPID: NOT DETECTED
SEG NEUTROPHILS: 43 % (ref 36–65)
SEGMENTED NEUTROPHILS ABSOLUTE COUNT: 3.56 K/UL (ref 1.5–8.1)
SODIUM BLD-SCNC: 140 MMOL/L (ref 135–144)
SPECIMEN DESCRIPTION: NORMAL
TROPONIN, HIGH SENSITIVITY: 6 NG/L (ref 0–14)
TROPONIN, HIGH SENSITIVITY: 7 NG/L (ref 0–14)
WBC # BLD: 8.2 K/UL (ref 3.5–11.3)

## 2022-06-23 PROCEDURE — 71045 X-RAY EXAM CHEST 1 VIEW: CPT

## 2022-06-23 PROCEDURE — 99285 EMERGENCY DEPT VISIT HI MDM: CPT

## 2022-06-23 PROCEDURE — 93010 ELECTROCARDIOGRAM REPORT: CPT | Performed by: INTERNAL MEDICINE

## 2022-06-23 PROCEDURE — 87635 SARS-COV-2 COVID-19 AMP PRB: CPT

## 2022-06-23 PROCEDURE — 83880 ASSAY OF NATRIURETIC PEPTIDE: CPT

## 2022-06-23 PROCEDURE — 84484 ASSAY OF TROPONIN QUANT: CPT

## 2022-06-23 PROCEDURE — 94640 AIRWAY INHALATION TREATMENT: CPT

## 2022-06-23 PROCEDURE — 93005 ELECTROCARDIOGRAM TRACING: CPT | Performed by: STUDENT IN AN ORGANIZED HEALTH CARE EDUCATION/TRAINING PROGRAM

## 2022-06-23 PROCEDURE — 80048 BASIC METABOLIC PNL TOTAL CA: CPT

## 2022-06-23 PROCEDURE — 6360000002 HC RX W HCPCS: Performed by: STUDENT IN AN ORGANIZED HEALTH CARE EDUCATION/TRAINING PROGRAM

## 2022-06-23 PROCEDURE — 85025 COMPLETE CBC W/AUTO DIFF WBC: CPT

## 2022-06-23 RX ADMIN — ALBUTEROL SULFATE 5 MG: 5 SOLUTION RESPIRATORY (INHALATION) at 02:10

## 2022-06-23 RX ADMIN — IPRATROPIUM BROMIDE 0.5 MG: 0.5 SOLUTION RESPIRATORY (INHALATION) at 02:10

## 2022-06-23 ASSESSMENT — ENCOUNTER SYMPTOMS
PHOTOPHOBIA: 0
ABDOMINAL DISTENTION: 0
SORE THROAT: 0
ANAL BLEEDING: 0
RHINORRHEA: 0
NAUSEA: 0
SHORTNESS OF BREATH: 0
COUGH: 1
CHEST TIGHTNESS: 0
CONSTIPATION: 0
DIARRHEA: 0
VOMITING: 0

## 2022-06-23 ASSESSMENT — PAIN - FUNCTIONAL ASSESSMENT: PAIN_FUNCTIONAL_ASSESSMENT: NONE - DENIES PAIN

## 2022-06-23 NOTE — ED PROVIDER NOTES
[facility] ED  Emergency Department Encounter  EmergencyMedicine Resident     Pt Name:Marycruz Montague  MRN: [id number]  [place] 1975  Date of evaluation: 6/23/22  PCP:  KAKYAY Ugarte CNP    This patient was evaluated in the Emergency Department for symptoms described in the history of present illness. The patient was evaluated in the context of the global COVID-19 pandemic, which necessitated consideration that the patient might be at risk for infection with the SARS-CoV-2 virus that causes COVID-19. Institutional protocols and algorithms that pertain to the evaluation of patients at risk for COVID-19 are in a state of rapid change based on information released by regulatory bodies including the CDC and federal and state organizations. These policies and algorithms were followed during the patient's care in the ED. CHIEF COMPLAINT       Chief Complaint   Patient presents with    Asthma       HISTORY OF PRESENT ILLNESS  (Location/Symptom, Timing/Onset, Context/Setting, Quality, Duration, Modifying Factors, Severity.)      Andrew Dow is a 49 y.o. female here for 1 week of cough chest pain while coughing and body aches. She denies any shortness of breath. Patient does have asthma but is unsure if she has had an asthma attack. She seen in the emergency department on 6/17 had cardiac work-up done that was unremarkable. She denies any recent sick contacts.     PAST MEDICAL / SURGICAL / SOCIAL / FAMILY HISTORY      has a past medical history of Anemia, Anxiety, Asthma, Bipolar 1 disorder ([address] Blackout spell, Body piercing, Cervicalgia, Chest pain, Chronic back pain, Chronic kidney disease, Chronic neck pain, Constipation, COPD (chronic obstructive pulmonary disease) ([address] Dental crown present, Depression, Fall, GERD (gastroesophageal reflux disease), Heartburn, History of blood transfusion, Hyperglycemia, Insomnia, Kidney stones, Knee pain, right, Low blood pressure, Low vitamin D level, Migraine, Numbness, Palpitations, Sprain of lumbosacral (joint) (ligament), [place] kidney stones, Tachycardia, Wears glasses, and Wears glasses. has a past surgical history that includes Ankle fracture surgery (Right, ); Wrist surgery (Right, ); total nephrectomy (Left, 12); laparoscopy (2014); Dilation and curettage of uterus (2014); myringotomy (Bilateral); other surgical history (age 27); Nerve Block (47-77-); Nerve Block (10/4/13); knee surgery (Left, 13); Cystoscopy (04/15/14); Ureter stent placement (Right, 04/15/14); Lithotripsy (Right, 04/15/14); Cystoscopy (Right, 14); Endometrial ablation (14); Hand surgery (Right); Hysterectomy (9-8-15); Nerve Block (12/8/15); Nerve Block (16); Nerve Block (2016); Sleeve Gastrectomy (N/A, 2017); Nerve Block (2017); Gastric bypass surgery (2017); Umbilical hernia repair (); Shoulder arthroscopy (Right); Knee arthroscopy (Right, 2017); pr knee scope,diagnostic (Right, 2017); Lithotripsy (); [place] tooth extraction; Gastric bypass surgery; Knee Arthroplasty (Right, 2018); pr total knee arthroplasty (Right, 2018); Nerve Block (2018); Nerve Block (Left, 2018); Nerve Block (Right, 2018); Nerve Block (Right, 2018); Arm Surgery (2019); and Cholecystectomy (2019).       Social History     Socioeconomic History    Marital status:      Spouse name: Not on file    Number of children: 3    Years of education: Not on file    Highest education level: Not on file   Occupational History    Not on file   Tobacco Use    Smoking status: Current Every Day Smoker     Packs/day: 0.50     Types: Cigarettes     Start date:      Last attempt to quit: 2017     Years since quittin.5    Smokeless tobacco: Never Used    Tobacco comment: litte less then 1/2 mariola/day   Vaping Use    Vaping Use: Never used   Substance and Sexual Activity    Alcohol use: No    Drug use: No    Sexual activity: Yes     Partners: Male   Other Topics Concern    Not on file   Social History Narrative    Not on file     Social Determinants of Health     Financial Resource Strain:     Difficulty of Paying Living Expenses: Not on file   Food Insecurity:     Worried About Running Out of Food in the Last Year: Not on file    Mihaela of Food in the Last Year: Not on file   Transportation Needs:     Lack of Transportation (Medical): Not on file    Lack of Transportation (Non-Medical): Not on file   Physical Activity:     Days of Exercise per Week: Not on file    Minutes of Exercise per Session: Not on file   Stress:     Feeling of Stress : Not on file   Social Connections:     Frequency of Communication with Friends and Family: Not on file    Frequency of Social Gatherings with Friends and Family: Not on file    Attends Restoration Services: Not on file    Active Member of [address] [place] [address] or Organizations: Not on file    Attends Club or Organization Meetings: Not on file    Marital Status: Not on file   Intimate Partner Violence:     Fear of Current or Ex-Partner: Not on file    Emotionally Abused: Not on file    Physically Abused: Not on file    Sexually Abused: Not on file   Housing Stability:     Unable to Pay for Housing in the Last Year: Not on file    Number of [place] in the Last Year: Not on file    Unstable Housing in the Last Year: Not on file       Family History   Problem Relation Age of Onset    Kidney Disease Mother         ? ?? stone     Cancer Father         unknown    Heart Disease Father     Seizures Father     Migraines Father     Coronary Art Dis Father     Migraines Sister     Cervical Cancer Sister     Lung Cancer Maternal Grandmother     Seizures Son     Diabetes Maternal Uncle     Diabetes Maternal Cousin     Lung Cancer Paternal Grandfather     Anxiety Disorder Daughter        Allergies:  Ibuprofen, Claritin [loratadine], and Tape [adhesive tape]    Home Medications:  Prior to Admission medications    Medication Sig Start Date End Date Taking? Authorizing Provider   cetirizine (ZYRTEC) 10 MG tablet Take 1 tablet by mouth daily 6/19/22   Sha Hinsd,    orphenadrine (NORFLEX) 100 MG extended release tablet Take 1 tablet by mouth 2 times daily 6/30/21   Liyah Faust, DO   pramipexole (MIRAPEX) 0.125 MG tablet Take 0.125 mg by mouth nightly 8/31/20   Historical Provider, MD   ARIPiprazole (ABILIFY) 20 MG tablet aripiprazole 20 mg tablet    Historical Provider, MD   diclofenac sodium 1 % GEL Apply 2 g topically 4 times daily as needed for Pain 1/18/20   Jeffy Wells MD   acetaminophen (TYLENOL) 325 MG tablet Take 2 tablets by mouth every 8 hours as needed for Pain 11/13/19   Matthew Gamboa DO   zolpidem (AMBIEN) 5 MG tablet Take 5 mg by mouth nightly as needed for Sleep.     Historical Provider, MD   ondansetron (ZOFRAN) 4 MG tablet take 1 tablet by mouth 8 hours if needed for nausea and vomiting 1/16/19   MD Modesto Dockery (AIMOVIG SC) Inject into the skin    Historical Provider, MD   venlafaxine (EFFEXOR XR) 37.5 MG extended release capsule Take 75 mg by mouth  2/8/18   Historical Provider, MD   topiramate (TOPAMAX) 100 MG tablet Take 100 mg by mouth 2/16/18 10/19/20  Historical Provider, MD   OXcarbazepine (TRILEPTAL) 300 MG tablet take 1 tablet by mouth at bedtime 1/30/18   Historical Provider, MD   polyethylene glycol (GLYCOLAX) powder Take 17 g by mouth daily    Historical Provider, MD   promethazine (PHENERGAN) 25 MG tablet Take 25 mg by mouth every 6 hours as needed for Nausea    Historical Provider, MD   Multiple Vitamins-Minerals (WOMENS ONE DAILY) TABS Take 1 tablet by mouth daily    Historical Provider, MD   vitamin D3 (CHOLECALCIFEROL) 400 units TABS tablet Take 400 Units by mouth daily    Historical Provider, MD   montelukast (SINGULAIR) 10 MG tablet Take 10 mg by mouth nightly 8/7/17   Historical Provider, MD   OXcarbazepine (TRILEPTAL) 150 MG tablet Take 450 mg by mouth nightly    Historical Provider, MD   amitriptyline (ELAVIL) 100 MG tablet Take 100 mg by mouth nightly    Historical Provider, MD   SUMAtriptan (IMITREX) 100 MG tablet Take 100 mg by mouth once as needed for Migraine    Historical Provider, MD   Potassium 75 MG TABS Take 75 mg by mouth 2 times daily    Historical Provider, MD   omeprazole (PRILOSEC) 20 MG delayed release capsule Take 40 mg by mouth daily     Historical Provider, MD   albuterol sulfate  (90 BASE) MCG/ACT inhaler Inhale 2 puffs into the lungs every 6 hours as needed for Wheezing    Historical Provider, MD   budesonide-formoterol (SYMBICORT) 160-4.5 MCG/ACT AERO Inhale 2 puffs into the lungs 2 times daily    Historical Provider, MD       REVIEW OF SYSTEMS    (2-9 systems for level 4, 10 or more for level 5)      Review of Systems   Constitutional: Negative for chills and fever. HENT: Negative for rhinorrhea and sore throat. Eyes: Negative for photophobia. Respiratory: Positive for cough. Negative for chest tightness and shortness of breath. Cardiovascular: Positive for chest pain. Gastrointestinal: Negative for abdominal distention, anal bleeding, constipation, diarrhea, nausea and vomiting. Endocrine: Negative for polyuria. Genitourinary: Negative for difficulty urinating and flank pain. Musculoskeletal: Negative for arthralgias. Skin: Negative for rash. Neurological: Negative for weakness and headaches. PHYSICAL EXAM   (up to 7 for level 4, 8 or more for level 5)      INITIAL VITALS:   /74   Pulse 89   Temp 98.8 °F (37.1 °C) (Oral)   Resp 21   LMP 09/08/2015   SpO2 99%     Physical Exam  Constitutional:       General: She is not in acute distress. Appearance: She is not ill-appearing. HENT:      Head: Normocephalic and atraumatic.       Nose: Nose normal.      Mouth/Throat:      Mouth: Mucous membranes are moist.      Pharynx: No posterior oropharyngeal erythema. Eyes:      Extraocular Movements: Extraocular movements intact. Pupils: Pupils are equal, round, and reactive to light. Cardiovascular:      Rate and Rhythm: Normal rate. Pulses: Normal pulses. Heart sounds: Normal heart sounds. Pulmonary:      Effort: No respiratory distress. Breath sounds: Wheezing present. Abdominal:      General: Abdomen is flat. Palpations: Abdomen is soft. Tenderness: There is no abdominal tenderness. Musculoskeletal:      Cervical back: No rigidity. Right lower leg: No edema. Left lower leg: No edema. Skin:     Capillary Refill: Capillary refill takes less than 2 seconds. Coloration: Skin is not jaundiced. Findings: No rash. Neurological:      General: No focal deficit present. Mental Status: She is oriented to person, place, and time. DIFFERENTIAL  DIAGNOSIS     PLAN (LABS / IMAGING / EKG):  Orders Placed This Encounter   Procedures    COVID-19, Rapid    XR CHEST PORTABLE    CBC with Auto Differential    Troponin    Brain Natriuretic Peptide    Basic Metabolic Panel w/ Reflex to MG    Troponin    Initiate ED RT Aerosol protocol    EKG 12 Lead       MEDICATIONS ORDERED:  Orders Placed This Encounter   Medications    albuterol (PROVENTIL) nebulizer solution 5 mg     Order Specific Question:   Initiate RT Bronchodilator Protocol     Answer:   No    ipratropium (ATROVENT) 0.02 % nebulizer solution 0.5 mg     Order Specific Question:   Initiate RT Bronchodilator Protocol     Answer:   No       DDX: copd, acs, pneumonia    DIAGNOSTIC RESULTS / EMERGENCY DEPARTMENT COURSE / MDM   LAB RESULTS:  Results for orders placed or performed during the hospital encounter of 06/23/22   COVID-19, Rapid    Specimen: Nasopharyngeal Swab   Result Value Ref Range    Specimen Description . NASOPHARYNGEAL SWAB     SARS-CoV-2, Rapid Not Detected Not Detected CBC with Auto Differential   Result Value Ref Range    WBC 8.2 3.5 - 11.3 k/uL    RBC 4.09 3.95 - 5.11 m/uL    Hemoglobin 12.1 11.9 - 15.1 g/dL    Hematocrit 37.5 36.3 - 47.1 %    MCV 91.7 82.6 - 102.9 fL    MCH 29.6 25.2 - 33.5 pg    MCHC 32.3 28.4 - 34.8 g/dL    RDW 14.5 (H) 11.8 - 14.4 %    Platelets 402 160 - 231 k/uL    MPV 10.6 8.1 - 13.5 fL    NRBC Automated 0.0 0.0 per 100 WBC    Seg Neutrophils 43 36 - 65 %    Lymphocytes 48 (H) 24 - 43 %    Monocytes 5 3 - 12 %    Eosinophils % 2 1 - 4 %    Basophils 1 0 - 2 %    Immature Granulocytes 1 (H) 0 %    Segs Absolute 3.56 1.50 - 8.10 k/uL    Absolute Lymph # 3.99 (H) 1.10 - 3.70 k/uL    Absolute Mono # 0.44 0.10 - 1.20 k/uL    Absolute Eos # 0.15 0.00 - 0.44 k/uL    Basophils Absolute 0.05 0.00 - 0.20 k/uL    Absolute Immature Granulocyte 0.05 0.00 - 0.30 k/uL    RBC Morphology ANISOCYTOSIS PRESENT    Troponin   Result Value Ref Range    Troponin, High Sensitivity 6 0 - 14 ng/L   Brain Natriuretic Peptide   Result Value Ref Range    Pro- <300 pg/mL   Basic Metabolic Panel w/ Reflex to MG   Result Value Ref Range    Glucose 87 70 - 99 mg/dL    BUN 8 6 - 20 mg/dL    CREATININE 0.87 0.50 - 0.90 mg/dL    Calcium 8.5 (L) 8.6 - 10.4 mg/dL    Sodium 140 135 - 144 mmol/L    Potassium 4.0 3.7 - 5.3 mmol/L    Chloride 109 (H) 98 - 107 mmol/L    CO2 20 20 - 31 mmol/L    Anion Gap 11 9 - 17 mmol/L    GFR Non-African American >60 >60 mL/min    GFR African American >60 >60 mL/min    GFR Comment         Troponin   Result Value Ref Range    Troponin, High Sensitivity 7 0 - 14 ng/L       RADIOLOGY:  XR CHEST (2 VW)    Result Date: 6/19/2022  No acute airspace disease identified.         EKG Interpretation    Interpreted by myself    Rhythm: normal sinus   Rate: normal  Axis: normal  Ectopy: none  Conduction: normal  ST Segments: normal  T Waves: normal  Q Waves: none    Clinical Impression: no acute changes    All EKG's are interpreted by the Emergency Department Physician who either signs or Co-signs this chart in the absence of a cardiologist.      EMERGENCY DEPARTMENT COURSE:  ED Course as of 06/23/22 0340   Thu Jun 23, 2022   0155 Patient by bedside. Nontoxic-appearing. Here for pleuritic chest pain and continued cough for 1 week. Will get cardiac labs, chest x-ray give breathing treatments [ZE]   0300 Acute process on labs imaging COVID-negative. VSS. Will discharge patient home if second troponin negative [ZE]   0337 Troponin, High Sensitivity: 7  Second troponin 7 will discharge [ZE]      ED Course User Index  [ZE] Cora Edwards DO       PROCEDURES:  Procedures     CONSULTS:  None    CRITICAL CARE:  none    MDM  Here for evaluation of cough pleuritic chest pain. Seen in the department several days ago for similar symptoms work-up negative. Cardiac labs and basic labs chest x-ray unremarkable for acute process. Patient mildly wheezy breathing treatment felt better. Patient likely has viral URI with cough. Nontoxic. No respiratory stress. Patient discharged home  FINAL IMPRESSION      1.  Viral URI with cough          DISPOSITION / PLAN     DISPOSITION Decision To Discharge 06/23/2022 03:38:47 AM      PATIENT REFERRED TO:  [facility]  [address] [place] [address]  [phone]    If symptoms worsen      DISCHARGE MEDICATIONS:  New Prescriptions    No medications on file       nAa Servin DO  Emergency Medicine Resident    (Please note that portions of thisnote were completed with a voice recognition program.  Efforts were made to edit the dictations but occasionally words are mis-transcribed.)        Cora Edwards DO  Resident  06/23/22 [id number]

## 2022-06-23 NOTE — ED PROVIDER NOTES
[facility]     Emergency Department     Faculty Attestation    I performed a history and physical examination of the patient and discussed management with the resident. I have reviewed and agree with the residents findings including all diagnostic interpretations, and treatment plans as written. Any areas of disagreement are noted on the chart. I was personally present for the key portions of any procedures. I have documented in the chart those procedures where I was not present during the key portions. I have reviewed the emergency nurses triage note. I agree with the chief complaint, past medical history, past surgical history, allergies, medications, social and family history as documented unless otherwise noted below. Documentation of the HPI, Physical Exam and Medical Decision Making performed by scribnicolás is based on my personal performance of the HPI, PE and MDM. For Physician Assistant/ Nurse Practitioner cases/documentation I have personally evaluated this patient and have completed at least one if not all key elements of the E/M (history, physical exam, and MDM). Additional findings are as noted. 48 yo F c/o asthma exacerbation, cough, no fever, no vomit,   No hx vte,   pe vss, gcs 15 no calf swelling, no calf tenderness,     Trop 6/7, cxr stable, vss discharged    EKG Interpretation    Interpreted by me  Normal sinus, heart rate 91, no ischemia, normal axis, QTc 405    CRITICAL CARE: There was a high probability of clinically significant/life threatening deterioration in this patient's condition which required my urgent intervention. Total critical care time was 5 minutes. This excludes any time for separately reportable procedures.        [facility] 24, DO  06/23/22 [address] [facility],   06/23/22 1697

## 2022-06-26 ENCOUNTER — HOSPITAL ENCOUNTER (EMERGENCY)
Age: 47
Discharge: HOME | End: 2022-06-27
Attending: EMERGENCY MEDICINE
Payer: MEDICARE

## 2022-06-26 DIAGNOSIS — T40.601A OPIATE OVERDOSE, ACCIDENTAL OR UNINTENTIONAL, INITIAL ENCOUNTER (HCC): Primary | ICD-10-CM

## 2022-06-26 PROCEDURE — 6360000002 HC RX W HCPCS: Performed by: STUDENT IN AN ORGANIZED HEALTH CARE EDUCATION/TRAINING PROGRAM

## 2022-06-26 PROCEDURE — 96374 THER/PROPH/DIAG INJ IV PUSH: CPT

## 2022-06-26 PROCEDURE — 99284 EMERGENCY DEPT VISIT MOD MDM: CPT

## 2022-06-26 PROCEDURE — 6370000000 HC RX 637 (ALT 250 FOR IP): Performed by: EMERGENCY MEDICINE

## 2022-06-26 RX ORDER — NALOXONE HYDROCHLORIDE 4 MG/.1ML
1 SPRAY NASAL ONCE
Status: COMPLETED | OUTPATIENT
Start: 2022-06-26 | End: 2022-06-26

## 2022-06-26 RX ORDER — ONDANSETRON 2 MG/ML
4 INJECTION INTRAMUSCULAR; INTRAVENOUS ONCE
Status: COMPLETED | OUTPATIENT
Start: 2022-06-26 | End: 2022-06-26

## 2022-06-26 RX ADMIN — NALOXONE HYDROCHLORIDE NASAL SPRAY 1 SPRAY: 4 INHALANT NASAL at 23:22

## 2022-06-26 RX ADMIN — ONDANSETRON 4 MG: 2 INJECTION INTRAMUSCULAR; INTRAVENOUS at 23:19

## 2022-06-26 ASSESSMENT — PAIN SCALES - GENERAL: PAINLEVEL_OUTOF10: 8

## 2022-06-26 ASSESSMENT — PAIN - FUNCTIONAL ASSESSMENT: PAIN_FUNCTIONAL_ASSESSMENT: 0-10

## 2022-06-27 ENCOUNTER — TELEPHONE (OUTPATIENT)
Dept: BARIATRICS/WEIGHT MGMT | Age: 47
End: 2022-06-27

## 2022-06-27 VITALS
BODY MASS INDEX: 31.65 KG/M2 | RESPIRATION RATE: 16 BRPM | TEMPERATURE: 98.7 F | HEIGHT: 62 IN | OXYGEN SATURATION: 100 % | SYSTOLIC BLOOD PRESSURE: 118 MMHG | WEIGHT: 172 LBS | DIASTOLIC BLOOD PRESSURE: 78 MMHG | HEART RATE: 86 BPM

## 2022-06-27 DIAGNOSIS — E55.9 VITAMIN D DEFICIENCY: ICD-10-CM

## 2022-06-27 DIAGNOSIS — E66.09 CLASS 1 OBESITY DUE TO EXCESS CALORIES WITH BODY MASS INDEX (BMI) OF 31.0 TO 31.9 IN ADULT, UNSPECIFIED WHETHER SERIOUS COMORBIDITY PRESENT: Primary | ICD-10-CM

## 2022-06-27 DIAGNOSIS — N18.30 STAGE 3 CHRONIC KIDNEY DISEASE, UNSPECIFIED WHETHER STAGE 3A OR 3B CKD (HCC): ICD-10-CM

## 2022-06-27 DIAGNOSIS — Z98.84 STATUS POST LAPAROSCOPIC SLEEVE GASTRECTOMY: ICD-10-CM

## 2022-06-27 RX ORDER — ONDANSETRON 4 MG/1
4 TABLET, ORALLY DISINTEGRATING ORAL EVERY 8 HOURS PRN
Qty: 12 TABLET | Refills: 0 | Status: SHIPPED | OUTPATIENT
Start: 2022-06-27

## 2022-06-27 NOTE — ED NOTES
Pt states she swallowed a percocet 30 with fiance at 1900. She then made dinner, sat on the couch and woke up to EMS giving her narcan.  They gave IN narcan 2 mg and 2 mg iv narcan and 4 mg iv zofran     Anthony Caceres RN  06/26/22 [id number]

## 2022-06-27 NOTE — TELEPHONE ENCOUNTER
Patient scheduled for annual post op visit. Please order annual labs. Patient will utilize a [facility].      Thank you

## 2022-06-27 NOTE — ED NOTES
Split percocet 30 with hermelindo at 1900, pt states she made dinner and then sat on couch and didn't know she lost consciousness      Eric Crook RN  06/26/22 2511

## 2022-06-27 NOTE — ED PROVIDER NOTES
[facility]     Emergency Department     Faculty Note/ Attestation      Pt Name: Grisel Neil                                       MRN: [id number]  [place] 1975  Date of evaluation: 6/26/2022    Patients PCP:    KAYKAY Moncada - CNP    Attestation  I performed a history and physical examination of the patient and discussed management with the resident. I reviewed the residents note and agree with the documented findings and plan of care. Any areas of disagreement are noted on the chart. I was personally present for the key portions of any procedures. I have documented in the chart those procedures where I was not present during the key portions. I have reviewed the emergency nurses triage note. I agree with the chief complaint, past medical history, past surgical history, allergies, medications, social and family history as documented unless otherwise noted below. For Physician Assistant/ Nurse Practitioner cases/documentation I have personally evaluated this patient and have completed at least one if not all key elements of the E/M (history, physical exam, and MDM). Additional findings are as noted. Initial Screens:             Vitals:    Vitals:    06/26/22 2256   BP: 107/84   Pulse: (!) 108   Resp: 20   Temp: 98.7 °F (37.1 °C)   TempSrc: Oral   SpO2: 99%   Weight: 172 lb (78 kg)   Height: 5' 2\" (1.575 m)       CHIEF COMPLAINT     No chief complaint on file. Patient 69-year-old female was doing drugs with her fiancé \"Percocet\" taking patient does not remember what happened woke up with naloxone being given by  providers as patient was not breathing.   Patient now awake alert        EMERGENCY DEPARTMENT COURSE:     -------------------------  BP: 107/84, Temp: 98.7 °F (37.1 °C), Heart Rate: (!) 108, Resp: 20  Patient speaking talking swallowing no acute distress patient breathing slightly tachycardic no pain at this time     Comments  Explained the risk of death in this patient and the risk of death from opiate overdose and the importance of needing help opiate kit ordered for patient at bedside    ED Course as of 06/27/22 0938   [place] Jun 26, 2022   2531 EKG Interpretation   Interpreted by Ligia Cuellar DO    Rhythm: sinus tachycardia  Axis: normal  Ectopy: none  Conduction: normal  ST Segments: normal  T Waves: normal  Q Waves: none    Clinical Impression: sinus tachy otherwise normal [WK]      ED Course User Index  [WK] DO Ligia Montelongo DO,, DO, RDMS.   Attending Emergency Physician          Ligia Cuellar DO  06/27/22 7165

## 2022-06-28 NOTE — ED PROVIDER NOTES
[address]  ED  Emergency Department Encounter  Emergency Medicine Resident     Pt Name: Kunal Rodrigez  MRN: [id number]  Dontaegfemily 1975  Date of evaluation: 6/28/22  PCP:  KAYKAY Rosales CNP    CHIEF COMPLAINT       Chief Complaint   Patient presents with    Drug Overdose       HISTORY OFPRESENT ILLNESS  (Location/Symptom, Timing/Onset, Context/Setting, Quality, Duration, Modifying Deneise Yissel.)      Kunal Rodrigez is a 49 y.o. female with past medical history of bipolar disorder, anxiety, COPD who presents after accidental drug overdose. Patient states that her and her  split a Percocet tab from a friend. She states that this was the last thing she remembers and she woke up on the couch with first responders standing above her. Patient thinks she may have taken the pill around 7 PM.  EMS administered 2 mg of intranasal Narcan followed by 2 mg of IV Narcan. Patient then vomited and was given 4 mg of IV Zofran. She did not require any bag valve mask ventilation. Patient thinks her son may have called 45. Patient's fiancé is also in the emergency department being evaluated. Patient states prior to taking the pill, patient felt well with no symptoms. She is currently denying any symptoms with the exception of nausea.     PAST MEDICAL / SURGICAL / SOCIAL / FAMILY HISTORY      has a past medical history of Anemia, Anxiety, Asthma, Bipolar 1 disorder ([address] Blackout spell, Body piercing, Cervicalgia, Chest pain, Chronic back pain, Chronic kidney disease, Chronic neck pain, Constipation, COPD (chronic obstructive pulmonary disease) ([address] Dental crown present, Depression, Fall, GERD (gastroesophageal reflux disease), Heartburn, History of blood transfusion, Hyperglycemia, Insomnia, Kidney stones, Knee pain, right, Low blood pressure, Low vitamin D level, Migraine, Numbness, Palpitations, Sprain of lumbosacral (joint) (ligament), Staghorn kidney stones, Tachycardia, Wears glasses, and Wears glasses. has a past surgical history that includes Ankle fracture surgery (Right, 1998); Wrist surgery (Right, 2002); total nephrectomy (Left, 4/17/12); laparoscopy (05/23/2014); Dilation and curettage of uterus (05/23/2014); myringotomy (Bilateral); other surgical history (age 27); Nerve Block (87-91-33); Nerve Block (10/4/13); knee surgery (Left, 12/16/13); Cystoscopy (04/15/14); Ureter stent placement (Right, 04/15/14); Lithotripsy (Right, 04/15/14); Cystoscopy (Right, 4/29/14); Endometrial ablation (1/23/14); Hand surgery (Right); Hysterectomy (9-8-15); Nerve Block (12/8/15); Nerve Block (5/24/16); Nerve Block (11/21/2016); Sleeve Gastrectomy (N/A, 2/20/2017); Nerve Block (08/02/2017); Gastric bypass surgery (02/20/2017); Umbilical hernia repair (2007); Shoulder arthroscopy (Right); Knee arthroscopy (Right, 09/12/2017); pr knee scope,diagnostic (Right, 9/12/2017); Lithotripsy (2014); [place] tooth extraction; Gastric bypass surgery; Knee Arthroplasty (Right, 02/20/2018); pr total knee arthroplasty (Right, 2/20/2018); Nerve Block (06/05/2018); Nerve Block (Left, 08/22/2018); Nerve Block (Right, 08/29/2018); Nerve Block (Right, 09/18/2018); Arm Surgery (02/26/2019); and Cholecystectomy (12/26/2019). Social:  reports that she has been smoking cigarettes. She started smoking about 31 years ago. She has been smoking about 0.50 packs per day. She has never used smokeless tobacco. She reports that she does not drink alcohol and does not use drugs. Family Hx:   Family History   Problem Relation Age of Onset    Kidney Disease Mother         ? ?? stone     Cancer Father         unknown    Heart Disease Father     Seizures Father     Migraines Father     Coronary Art Dis Father     Migraines Sister     Cervical Cancer Sister     Lung Cancer Maternal Grandmother     Seizures Son     Diabetes Maternal Uncle     Diabetes Maternal Cousin     Lung Cancer Paternal Grandfather    Pastor Moore Anxiety Disorder Daughter         Allergies:  Ibuprofen, Claritin [loratadine], and Tape [adhesive tape]    Home Medications:  Prior to Admission medications    Medication Sig Start Date End Date Taking? Authorizing Provider   ondansetron (ZOFRAN ODT) 4 MG disintegrating tablet Take 1 tablet by mouth every 8 hours as needed for Nausea or Vomiting 6/27/22  Yes Susan English,    cetirizine (ZYRTEC) 10 MG tablet Take 1 tablet by mouth daily 6/19/22   Tiffany Holbrook DO   orphenadrine (NORFLEX) 100 MG extended release tablet Take 1 tablet by mouth 2 times daily 6/30/21   Shan Murray DO   pramipexole (MIRAPEX) 0.125 MG tablet Take 0.125 mg by mouth nightly 8/31/20   Historical Provider, MD   ARIPiprazole (ABILIFY) 20 MG tablet aripiprazole 20 mg tablet    Historical Provider, MD   diclofenac sodium 1 % GEL Apply 2 g topically 4 times daily as needed for Pain 1/18/20   Keenan East MD   acetaminophen (TYLENOL) 325 MG tablet Take 2 tablets by mouth every 8 hours as needed for Pain 11/13/19   Emily Rico DO   zolpidem (AMBIEN) 5 MG tablet Take 5 mg by mouth nightly as needed for Sleep.     Historical Provider, MD   ondansetron (ZOFRAN) 4 MG tablet take 1 tablet by mouth 8 hours if needed for nausea and vomiting 1/16/19   MD Rocio Valle (AIMOVIG SC) Inject into the skin    Historical Provider, MD   venlafaxine (EFFEXOR XR) 37.5 MG extended release capsule Take 75 mg by mouth  2/8/18   Historical Provider, MD   topiramate (TOPAMAX) 100 MG tablet Take 100 mg by mouth 2/16/18 10/19/20  Historical Provider, MD   OXcarbazepine (TRILEPTAL) 300 MG tablet take 1 tablet by mouth at bedtime 1/30/18   Historical Provider, MD   polyethylene glycol (GLYCOLAX) powder Take 17 g by mouth daily    Historical Provider, MD   promethazine (PHENERGAN) 25 MG tablet Take 25 mg by mouth every 6 hours as needed for Nausea    Historical Provider, MD   Multiple Vitamins-Minerals (WOMENS ONE DAILY) TABS Take 1 tablet by mouth daily    Historical Provider, MD   vitamin D3 (CHOLECALCIFEROL) 400 units TABS tablet Take 400 Units by mouth daily    Historical Provider, MD   montelukast (SINGULAIR) 10 MG tablet Take 10 mg by mouth nightly 8/7/17   Historical Provider, MD   OXcarbazepine (TRILEPTAL) 150 MG tablet Take 450 mg by mouth nightly    Historical Provider, MD   amitriptyline (ELAVIL) 100 MG tablet Take 100 mg by mouth nightly    Historical Provider, MD   SUMAtriptan (IMITREX) 100 MG tablet Take 100 mg by mouth once as needed for Migraine    Historical Provider, MD   Potassium 75 MG TABS Take 75 mg by mouth 2 times daily    Historical Provider, MD   omeprazole (PRILOSEC) 20 MG delayed release capsule Take 40 mg by mouth daily     Historical Provider, MD   albuterol sulfate  (90 BASE) MCG/ACT inhaler Inhale 2 puffs into the lungs every 6 hours as needed for Wheezing    Historical Provider, MD   budesonide-formoterol (SYMBICORT) 160-4.5 MCG/ACT AERO Inhale 2 puffs into the lungs 2 times daily    Historical Provider, MD       REVIEW OFSYSTEMS    (2-9 systems for level 4, 10 or more for level 5)      Review of Systems   Constitutional: Negative for chills and fever. HENT: Negative for congestion, rhinorrhea and sore throat. Eyes: Negative for visual disturbance. Respiratory: Negative for cough and shortness of breath. Cardiovascular: Negative for chest pain and leg swelling. Gastrointestinal: Positive for nausea. Negative for abdominal pain, constipation, diarrhea and vomiting. Genitourinary: Negative for dysuria, frequency and hematuria. Musculoskeletal: Negative for arthralgias, back pain and neck pain. Skin: Negative for rash. Neurological: Negative for weakness, light-headedness, numbness and headaches. Psychiatric/Behavioral: Negative for confusion. All other systems reviewed and are negative.       PHYSICAL EXAM   (up to 7 for level 4, 8 or more forlevel 5)      INITIAL VITALS:   Vitals: 06/26/22 2256 06/26/22 2338 06/27/22 0022   BP: 107/84  118/78   Pulse: (!) 108  86   Resp: 20  16   Temp: 98.7 °F (37.1 °C)     TempSrc: Oral     SpO2: 99% 98% 100%   Weight: 172 lb (78 kg)     Height: 5' 2\" (1.575 m)          Physical Exam  Vitals and nursing note reviewed. Constitutional:       General: She is not in acute distress. Comments: Adult female sitting in stretcher in no acute distress. Speaks in full sentences and answers questions appropriately. She has an emesis been next to her   HENT:      Head: Normocephalic and atraumatic. Mouth/Throat:      Mouth: Mucous membranes are moist.      Pharynx: Oropharynx is clear. Eyes:      Pupils: Pupils are equal, round, and reactive to light. Cardiovascular:      Rate and Rhythm: Normal rate and regular rhythm. Pulmonary:      Effort: Pulmonary effort is normal. No respiratory distress. Breath sounds: Normal breath sounds. Abdominal:      General: There is no distension. Palpations: Abdomen is soft. Tenderness: There is no abdominal tenderness. Musculoskeletal:      Cervical back: Neck supple. No rigidity. Skin:     General: Skin is warm and dry. Findings: No rash. Neurological:      Mental Status: She is alert. Psychiatric:         Mood and Affect: Mood normal.         Behavior: Behavior normal.         DIFFERENTIAL  DIAGNOSIS     DDX: Accidental drug overdose, opiate overdose    Initial MDM/Plan: 49 y.o. female who presents via EMS after being found unresponsive on her couch after she split what she thought was a Percocet with her fiancé. Patient required 2 mg of intranasal Narcan and 2 mg of IV Narcan. She did not require any assisted ventilation. On physical exam, patient is nontoxic-appearing with vital signs initially remarkable for some mild tachycardia. Cardiopulmonary exam is benign. Patient is only complaining of nausea.   Will obtain EKG and monitor in the patient in the emergency department for 2 hours given the long half-life of Percocet, though I suspect this he will may actually have contained fentanyl. We will treat the patient's nausea. DIAGNOSTIC RESULTS / EMERGENCYDEPARTMENT COURSE / [facility]     LABS:  No results found for this visit on 06/26/22. RADIOLOGY:  No results found. EKG  Rhythm: sinus tachycardia  Axis: normal  Ectopy: none  Conduction: normal  ST Segments: normal  T Waves: normal  Q Waves: none    All EKG's are interpreted by the Emergency Department Physician who either signs or Co-signs this chart in the absence of a cardiologist.      MEDICATIONS ORDERED:  Orders Placed This Encounter   Medications    naloxone opiate overdose kit 4mg    ondansetron (ZOFRAN) injection 4 mg    ondansetron (ZOFRAN ODT) 4 MG disintegrating tablet     Sig: Take 1 tablet by mouth every 8 hours as needed for Nausea or Vomiting     Dispense:  12 tablet     Refill:  0         PROCEDURES:  None      CONSULTS:  None      EMERGENCY DEPARTMENT COURSE:  0100: Patient was monitored in the emergency department for 2 hours. She did not require any additional Narcan. Heart rate has normalized. She feels improved after the Zofran. We will discharge her home with prescription for Zofran as well as provide her with a Narcan kit. Patient was instructed to return for any worsening symptoms. FINAL IMPRESSION      1.  Opiate overdose, accidental or unintentional, initial encounter [facility])          DISPOSITION / PLAN     DISPOSITION Decision To Discharge 06/27/2022 01:02:54 AM      PATIENT REFERRED TO:  KAYKAY Travis CNP, [id number]  [address]  [phone]    Schedule an appointment as soon as possible for a visit       [facility] ED  [address] [place] [address]  [phone]    If symptoms worsen      DISCHARGE MEDICATIONS:  Discharge Medication List as of 6/27/2022  1:06 AM          Jose J Sorenson DO  Emergency Medicine Resident    (Please note that portions of this note were completed with a voice recognition program.Efforts were made to edit the dictations but occasionally words are mis-transcribed.)        Sammie Cintron DO  Resident  06/29/22 9999

## 2022-06-29 ASSESSMENT — ENCOUNTER SYMPTOMS
VOMITING: 0
BACK PAIN: 0
DIARRHEA: 0
RHINORRHEA: 0
NAUSEA: 1
SORE THROAT: 0
ABDOMINAL PAIN: 0
CONSTIPATION: 0
SHORTNESS OF BREATH: 0
COUGH: 0

## 2022-07-14 ENCOUNTER — TELEPHONE (OUTPATIENT)
Dept: BARIATRICS/WEIGHT MGMT | Age: 47
End: 2022-07-14

## 2022-07-14 NOTE — TELEPHONE ENCOUNTER
Called patient regarding missed appointment today. Unable to leave message as phone is not accepting calls at this time.

## 2022-07-20 ENCOUNTER — HOSPITAL ENCOUNTER (EMERGENCY)
Age: 47
Discharge: HOME | End: 2022-07-20
Attending: EMERGENCY MEDICINE
Payer: MEDICARE

## 2022-07-20 VITALS
DIASTOLIC BLOOD PRESSURE: 72 MMHG | HEART RATE: 76 BPM | SYSTOLIC BLOOD PRESSURE: 113 MMHG | OXYGEN SATURATION: 98 % | TEMPERATURE: 98.2 F | BODY MASS INDEX: 32.39 KG/M2 | HEIGHT: 62 IN | WEIGHT: 176 LBS | RESPIRATION RATE: 14 BRPM

## 2022-07-20 DIAGNOSIS — G89.29 CHRONIC RIGHT SHOULDER PAIN: Primary | ICD-10-CM

## 2022-07-20 DIAGNOSIS — M25.511 CHRONIC RIGHT SHOULDER PAIN: Primary | ICD-10-CM

## 2022-07-20 PROCEDURE — 99283 EMERGENCY DEPT VISIT LOW MDM: CPT

## 2022-07-20 PROCEDURE — 6370000000 HC RX 637 (ALT 250 FOR IP): Performed by: STUDENT IN AN ORGANIZED HEALTH CARE EDUCATION/TRAINING PROGRAM

## 2022-07-20 RX ORDER — ACETAMINOPHEN 500 MG
1000 TABLET ORAL ONCE
Status: COMPLETED | OUTPATIENT
Start: 2022-07-20 | End: 2022-07-20

## 2022-07-20 RX ADMIN — ACETAMINOPHEN 1000 MG: 500 TABLET ORAL at 20:52

## 2022-07-20 ASSESSMENT — ENCOUNTER SYMPTOMS
COUGH: 0
ABDOMINAL PAIN: 0
BACK PAIN: 0
SORE THROAT: 0
VOMITING: 0
SHORTNESS OF BREATH: 0

## 2022-07-20 ASSESSMENT — PAIN DESCRIPTION - ORIENTATION
ORIENTATION: RIGHT
ORIENTATION: RIGHT

## 2022-07-20 ASSESSMENT — PAIN - FUNCTIONAL ASSESSMENT: PAIN_FUNCTIONAL_ASSESSMENT: 0-10

## 2022-07-20 ASSESSMENT — PAIN DESCRIPTION - LOCATION
LOCATION: SHOULDER
LOCATION: SHOULDER

## 2022-07-20 ASSESSMENT — PAIN SCALES - GENERAL: PAINLEVEL_OUTOF10: 9

## 2022-07-21 NOTE — ED PROVIDER NOTES
[facility] ED  Emergency Department Encounter  Emergency Medicine Resident     Pt Alisa Delgado  MRN: [id number]  Dontaegfemily 1975  Date of evaluation: 7/20/22  PCP:  KAYKAY Oates CNP      CHIEF COMPLAINT       R shoulder pain    HISTORY OF PRESENT ILLNESS  (Location/Symptom, Timing/Onset, Context/Setting, Quality, Duration, Modifying Factors, Severity.)      Jefferson Gaytan is a 49 y.o. female who presents with acute on chronic right shoulder pain. Patient states that she has a history of a right-sided rotator cuff tear, has been following with Dr. Juliann Levine. 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 Patient has been having some worsening of her right shoulder pain intermittently after these movements and is not new to her. Patient takes Flexeril and gabapentin at home. Denies new injuries otherwise, no other complaints. PAST MEDICAL / SURGICAL / SOCIAL / FAMILY HISTORY      has a past medical history of Anemia, Anxiety, Asthma, Bipolar 1 disorder ([address] Blackout spell, Body piercing, Cervicalgia, Chest pain, Chronic back pain, Chronic kidney disease, Chronic neck pain, Constipation, COPD (chronic obstructive pulmonary disease) ([address] Dental crown present, Depression, Fall, GERD (gastroesophageal reflux disease), Heartburn, History of blood transfusion, Hyperglycemia, Insomnia, Kidney stones, Knee pain, right, Low blood pressure, Low vitamin D level, Migraine, Numbness, Palpitations, Sprain of lumbosacral (joint) (ligament), Staghorn kidney stones, Tachycardia, Wears glasses, and Wears glasses. has a past surgical history that includes Ankle fracture surgery (Right, 1998); Wrist surgery (Right, 2002); total nephrectomy (Left, 4/17/12); laparoscopy (05/23/2014);  Dilation and curettage of uterus (05/23/2014); myringotomy (Bilateral); other surgical history (age 27); Nerve Block (53-84-56); Nerve Block (10/4/13); knee surgery (Left, 13); Cystoscopy (04/15/14); Ureter stent placement (Right, 04/15/14); Lithotripsy (Right, 04/15/14); Cystoscopy (Right, 14); Endometrial ablation (14); Hand surgery (Right); Hysterectomy (9-8-15); Nerve Block (12/8/15); Nerve Block (16); Nerve Block (2016); Sleeve Gastrectomy (N/A, 2017); Nerve Block (2017); Gastric bypass surgery (2017); Umbilical hernia repair (); Shoulder arthroscopy (Right); Knee arthroscopy (Right, 2017); pr knee scope,diagnostic (Right, 2017); Lithotripsy (); [place] tooth extraction; Gastric bypass surgery; Knee Arthroplasty (Right, 2018); pr total knee arthroplasty (Right, 2018); Nerve Block (2018); Nerve Block (Left, 2018); Nerve Block (Right, 2018); Nerve Block (Right, 2018); Arm Surgery (2019); and Cholecystectomy (2019).     Social History     Socioeconomic History    Marital status:      Spouse name: Not on file    Number of children: 4    Years of education: Not on file    Highest education level: Not on file   Occupational History    Not on file   Tobacco Use    Smoking status: Every Day     Packs/day: 0.50     Types: Cigarettes     Start date:      Last attempt to quit: 2017     Years since quittin.6    Smokeless tobacco: Never    Tobacco comments:     litte less then 1/2 mariola/day   Vaping Use    Vaping Use: Never used   Substance and Sexual Activity    Alcohol use: No    Drug use: No    Sexual activity: Yes     Partners: Male   Other Topics Concern    Not on file   Social History Narrative    Not on file     Social Determinants of Health     Financial Resource Strain: Not on file   Food Insecurity: Not on file   Transportation Needs: Not on file   Physical Activity: Not on file   Stress: Not on file   Social Connections: Not on file   Intimate Partner Violence: Not on file   Housing Stability: Not on file       Family History   Problem Relation Age of Onset    Kidney Disease Mother         ? ?? stone     Cancer Father         unknown    Heart Disease Father     Seizures Father     Migraines Father     Coronary Art Dis Father     Migraines Sister     Cervical Cancer Sister     Lung Cancer Maternal Grandmother     Seizures Son     Diabetes Maternal Uncle     Diabetes Maternal Cousin     Lung Cancer Paternal Grandfather     Anxiety Disorder Daughter        Allergies:  Ibuprofen, Claritin [loratadine], and Tape [adhesive tape]    Home Medications:  Prior to Admission medications    Medication Sig Start Date End Date Taking? Authorizing Provider   ondansetron (ZOFRAN ODT) 4 MG disintegrating tablet Take 1 tablet by mouth every 8 hours as needed for Nausea or Vomiting 6/27/22   Estellaharry Barbosa, DO   cetirizine (ZYRTEC) 10 MG tablet Take 1 tablet by mouth daily 6/19/22   Mouna Swift, DO   orphenadrine (NORFLEX) 100 MG extended release tablet Take 1 tablet by mouth 2 times daily 6/30/21   Mann Poor, DO   pramipexole (MIRAPEX) 0.125 MG tablet Take 0.125 mg by mouth nightly 8/31/20   Historical Provider, MD   ARIPiprazole (ABILIFY) 20 MG tablet aripiprazole 20 mg tablet    Historical Provider, MD   diclofenac sodium 1 % GEL Apply 2 g topically 4 times daily as needed for Pain 1/18/20   Ashely Recinos MD   acetaminophen (TYLENOL) 325 MG tablet Take 2 tablets by mouth every 8 hours as needed for Pain 11/13/19   Shay Viera,    zolpidem (AMBIEN) 5 MG tablet Take 5 mg by mouth nightly as needed for Sleep.     Historical Provider, MD   ondansetron (ZOFRAN) 4 MG tablet take 1 tablet by mouth 8 hours if needed for nausea and vomiting 1/16/19   MD Duc Drew (AIMOVIG SC) Inject into the skin    Historical Provider, MD   venlafaxine (EFFEXOR XR) 37.5 MG extended release capsule Take 75 mg by mouth  2/8/18   Historical Provider, MD   topiramate (TOPAMAX) 100 MG tablet Take 100 mg by mouth 2/16/18 10/19/20  Historical Provider, MD   OXcarbazepine (TRILEPTAL) 300 MG tablet take 1 tablet by mouth at bedtime 1/30/18   Historical Provider, MD   polyethylene glycol (GLYCOLAX) powder Take 17 g by mouth daily    Historical Provider, MD   promethazine (PHENERGAN) 25 MG tablet Take 25 mg by mouth every 6 hours as needed for Nausea    Historical Provider, MD   Multiple Vitamins-Minerals (WOMENS ONE DAILY) TABS Take 1 tablet by mouth daily    Historical Provider, MD   vitamin D3 (CHOLECALCIFEROL) 400 units TABS tablet Take 400 Units by mouth daily    Historical Provider, MD   montelukast (SINGULAIR) 10 MG tablet Take 10 mg by mouth nightly 8/7/17   Historical Provider, MD   OXcarbazepine (TRILEPTAL) 150 MG tablet Take 450 mg by mouth nightly    Historical Provider, MD   amitriptyline (ELAVIL) 100 MG tablet Take 100 mg by mouth nightly    Historical Provider, MD   SUMAtriptan (IMITREX) 100 MG tablet Take 100 mg by mouth once as needed for Migraine    Historical Provider, MD   Potassium 75 MG TABS Take 75 mg by mouth 2 times daily    Historical Provider, MD   omeprazole (PRILOSEC) 20 MG delayed release capsule Take 40 mg by mouth daily     Historical Provider, MD   albuterol sulfate  (90 BASE) MCG/ACT inhaler Inhale 2 puffs into the lungs every 6 hours as needed for Wheezing    Historical Provider, MD   budesonide-formoterol (SYMBICORT) 160-4.5 MCG/ACT AERO Inhale 2 puffs into the lungs 2 times daily    Historical Provider, MD       REVIEW OF SYSTEMS    (2-9 systems for level 4, 10 or more for level 5)      Review of Systems   Constitutional:  Negative for chills and fever. HENT:  Negative for sore throat. Eyes:  Negative for visual disturbance. Respiratory:  Negative for cough and shortness of breath. Cardiovascular:  Negative for chest pain. Gastrointestinal:  Negative for abdominal pain and vomiting. Endocrine: Negative for polyuria. Genitourinary:  Negative for dysuria and hematuria. Musculoskeletal:  Negative for back pain. Neurological:  Negative for light-headedness and headaches. Psychiatric/Behavioral:  Negative for confusion. PHYSICAL EXAM   (up to 7 for level 4, 8 or more for level 5)      INITIAL VITALS:   /72   Pulse 76   Temp 98.2 °F (36.8 °C)   Resp 14   Ht 5' 2\" (1.575 m)   Wt 176 lb (79.8 kg)   LMP 09/08/2015   SpO2 98%   BMI 32.19 kg/m²     Physical Exam  Constitutional:       Appearance: Normal appearance. HENT:      Head: Normocephalic. Nose: Nose normal.      Mouth/Throat:      Mouth: Mucous membranes are moist.      Pharynx: Oropharynx is clear. Eyes:      Extraocular Movements: Extraocular movements intact. Pupils: Pupils are equal, round, and reactive to light. Cardiovascular:      Rate and Rhythm: Normal rate and regular rhythm. Pulses: Normal pulses. Heart sounds: Normal heart sounds. Pulmonary:      Effort: Pulmonary effort is normal.      Breath sounds: Normal breath sounds. Abdominal:      Palpations: Abdomen is soft. Tenderness: no abdominal tenderness There is no right CVA tenderness or left CVA tenderness. Musculoskeletal:      Comments: No obvious swelling or erythema to the right shoulder, patient does have a well-healed scar anterior shoulder  No focal neurological deficits   Skin:     General: Skin is warm. Capillary Refill: Capillary refill takes less than 2 seconds. Neurological:      General: No focal deficit present. Mental Status: She is alert and oriented to person, place, and time. Mental status is at baseline.    Psychiatric:         Mood and Affect: Mood normal.       DIFFERENTIAL  DIAGNOSIS     PLAN (LABS / IMAGING / EKG):  Orders Placed This Encounter   Procedures    Ice to affected area       MEDICATIONS ORDERED:  Orders Placed This Encounter   Medications    acetaminophen (TYLENOL) tablet 1,000 mg     DDX: Acute on chronic shoulder pain, rotator cuff tear, rotator cuff tear exacerbation, ligamentous injury, sprain, contusion    DIAGNOSTIC RESULTS / EMERGENCY DEPARTMENT COURSE / MDM   LAB RESULTS:  No results found for this visit on 07/20/22. IMPRESSION: 44-year-old lady presents to the emergency department with acute on chronic right shoulder pain after working yesterday as a . Denies new symptoms otherwise. No other injuries. Vital signs stable, physical exam showing no bony tenderness to palpation, neurovascularly intact distally. Tylenol given, ice applied. Discussed with patient with regards to follow-up with her orthopedic surgeon, primary care physician and for strict return precautions. Patient verbalized agreement understanding. Stable discharge. RADIOLOGY:  No orders to display     EMERGENCY DEPARTMENT COURSE:       No notes of  Admission Criteria type on file. PROCEDURES:  None    CONSULTS:  None    FINAL IMPRESSION      1.  Chronic right shoulder pain          DISPOSITION / PLAN     DISPOSITION Decision To Discharge 07/20/2022 09:09:46 PM      PATIENT REFERRED TO:  KAYKAY Zepeda - CNP  [id number]  [address] [facility] [address]  [phone]    Schedule an appointment as soon as possible for a visit   For follow up    [facility]  [address]  Go to   As needed    [address], [address] [facility]  [phone]  Schedule an appointment as soon as possible for a visit   For follow up    DISCHARGE MEDICATIONS:  New Prescriptions    No medications on file       Gen Gomez MD  Emergency Medicine Resident    (Please note that portions of thisnote were completed with a voice recognition program.  Efforts were made to edit the dictations but occasionally words are mis-transcribed.)       Gen Gomez MD  Resident  07/20/22 [id number]

## 2022-07-21 NOTE — DISCHARGE INSTRUCTIONS
Take your medication as indicated and prescribed. For pain use acetaminophen (Tylenol) or ibuprofen (Motrin / Advil), unless prescribed medications that have acetaminophen or ibuprofen (or similar medications) in it. You can take over the counter acetaminophen tablets (1 - 2 tablets of the 500-mg strength every 6 hours) or ibuprofen tablets (2 tablets every 4 hours). Use an ice pack or bag filled with ice and apply to the injured area 3 - 4 times a day for 15 - 20 minutes each time. If the injury is older than 3 days, then use a heating pad to help relax the muscles. PLEASE RETURN TO THE EMERGENCY DEPARTMENT IMMEDIATELY for worsening of pain, worse swelling to your elbow or wrist, inability to move your shoulder, arm / wrist, or if you develop any concerning symptoms such as: high fever not relieved by acetaminophen (Tylenol) and/or ibuprofen (Motrin / Advil), chills, feeling of your heart fluttering or racing, persistent nausea and/or vomiting, vomiting up blood, blood in your stool, loss of consciousness, numbness, weakness or tingling in the arms or legs or change in color of the extremities, changes in mental status, persistent headache, blurry vision, loss of bladder / bowel control, unable to follow up with your physician, or other any other care or concern.
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
## 2022-09-07 ENCOUNTER — APPOINTMENT (OUTPATIENT)
Dept: GENERAL RADIOLOGY | Age: 47
End: 2022-09-07
Payer: MEDICARE

## 2022-09-07 ENCOUNTER — HOSPITAL ENCOUNTER (EMERGENCY)
Age: 47
Discharge: HOME | End: 2022-09-07
Attending: EMERGENCY MEDICINE
Payer: MEDICARE

## 2022-09-07 VITALS
HEART RATE: 103 BPM | BODY MASS INDEX: 30.18 KG/M2 | DIASTOLIC BLOOD PRESSURE: 84 MMHG | WEIGHT: 164 LBS | OXYGEN SATURATION: 98 % | SYSTOLIC BLOOD PRESSURE: 115 MMHG | TEMPERATURE: 98.2 F | RESPIRATION RATE: 16 BRPM | HEIGHT: 62 IN

## 2022-09-07 DIAGNOSIS — S82.891A CLOSED FRACTURE OF RIGHT ANKLE, INITIAL ENCOUNTER: Primary | ICD-10-CM

## 2022-09-07 PROCEDURE — 6370000000 HC RX 637 (ALT 250 FOR IP): Performed by: EMERGENCY MEDICINE

## 2022-09-07 PROCEDURE — 73610 X-RAY EXAM OF ANKLE: CPT

## 2022-09-07 PROCEDURE — 29515 APPLICATION SHORT LEG SPLINT: CPT

## 2022-09-07 PROCEDURE — 99283 EMERGENCY DEPT VISIT LOW MDM: CPT

## 2022-09-07 RX ORDER — HYDROCODONE BITARTRATE AND ACETAMINOPHEN 5; 325 MG/1; MG/1
1 TABLET ORAL ONCE
Status: COMPLETED | OUTPATIENT
Start: 2022-09-07 | End: 2022-09-07

## 2022-09-07 RX ORDER — HYDROCODONE BITARTRATE AND ACETAMINOPHEN 5; 325 MG/1; MG/1
1 TABLET ORAL EVERY 6 HOURS PRN
Qty: 10 TABLET | Refills: 0 | Status: SHIPPED | OUTPATIENT
Start: 2022-09-07 | End: 2022-09-12

## 2022-09-07 RX ADMIN — HYDROCODONE BITARTRATE AND ACETAMINOPHEN 1 TABLET: 5; 325 TABLET ORAL at 22:28

## 2022-09-07 ASSESSMENT — PAIN - FUNCTIONAL ASSESSMENT: PAIN_FUNCTIONAL_ASSESSMENT: 0-10

## 2022-09-07 ASSESSMENT — ENCOUNTER SYMPTOMS: BACK PAIN: 0

## 2022-09-07 ASSESSMENT — PAIN SCALES - GENERAL
PAINLEVEL_OUTOF10: 10
PAINLEVEL_OUTOF10: 10

## 2022-09-08 ENCOUNTER — TELEPHONE (OUTPATIENT)
Dept: ORTHOPEDIC SURGERY | Age: 47
End: 2022-09-08

## 2022-09-08 NOTE — ED PROVIDER NOTES
[facility]  Emergency Medicine Department    Pt Name: Gelacio Dixon  MRN: [id number]  [place] 1975  Date of evaluation: 9/7/2022  Provider: Martha Barnhart MD    CHIEF COMPLAINT     Chief Complaint   Patient presents with    Ankle Pain     Gisell Serge in hole, right ankle swelling, bruising and pain, numbness to foot, scraps on knee and right elbow        HISTORY OF PRESENT ILLNESS  (Location/Symptom, Timing/Onset, Context/Setting,Quality, Duration, Modifying Factors, Severity.)   Gelacio Dixon is a 49 y.o. female who presents to the emergency department complaining of pain to her right ankle. She states she fell in a hole at around 3 PM.  She also has abrasions to her knees and elbows but denies pain to these areas. She had a previous surgery on her right ankle from another injury. She reports her tetanus is up-to-date. She has been unable to bear weight since the injury. Nursing Notes were reviewed.     ALLERGIES     Ibuprofen, Claritin [loratadine], and Tape [adhesive tape]    CURRENT MEDICATIONS       Previous Medications    ACETAMINOPHEN (TYLENOL) 325 MG TABLET    Take 2 tablets by mouth every 8 hours as needed for Pain    ALBUTEROL SULFATE  (90 BASE) MCG/ACT INHALER    Inhale 2 puffs into the lungs every 6 hours as needed for Wheezing    AMITRIPTYLINE (ELAVIL) 100 MG TABLET    Take 100 mg by mouth nightly    ARIPIPRAZOLE (ABILIFY) 20 MG TABLET    aripiprazole 20 mg tablet    BUDESONIDE-FORMOTEROL (SYMBICORT) 160-4.5 MCG/ACT AERO    Inhale 2 puffs into the lungs 2 times daily    CETIRIZINE (ZYRTEC) 10 MG TABLET    Take 1 tablet by mouth daily    DICLOFENAC SODIUM 1 % GEL    Apply 2 g topically 4 times daily as needed for Pain    ERENUMAB-AOOE (AIMOVIG SC)    Inject into the skin    MONTELUKAST (SINGULAIR) 10 MG TABLET    Take 10 mg by mouth nightly    MULTIPLE VITAMINS-MINERALS (WOMENS ONE DAILY) TABS    Take 1 tablet by mouth daily    OMEPRAZOLE (PRILOSEC) 20 MG DELAYED RELEASE CAPSULE    Take 40 mg by mouth daily     ONDANSETRON (ZOFRAN ODT) 4 MG DISINTEGRATING TABLET    Take 1 tablet by mouth every 8 hours as needed for Nausea or Vomiting    ONDANSETRON (ZOFRAN) 4 MG TABLET    take 1 tablet by mouth 8 hours if needed for nausea and vomiting    ORPHENADRINE (NORFLEX) 100 MG EXTENDED RELEASE TABLET    Take 1 tablet by mouth 2 times daily    OXCARBAZEPINE (TRILEPTAL) 150 MG TABLET    Take 450 mg by mouth nightly    OXCARBAZEPINE (TRILEPTAL) 300 MG TABLET    take 1 tablet by mouth at bedtime    POLYETHYLENE GLYCOL (GLYCOLAX) POWDER    Take 17 g by mouth daily    POTASSIUM 75 MG TABS    Take 75 mg by mouth 2 times daily    PRAMIPEXOLE (MIRAPEX) 0.125 MG TABLET    Take 0.125 mg by mouth nightly    PROMETHAZINE (PHENERGAN) 25 MG TABLET    Take 25 mg by mouth every 6 hours as needed for Nausea    SUMATRIPTAN (IMITREX) 100 MG TABLET    Take 100 mg by mouth once as needed for Migraine    TOPIRAMATE (TOPAMAX) 100 MG TABLET    Take 100 mg by mouth    VENLAFAXINE (EFFEXOR XR) 37.5 MG EXTENDED RELEASE CAPSULE    Take 75 mg by mouth     VITAMIN D3 (CHOLECALCIFEROL) 400 UNITS TABS TABLET    Take 400 Units by mouth daily    ZOLPIDEM (AMBIEN) 5 MG TABLET    Take 5 mg by mouth nightly as needed for Sleep.        PAST MEDICAL HISTORY         Diagnosis Date    Anemia     Anxiety     Asthma     appt with pulmonologist 2/1/18    Bipolar 1 disorder ([facility] [address]     Blackout spell     Body piercing     X2 ABOVE AND BELOW LIP     Cervicalgia 8/14/2013    Chest pain     Chronic back pain     Chronic kidney disease     stage 3    Chronic neck pain     Constipation     COPD (chronic obstructive pulmonary disease) ([facility] [address]     Dental crown present     has dental clearance    Depression     Fall     landed on tailbone, exacrbated back pain and headaches    GERD (gastroesophageal reflux disease)     Heartburn     History of blood transfusion 2012    no reaction    Hyperglycemia     DIET CONTROLED    Insomnia     Kidney stones Knee pain, right     Low blood pressure     Low vitamin D level     Migraine     Numbness     bilat. legs    Palpitations     Sprain of lumbosacral (joint) (ligament) 8/14/2013    Staghorn kidney stones     L side Kidney stone  and R side    Tachycardia     HR: 130's with chest pain at times, sees Cardiologist @ [address]/ pt. can't remember name dr Caryn Boone cardiologist appt for cc next week    Wears glasses     Wears glasses        SURGICAL HISTORY           Procedure Laterality Date    ANKLE FRACTURE SURGERY Right [address]  02/26/2019    [facility]    CHOLECYSTECTOMY  12/26/2019    laproscopic    CYSTOSCOPY  04/15/14    CYSTOSCOPY Right 4/29/14    with rt ureteral stent removal (Dr Lennox Bastos)    [facility]  05/23/2014    ENDOMETRIAL ABLATION  1/23/14    [employer] -  [address]  02/20/2017    gastric sleeve    GASTRIC BYPASS SURGERY      sleeve    HAND SURGERY Right     cyst removal    HYSTERECTOMY (CERVIX STATUS UNKNOWN)  9-8-15    RONNY with BSO    KNEE ARTHROPLASTY Right 02/20/2018    KNEE ARTHROSCOPY Right 09/12/2017    rt knee scope    KNEE SURGERY Left 12/16/13    LAPAROSCOPY  05/23/2014    pelvic exploratory    LITHOTRIPSY Right 04/15/14    LITHOTRIPSY  2014    MYRINGOTOMY Bilateral     NERVE BLOCK  09-19-13    KENALOG 40 MG    NERVE BLOCK  10/4/13    Left MBNB #2, Decadron 10mg    NERVE BLOCK  12/8/15    duramorph, celestone 9mg, duramorph 1.25mg    NERVE BLOCK  5/24/16    duramorph 1.5  decadron 7mg    NERVE BLOCK  11/21/2016    duramorph 1mg  celestone 9mg    NERVE BLOCK  08/02/2017    duramorph morphine 1.5mg decadron 7.5mg    NERVE BLOCK  06/05/2018    duramorph- decadron 7 mg, duramorph 1.5 mg    NERVE BLOCK Left 08/22/2018    LEFT LUMBAR TRANSFORAMINAL EPIDURAL DECADRON 20MG    NERVE BLOCK Right 08/29/2018    right genicular block, marcaine . 25%    NERVE BLOCK Right 09/18/2018    right genicular knee nerve block #2 no steroids    OTHER SURGICAL HISTORY  age 27    Essure contraceptive implants     IN KNEE SCOPE,DIAGNOSTIC Right 2017    KNEE ARTHROSCOPY, LATERAL RELEASE PATELLA RETINACULUM   ARTHROSCOPIC BOVIE, performed by Poppy Mejias DO at [facility] Right 2018    NAVIO ROBOTIC TOTAL KNEE PATELLOFEMORAL  ARTHROPLASTY - NUNEZ &  NEPHEW, NSA=FEMORAL NERVE BLOCK, SPINAL VS GENERAL performed by Poppy Mejias DO at [address] ARTHROSCOPY Right     AUG 3,2015    SLEEVE GASTRECTOMY N/A 2017    GASTRECTOMY SLEEVE LAPAROSCOPIC XI ROBOTIC, ENDOSEALER  performed by Bijan Díaz MD at [facility] Left 12    Left Nephrectomy - staghorn calculus    UMBILICAL HERNIA REPAIR      URETER STENT PLACEMENT Right 04/15/14    removed 2 weeks later    WISDOM TOOTH EXTRACTION      WRIST SURGERY Right     right ganglion cystectomy       FAMILY HISTORY           Problem Relation Age of Onset    Kidney Disease Mother         ? ?? stone     Cancer Father         unknown    Heart Disease Father     Seizures Father     Migraines Father     Coronary Art Dis Father     Migraines Sister     Cervical Cancer Sister     Lung Cancer Maternal Grandmother     Seizures Son     Diabetes Maternal Uncle     Diabetes Maternal Cousin     Lung Cancer Paternal Grandfather     Anxiety Disorder Daughter      Family Status   Relation Name Status    Mother  Alive    Father      Sister  Alive    Brother  Alive    MGM  Alive    MGF  Alive    Son  Alive    MUnc      MCousin  Alive    PGM  Alive    PGF      Son 2 Alive    Pb  Alive        SOCIAL HISTORY      reports that she has been smoking cigarettes. She started smoking about 31 years ago. She has been smoking an average of .5 packs per day. She has never used smokeless tobacco. She reports that she does not drink alcohol and does not use drugs.     REVIEW OF SYSTEMS    (2-9 systems for level 4, 10 or more for level 5)     Review of Systems   Musculoskeletal:  Negative for back pain and neck pain. Pain to right ankle   Skin:  Positive for wound. Allergic/Immunologic: Negative for immunocompromised state. Neurological:  Negative for weakness, numbness and headaches. PHYSICAL EXAM    (up to 7 for level 4, 8 or more for level 5)     ED Triage Vitals   BP Temp Temp src Heart Rate Resp SpO2 Height Weight   09/07/22 1953 09/07/22 1950 -- 09/07/22 1950 09/07/22 1950 09/07/22 1950 09/07/22 1950 09/07/22 1950   115/84 98.2 °F (36.8 °C)  (!) 103 16 98 % 5' 2\" (1.575 m) 164 lb (74.4 kg)       Physical Exam  Vitals and nursing note reviewed. Constitutional:       General: She is not in acute distress. Appearance: Normal appearance. She is not ill-appearing. HENT:      Head: Normocephalic and atraumatic. Nose: Nose normal.      Mouth/Throat:      Mouth: Mucous membranes are moist.   Eyes:      Extraocular Movements: Extraocular movements intact. Pulmonary:      Effort: Pulmonary effort is normal. No respiratory distress. Musculoskeletal:         General: Swelling (lateral right ankle), tenderness (lateral right ankle) and signs of injury present. Cervical back: Normal range of motion and neck supple. Skin:     General: Skin is warm and dry. Findings: Bruising (right lateral ankle) present. Neurological:      Mental Status: She is alert and oriented to person, place, and time. Psychiatric:         Mood and Affect: Mood normal.         Behavior: Behavior normal.       DIAGNOSTIC RESULTS     RADIOLOGY:   Non-plain film images such as CT, Ultrasound and MRI are read by theradiologist. Plain radiographic images are visualized and preliminarily interpreted by the emergency physician with the below findings:    Interpretation per the Radiologist below, if available at the time of this note:    XR ANKLE RIGHT (MIN 3 VIEWS)   Final Result   1.  Acute comminuted cortical avulsion fracture from the tip of the lateral malleolus with overlying soft tissue swelling. 2. Mild degenerative changes with stable suspected osteochondral defect in   the lateral talar dome. ED BEDSIDE ULTRASOUND:   Performed by ED Physician - none    LABS:  Labs Reviewed - No data to display    All other labs were within normal range or not returned as of this dictation. EMERGENCYDEPARTMENT COURSE and DIFFERENTIAL DIAGNOSIS/MDM:   Vitals:    Vitals:    09/07/22 1950 09/07/22 1953   BP:  115/84   Pulse: (!) 103    Resp: 16    Temp: 98.2 °F (36.8 °C)    SpO2: 98%    Weight: 164 lb (74.4 kg)    Height: 5' 2\" (1.575 m)      39-year-old female presenting with right ankle swelling after a fall. No other tenderness on exam other than the right ankle. X-ray revealed an avulsion of the tip of the distal right fibula. Will place in a short leg cast and provide crutches. She has an orthopedist that she sees for her right knee. We will also provide contact information for our orthopedist on-call. She has a single kidney and thus is not supposed to take NSAIDs. We will prevent a short course of pain medication to use as needed. CONSULTS:  None    PROCEDURES:  Splint placed by nursing    FINAL IMPRESSION     1. Closed fracture of right ankle, initial encounter          DISPOSITION/PLAN   DISPOSITION Decision To Discharge 09/07/2022 10:49:34 PM    PATIENT REFERRED TO:   Deedee Donald MD  [facility]  [address] [address]  [phone]    Schedule an appointment as soon as possible for a visit in 1 week  For Follow up  DISCHARGE MEDICATIONS:     New Prescriptions    HYDROCODONE-ACETAMINOPHEN (NORCO) 5-325 MG PER TABLET    Take 1 tablet by mouth every 6 hours as needed for Pain for up to 5 days. Intended supply: 3 days.  Take lowest dose possible to manage pain     (Please note that portions of this note were completed with a voice recognition program.  Efforts were made to edit the dictations butoccasionally words are mis-transcribed.)    Margot Victor MD  Attending Emergency Physician          Margot Victor MD  09/07/22 [id number]

## 2022-09-08 NOTE — TELEPHONE ENCOUNTER
Called patient to scheduled ED follow up appointment with Dr. Yaya Denise for next week.  Left message

## 2022-09-29 ENCOUNTER — APPOINTMENT (OUTPATIENT)
Dept: GENERAL RADIOLOGY | Age: 47
End: 2022-09-29
Payer: MEDICARE

## 2022-09-29 ENCOUNTER — HOSPITAL ENCOUNTER (EMERGENCY)
Age: 47
Discharge: HOME | End: 2022-09-29
Attending: EMERGENCY MEDICINE
Payer: MEDICARE

## 2022-09-29 ENCOUNTER — APPOINTMENT (OUTPATIENT)
Dept: CT IMAGING | Age: 47
End: 2022-09-29
Payer: MEDICARE

## 2022-09-29 VITALS
BODY MASS INDEX: 29.81 KG/M2 | WEIGHT: 162 LBS | OXYGEN SATURATION: 98 % | RESPIRATION RATE: 19 BRPM | DIASTOLIC BLOOD PRESSURE: 69 MMHG | HEART RATE: 97 BPM | TEMPERATURE: 98.1 F | HEIGHT: 62 IN | SYSTOLIC BLOOD PRESSURE: 103 MMHG

## 2022-09-29 DIAGNOSIS — F14.90 COCAINE USE: ICD-10-CM

## 2022-09-29 DIAGNOSIS — N30.00 ACUTE CYSTITIS WITHOUT HEMATURIA: ICD-10-CM

## 2022-09-29 DIAGNOSIS — R00.0 TACHYCARDIA: Primary | ICD-10-CM

## 2022-09-29 LAB
ALBUMIN SERPL-MCNC: 3.5 G/DL (ref 3.5–5.2)
ALBUMIN/GLOBULIN RATIO: 1.3 (ref 1–2.5)
ALP BLD-CCNC: 105 U/L (ref 35–104)
ALT SERPL-CCNC: 11 U/L (ref 5–33)
AMPHETAMINE SCREEN URINE: NEGATIVE
ANION GAP SERPL CALCULATED.3IONS-SCNC: 8 MMOL/L (ref 9–17)
AST SERPL-CCNC: 15 U/L
BACTERIA: ABNORMAL
BARBITURATE SCREEN URINE: NEGATIVE
BENZODIAZEPINE SCREEN, URINE: NEGATIVE
BILIRUB SERPL-MCNC: <0.1 MG/DL (ref 0.3–1.2)
BILIRUBIN URINE: NEGATIVE
BUN BLDV-MCNC: 11 MG/DL (ref 6–20)
CALCIUM SERPL-MCNC: 8.5 MG/DL (ref 8.6–10.4)
CANNABINOID SCREEN URINE: NEGATIVE
CASTS UA: ABNORMAL /LPF (ref 0–8)
CHLORIDE BLD-SCNC: 107 MMOL/L (ref 98–107)
CO2: 24 MMOL/L (ref 20–31)
COCAINE METABOLITE, URINE: POSITIVE
COLOR: YELLOW
CREAT SERPL-MCNC: 0.94 MG/DL (ref 0.5–0.9)
D-DIMER QUANTITATIVE: 0.77 MG/L FEU
EPITHELIAL CELLS UA: ABNORMAL /HPF (ref 0–5)
FENTANYL URINE: POSITIVE
GFR AFRICAN AMERICAN: >60 ML/MIN
GFR NON-AFRICAN AMERICAN: >60 ML/MIN
GFR SERPL CREATININE-BSD FRML MDRD: ABNORMAL ML/MIN/{1.73_M2}
GLUCOSE BLD-MCNC: 100 MG/DL (ref 70–99)
GLUCOSE URINE: NEGATIVE
HCG QUALITATIVE: NEGATIVE
HCT VFR BLD CALC: 40.2 % (ref 36.3–47.1)
HEMOGLOBIN: 12.5 G/DL (ref 11.9–15.1)
KETONES, URINE: NEGATIVE
LEUKOCYTE ESTERASE, URINE: ABNORMAL
MAGNESIUM: 1.8 MG/DL (ref 1.6–2.6)
MCH RBC QN AUTO: 30.3 PG (ref 25.2–33.5)
MCHC RBC AUTO-ENTMCNC: 31.1 G/DL (ref 28.4–34.8)
MCV RBC AUTO: 97.3 FL (ref 82.6–102.9)
METHADONE SCREEN, URINE: NEGATIVE
NITRITE, URINE: NEGATIVE
NRBC AUTOMATED: 0 PER 100 WBC
OPIATES, URINE: NEGATIVE
OXYCODONE SCREEN URINE: NEGATIVE
PDW BLD-RTO: 14.5 % (ref 11.8–14.4)
PH UA: 5.5 (ref 5–8)
PHENCYCLIDINE, URINE: NEGATIVE
PLATELET # BLD: 312 K/UL (ref 138–453)
PMV BLD AUTO: 10.9 FL (ref 8.1–13.5)
POTASSIUM SERPL-SCNC: 4.7 MMOL/L (ref 3.7–5.3)
PROTEIN UA: NEGATIVE
RBC # BLD: 4.13 M/UL (ref 3.95–5.11)
RBC UA: ABNORMAL /HPF (ref 0–4)
SARS-COV-2, RAPID: NOT DETECTED
SODIUM BLD-SCNC: 139 MMOL/L (ref 135–144)
SPECIFIC GRAVITY UA: 1.02 (ref 1–1.03)
SPECIMEN DESCRIPTION: NORMAL
TEST INFORMATION: ABNORMAL
TOTAL PROTEIN: 6.1 G/DL (ref 6.4–8.3)
TROPONIN, HIGH SENSITIVITY: 7 NG/L (ref 0–14)
TSH SERPL DL<=0.05 MIU/L-ACNC: 4.52 UIU/ML (ref 0.3–5)
TURBIDITY: CLEAR
URINE HGB: NEGATIVE
UROBILINOGEN, URINE: NORMAL
WBC # BLD: 10.6 K/UL (ref 3.5–11.3)
WBC UA: ABNORMAL /HPF (ref 0–5)

## 2022-09-29 PROCEDURE — 2580000003 HC RX 258: Performed by: STUDENT IN AN ORGANIZED HEALTH CARE EDUCATION/TRAINING PROGRAM

## 2022-09-29 PROCEDURE — 85379 FIBRIN DEGRADATION QUANT: CPT

## 2022-09-29 PROCEDURE — 87086 URINE CULTURE/COLONY COUNT: CPT

## 2022-09-29 PROCEDURE — 87635 SARS-COV-2 COVID-19 AMP PRB: CPT

## 2022-09-29 PROCEDURE — 6360000004 HC RX CONTRAST MEDICATION: Performed by: STUDENT IN AN ORGANIZED HEALTH CARE EDUCATION/TRAINING PROGRAM

## 2022-09-29 PROCEDURE — 84703 CHORIONIC GONADOTROPIN ASSAY: CPT

## 2022-09-29 PROCEDURE — 6370000000 HC RX 637 (ALT 250 FOR IP): Performed by: STUDENT IN AN ORGANIZED HEALTH CARE EDUCATION/TRAINING PROGRAM

## 2022-09-29 PROCEDURE — 71045 X-RAY EXAM CHEST 1 VIEW: CPT

## 2022-09-29 PROCEDURE — 83735 ASSAY OF MAGNESIUM: CPT

## 2022-09-29 PROCEDURE — 85027 COMPLETE CBC AUTOMATED: CPT

## 2022-09-29 PROCEDURE — 71260 CT THORAX DX C+: CPT | Performed by: STUDENT IN AN ORGANIZED HEALTH CARE EDUCATION/TRAINING PROGRAM

## 2022-09-29 PROCEDURE — 84443 ASSAY THYROID STIM HORMONE: CPT

## 2022-09-29 PROCEDURE — 80053 COMPREHEN METABOLIC PANEL: CPT

## 2022-09-29 PROCEDURE — 81001 URINALYSIS AUTO W/SCOPE: CPT

## 2022-09-29 PROCEDURE — 93005 ELECTROCARDIOGRAM TRACING: CPT | Performed by: STUDENT IN AN ORGANIZED HEALTH CARE EDUCATION/TRAINING PROGRAM

## 2022-09-29 PROCEDURE — 84484 ASSAY OF TROPONIN QUANT: CPT

## 2022-09-29 PROCEDURE — 80307 DRUG TEST PRSMV CHEM ANLYZR: CPT

## 2022-09-29 PROCEDURE — 99285 EMERGENCY DEPT VISIT HI MDM: CPT

## 2022-09-29 RX ORDER — CEPHALEXIN 500 MG/1
500 CAPSULE ORAL ONCE
Status: COMPLETED | OUTPATIENT
Start: 2022-09-29 | End: 2022-09-29

## 2022-09-29 RX ORDER — SODIUM CHLORIDE, SODIUM LACTATE, POTASSIUM CHLORIDE, AND CALCIUM CHLORIDE .6; .31; .03; .02 G/100ML; G/100ML; G/100ML; G/100ML
1000 INJECTION, SOLUTION INTRAVENOUS ONCE
Status: COMPLETED | OUTPATIENT
Start: 2022-09-29 | End: 2022-09-29

## 2022-09-29 RX ORDER — CEPHALEXIN 500 MG/1
500 CAPSULE ORAL 2 TIMES DAILY
Qty: 10 CAPSULE | Refills: 0 | Status: SHIPPED | OUTPATIENT
Start: 2022-09-29 | End: 2022-10-04

## 2022-09-29 RX ADMIN — SODIUM CHLORIDE, POTASSIUM CHLORIDE, SODIUM LACTATE AND CALCIUM CHLORIDE 1000 ML: 600; 310; 30; 20 INJECTION, SOLUTION INTRAVENOUS at 17:04

## 2022-09-29 RX ADMIN — CEPHALEXIN 500 MG: 500 CAPSULE ORAL at 20:42

## 2022-09-29 RX ADMIN — IOPAMIDOL 75 ML: 755 INJECTION, SOLUTION INTRAVENOUS at 18:49

## 2022-09-29 NOTE — ED NOTES
Pt respirations are even and unlabored, pt is oriented X 4, speaking in complete sentences, bed is in the lowest position, call light is within reach. Will continue to monitor.        Bogdan Hernandez RN  09/29/22 1929

## 2022-09-29 NOTE — ED PROVIDER NOTES
[facility]  Emergency Department  Faculty Attestation     I performed a history and physical examination of the patient and discussed management with the resident. I reviewed the residents note and agree with the documented findings and plan of care. Any areas of disagreement are noted on the chart. I was personally present for the key portions of any procedures. I have documented in the chart those procedures where I was not present during the key portions. I have reviewed the emergency nurses triage note. I agree with the chief complaint, past medical history, past surgical history, allergies, medications, social and family history as documented unless otherwise noted below. For Physician Assistant/ Nurse Practitioner cases/documentation I have personally evaluated this patient and have completed at least one if not all key elements of the E/M (history, physical exam, and MDM). Additional findings are as noted. Primary Care Physician:  KAYKAY Judge - CNP    Screenings:  [unfilled]    CHIEF COMPLAINT       Chief Complaint   Patient presents with    Tachycardia       RECENT VITALS:   Temp: 98.1 °F (36.7 °C),  Heart Rate: (!) 130, Resp: 12, BP: 104/74    LABS:  Labs Reviewed   CBC - Abnormal; Notable for the following components:       Result Value    RDW 14.5 (*)     All other components within normal limits   COMPREHENSIVE METABOLIC PANEL   TROPONIN   MAGNESIUM   TSH WITH REFLEX   URINE DRUG SCREEN   HCG, SERUM, QUALITATIVE   D-DIMER, QUANTITATIVE       Radiology  XR CHEST PORTABLE    (Results Pending)       CRITICAL CARE: There was a high probability of clinically significant/life threatening deterioration in this patient's condition which required my urgent intervention. Total critical care time was none minutes. This excludes any time for separately reportable procedures.      EKG:  EKG Interpretation    Interpreted by me    Rhythm: normal sinus

## 2022-09-29 NOTE — ED TRIAGE NOTES
Pt brought to 20 by EMS with co tachycardia. Pt was found to have HR in 160's. Pt converted to 130's without intervention. Pt hx of tachycardia. Pt only has 1 kidney. Pt has boot on right ankle for unrelated fx. Pt denies pain or SOB at this time. Pt placed on monitor. IV access gained PTA. EKG complete. Pt respirations are even and unlabored, pt is oriented X 4, speaking in complete sentences, bed is in the lowest position, call light is within reach. Will continue to monitor.

## 2022-09-29 NOTE — ED PROVIDER NOTES
[facility] ED  Emergency Department Encounter  Emergency Medicine Resident     Pt Name: Ezequiel Galo  MRN: [id number]  [place] 1975  Date of evaluation: 9/29/22  PCP:  KAYKAY Blevins CNP    CHIEF COMPLAINT       Chief Complaint   Patient presents with    Tachycardia       HISTORY [place]  (Location/Symptom, Timing/Onset, Context/Setting, Quality, Duration, Modifying Factors,Severity.)      Ezequiel Galo is a 49 y.o. female who presents with tachycardia. Patient's son called EMS when she was confused and disoriented, not making sense. She was found to have a heart rate of 160s. They were going to push adenosine but patient converted down to 130s on her own. She has been alert and oriented. She is denying any significant chest pain, mild shortness of breath. No abdominal pain, nausea, vomiting. No headaches or vision changes. Patient has a history of this in the past she does not follow with a cardiologist by report. She is not on any drugs that she knows of to treat this. She does have nitroglycerin at home. No other complaints. PAST MEDICAL / SURGICAL / SOCIAL / FAMILY HISTORY      has a past medical history of Anemia, Anxiety, Asthma, Bipolar 1 disorder ([address] Blackout spell, Body piercing, Cervicalgia, Chest pain, Chronic back pain, Chronic kidney disease, Chronic neck pain, Constipation, COPD (chronic obstructive pulmonary disease) ([address] Dental crown present, Depression, Fall, GERD (gastroesophageal reflux disease), Heartburn, History of blood transfusion, Hyperglycemia, Insomnia, Kidney stones, Knee pain, right, Low blood pressure, Low vitamin D level, Migraine, Numbness, Palpitations, Sprain of lumbosacral (joint) (ligament), Staghorn kidney stones, Tachycardia, Wears glasses, and Wears glasses. has a past surgical history that includes Ankle fracture surgery (Right, 1998);  Wrist surgery (Right, 2002); total nephrectomy (Left, 4/17/12); laparoscopy (2014); Dilation and curettage of uterus (2014); myringotomy (Bilateral); other surgical history (age 27); Nerve Block (70-55-86); Nerve Block (10/4/13); knee surgery (Left, 13); Cystoscopy (04/15/14); Ureter stent placement (Right, 04/15/14); Lithotripsy (Right, 04/15/14); Cystoscopy (Right, 14); Endometrial ablation (14); Hand surgery (Right); Hysterectomy (9-8-15); Nerve Block (12/8/15); Nerve Block (16); Nerve Block (2016); Sleeve Gastrectomy (N/A, 2017); Nerve Block (2017); Gastric bypass surgery (2017); Umbilical hernia repair (); Shoulder arthroscopy (Right); Knee arthroscopy (Right, 2017); pr knee scope,diagnostic (Right, 2017); Lithotripsy (); [place] tooth extraction; Gastric bypass surgery; Knee Arthroplasty (Right, 2018); pr total knee arthroplasty (Right, 2018); Nerve Block (2018); Nerve Block (Left, 2018); Nerve Block (Right, 2018); Nerve Block (Right, 2018); Arm Surgery (2019); and Cholecystectomy (2019).      Social History     Socioeconomic History    Marital status:      Spouse name: Not on file    Number of children: 4    Years of education: Not on file    Highest education level: Not on file   Occupational History    Not on file   Tobacco Use    Smoking status: Every Day     Packs/day: 0.50     Types: Cigarettes     Start date:  [address]     Last attempt to quit: 2017     Years since quittin.8    Smokeless tobacco: Never    Tobacco comments:     litte less then 1/2 mariola/day   Vaping Use    Vaping Use: Never used   Substance and Sexual Activity    Alcohol use: No    Drug use: No    Sexual activity: Yes     Partners: Male   Other Topics Concern    Not on file   Social History Narrative    Not on file     Social Determinants of Health     Financial Resource Strain: Not on file   Food Insecurity: Not on file   Transportation Needs: Not on file   Physical Activity: Not on file   Stress: Not on file   Social Connections: Not on file   Intimate Partner Violence: Not on file   Housing Stability: Not on file       Family History   Problem Relation Age of Onset    Kidney Disease Mother         ? ?? stone     Cancer Father         unknown    Heart Disease Father     Seizures Father     Migraines Father     Coronary Art Dis Father     Migraines Sister     Cervical Cancer Sister     Lung Cancer Maternal Grandmother     Seizures Son     Diabetes Maternal Uncle     Diabetes Maternal Cousin     Lung Cancer Paternal Grandfather     Anxiety Disorder Daughter         Allergies:  Ibuprofen, Claritin [loratadine], and Tape [adhesive tape]    Home Medications:  Prior to Admission medications    Medication Sig Start Date End Date Taking? Authorizing Provider   cephALEXin (KEFLEX) 500 MG capsule Take 1 capsule by mouth 2 times daily for 5 days 9/29/22 10/4/22 Yes Hoda Tan, DO   ondansetron (ZOFRAN ODT) 4 MG disintegrating tablet Take 1 tablet by mouth every 8 hours as needed for Nausea or Vomiting 6/27/22   Trinity Herrera, DO   cetirizine (ZYRTEC) 10 MG tablet Take 1 tablet by mouth daily 6/19/22   Rose Kohler, DO   orphenadrine (NORFLEX) 100 MG extended release tablet Take 1 tablet by mouth 2 times daily 6/30/21   Nicole Emery, DO   pramipexole (MIRAPEX) 0.125 MG tablet Take 0.125 mg by mouth nightly 8/31/20   Historical Provider, MD   ARIPiprazole (ABILIFY) 20 MG tablet aripiprazole 20 mg tablet    Historical Provider, MD   diclofenac sodium 1 % GEL Apply 2 g topically 4 times daily as needed for Pain 1/18/20   Narinder Bello MD   acetaminophen (TYLENOL) 325 MG tablet Take 2 tablets by mouth every 8 hours as needed for Pain 11/13/19   Ayan Gusman,    zolpidem (AMBIEN) 5 MG tablet Take 5 mg by mouth nightly as needed for Sleep.     Historical Provider, MD   ondansetron (ZOFRAN) 4 MG tablet take 1 tablet by mouth 8 hours if needed for nausea and vomiting 1/16/19   Chris Banister, MD [place] (AIMOVIG SC) Inject into the skin    Historical Provider, MD   venlafaxine (EFFEXOR XR) 37.5 MG extended release capsule Take 75 mg by mouth  2/8/18   Historical Provider, MD   topiramate (TOPAMAX) 100 MG tablet Take 100 mg by mouth 2/16/18 10/19/20  Historical Provider, MD   OXcarbazepine (TRILEPTAL) 300 MG tablet take 1 tablet by mouth at bedtime 1/30/18   Historical Provider, MD   polyethylene glycol (GLYCOLAX) powder Take 17 g by mouth daily    Historical Provider, MD   promethazine (PHENERGAN) 25 MG tablet Take 25 mg by mouth every 6 hours as needed for Nausea    Historical Provider, MD   Multiple Vitamins-Minerals (WOMENS ONE DAILY) TABS Take 1 tablet by mouth daily    Historical Provider, MD   vitamin D3 (CHOLECALCIFEROL) 400 units TABS tablet Take 400 Units by mouth daily    Historical Provider, MD   montelukast (SINGULAIR) 10 MG tablet Take 10 mg by mouth nightly 8/7/17   Historical Provider, MD   OXcarbazepine (TRILEPTAL) 150 MG tablet Take 450 mg by mouth nightly    Historical Provider, MD   amitriptyline (ELAVIL) 100 MG tablet Take 100 mg by mouth nightly    Historical Provider, MD   SUMAtriptan (IMITREX) 100 MG tablet Take 100 mg by mouth once as needed for Migraine    Historical Provider, MD   Potassium 75 MG TABS Take 75 mg by mouth 2 times daily    Historical Provider, MD   omeprazole (PRILOSEC) 20 MG delayed release capsule Take 40 mg by mouth daily     Historical Provider, MD   albuterol sulfate  (90 BASE) MCG/ACT inhaler Inhale 2 puffs into the lungs every 6 hours as needed for Wheezing    Historical Provider, MD   budesonide-formoterol (SYMBICORT) 160-4.5 MCG/ACT AERO Inhale 2 puffs into the lungs 2 times daily    Historical Provider, MD       REVIEW OFSYSTEMS    (2-9 systems for level 4, 10 or more for level 5)      Review of Systems   Constitutional:  Negative for chills and fever. HENT:  Negative for congestion and rhinorrhea.     Eyes: Negative for visual disturbance. Respiratory:  Negative for cough and shortness of breath. Cardiovascular:  Positive for palpitations. Negative for chest pain and leg swelling. Gastrointestinal:  Negative for abdominal pain, diarrhea, nausea and vomiting. Genitourinary:  Negative for dysuria. Musculoskeletal:  Negative for back pain and neck pain. Skin:  Negative for rash. Neurological:  Negative for weakness, numbness and headaches. PHYSICAL EXAM   (up to 7 for level 4, 8 or more forlevel 5)      INITIAL VITALS:   Vitals:    09/29/22 1747 09/29/22 1828 09/29/22 1831 09/29/22 1901   BP: 102/68 100/61 103/69    Pulse: (!) 108 (!) 110 (!) 113 97   Resp: 15 11 22 19   Temp:       TempSrc:       SpO2: 96% 99% 99% 98%   Weight:       Height:             Physical Exam  Constitutional:       General: She is not in acute distress. Appearance: Normal appearance. She is normal weight. She is not ill-appearing, toxic-appearing or diaphoretic. HENT:      Head: Normocephalic and atraumatic. Nose: Nose normal.      Mouth/Throat:      Mouth: Mucous membranes are moist.      Pharynx: Oropharynx is clear. No oropharyngeal exudate or posterior oropharyngeal erythema. Eyes:      Extraocular Movements: Extraocular movements intact. Pupils: Pupils are equal, round, and reactive to light. Cardiovascular:      Rate and Rhythm: Regular rhythm. Tachycardia present. Heart sounds: Normal heart sounds. No murmur heard. Pulmonary:      Effort: Pulmonary effort is normal. No respiratory distress. Breath sounds: Normal breath sounds. No wheezing, rhonchi or rales. Abdominal:      General: There is no distension. Palpations: Abdomen is soft. Tenderness: There is no abdominal tenderness. There is no guarding or rebound. Musculoskeletal:         General: No tenderness. Normal range of motion. Cervical back: Normal range of motion and neck supple. Right lower leg: No edema. Left lower leg: No edema. Skin:     General: Skin is warm and dry. Neurological:      General: No focal deficit present. Mental Status: She is alert and oriented to person, place, and time. Motor: No weakness. Psychiatric:         Mood and Affect: Mood normal.       DIFFERENTIAL  DIAGNOSIS     PLAN (LABS / IMAGING / EKG):  Orders Placed This Encounter   Procedures    COVID-19, Rapid    Culture, Urine    XR CHEST PORTABLE    CT CHEST PULMONARY EMBOLISM W CONTRAST    CBC    Comprehensive Metabolic Panel    Magnesium    TSH with Reflex    Urine Drug Screen    D-Dimer, Quantitative    HCG Qualitative, Serum    Urinalysis with Reflex to Culture    Microscopic Urinalysis    Vital signs    EKG 12 Lead       MEDICATIONS ORDERED:  Orders Placed This Encounter   Medications    lactated ringers bolus    iopamidol (ISOVUE-370) 76 % injection 75 mL    cephALEXin (KEFLEX) capsule 500 mg     Order Specific Question:   Antimicrobial Indications     Answer:   Urinary Tract Infection    cephALEXin (KEFLEX) 500 MG capsule     Sig: Take 1 capsule by mouth 2 times daily for 5 days     Dispense:  10 capsule     Refill:  0       DDX: SVT, atrial fibrillation, sinus tachycardia, medication use, drug use, dehydration, infection    Initial MDM/Plan/ED COURSE:    49 y.o. female who presents with tachycardia, altered mental status prior to arrival.  Patient is alert and oriented, vitals demonstrate heart rate of 130s on arrival.  EKG performed and appears to be sinus tachycardia. Remainder vitals are stable. She is overall asymptomatic and feeling well. We will obtain broad work-up including D-dimer to rule out PE. Urinalysis also sent for possible infection source. After attendings discussion with the patient, question whether there is drug use on board as well. We will send CYNTHIA. Patient otherwise resting comfortably. We will continue to hydrate and reevaluate.     ED Course as of 09/30/22 1136   Thu Sep 29, 2022 1748 SARS-CoV-2, Rapid: Not Detected [JS]   2010 D-Dimer, Quant: 0.77 [JS]   1841 Cocaine Metabolite, Urine(!): POSITIVE [JS]   1841 Fentanyl, Ur(!): POSITIVE [JS]   1954 CT CHEST PULMONARY EMBOLISM W CONTRAST  IMPRESSION:  1. No findings of pulmonary embolism. 2. Mild bronchial wall thickening potentially due to pulmonary vascular  congestion, reactive airways disease, or bronchitis. 3. Mosaic attenuation in the lungs potentially due to small airways disease  or small vessels disease. [JS]      ED Course User Index  [JS] Arin Blackburn DO      Patient updated on all work-up. We discussed cocaine use and this could be a very potent cause of her symptoms. She states she is working on quitting and does not need it. She is otherwise feeling well, heart rate normalized. Will discharge home with close follow-up. Cardiology referral given.      DIAGNOSTIC RESULTS / EMERGENCYDEPARTMENT COURSE / MDM     LABS:  Labs Reviewed   CBC - Abnormal; Notable for the following components:       Result Value    RDW 14.5 (*)     All other components within normal limits   COMPREHENSIVE METABOLIC PANEL - Abnormal; Notable for the following components:    Glucose 100 (*)     Creatinine 0.94 (*)     Calcium 8.5 (*)     Anion Gap 8 (*)     Alkaline Phosphatase 105 (*)     Total Bilirubin <0.1 (*)     Total Protein 6.1 (*)     All other components within normal limits   URINE DRUG SCREEN - Abnormal; Notable for the following components:    Cocaine Metabolite, Urine POSITIVE (*)     Fentanyl, Ur POSITIVE (*)     All other components within normal limits   URINALYSIS WITH REFLEX TO CULTURE - Abnormal; Notable for the following components:    Leukocyte Esterase, Urine SMALL (*)     All other components within normal limits   MICROSCOPIC URINALYSIS - Abnormal; Notable for the following components:    Bacteria, UA FEW (*)     All other components within normal limits   COVID-19, RAPID   CULTURE, URINE   TROPONIN   MAGNESIUM   TSH WITH REFLEX   D-DIMER, QUANTITATIVE   HCG, SERUM, QUALITATIVE           XR CHEST PORTABLE    Result Date: 9/29/2022  EXAMINATION: ONE XRAY VIEW OF THE CHEST 9/29/2022 4:38 pm COMPARISON: 06/23/2022 HISTORY: ORDERING SYSTEM PROVIDED HISTORY: Chest Pain TECHNOLOGIST PROVIDED HISTORY: Chest Pain FINDINGS: Cardiomediastinal silhouette and pulmonary vasculature are within normal limits. No focal airspace consolidation, pneumothorax, or pleural effusion. No free air beneath the diaphragm. No acute osseous abnormality. No acute intrathoracic process. CT CHEST PULMONARY EMBOLISM W CONTRAST    Result Date: 9/29/2022  EXAMINATION: CTA OF THE CHEST 9/29/2022 6:40 pm TECHNIQUE: CTA of the chest was performed after the administration of intravenous contrast.  Multiplanar reformatted images are provided for review. MIP images are provided for review. Automated exposure control, iterative reconstruction, and/or weight based adjustment of the mA/kV was utilized to reduce the radiation dose to as low as reasonably achievable. COMPARISON: Chest radiograph 09/29/2022, chest CTA 09/17/2017 HISTORY: ORDERING SYSTEM PROVIDED HISTORY: elevated d dimer, tachy TECHNOLOGIST PROVIDED HISTORY: elevated d dimer, tachy Decision Support Exception - unselect if not a suspected or confirmed emergency medical condition->Emergency Medical Condition (MA) Reason for Exam: elevated d dimer, tachy FINDINGS: PULMONARY ARTERIES:  Normal caliber. Adequately opacified for evaluation. No filling defects consistent with emboli. MEDIASTINUM:  Mild cardiomegaly. No flattening of the interventricular septum. No pericardial effusion. No mediastinal nor hilar lymphadenopathy. Distended thoracic esophagus. LUNGS/PLEURA:  Partial collapse of the central airways likely due to expiration during image acquisition. Mild bronchial wall thickening. Mosaic attenuation of the lungs with no significant interlobular septal thickening.  Unchanged 0.6 cm x 0.3 cm perifissural lymph node along the anterolateral right minor fissure. Linear opacities near the bases due to atelectasis or scarring. Gravity dependent atelectasis bilaterally. No pleural effusions nor pneumothoraces. UPPER ABDOMEN:  Surgically absent gallbladder. No intrahepatic nor extrahepatic biliary dilation. Mild pancreatic atrophy. Changes of sleeve gastrectomy SOFT TISSUES/BONES:   No supraclavicular nor axillary lymphadenopathy. No acute fractures nor suspicious bony lesions. 1. No findings of pulmonary embolism. 2. Mild bronchial wall thickening potentially due to pulmonary vascular congestion, reactive airways disease, or bronchitis. 3. Mosaic attenuation in the lungs potentially due to small airways disease or small vessels disease. EKG  EKG demonstrates normal sinus rhythm, normal rate, left axis, no acute ST changes, T wave inversion in aVL, nonspecific EKG. Sinus tachycardia    All EKG's are interpreted by the Emergency Department Physicianwho either signs or Co-signs this chart in the absence of a cardiologist.      PROCEDURES:  None    CONSULTS:  None    CRITICAL CARE:  Please see attending note    FINAL IMPRESSION      1. Tachycardia    2. Cocaine use    3.  Acute cystitis without hematuria          DISPOSITION / PLAN     DISPOSITION Decision To Discharge 09/29/2022 08:38:44 PM      PATIENT REFERRED TO:  KAYKAY Cullen - CNP  [facility], [id number]  [address]  [phone]    Schedule an appointment as soon as possible for a visit in 2 days      [facility]  [address] [address]  [phone]    If symptoms worsen    Yolanda Andersen MD  [address] [place] [address] [address]  [place] [place] [address]  [phone]    Schedule an appointment as soon as possible for a visit       DISCHARGE MEDICATIONS:  Discharge Medication List as of 9/29/2022  8:40 PM        START taking these medications    Details   cephALEXin (KEFLEX) 500 MG capsule Take 1 capsule by mouth 2 times daily for 5 days, Disp-10 capsule, R-0Print             Stevie Johansen DO  Emergency Medicine Resident    (Please note that portions of this note were completed with a voice recognition program.Efforts were made to edit the dictations but occasionally words are mis-transcribed.)       Stevie Johansen DO  Resident  09/30/22 1135

## 2022-09-29 NOTE — PLAN OF CARE
Accessed chart in error and patient was assigned to me in error. I did not participate in evaluation or care of this patient during this encounter.     Ashwini Curiel, DO

## 2022-09-30 LAB
CULTURE: NORMAL
EKG ATRIAL RATE: 127 BPM
EKG P AXIS: 23 DEGREES
EKG P-R INTERVAL: 154 MS
EKG Q-T INTERVAL: 314 MS
EKG QRS DURATION: 82 MS
EKG QTC CALCULATION (BAZETT): 456 MS
EKG R AXIS: 2 DEGREES
EKG T AXIS: 44 DEGREES
EKG VENTRICULAR RATE: 127 BPM
SPECIMEN DESCRIPTION: NORMAL

## 2022-09-30 PROCEDURE — 93010 ELECTROCARDIOGRAM REPORT: CPT | Performed by: INTERNAL MEDICINE

## 2022-09-30 ASSESSMENT — ENCOUNTER SYMPTOMS
COUGH: 0
SHORTNESS OF BREATH: 0
VOMITING: 0
DIARRHEA: 0
BACK PAIN: 0
RHINORRHEA: 0
NAUSEA: 0
ABDOMINAL PAIN: 0

## 2022-09-30 NOTE — DISCHARGE INSTRUCTIONS
You were seen in the emergency department today for tachycardia. Labs and work-up are reassuring. As we discussed, cocaine can induce high heart rates. Please focus on good hydration. You are also being treated for UTI. Follow-up with your PCP as soon as possible. If you have any new or worsening symptoms, please return to the ED for reevaluation. Thank you for visiting [address] [facility] Emergency Department. You need to call KAYKAY Alberto CNP to make an appointment as directed for follow up. Should you have any questions regarding your care or further treatment, please call [facility] Emergency Department at [phone]. Take any medications as prescribed, if given any, otherwise for pain Use ibuprofen or Tylenol (unless prescribed medications that have Tylenol in it). You can take over the counter Ibuprofen (advil) tablets (4 tablets every 8 hours or 3 tablets every 6 hours or 2 tablets every 4 hours)    If given narcotics during this ED visit, please do not drive or operate heavy machinery for at least 4-6 hours. PLEASE RETURN TO THE ED IMMEDIATELY for worsening symptoms, or if you develop any concerning symptoms such as: high fever not relieved by tylenol and/or motrin, chills, shortness of breath, chest pain, persistent nausea and/or vomiting, numbness, weakness or tingling in the arms or legs or change in color of the extremities, changes in mental status, persistent headache, blurry vision, inability to urinate, unable to follow up with your physician, or other any other  Care or concern.

## 2022-11-22 ENCOUNTER — HOSPITAL ENCOUNTER (EMERGENCY)
Age: 47
Discharge: HOME | End: 2022-11-22
Attending: EMERGENCY MEDICINE
Payer: MEDICARE

## 2022-11-22 VITALS
HEIGHT: 62 IN | SYSTOLIC BLOOD PRESSURE: 118 MMHG | RESPIRATION RATE: 16 BRPM | HEART RATE: 101 BPM | BODY MASS INDEX: 30.18 KG/M2 | DIASTOLIC BLOOD PRESSURE: 83 MMHG | WEIGHT: 164 LBS | TEMPERATURE: 97.8 F | OXYGEN SATURATION: 100 %

## 2022-11-22 DIAGNOSIS — S82.891G CLOSED FRACTURE OF RIGHT ANKLE WITH DELAYED HEALING, SUBSEQUENT ENCOUNTER: Primary | ICD-10-CM

## 2022-11-22 PROCEDURE — 6370000000 HC RX 637 (ALT 250 FOR IP): Performed by: STUDENT IN AN ORGANIZED HEALTH CARE EDUCATION/TRAINING PROGRAM

## 2022-11-22 PROCEDURE — 99283 EMERGENCY DEPT VISIT LOW MDM: CPT

## 2022-11-22 RX ORDER — ACETAMINOPHEN 500 MG
1000 TABLET ORAL ONCE
Status: COMPLETED | OUTPATIENT
Start: 2022-11-22 | End: 2022-11-22

## 2022-11-22 RX ADMIN — ACETAMINOPHEN 1000 MG: 500 TABLET ORAL at 01:50

## 2022-11-22 ASSESSMENT — ENCOUNTER SYMPTOMS
SHORTNESS OF BREATH: 0
COUGH: 0
BACK PAIN: 0

## 2022-11-22 ASSESSMENT — PAIN SCALES - GENERAL
PAINLEVEL_OUTOF10: 9
PAINLEVEL_OUTOF10: 9

## 2022-11-22 ASSESSMENT — PAIN DESCRIPTION - DESCRIPTORS: DESCRIPTORS: ACHING

## 2022-11-22 ASSESSMENT — PAIN - FUNCTIONAL ASSESSMENT: PAIN_FUNCTIONAL_ASSESSMENT: 0-10

## 2022-11-22 ASSESSMENT — PAIN DESCRIPTION - LOCATION: LOCATION: ANKLE

## 2022-11-22 ASSESSMENT — PAIN DESCRIPTION - ORIENTATION: ORIENTATION: RIGHT

## 2022-11-22 NOTE — ED PROVIDER NOTES
[facility] ED     Emergency Department     Faculty Attestation    I performed a history and physical examination of the patient and discussed management with the resident. I reviewed the residents note and agree with the documented findings and plan of care. Any areas of disagreement are noted on the chart. I was personally present for the key portions of any procedures. I have documented in the chart those procedures where I was not present during the key portions. I have reviewed the emergency nurses triage note. I agree with the chief complaint, past medical history, past surgical history, allergies, medications, social and family history as documented unless otherwise noted below. For Physician Assistant/ Nurse Practitioner cases/documentation I have personally evaluated this patient and have completed at least one if not all key elements of the E/M (history, physical exam, and MDM). Additional findings are as noted. Patient here asking for ankles splint. Known ankle fracture following with Ortho. States that the clinic today they did not have what they needed wrote her prescription she was not sure where to get it increasing pain and thus presents. No new injury. On exam strong pulses. Intact sensation no open injury.   Do not feel needs repeat imaging will provide Aircast splint follow-up with Ortho      Critical Care     none    Linnette Johns MD, Jose Alfredo Henderson  Attending Emergency  Physician           Linnette Johns MD  11/22/22 2345

## 2022-11-22 NOTE — ED PROVIDER NOTES
[facility]  ED  Emergency Department Encounter  EmergencyMedicine Resident     Pt Name:Marycruz Salmon  MRN: [id number]  [place] 1975  Date of evaluation: 11/22/22  PCP:  No primary care provider on file. CHIEF COMPLAINT       Chief Complaint   Patient presents with    Ankle Pain     Pt stated she has a broken ankle that is causing her pain. Pt stated she was not given a boot for the ankle. HISTORY OF PRESENT ILLNESS  (Location/Symptom, Timing/Onset, Context/Setting, Quality, Duration, Modifying Factors, Severity.)      Ezequiel Galo is a 49 y.o. female who presents with right-sided ankle pain. Patient has a known avulsion fracture of the distal end of the fibula. The initial injury occurred in late September. She states that since then she has had increasing pain, did follow-up with orthopedics earlier today. States that she was post be provided with an Aircast, they were out of these at the time. On chart review Aircast was ordered by orthopedics. Patient states that she was in a walking boot previously, needs an MRI per orthopedics. Refused to do physical therapy, would like surgery and is following with them for such. Patient was started on naproxen today due to pain. Says that this is not controlling her pain.     PAST MEDICAL / SURGICAL / SOCIAL / FAMILY HISTORY      has a past medical history of Anemia, Anxiety, Asthma, Bipolar 1 disorder ([address] Blackout spell, Body piercing, Cervicalgia, Chest pain, Chronic back pain, Chronic kidney disease, Chronic neck pain, Constipation, COPD (chronic obstructive pulmonary disease) ([address] Dental crown present, Depression, Fall, GERD (gastroesophageal reflux disease), Heartburn, History of blood transfusion, Hyperglycemia, Insomnia, Kidney stones, Knee pain, right, Low blood pressure, Low vitamin D level, Migraine, Numbness, Palpitations, Sprain of lumbosacral (joint) (ligament), Staghorn kidney stones, Tachycardia, Wears glasses, and Wears glasses. has a past surgical history that includes Ankle fracture surgery (Right, ); Wrist surgery (Right, ); total nephrectomy (Left, 12); laparoscopy (2014); Dilation and curettage of uterus (2014); myringotomy (Bilateral); other surgical history (age 27); Nerve Block (-); Nerve Block (10/4/13); knee surgery (Left, 13); Cystoscopy (04/15/14); Ureter stent placement (Right, 04/15/14); Lithotripsy (Right, 04/15/14); Cystoscopy (Right, 14); Endometrial ablation (14); Hand surgery (Right); Hysterectomy (9-8-15); Nerve Block (12/8/15); Nerve Block (16); Nerve Block (2016); Sleeve Gastrectomy (N/A, 2017); Nerve Block (2017); Gastric bypass surgery (2017); Umbilical hernia repair (); Shoulder arthroscopy (Right); Knee arthroscopy (Right, 2017); pr knee scope,diagnostic (Right, 2017); Lithotripsy (); [place] tooth extraction; Gastric bypass surgery; Knee Arthroplasty (Right, 2018); pr total knee arthroplasty (Right, 2018); Nerve Block (2018); Nerve Block (Left, 2018); Nerve Block (Right, 2018); Nerve Block (Right, 2018); Arm Surgery (2019); and Cholecystectomy (2019).       Social History     Socioeconomic History    Marital status:      Spouse name: Not on file    Number of children: 4    Years of education: Not on file    Highest education level: Not on file   Occupational History    Not on file   Tobacco Use    Smoking status: Every Day     Packs/day: 0.50     Types: Cigarettes     Start date:      Last attempt to quit: 2017     Years since quittin.9    Smokeless tobacco: Never    Tobacco comments:     litte less then 1/2 mariola/day   Vaping Use    Vaping Use: Never used   Substance and Sexual Activity    Alcohol use: No    Drug use: No    Sexual activity: Yes     Partners: Male   Other Topics Concern    Not on file   Social History Narrative    Not on file     Social Determinants of Health     Financial Resource Strain: Not on file   Food Insecurity: Not on file   Transportation Needs: Not on file   Physical Activity: Not on file   Stress: Not on file   Social Connections: Not on file   Intimate Partner Violence: Not on file   Housing Stability: Not on file       Family History   Problem Relation Age of Onset    Kidney Disease Mother         ? ?? stone     Cancer Father         unknown    Heart Disease Father     Seizures Father     Migraines Father     Coronary Art Dis Father     Migraines Sister     Cervical Cancer Sister     Lung Cancer Maternal Grandmother     Seizures Son     Diabetes Maternal Uncle     Diabetes Maternal Cousin     Lung Cancer Paternal Grandfather     Anxiety Disorder Daughter        Allergies:  Ibuprofen, Claritin [loratadine], and Tape [adhesive tape]    Home Medications:  Prior to Admission medications    Medication Sig Start Date End Date Taking? Authorizing Provider   ondansetron (ZOFRAN ODT) 4 MG disintegrating tablet Take 1 tablet by mouth every 8 hours as needed for Nausea or Vomiting 6/27/22   Jewel Disla, DO   cetirizine (ZYRTEC) 10 MG tablet Take 1 tablet by mouth daily 6/19/22   Chyna Brock,    orphenadrine (NORFLEX) 100 MG extended release tablet Take 1 tablet by mouth 2 times daily 6/30/21   Matt Greenberg,    pramipexole (MIRAPEX) 0.125 MG tablet Take 0.125 mg by mouth nightly 8/31/20   Historical Provider, MD   ARIPiprazole (ABILIFY) 20 MG tablet aripiprazole 20 mg tablet    Historical Provider, MD   diclofenac sodium 1 % GEL Apply 2 g topically 4 times daily as needed for Pain 1/18/20   Ihsan Jain MD   acetaminophen (TYLENOL) 325 MG tablet Take 2 tablets by mouth every 8 hours as needed for Pain 11/13/19   Boyd Britt DO   zolpidem (AMBIEN) 5 MG tablet Take 5 mg by mouth nightly as needed for Sleep.     Historical Provider, MD   ondansetron (ZOFRAN) 4 MG tablet take 1 tablet by mouth 8 hours if needed for nausea and vomiting 1/16/19   MD Gen Carlin (AIMOVIG SC) Inject into the skin    Historical Provider, MD   venlafaxine (EFFEXOR XR) 37.5 MG extended release capsule Take 75 mg by mouth  2/8/18   Historical Provider, MD   topiramate (TOPAMAX) 100 MG tablet Take 100 mg by mouth 2/16/18 10/19/20  Historical Provider, MD   OXcarbazepine (TRILEPTAL) 300 MG tablet take 1 tablet by mouth at bedtime 1/30/18   Historical Provider, MD   polyethylene glycol (GLYCOLAX) powder Take 17 g by mouth daily    Historical Provider, MD   promethazine (PHENERGAN) 25 MG tablet Take 25 mg by mouth every 6 hours as needed for Nausea    Historical Provider, MD   Multiple Vitamins-Minerals (WOMENS ONE DAILY) TABS Take 1 tablet by mouth daily    Historical Provider, MD   vitamin D3 (CHOLECALCIFEROL) 400 units TABS tablet Take 400 Units by mouth daily    Historical Provider, MD   montelukast (SINGULAIR) 10 MG tablet Take 10 mg by mouth nightly 8/7/17   Historical Provider, MD   OXcarbazepine (TRILEPTAL) 150 MG tablet Take 450 mg by mouth nightly    Historical Provider, MD   amitriptyline (ELAVIL) 100 MG tablet Take 100 mg by mouth nightly    Historical Provider, MD   SUMAtriptan (IMITREX) 100 MG tablet Take 100 mg by mouth once as needed for Migraine    Historical Provider, MD   Potassium 75 MG TABS Take 75 mg by mouth 2 times daily    Historical Provider, MD   omeprazole (PRILOSEC) 20 MG delayed release capsule Take 40 mg by mouth daily     Historical Provider, MD   albuterol sulfate  (90 BASE) MCG/ACT inhaler Inhale 2 puffs into the lungs every 6 hours as needed for Wheezing    Historical Provider, MD   budesonide-formoterol (SYMBICORT) 160-4.5 MCG/ACT AERO Inhale 2 puffs into the lungs 2 times daily    Historical Provider, MD       REVIEW OF SYSTEMS    (2-9 systems for level 4, 10 or more for level 5)      Review of Systems   Constitutional:  Negative for activity change, appetite change, chills and fever.   Respiratory:  Negative for cough and shortness of breath. Cardiovascular:  Negative for chest pain. Musculoskeletal:  Positive for joint swelling. Negative for back pain, gait problem and neck pain. Neurological:  Negative for dizziness, syncope, weakness, numbness and headaches. PHYSICAL EXAM   (up to 7 for level 4, 8 or more for level 5)      INITIAL VITALS:   /83   Pulse (!) 101   Temp 97.8 °F (36.6 °C) (Oral)   Resp 16   Ht 5' 2\" (1.575 m)   Wt 164 lb (74.4 kg)   LMP 09/08/2015   SpO2 100%   BMI 30.00 kg/m²     Physical Exam  Constitutional:       Appearance: Normal appearance. HENT:      Head: Normocephalic and atraumatic. Right Ear: External ear normal.      Left Ear: External ear normal.   Eyes:      Extraocular Movements: Extraocular movements intact. Cardiovascular:      Rate and Rhythm: Normal rate. Pulses: Normal pulses. Pulmonary:      Effort: Pulmonary effort is normal.      Breath sounds: Normal breath sounds. Abdominal:      Palpations: Abdomen is soft. Tenderness: There is no abdominal tenderness. Musculoskeletal:         General: Normal range of motion. Cervical back: Normal range of motion. Comments: Tenderness to the right ankle, minimal swelling, no abrasion or laceration. Minimal ecchymosis. Neurological:      General: No focal deficit present. Mental Status: She is alert and oriented to person, place, and time. Psychiatric:         Mood and Affect: Mood normal.       DIFFERENTIAL  DIAGNOSIS     PLAN (LABS / IMAGING / EKG):  No orders of the defined types were placed in this encounter. MEDICATIONS ORDERED:  Orders Placed This Encounter   Medications    acetaminophen (TYLENOL) tablet 1,000 mg       DDX: Fracture of the ankle, contusion, exacerbation of pain    MDM: 49 y.o. female presents today with need for air splint, increased pain in ankle.   Patient is advised to follow-up with her orthopedist for further pain control. Provided with Tylenol here. Provided with an air splint. Discharged with Ortho follow-up. Jason Coma Scale  Eye Opening: Spontaneous  Best Verbal Response: Oriented  Best Motor Response: Obeys commands  [place] Coma Scale Score: 15  DIAGNOSTIC RESULTS / EMERGENCY DEPARTMENT COURSE / MDM   LAB RESULTS:  No results found for this visit on 11/22/22. RADIOLOGY:  No orders to display          PROCEDURES:  None    CONSULTS:  None    CRITICAL CARE:  There was significant risk of life threatening deterioration of patient's condition requiring my direct management. Critical care time 0 minutes, excluding any documented procedures. FINAL IMPRESSION      1. Closed fracture of right ankle with delayed healing, subsequent encounter          DISPOSITION / PLAN     DISPOSITION Decision To Discharge 11/22/2022 01:34:40 AM      PATIENT REFERRED TO:  No follow-up provider specified.     DISCHARGE MEDICATIONS:  Discharge Medication List as of 11/22/2022  2:41 AM          Ed Michelle MD  Emergency Medicine Resident    (Please note that portions of thisnote were completed with a voice recognition program.  Efforts were made to edit the dictations but occasionally words are mis-transcribed.)        Ed Michelle MD  Resident  11/22/22 [id number]

## 2022-11-22 NOTE — DISCHARGE INSTRUCTIONS
To the emergency department today due to increased pain from the ankle fracture. Please follow-up with the orthopedist as planned. Please get your MRI that your orthopedic surgeon is ordered. Please wear the air splint as instructed by orthopedic doctor. Please take Tylenol Motrin and what ever else and orthopedic doctor has prescribed for pain. If you have increased pain please follow-up with them.

## 2022-11-30 SDOH — HEALTH STABILITY: PHYSICAL HEALTH: ON AVERAGE, HOW MANY DAYS PER WEEK DO YOU ENGAGE IN MODERATE TO STRENUOUS EXERCISE (LIKE A BRISK WALK)?: 1 DAY

## 2022-11-30 ASSESSMENT — SOCIAL DETERMINANTS OF HEALTH (SDOH)
WITHIN THE LAST YEAR, HAVE YOU BEEN HUMILIATED OR EMOTIONALLY ABUSED IN OTHER WAYS BY YOUR PARTNER OR EX-PARTNER?: YES
WITHIN THE LAST YEAR, HAVE TO BEEN RAPED OR FORCED TO HAVE ANY KIND OF SEXUAL ACTIVITY BY YOUR PARTNER OR EX-PARTNER?: NO
WITHIN THE LAST YEAR, HAVE YOU BEEN AFRAID OF YOUR PARTNER OR EX-PARTNER?: NO
WITHIN THE LAST YEAR, HAVE YOU BEEN KICKED, HIT, SLAPPED, OR OTHERWISE PHYSICALLY HURT BY YOUR PARTNER OR EX-PARTNER?: YES

## 2022-12-01 ENCOUNTER — OFFICE VISIT (OUTPATIENT)
Dept: ORTHOPEDIC SURGERY | Age: 47
End: 2022-12-01

## 2022-12-01 ENCOUNTER — HOSPITAL ENCOUNTER (EMERGENCY)
Age: 47
Discharge: HOME | End: 2022-12-01
Attending: EMERGENCY MEDICINE
Payer: MEDICARE

## 2022-12-01 VITALS
TEMPERATURE: 98.8 F | HEART RATE: 90 BPM | SYSTOLIC BLOOD PRESSURE: 141 MMHG | RESPIRATION RATE: 16 BRPM | OXYGEN SATURATION: 99 % | DIASTOLIC BLOOD PRESSURE: 76 MMHG

## 2022-12-01 VITALS — HEIGHT: 62 IN | WEIGHT: 164 LBS | BODY MASS INDEX: 30.18 KG/M2

## 2022-12-01 DIAGNOSIS — S82.839A AVULSION FRACTURE OF DISTAL FIBULA: Primary | ICD-10-CM

## 2022-12-01 DIAGNOSIS — M95.8 OSTEOCHONDRAL DEFECT OF ANKLE: ICD-10-CM

## 2022-12-01 DIAGNOSIS — H66.90 ACUTE OTITIS MEDIA, UNSPECIFIED OTITIS MEDIA TYPE: Primary | ICD-10-CM

## 2022-12-01 DIAGNOSIS — M25.571 RIGHT ANKLE PAIN, UNSPECIFIED CHRONICITY: ICD-10-CM

## 2022-12-01 PROCEDURE — 99283 EMERGENCY DEPT VISIT LOW MDM: CPT

## 2022-12-01 PROCEDURE — 6370000000 HC RX 637 (ALT 250 FOR IP)

## 2022-12-01 RX ORDER — HYDROCODONE BITARTRATE AND ACETAMINOPHEN 5; 325 MG/1; MG/1
1 TABLET ORAL EVERY 6 HOURS PRN
Qty: 3 TABLET | Refills: 0 | Status: SHIPPED | OUTPATIENT
Start: 2022-12-01 | End: 2022-12-02 | Stop reason: SDUPTHER

## 2022-12-01 RX ORDER — NAPROXEN 500 MG/1
500 TABLET ORAL 2 TIMES DAILY WITH MEALS
Qty: 30 TABLET | Refills: 0 | Status: SHIPPED | OUTPATIENT
Start: 2022-12-01 | End: 2022-12-16

## 2022-12-01 RX ORDER — LEVOFLOXACIN 750 MG/1
750 TABLET ORAL ONCE
Status: COMPLETED | OUTPATIENT
Start: 2022-12-01 | End: 2022-12-01

## 2022-12-01 RX ORDER — ACETAMINOPHEN 500 MG
1000 TABLET ORAL ONCE
Status: COMPLETED | OUTPATIENT
Start: 2022-12-01 | End: 2022-12-01

## 2022-12-01 RX ORDER — LEVOFLOXACIN 750 MG/1
750 TABLET ORAL DAILY
Qty: 7 TABLET | Refills: 0 | Status: SHIPPED | OUTPATIENT
Start: 2022-12-01 | End: 2022-12-08

## 2022-12-01 RX ADMIN — LEVOFLOXACIN 750 MG: 750 TABLET, FILM COATED ORAL at 17:50

## 2022-12-01 RX ADMIN — ACETAMINOPHEN 1000 MG: 500 TABLET ORAL at 17:50

## 2022-12-01 ASSESSMENT — PAIN DESCRIPTION - FREQUENCY: FREQUENCY: CONTINUOUS

## 2022-12-01 ASSESSMENT — PAIN DESCRIPTION - PAIN TYPE: TYPE: ACUTE PAIN

## 2022-12-01 ASSESSMENT — ENCOUNTER SYMPTOMS
VOMITING: 0
VOMITING: 0
SHORTNESS OF BREATH: 0
COLOR CHANGE: 0
COUGH: 0
NAUSEA: 0

## 2022-12-01 ASSESSMENT — PAIN DESCRIPTION - ORIENTATION: ORIENTATION: RIGHT

## 2022-12-01 ASSESSMENT — PAIN DESCRIPTION - LOCATION: LOCATION: EAR

## 2022-12-01 ASSESSMENT — PAIN - FUNCTIONAL ASSESSMENT: PAIN_FUNCTIONAL_ASSESSMENT: 0-10

## 2022-12-01 ASSESSMENT — PAIN SCALES - GENERAL: PAINLEVEL_OUTOF10: 9

## 2022-12-01 ASSESSMENT — PAIN DESCRIPTION - ONSET: ONSET: ON-GOING

## 2022-12-01 ASSESSMENT — PAIN DESCRIPTION - DESCRIPTORS: DESCRIPTORS: THROBBING

## 2022-12-01 NOTE — DISCHARGE INSTRUCTIONS
You were seen in the emergency department for right ear pain and headache. Your right ear looks like it is infected. We gave you your first dose of levofloxacin and a dose of Tylenol. We will discharge you on levofloxacin. Please take this daily for the next 7 days. We will also give you 3 doses of Norco for your pain. Please keep your appointment with your new primary care provider tomorrow so that you may be referred to a new ENT for your recurrent ear infections. Please return to the emergency department if you develop fever, ear drainage, tenderness to the back of your head, or neck pain.

## 2022-12-01 NOTE — ED PROVIDER NOTES
[facility]     Emergency Department     Faculty Attestation    I performed a history and physical examination of the patient and discussed management with the resident. I reviewed the residents note and agree with the documented findings and plan of care. Any areas of disagreement are noted on the chart. I was personally present for the key portions of any procedures. I have documented in the chart those procedures where I was not present during the key portions. I have reviewed the emergency nurses triage note. I agree with the chief complaint, past medical history, past surgical history, allergies, medications, social and family history as documented unless otherwise noted below. For Physician Assistant/ Nurse Practitioner cases/documentation I have personally evaluated this patient and have completed at least one if not all key elements of the E/M (history, physical exam, and MDM). Additional findings are as noted. I have personally seen and evaluated the patient. I find the patient's history and physical exam are consistent with the NP/PA documentation. I agree with the care provided, treatment rendered, disposition and follow-up plan. Recurrent ear infections patient has an obvious right otitis media no mastoid tenderness no fever shakes or chills or neuro complaints. Critical Care     Darryl Pena M.D.   Attending Emergency  Physician            Keri Flores MD  12/01/22 [id number]

## 2022-12-01 NOTE — ED NOTES
This patient was assessed by the doctor only. Nurse processed and completed the orders from this doctor ie labs, meds, and/or EKG.         Aubrey Vera RN  12/01/22 0448

## 2022-12-01 NOTE — PROGRESS NOTES
[address]  [address] [facility] [place] [address]  Dept: [phone]  Dept Fax: [phone]        Established Patient - New Problem      Subjective:   Vilma Marcano is a 49y.o. year old female who presents to our office today for routine followup regarding her   1. Avulsion fracture of distal fibula    2. Right ankle pain, unspecified chronicity    3. Osteochondral defect of ankle        Chief Complaint   Patient presents with    Follow-up     Right ankle pain       Date of Injury: 2022    HPI Vilma Marcano  is a 49 y.o. female who presents today for a second opinion appointment of a right ankle injury sustained on 2022. Patient notes that she fell into a hole causing immediate discomfort within the right ankle. Patient was initially evaluated at [facility] emergency department on 2022 and underwent treatment in the form of a right ankle x-ray revealing a minimally displaced avulsion fracture from the distal aspect of the fibula. She was placed in a splint and was instructed to follow-up with an orthopedic provider. The patient was then evaluated by Sharon Lloyd PA-C at [facility] 2022 and was placed in a cam walking boot for her right distal fibula avulsion fracture. She notes that she did not want to stay in that walking boot due to the fact that it was very heavy and she does have neuropathy from diabetes which caused her to feel like she was going to fall. An MRI of the right ankle was ordered and the patient is scheduled to have her MRI on 2022. Of note patient did have extensive ankle surgery in  with Dr. Francine Boyer. Patient is here today for second opinion. Review of Systems   Constitutional:  Negative for activity change and fever. HENT:  Negative for sneezing. Respiratory:  Negative for cough and shortness of breath.     Cardiovascular: Negative for chest pain. Gastrointestinal:  Negative for vomiting. Musculoskeletal:  Positive for arthralgias (right ankle). Negative for joint swelling and myalgias. Skin:  Negative for color change. Neurological:  Negative for weakness and numbness. Psychiatric/Behavioral:  Negative for sleep disturbance. Objective :   Ht 5' 2\" (1.575 m)   Wt 164 lb (74.4 kg)   LMP 09/08/2015   BMI 30.00 kg/m²  Body mass index is 30 kg/m². General: Spencer Carroll is a 49 y.o. female who is alert and oriented and sitting comfortably in our office. Ortho Exam  MS:  Patient arrived walking independently with a normal shoe on the Right lower extremity. After removal of the shoe evaluation of the Right ankle reveals mild diffuse swelling within the Right ankle primarily on the lateral aspect. Patient skin is intact without signs of infection. Well-healed surgical incision is noted on the anterior lateral aspect of the right ankle. There is no erythema or ecchymosis appreciated. Tenderness noted over the lateral malleoli and  ATF ligament. The patient's Right lower extremity compartments are soft and compressible. She is able to initiate plantarflexion and dorsiflexion of the ankle. Negative Rodrigues test. Positive anterior drawer. Positive Talar tilt. Negative syndesmotic squeeze test. Negative External rotation stress test. No calf tenderness noted. Sensation is intact to light touch to the Right lower extremity without focal deficits present. The skin is noted to be warm with brisk capillary refill. DP pulse 2+, PT pulse 2+ on the Right. Neuro: alert and oriented to person and place. Eyes: Extra-ocular muscles intact  Mouth: Oral mucosa moist. No perioral lesions  Pulm: Respirations unlabored and regular. Symmetric chest excursion without outward deformity is noted.    Skin: warm, well perfused  Psych:   Patient has good fund of knowledge and displays understanging of exam, diagnosis, and right ankle MRI scheduled for 12/6/2022. I discussed with the patient that if she would like to continue care in our office that she is to follow-up after the MRI we can discuss the results and next steps in her treatment. And was given a 2-week prescription for naproxen. I did let her know that I will not be prescribing this medication long-term and that she can transition to over-the-counter medication or request long-term NSAID medication from her primary care physician if they deem appropriate. Patient does have only 1 kidney therefore I do not want to stress her kidney with NSAID medications long-term. The patient was also given a prescription to begin outpatient physical therapy for her right ankle. She was instructed not to begin the physical therapy until after her MRI. She noted her understanding. The patient will follow up after her MRI for results review. We discussed that the patient should call us with any questions or concerns. The patient voiced her understanding. Follow up:Return for MRI results.     Total Time: 35 min      Orders Placed This Encounter   Medications    naproxen (NAPROSYN) 500 MG tablet     Sig: Take 1 tablet by mouth 2 times daily (with meals) for 15 days     Dispense:  30 tablet     Refill:  0       Orders Placed This Encounter   Procedures    XR ANKLE RIGHT (MIN 3 VIEWS)     Standing Status:   Future     Number of Occurrences:   1     Standing Expiration Date:   12/1/2023    [facility]     Referral Priority:   Routine     Referral Type:   Eval and Treat     Referral Reason:   Specialty Services Required     Requested Specialty:   Physical Therapist     Number of Visits Requested:   1       This note is created with the assistance of a speech recognition program.  While intending to generate a document that actually reflects the content of the visit, the document can still have some errors including those of syntax and sound a like substitutions which may escape proof reading. In such instances, actual meaning can be extrapolated by contextual diversion.      Electronically signed by Lynn Tyson PA-C on 12/1/2022 at 10:20 PM

## 2022-12-02 ENCOUNTER — OFFICE VISIT (OUTPATIENT)
Dept: INTERNAL MEDICINE CLINIC | Age: 47
End: 2022-12-02

## 2022-12-02 VITALS
HEIGHT: 62 IN | OXYGEN SATURATION: 100 % | HEART RATE: 91 BPM | BODY MASS INDEX: 29.26 KG/M2 | SYSTOLIC BLOOD PRESSURE: 138 MMHG | DIASTOLIC BLOOD PRESSURE: 86 MMHG | WEIGHT: 159 LBS

## 2022-12-02 DIAGNOSIS — J41.0 SIMPLE CHRONIC BRONCHITIS (HCC): ICD-10-CM

## 2022-12-02 DIAGNOSIS — G89.29 CHRONIC BILATERAL LOW BACK PAIN WITH LEFT-SIDED SCIATICA: Chronic | ICD-10-CM

## 2022-12-02 DIAGNOSIS — H66.3X1 CHRONIC SUPPURATIVE OTITIS MEDIA OF RIGHT EAR, UNSPECIFIED OTITIS MEDIA LOCATION: ICD-10-CM

## 2022-12-02 DIAGNOSIS — H92.01 RIGHT EAR PAIN: ICD-10-CM

## 2022-12-02 DIAGNOSIS — G89.4 CHRONIC PAIN SYNDROME: Primary | ICD-10-CM

## 2022-12-02 DIAGNOSIS — F31.9 BIPOLAR I DISORDER (HCC): ICD-10-CM

## 2022-12-02 DIAGNOSIS — G43.009 MIGRAINE WITHOUT AURA AND WITHOUT STATUS MIGRAINOSUS, NOT INTRACTABLE: ICD-10-CM

## 2022-12-02 DIAGNOSIS — Z72.0 TOBACCO ABUSE: ICD-10-CM

## 2022-12-02 DIAGNOSIS — M54.42 CHRONIC BILATERAL LOW BACK PAIN WITH LEFT-SIDED SCIATICA: Chronic | ICD-10-CM

## 2022-12-02 RX ORDER — FREMANEZUMAB-VFRM 225 MG/1.5ML
INJECTION SUBCUTANEOUS
Qty: 1.68 ML | Status: CANCELLED | OUTPATIENT
Start: 2022-12-02

## 2022-12-02 RX ORDER — FLUTICASONE PROPIONATE 50 MCG
1 SPRAY, SUSPENSION (ML) NASAL DAILY
Qty: 32 G | Refills: 1 | Status: SHIPPED | OUTPATIENT
Start: 2022-12-02

## 2022-12-02 RX ORDER — GABAPENTIN 600 MG/1
TABLET ORAL
COMMUNITY
Start: 2022-11-08

## 2022-12-02 RX ORDER — PSEUDOEPHEDRINE HCL 120 MG/1
TABLET, FILM COATED, EXTENDED RELEASE ORAL
Status: CANCELLED | OUTPATIENT
Start: 2022-12-02

## 2022-12-02 RX ORDER — FREMANEZUMAB-VFRM 225 MG/1.5ML
INJECTION SUBCUTANEOUS
COMMUNITY
Start: 2021-09-14

## 2022-12-02 RX ORDER — CYCLOBENZAPRINE HCL 10 MG
TABLET ORAL
COMMUNITY
Start: 2022-09-14

## 2022-12-02 RX ORDER — TIZANIDINE HYDROCHLORIDE 4 MG/1
CAPSULE, GELATIN COATED ORAL
COMMUNITY
Start: 2022-11-08

## 2022-12-02 RX ORDER — PSEUDOEPHEDRINE HCL 120 MG/1
TABLET, FILM COATED, EXTENDED RELEASE ORAL
COMMUNITY

## 2022-12-02 RX ORDER — PSEUDOEPHED/ACETAMINOPH/DIPHEN 30MG-500MG
TABLET ORAL
COMMUNITY
Start: 2022-09-01 | End: 2022-12-02 | Stop reason: SDUPTHER

## 2022-12-02 RX ORDER — AZELASTINE 1 MG/ML
SPRAY, METERED NASAL
COMMUNITY

## 2022-12-02 RX ORDER — FUROSEMIDE 20 MG/1
TABLET ORAL
COMMUNITY
Start: 2022-10-17

## 2022-12-02 RX ORDER — FEXOFENADINE HCL 180 MG/1
TABLET ORAL
Status: CANCELLED | OUTPATIENT
Start: 2022-12-02

## 2022-12-02 RX ORDER — FEXOFENADINE HCL 180 MG/1
TABLET ORAL
COMMUNITY
Start: 2022-12-01 | End: 2022-12-02 | Stop reason: SDUPTHER

## 2022-12-02 RX ORDER — FEXOFENADINE HCL 180 MG/1
180 TABLET ORAL DAILY
Qty: 30 TABLET | Refills: 0 | Status: SHIPPED | OUTPATIENT
Start: 2022-12-02

## 2022-12-02 RX ORDER — VENLAFAXINE HYDROCHLORIDE 75 MG/1
CAPSULE, EXTENDED RELEASE ORAL
COMMUNITY
Start: 2022-10-17

## 2022-12-02 RX ORDER — ACETAMINOPHEN 160 MG
TABLET,DISINTEGRATING ORAL
COMMUNITY
Start: 2022-12-01 | End: 2022-12-02 | Stop reason: SDUPTHER

## 2022-12-02 RX ORDER — LIDOCAINE HCL 100 %
POWDER (GRAM) MISCELLANEOUS
COMMUNITY
Start: 2022-09-06 | End: 2022-12-02

## 2022-12-02 RX ORDER — METHOCARBAMOL 750 MG/1
TABLET, FILM COATED ORAL
COMMUNITY
Start: 2022-10-17

## 2022-12-02 RX ORDER — BUTALBITAL, ACETAMINOPHEN AND CAFFEINE 50; 325; 40 MG/1; MG/1; MG/1
TABLET ORAL
COMMUNITY
Start: 2022-11-09

## 2022-12-02 RX ORDER — RIZATRIPTAN BENZOATE 10 MG/1
TABLET ORAL
COMMUNITY

## 2022-12-02 SDOH — ECONOMIC STABILITY: FOOD INSECURITY: WITHIN THE PAST 12 MONTHS, YOU WORRIED THAT YOUR FOOD WOULD RUN OUT BEFORE YOU GOT MONEY TO BUY MORE.: NEVER TRUE

## 2022-12-02 SDOH — ECONOMIC STABILITY: FOOD INSECURITY: WITHIN THE PAST 12 MONTHS, THE FOOD YOU BOUGHT JUST DIDN'T LAST AND YOU DIDN'T HAVE MONEY TO GET MORE.: NEVER TRUE

## 2022-12-02 ASSESSMENT — PATIENT HEALTH QUESTIONNAIRE - PHQ9
3. TROUBLE FALLING OR STAYING ASLEEP: 2
SUM OF ALL RESPONSES TO PHQ9 QUESTIONS 1 & 2: 1
8. MOVING OR SPEAKING SO SLOWLY THAT OTHER PEOPLE COULD HAVE NOTICED. OR THE OPPOSITE, BEING SO FIGETY OR RESTLESS THAT YOU HAVE BEEN MOVING AROUND A LOT MORE THAN USUAL: 0
4. FEELING TIRED OR HAVING LITTLE ENERGY: 2
10. IF YOU CHECKED OFF ANY PROBLEMS, HOW DIFFICULT HAVE THESE PROBLEMS MADE IT FOR YOU TO DO YOUR WORK, TAKE CARE OF THINGS AT HOME, OR GET ALONG WITH OTHER PEOPLE: 1
6. FEELING BAD ABOUT YOURSELF - OR THAT YOU ARE A FAILURE OR HAVE LET YOURSELF OR YOUR FAMILY DOWN: 1
1. LITTLE INTEREST OR PLEASURE IN DOING THINGS: 0
9. THOUGHTS THAT YOU WOULD BE BETTER OFF DEAD, OR OF HURTING YOURSELF: 0
SUM OF ALL RESPONSES TO PHQ QUESTIONS 1-9: 7
7. TROUBLE CONCENTRATING ON THINGS, SUCH AS READING THE NEWSPAPER OR WATCHING TELEVISION: 1
5. POOR APPETITE OR OVEREATING: 0
SUM OF ALL RESPONSES TO PHQ QUESTIONS 1-9: 7
2. FEELING DOWN, DEPRESSED OR HOPELESS: 1
SUM OF ALL RESPONSES TO PHQ QUESTIONS 1-9: 7
SUM OF ALL RESPONSES TO PHQ QUESTIONS 1-9: 7

## 2022-12-02 ASSESSMENT — SOCIAL DETERMINANTS OF HEALTH (SDOH): HOW HARD IS IT FOR YOU TO PAY FOR THE VERY BASICS LIKE FOOD, HOUSING, MEDICAL CARE, AND HEATING?: NOT HARD AT ALL

## 2022-12-02 ASSESSMENT — COLUMBIA-SUICIDE SEVERITY RATING SCALE - C-SSRS
6. HAVE YOU EVER DONE ANYTHING, STARTED TO DO ANYTHING, OR PREPARED TO DO ANYTHING TO END YOUR LIFE?: NO
1. WITHIN THE PAST MONTH, HAVE YOU WISHED YOU WERE DEAD OR WISHED YOU COULD GO TO SLEEP AND NOT WAKE UP?: NO
2. HAVE YOU ACTUALLY HAD ANY THOUGHTS OF KILLING YOURSELF?: NO

## 2022-12-02 NOTE — PROGRESS NOTES
Visit Information    Have you changed or started any medications since your last visit including any over-the-counter medicines, vitamins, or herbal medicines? no   Are you having any side effects from any of your medications? -  no  Have you stopped taking any of your medications? Is so, why? -  no    Have you seen any other physician or provider since your last visit? No  Have you had any other diagnostic tests since your last visit? No  Have you been seen in the emergency room and/or had an admission to a hospital since we last saw you? No  Have you had your routine dental cleaning in the past 6 months? no    Have you activated your "[employer]" account? If not, what are your barriers?  Yes     Patient Care Team:  Shahram Barragan MD as PCP - General (Internal Medicine)  Louis Thomas MD as Consulting Physician (Urology)  Susan Guthrie DO as Obstetrician (Obstetrics & Gynecology)    Medical History Review  Past Medical, Family, and Social History reviewed and does contribute to the patient presenting condition    Health Maintenance   Topic Date Due    Depression Monitoring  Never done    Hepatitis C screen  Never done    Pneumococcal 0-64 years Vaccine (2 - PCV) 02/21/2019    Colorectal Cancer Screen  Never done    COVID-19 Vaccine (5 - Booster for Zacarias Peter series) 05/24/2022    Flu vaccine (1) 08/01/2022    Lipids  03/25/2024    DTaP/Tdap/Td vaccine (5 - Td or Tdap) 06/16/2031    HIV screen  Completed    Hepatitis A vaccine  Aged Out    Hib vaccine  Aged Out    Meningococcal (ACWY) vaccine  Aged Out

## 2022-12-02 NOTE — PROGRESS NOTES
[address] [facility] [address]  [place] [address]  Dept: [phone]  Dept Fax: [phone]     Name: Melva Suarez  : 1975           Chief Complaint   Patient presents with    New Patient    Otalgia     Right, requesting ENT    Migraine       History of Present Illness:    HPI  Patient came to establish care  Multiple issues  Has been having history of migraines  Following up with neurology  Patient states that she used to get monoclonal antibodies but that was stopped earlier this year because of insurance issues  Does get left-sided numbness with migraine as well  Follows up with neurology at [facility]    Requesting ENT consult  Has history of multiple right ear infections since childhood  Continues to the right ear infection  Was in ER last night for this.   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  No fever chills  Ear pain present no discharge    History of chronic back pain  Up with pain management now want to change provider  Pain management consult given    History of bipolar  History of bipolar  Was seen at [facility] that she wants referral for self-centered  Referral given next    History of allergies chronic sinusitis  Asking Allegra  No other antihistaminic walks  And Flonase            Past Medical History:    Past Medical History:   Diagnosis Date    Anemia     Anxiety     Asthma     appt with pulmonologist 17    Bipolar 1 disorder ([address]     Blackout spell     Body piercing     X2 ABOVE AND BELOW LIP     Cervicalgia 2013    Chest pain     Chronic back pain     Chronic kidney disease     stage 3    Chronic neck pain     Constipation     COPD (chronic obstructive pulmonary disease) ([facility]     Dental crown present     has dental clearance    Depression     Fall     landed on tailbone, exacrbated back pain and headaches    GERD (gastroesophageal reflux disease)     Heartburn     History of blood transfusion 2012    no reaction    Hyperglycemia     DIET CONTROLED    Insomnia     Kidney stones     Knee pain, right     Low blood pressure     Low vitamin D level     Migraine     Numbness     bilat. legs    Palpitations     Sprain of lumbosacral (joint) (ligament) 8/14/2013    Staghorn kidney stones     L side Kidney stone  and R side    Tachycardia     HR: 130's with chest pain at times, sees Cardiologist @ [address] [facility]/ pt. can't remember name dr Aurea Bee cardiologist appt for cc next week    Wears glasses     Wears glasses       Reviewed all health maintenance requirements and ordered appropriate tests  Health Maintenance Due   Topic Date Due    Depression Monitoring  Never done    Hepatitis C screen  Never done    Pneumococcal 0-64 years Vaccine (2 - PCV) 02/21/2019    Colorectal Cancer Screen  Never done    COVID-19 Vaccine (5 - Booster for [email or web address] series) 05/24/2022    Flu vaccine (1) 08/01/2022       Past Surgical History:    Past Surgical History:   Procedure Laterality Date    [address] SURGERY  02/26/2019    [facility]    CHOLECYSTECTOMY  12/26/2019    laproscopic    CYSTOSCOPY  04/15/14    CYSTOSCOPY Right 4/29/14    with rt ureteral stent removal (Dr Ike Mccall)    [facility]  05/23/2014    ENDOMETRIAL ABLATION  1/23/14    [facility] - Dr. Hailey Lopez [address]  02/20/2017    gastric sleeve    GASTRIC BYPASS SURGERY      sleeve    HAND SURGERY Right     cyst removal    HYSTERECTOMY (CERVIX STATUS UNKNOWN)  9-8-15    RONNY with BSO    KNEE ARTHROPLASTY Right 02/20/2018    KNEE ARTHROSCOPY Right 09/12/2017    rt knee scope    KNEE SURGERY Left 12/16/13    LAPAROSCOPY  05/23/2014    pelvic exploratory    LITHOTRIPSY Right 04/15/14    LITHOTRIPSY  2014    MYRINGOTOMY Bilateral     NERVE BLOCK  09-19-13    KENALOG 40 MG    NERVE BLOCK  10/4/13    Left MBNB #2, Decadron 10mg    NERVE BLOCK  12/8/15    duramorph, celestone 9mg, duramorph 1.25mg    NERVE BLOCK  5/24/16    duramorph 1.5  decadron 7mg    NERVE BLOCK  11/21/2016    duramorph 1mg  celestone 9mg    NERVE BLOCK  08/02/2017    duramorph morphine 1.5mg decadron 7.5mg    NERVE BLOCK  06/05/2018    duramorph- decadron 7 mg, duramorph 1.5 mg    NERVE BLOCK Left 08/22/2018    LEFT LUMBAR TRANSFORAMINAL EPIDURAL DECADRON 20MG    NERVE BLOCK Right 08/29/2018    right genicular block, marcaine . 25%    NERVE BLOCK Right 09/18/2018    right genicular knee nerve block #2 no steroids    OTHER SURGICAL HISTORY  age 27    Essure contraceptive implants     SC KNEE SCOPE,DIAGNOSTIC Right 9/12/2017    KNEE ARTHROSCOPY, LATERAL RELEASE PATELLA RETINACULUM   ARTHROSCOPIC BOVIE, performed by Chele Otto DO at [address] Right 2/20/2018    NAVIO ROBOTIC TOTAL KNEE PATELLOFEMORAL  ARTHROPLASTY - NUNEZ &  NEPHEW, NSA=FEMORAL NERVE BLOCK, SPINAL VS GENERAL performed by Chele Otto DO at [address] ARTHROSCOPY Right     AUG 3,2015    SLEEVE GASTRECTOMY N/A 2/20/2017    GASTRECTOMY SLEEVE LAPAROSCOPIC XI ROBOTIC, ENDOSEALER  performed by Andreas White MD at [facility] Left 4/17/12    Left Nephrectomy - staghorn calculus    UMBILICAL HERNIA REPAIR  2007    URETER STENT PLACEMENT Right 04/15/14    removed 2 weeks later    [address] Right 2002    right ganglion cystectomy        Medications:      Current Outpatient Medications:     tiZANidine (ZANAFLEX) 4 MG capsule, take 1 capsule by mouth three times a day if needed, Disp: , Rfl:     pseudoephedrine (SUDAFED 12 HR) 120 MG extended release tablet, Suphedrine 12 Hour 120 mg tablet,extended release  take 1 tablet by mouth every 12 hours if needed, Disp: , Rfl:     gabapentin (NEURONTIN) 600 MG tablet, take 1 tablet by mouth three times a day, Disp: , Rfl:     furosemide (LASIX) 20 MG tablet, take 1 tablet by mouth twice a day, Disp: , Rfl:     fexofenadine (ALLEGRA) 180 MG tablet, , Disp: , Rfl:     cyclobenzaprine (FLEXERIL) 10 MG tablet, take 1 tablet by mouth at bedtime if needed for muscle spasm, Disp: , Rfl:     butalbital-acetaminophen-caffeine (FIORICET, ESGIC) -40 MG per tablet, take 1 tablet by mouth every 6 hours if needed for headache or migraines, Disp: , Rfl:     azelastine (ASTELIN) 0.1 % nasal spray, azelastine 137 mcg (0.1 %) nasal spray aerosol  instill 1 spray into each nostril twice a day, Disp: , Rfl:     naproxen (NAPROSYN) 500 MG tablet, Take 1 tablet by mouth 2 times daily (with meals) for 15 days, Disp: 30 tablet, Rfl: 0    levoFLOXacin (LEVAQUIN) 750 MG tablet, Take 1 tablet by mouth daily for 7 days, Disp: 7 tablet, Rfl: 0    pramipexole (MIRAPEX) 0.125 MG tablet, Take 0.125 mg by mouth nightly, Disp: , Rfl:     ARIPiprazole (ABILIFY) 20 MG tablet, aripiprazole 20 mg tablet, Disp: , Rfl:     diclofenac sodium 1 % GEL, Apply 2 g topically 4 times daily as needed for Pain, Disp: 1 Tube, Rfl: 0    acetaminophen (TYLENOL) 325 MG tablet, Take 2 tablets by mouth every 8 hours as needed for Pain, Disp: 30 tablet, Rfl: 0    ondansetron (ZOFRAN) 4 MG tablet, take 1 tablet by mouth 8 hours if needed for nausea and vomiting, Disp: 30 tablet, Rfl: 0    Multiple Vitamins-Minerals (WOMENS ONE DAILY) TABS, Take 1 tablet by mouth daily, Disp: , Rfl:     montelukast (SINGULAIR) 10 MG tablet, Take 10 mg by mouth nightly, Disp: , Rfl: 1    amitriptyline (ELAVIL) 100 MG tablet, Take 100 mg by mouth nightly, Disp: , Rfl:     Potassium 75 MG TABS, Take 75 mg by mouth 2 times daily, Disp: , Rfl:     omeprazole (PRILOSEC) 20 MG delayed release capsule, Take 40 mg by mouth daily , Disp: , Rfl:     albuterol sulfate  (90 BASE) MCG/ACT inhaler, Inhale 2 puffs into the lungs every 6 hours as needed for Wheezing, Disp: , Rfl:     venlafaxine (EFFEXOR XR) 75 MG extended release capsule, take 1 capsule by mouth every morning (Patient not taking: Reported on 12/2/2022), Disp: , Rfl:     rizatriptan (MAXALT) 10 MG tablet, rizatriptan 10 mg tablet (Patient not taking: Reported on 12/2/2022), Disp: , Rfl:     methocarbamol (ROBAXIN) 750 MG tablet, take 1 tablet by mouth three times a day (Patient not taking: Reported on 12/2/2022), Disp: , Rfl:     Fremanezumab-vfrm (AJOVY) 225 MG/1.5ML SOSY, inject 1 AND 1/2 milliliters ( 225 milligrams ) subcutaneously EVERY 28 DAYS (Patient not taking: Reported on 12/2/2022), Disp: , Rfl:     cetirizine (ZYRTEC) 10 MG tablet, Take 1 tablet by mouth daily (Patient not taking: Reported on 12/2/2022), Disp: 30 tablet, Rfl: 0    orphenadrine (NORFLEX) 100 MG extended release tablet, Take 1 tablet by mouth 2 times daily (Patient not taking: Reported on 12/2/2022), Disp: 14 tablet, Rfl: 0    zolpidem (AMBIEN) 5 MG tablet, Take 5 mg by mouth nightly as needed for Sleep.  (Patient not taking: Reported on 12/2/2022), Disp: , Rfl:     Erenumab-aooe (AIMOVIG SC), Inject into the skin (Patient not taking: Reported on 12/2/2022), Disp: , Rfl:     OXcarbazepine (TRILEPTAL) 300 MG tablet, take 1 tablet by mouth at bedtime (Patient not taking: Reported on 12/2/2022), Disp: , Rfl: 0    polyethylene glycol (GLYCOLAX) 17 GM/SCOOP powder, Take 17 g by mouth daily (Patient not taking: Reported on 12/2/2022), Disp: , Rfl:     promethazine (PHENERGAN) 25 MG tablet, Take 25 mg by mouth every 6 hours as needed for Nausea (Patient not taking: Reported on 12/2/2022), Disp: , Rfl:     vitamin D3 (CHOLECALCIFEROL) 400 units TABS tablet, Take 400 Units by mouth daily (Patient not taking: Reported on 12/2/2022), Disp: , Rfl:     OXcarbazepine (TRILEPTAL) 150 MG tablet, Take 450 mg by mouth nightly (Patient not taking: Reported on 12/2/2022), Disp: , Rfl:     SUMAtriptan (IMITREX) 100 MG tablet, Take 100 mg by mouth once as needed for Migraine (Patient not taking: Reported on 12/2/2022), Disp: , Rfl:     Allergies:      Ibuprofen, Desonide, Claritin [loratadine], and Tape Alli Langston tape]    Social History:    Tobacco:    reports that she has been smoking cigarettes. She started smoking about 31 years ago. She has been smoking an average of .5 packs per day. She has never used smokeless tobacco.  Alcohol:      reports no history of alcohol use. Drug Use:  reports no history of drug use. Family History:    Family History   Problem Relation Age of Onset    Kidney Disease Mother         ? ?? stone     Cancer Father         unknown    Heart Disease Father     Seizures Father     Migraines Father     Coronary Art Dis Father     Migraines Sister     Cervical Cancer Sister     Lung Cancer Maternal Grandmother     Seizures Son     Diabetes Maternal Uncle     Diabetes Maternal Cousin     Lung Cancer Paternal Grandfather     Anxiety Disorder Daughter        Review of Systems:    Positive and Negative as described in HPI    Constitutional:  negative for  fevers, chills, sweats, fatigue, and weight loss  HEENT: Positive for right ear pain  Recurrent ear infection in the right ear and hearing loss from the right respiratory: Positive for seasonal allergies, and congestion pus cardiovascular:  negative for  chest pain, palpitations  Gastrointestinal:  negative for nausea, vomiting, diarrhea, constipation, abdominal pain  Genitourinary:  negative for frequency, dysuria  Integument/Breast:  negative for rash, skin lesions  Musculoskeletal: For chronic back  Neurological: Positive for headache , no lightheadedness, numbness, pain and tingling extrimities  Behavior/Psych: Positive for anxiety  Physical Exam:    Vitals:  /86   Pulse 91   Ht 5' 2\" (1.575 m)   Wt 159 lb (72.1 kg)   LMP 09/08/2015   SpO2 100%   BMI 29.08 kg/m²     General appearance - alert, well appearing, and in no acute distress  Mental status - oriented to person, place, and time with normal affect  Head - normocephalic and atraumatic  Eyes - pupils equal and reactive, extraocular eye movements intact, conjunctiva clear  Ears -right ear, erythematous tympanic membrane present nose - no drainage noted  Mouth - mucous membranes moist  Neck - supple, no carotid bruits, thyroid not palpable  Chest - clear to auscultation, normal effort  Heart - normal rate, regular rhythm, no murmurs  Abdomen - soft, nontender, nondistended, bowel sounds present all four quadrants, no masses, hepatomegaly or splenomegaly  Neurological - normal speech, no focal findings or movement disorder noted, cranial nerves II through XII grossly intact  Extremities - peripheral pulses palpable, no pedal edema or calf pain with palpation  Skin - no gross lesions, rashes, or induration noted      Data:    Lab Results   Component Value Date/Time     09/29/2022 04:27 PM    K 4.7 09/29/2022 04:27 PM     09/29/2022 04:27 PM    CO2 24 09/29/2022 04:27 PM    BUN 11 09/29/2022 04:27 PM    CREATININE 0.94 09/29/2022 04:27 PM    GLUCOSE 100 09/29/2022 04:27 PM    GLUCOSE 87 05/12/2012 11:58 AM    PROT 6.1 09/29/2022 04:27 PM    LABALBU 3.5 09/29/2022 04:27 PM    LABALBU 3.9 04/09/2012 11:05 AM    BILITOT <0.1 09/29/2022 04:27 PM    ALKPHOS 105 09/29/2022 04:27 PM    AST 15 09/29/2022 04:27 PM    ALT 11 09/29/2022 04:27 PM     Lab Results   Component Value Date/Time    WBC 10.6 09/29/2022 04:27 PM    RBC 4.13 09/29/2022 04:27 PM    RBC 4.52 05/21/2012 11:30 AM    HGB 12.5 09/29/2022 04:27 PM    HCT 40.2 09/29/2022 04:27 PM    MCV 97.3 09/29/2022 04:27 PM    MCH 30.3 09/29/2022 04:27 PM    MCHC 31.1 09/29/2022 04:27 PM    RDW 14.5 09/29/2022 04:27 PM     09/29/2022 04:27 PM     05/21/2012 11:30 AM    MPV 10.9 09/29/2022 04:27 PM     Lab Results   Component Value Date/Time    TSH 4.52 09/29/2022 04:27 PM     Lab Results   Component Value Date/Time    CHOL 276 03/25/2019 11:27 AM    CHOL 287 01/04/2018 12:00 AM    HDL 57 03/25/2019 11:27 AM    LABA1C 5.1 01/04/2018 12:00 AM          Assessment & Plan:     Diagnosis Orders   1.  Bipolar I disorder ([facility] 1. Chronic pain syndrome  -     Ambulatory referral to Pain Medicine  2. Bipolar I disorder [facility])  -     External Referral To Psychiatry  -     External Referral To Psychiatry  3. Right ear pain  -     AFL - Damion Mccain MD, Otolaryngology, [place]  4. Simple chronic bronchitis ([address]  5. Chronic bilateral low back pain with left-sided sciatica  6. Migraine without aura and without status migrainosus, not intractable  7. Chronic suppurative otitis media of right ear, unspecified otitis media location  -     fexofenadine (ALLEGRA) 180 MG tablet; Take 1 tablet by mouth daily, Disp-30 tablet, R-0Normal  -     fluticasone (FLONASE) 50 MCG/ACT nasal spray; 1 spray by Each Nostril route daily, Disp-32 g, R-1Normal  8. Tobacco abuse               Completed Refills   Requested Prescriptions     Pending Prescriptions Disp Refills    Fremanezumab-vfrm (AJOVY) 225 MG/1.5ML SOSY 1.68 mL     fexofenadine (ALLEGRA) 180 MG tablet      pseudoephedrine (SUDAFED 12 HR) 120 MG extended release tablet       No follow-ups on file. No orders of the defined types were placed in this encounter. No orders of the defined types were placed in this encounter.       Electronically signed by Filiberto Mazariegos MD on 12/2/2022 at 10:36 AM
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
## 2022-12-05 DIAGNOSIS — G43.009 MIGRAINE WITHOUT AURA AND WITHOUT STATUS MIGRAINOSUS, NOT INTRACTABLE: ICD-10-CM

## 2022-12-05 DIAGNOSIS — G89.4 CHRONIC PAIN SYNDROME: ICD-10-CM

## 2022-12-05 RX ORDER — BUTALBITAL, ACETAMINOPHEN AND CAFFEINE 50; 325; 40 MG/1; MG/1; MG/1
TABLET ORAL
Qty: 180 TABLET | Refills: 0 | Status: SHIPPED | OUTPATIENT
Start: 2022-12-05

## 2022-12-05 RX ORDER — OMEGA-3S/DHA/EPA/FISH OIL/D3 300MG-1000
400 CAPSULE ORAL DAILY
Qty: 30 TABLET | Refills: 0 | Status: SHIPPED | OUTPATIENT
Start: 2022-12-05

## 2022-12-05 RX ORDER — PRAMIPEXOLE DIHYDROCHLORIDE 0.12 MG/1
0.12 TABLET ORAL NIGHTLY
Qty: 90 TABLET | Refills: 0 | Status: SHIPPED | OUTPATIENT
Start: 2022-12-05

## 2022-12-05 RX ORDER — VENLAFAXINE HYDROCHLORIDE 75 MG/1
CAPSULE, EXTENDED RELEASE ORAL
Qty: 30 CAPSULE | Refills: 0 | Status: SHIPPED | OUTPATIENT
Start: 2022-12-05

## 2022-12-05 RX ORDER — FUROSEMIDE 20 MG/1
TABLET ORAL
Qty: 60 TABLET | Refills: 0 | Status: SHIPPED | OUTPATIENT
Start: 2022-12-05

## 2022-12-05 RX ORDER — ACETAMINOPHEN 325 MG/1
650 TABLET ORAL EVERY 8 HOURS PRN
Qty: 30 TABLET | Refills: 0 | Status: SHIPPED | OUTPATIENT
Start: 2022-12-05 | End: 2022-12-06

## 2022-12-05 RX ORDER — OMEPRAZOLE 20 MG/1
40 CAPSULE, DELAYED RELEASE ORAL DAILY
Qty: 30 CAPSULE | Refills: 3 | Status: SHIPPED | OUTPATIENT
Start: 2022-12-05

## 2022-12-05 RX ORDER — ALBUTEROL SULFATE 90 UG/1
2 AEROSOL, METERED RESPIRATORY (INHALATION) EVERY 6 HOURS PRN
Qty: 18 G | Refills: 3 | Status: SHIPPED | OUTPATIENT
Start: 2022-12-05

## 2022-12-05 NOTE — TELEPHONE ENCOUNTER
----- Message from Boyce Alvin sent at 12/5/2022  2:27 PM EST -----  Subject: Refill Request    QUESTIONS  Name of Medication? acetaminophen (TYLENOL) 325 MG tablet  Patient-reported dosage and instructions? 2 tablets every 8 hours as   needed for pain  How many days do you have left? 0  Preferred Pharmacy? [id number]  Pharmacy phone number (if available)? [phone]  ---------------------------------------------------------------------------  --------------,  Name of Medication? Other - Albuterol  (90 base)  Patient-reported dosage and instructions? 2 puffs every 6 hours as needed   for wheezing  How many days do you have left? 0  Preferred Pharmacy? [facility] #[id number]  Pharmacy phone number (if available)? [phone]  ---------------------------------------------------------------------------  --------------,  Name of Medication? butalbital-acetaminophen-caffeine (FIORICET, ESGIC)   -40 MG per tablet  Patient-reported dosage and instructions? 1 tablet by mouth every 6 hours  How many days do you have left? 0  Preferred Pharmacy? [facility] #[id number]  Pharmacy phone number (if available)? [phone]  ---------------------------------------------------------------------------  --------------,  Name of Medication? diclofenac sodium 1 % GEL  Patient-reported dosage and instructions? apply 2 grams 4 times a day  How many days do you have left? 0  Preferred Pharmacy? [facility] #[id number]  Pharmacy phone number (if available)? [phone]  ---------------------------------------------------------------------------  --------------,  Name of Medication? furosemide (LASIX) 20 MG tablet  Patient-reported dosage and instructions? twice a day  How many days do you have left? 0  Preferred Pharmacy? [facility] #[id number]  Pharmacy phone number (if available)? [phone]  ---------------------------------------------------------------------------  --------------,  Name of Medication? Other - omeprazole (prilosec) 20 mg delayed release   capsule  Patient-reported dosage and instructions? 40 mg by mouth daily  How many days do you have left? 0  Preferred Pharmacy? [facility] #[id number]  Pharmacy phone number (if available)? [phone]  ---------------------------------------------------------------------------  --------------,  Name of Medication? Other - potassium nitrate 75 mg  Patient-reported dosage and instructions? by mouth 2 times daily  How many days do you have left? 0  Preferred Pharmacy? [facility] #[id number]  Pharmacy phone number (if available)? [phone]  ---------------------------------------------------------------------------  --------------,  Name of Medication? pramipexole (MIRAPEX) 0.125 MG tablet  Patient-reported dosage and instructions? 1 tablet at night  How many days do you have left? 0  Preferred Pharmacy? [facility] #[id number]  Pharmacy phone number (if available)? [phone]  ---------------------------------------------------------------------------  --------------,  Name of Medication? Other - Vitamin D3 10 mcg 400 unit  Patient-reported dosage and instructions? take 400 units by mouth daily  How many days do you have left? 0  Preferred Pharmacy? [facility]  #[id number]  Pharmacy phone number (if available)? [phone]  ---------------------------------------------------------------------------  --------------,  Name of Medication? venlafaxine (EFFEXOR XR) 75 MG extended release   capsule  Patient-reported dosage and instructions? 1 capsule by mouth every morning  How many days do you have left? 0  Pref[facility] Pharmacy? [facility] #[id number]  Pharmacy phone number (if available)? [phone]  ---------------------------------------------------------------------------  --------------,  Name of Medication? Other - Symbicort inhaler  Patient-reported dosage and instructions? 2 puffs twice a day  How many days do you have left? 0  Preferred Pharmacy? [facility] #[id number]  Pharmacy phone number (if available)? [phone]  ---------------------------------------------------------------------------  --------------  CALL BACK INFO  What is the best way for the office to contact you? OK to leave message on   [employer], OK to respond with electronic message via [employer] portal (only   for patients who have registered [employer] account)  Preferred Call Back Phone Number? [phone]  ---------------------------------------------------------------------------  --------------  SCRIPT ANSWERS  Relationship to Patient?  Self

## 2022-12-05 NOTE — TELEPHONE ENCOUNTER
----- Message from Grace Rojas sent at 12/5/2022  2:07 PM EST -----  Subject: Referral Request    Reason for referral request? ENT  Provider patient wants to be referred to(if known): Shahram Barragan    Provider Phone Number(if known): Additional Information for Provider? Pt is requesting an referral be sent   for an ENT in network. The previous provider submitted for not accepting   [employer] insurance.   ---------------------------------------------------------------------------  --------------  Kp OGLESBY    [phone]; OK to leave message on voicemail, OK to respond with   electronic message via [employer] portal (only for patients who have   registered [employer] account)  ---------------------------------------------------------------------------  --------------

## 2022-12-06 ENCOUNTER — HOSPITAL ENCOUNTER (EMERGENCY)
Age: 47
Discharge: HOME | End: 2022-12-06
Attending: EMERGENCY MEDICINE
Payer: MEDICARE

## 2022-12-06 VITALS
HEART RATE: 106 BPM | TEMPERATURE: 98.8 F | DIASTOLIC BLOOD PRESSURE: 73 MMHG | OXYGEN SATURATION: 99 % | RESPIRATION RATE: 18 BRPM | SYSTOLIC BLOOD PRESSURE: 134 MMHG

## 2022-12-06 DIAGNOSIS — H66.90 SUBACUTE OTITIS MEDIA, UNSPECIFIED OTITIS MEDIA TYPE: Primary | ICD-10-CM

## 2022-12-06 PROCEDURE — 99283 EMERGENCY DEPT VISIT LOW MDM: CPT

## 2022-12-06 PROCEDURE — 6370000000 HC RX 637 (ALT 250 FOR IP): Performed by: STUDENT IN AN ORGANIZED HEALTH CARE EDUCATION/TRAINING PROGRAM

## 2022-12-06 RX ORDER — ACETAMINOPHEN 500 MG
1000 TABLET ORAL ONCE
Status: COMPLETED | OUTPATIENT
Start: 2022-12-06 | End: 2022-12-06

## 2022-12-06 RX ORDER — PSEUDOEPHEDRINE HCL 30 MG
30 TABLET ORAL EVERY 4 HOURS PRN
Qty: 30 TABLET | Refills: 0 | Status: SHIPPED | OUTPATIENT
Start: 2022-12-06 | End: 2022-12-11

## 2022-12-06 RX ORDER — CLINDAMYCIN HYDROCHLORIDE 150 MG/1
450 CAPSULE ORAL 3 TIMES DAILY
Qty: 90 CAPSULE | Refills: 0 | Status: SHIPPED | OUTPATIENT
Start: 2022-12-06 | End: 2022-12-16

## 2022-12-06 RX ORDER — CLINDAMYCIN HYDROCHLORIDE 150 MG/1
450 CAPSULE ORAL ONCE
Status: COMPLETED | OUTPATIENT
Start: 2022-12-06 | End: 2022-12-06

## 2022-12-06 RX ORDER — ACETAMINOPHEN 500 MG
500 TABLET ORAL 4 TIMES DAILY PRN
Qty: 60 TABLET | Refills: 0 | Status: SHIPPED | OUTPATIENT
Start: 2022-12-06 | End: 2022-12-12

## 2022-12-06 RX ORDER — PSEUDOEPHEDRINE HCL 30 MG
30 TABLET ORAL ONCE
Status: COMPLETED | OUTPATIENT
Start: 2022-12-06 | End: 2022-12-06

## 2022-12-06 RX ADMIN — ACETAMINOPHEN 1000 MG: 500 TABLET ORAL at 20:13

## 2022-12-06 RX ADMIN — PSEUDOEPHEDRINE HYDROCHLORIDE 30 MG: 30 TABLET, FILM COATED ORAL at 20:12

## 2022-12-06 RX ADMIN — CLINDAMYCIN HYDROCHLORIDE 450 MG: 150 CAPSULE ORAL at 20:13

## 2022-12-06 ASSESSMENT — PAIN - FUNCTIONAL ASSESSMENT: PAIN_FUNCTIONAL_ASSESSMENT: 0-10

## 2022-12-06 ASSESSMENT — ENCOUNTER SYMPTOMS
NAUSEA: 0
DIARRHEA: 0
VOMITING: 0
SHORTNESS OF BREATH: 0
ABDOMINAL PAIN: 0
TROUBLE SWALLOWING: 0
RHINORRHEA: 1
FACIAL SWELLING: 1
COUGH: 1
SORE THROAT: 1
SINUS PRESSURE: 1

## 2022-12-06 ASSESSMENT — PAIN SCALES - GENERAL: PAINLEVEL_OUTOF10: 9

## 2022-12-06 NOTE — FLOWSHEET NOTE
[x] [facility] ([facility]) [facility] &  Therapy  [address]    [phone]  F: [phone]   [] [address]  [facility]   [address]  P: [phone]  F: [phone]  [] [address] &  Therapy  [address]  P: [phone]  F: [phone] [] [address]  P: [phone]  F: [phone]  [] [address] [facility]   [address]   [place]: [phone]  F: [phone]   [] [place] [address] [facility] [address]  [place]: [phone]   F: [phone]     Physical Therapy Cancel/No Show note    Date: 2022  Patient: Melva Suarez  : 1975  MRN: [id number]    Cancels/No Shows to date:     For today's appointment patient:    [x]  Cancelled    [x] Rescheduled appointment    [] No-show     Reason given by patient:    []  Patient ill    [x]  Conflicting appointment    [] No transportation      [] Conflict with work    [] No reason given    [] Weather related    [] COVID-19    [] Other:      Comments:        [x] Next appointment was confirmed    Electronically signed by: Austin Lancaster PT

## 2022-12-07 ENCOUNTER — HOSPITAL ENCOUNTER (OUTPATIENT)
Dept: PHYSICAL THERAPY | Age: 47
Setting detail: THERAPIES SERIES
Discharge: HOME | End: 2022-12-07

## 2022-12-07 NOTE — ED NOTES
Pt presents to the ED with C/O having right ear pain. Pt state that she came in last week, she was given antibiotics for her ear infection. Pt stated that she took all her medication. Pt stated that she has a ENT specialist and  She is suppose to get tubes placed soon. Pt stated that she could not deal with the pain. Pt stated that she has a HX of tachycardia. Pt denies SOB or CP. Pt's vitals are within her define limits. Will continue to monitor and reassess.        Johan Carter RN  12/06/22 0347

## 2022-12-07 NOTE — DISCHARGE INSTRUCTIONS
Today you were evaluated for right ear pain. You were found to have continued right ear infection. You were prescribed antibiotics for the infection. Please take the entire course of antibiotics as prescribed. Please take Sudafed as needed for congestion. Please take Tylenol as needed for pain. Please see your ENT appointment as you have scheduled. Please return to the ED if your pain worsens, if you develop fevers, headaches, vision loss, chest pain, shortness of breath or hearing loss.

## 2022-12-07 NOTE — ED PROVIDER NOTES
[facility] ED  Emergency Department Encounter  Emergency Medicine Resident     Pt Robertrowena Mayank Zapien  MRN: [id number]  [place] 1975  Date of evaluation: 12/6/22  PCP:  Leonid Harris MD      CHIEF COMPLAINT       Chief Complaint   Patient presents with    Otalgia     Right, x1 week    Cough       HISTORY OF PRESENT ILLNESS  (Location/Symptom, Timing/Onset, Context/Setting, Quality, Duration, Modifying Factors, Severity.)      Babita Stone is a 49 y.o. female who presents with continued right ear pain. Patient reports approximately 1 week ago she was diagnosed with an ear infection in the right ear. She was given antibiotics which she has completed at this time. Per chart review she was taking Levaquin which she has now completed. Patient reports despite taking the antibiotics and Tylenol and occasionally Fioricet she continues to have right ear pain. Patient has an extensive history of ear infections and has required tympanostomy tubes in the past.  Patient currently reports right ear pain with sensation of swelling to the mastoid process. Patient denies hearing loss or ear drainage, fever, chills, headache, neck pain. Patient reports she has also been having cough runny nose and sinus pressure for the past few days. Patient reports she thinks some of her friends children may have given her something. Patient also denies chest pain, shortness of breath, nausea, vomiting at this time.     PAST MEDICAL / SURGICAL / SOCIAL / FAMILY HISTORY      has a past medical history of Anemia, Anxiety, Asthma, Bipolar 1 disorder ([address] Blackout spell, Body piercing, Cervicalgia, Chest pain, Chronic back pain, Chronic kidney disease, Chronic neck pain, Constipation, COPD (chronic obstructive pulmonary disease) ([address] Dental crown present, Depression, Fall, GERD (gastroesophageal reflux disease), Heartburn, History of blood transfusion, Hyperglycemia, Insomnia, Kidney stones, Knee pain, right, Low blood pressure, Low vitamin D level, Migraine, Numbness, Palpitations, Sprain of lumbosacral (joint) (ligament), [place] kidney stones, Tachycardia, Wears glasses, and Wears glasses. has a past surgical history that includes Ankle fracture surgery (Right, ); Wrist surgery (Right, ); total nephrectomy (Left, 12); laparoscopy (2014); Dilation and curettage of uterus (2014); myringotomy (Bilateral); other surgical history (age 27); Nerve Block (02-75-15); Nerve Block (10/4/13); knee surgery (Left, 13); Cystoscopy (04/15/14); Ureter stent placement (Right, 04/15/14); Lithotripsy (Right, 04/15/14); Cystoscopy (Right, 14); Endometrial ablation (14); Hand surgery (Right); Hysterectomy (9-8-15); Nerve Block (12/8/15); Nerve Block (16); Nerve Block (2016); Sleeve Gastrectomy (N/A, 2017); Nerve Block (2017); Gastric bypass surgery (2017); Umbilical hernia repair (); Shoulder arthroscopy (Right); Knee arthroscopy (Right, 2017); pr knee scope,diagnostic (Right, 2017); Lithotripsy (); [place] tooth extraction; Gastric bypass surgery; Knee Arthroplasty (Right, 2018); pr total knee arthroplasty (Right, 2018); Nerve Block (2018); Nerve Block (Left, 2018); Nerve Block (Right, 2018); Nerve Block (Right, 2018); Arm Surgery (2019); and Cholecystectomy (2019).     Social History     Socioeconomic History    Marital status:      Spouse name: Not on file    Number of children: 4    Years of education: Not on file    Highest education level: Not on file   Occupational History    Not on file   Tobacco Use    Smoking status: Every Day     Packs/day: 0.50     Types: Cigarettes     Start date:  [address]     Last attempt to quit: 2017     Years since quittin.0    Smokeless tobacco: Never    Tobacco comments:     litte less then 1/2 mariola/day   Vaping Use    Vaping Use: Never used   Substance and (PROVENTIL;VENTOLIN;PROAIR) 108 (90 Base) MCG/ACT inhaler Inhale 2 puffs into the lungs every 6 hours as needed for Wheezing 12/5/22   Sandee Spence MD   butalbital-acetaminophen-caffeine (FIORICET, [facility]) -23 MG per tablet take 1 tablet by mouth every 6 hours if needed for headache or migraines 12/5/22   Sandee Spence MD   omeprazole (PRILOSEC) 20 MG delayed release capsule Take 2 capsules by mouth daily 12/5/22   Sandee Spence MD   pramipexole (MIRAPEX) 0.125 MG tablet Take 1 tablet by mouth nightly 12/5/22   Sandee Spence MD   furosemide (LASIX) 20 MG tablet take 1 tablet by mouth twice a day 12/5/22   Sandee Spence MD   Potassium 75 MG TABS Take 75 mg by mouth 2 times daily 12/5/22   Sandee Spence MD   venlafaxine (EFFEXOR XR) 75 MG extended release capsule take 1 capsule by mouth every morning 12/5/22   Sandee Spence MD   vitamin D3 (CHOLECALCIFEROL) 10 MCG (400 UNIT) TABS tablet Take 1 tablet by mouth daily 12/5/22   Sandee Spence MD   tiZANidine (ZANAFLEX) 4 MG capsule take 1 capsule by mouth three times a day if needed 11/8/22   Historical Provider, MD   rizatriptan (MAXALT) 10 MG tablet rizatriptan 10 mg tablet  Patient not taking: Reported on 12/2/2022    Historical Provider, MD   methocarbamol (ROBAXIN) 750 MG tablet take 1 tablet by mouth three times a day  Patient not taking: Reported on 12/2/2022 10/17/22   Historical Provider, MD   gabapentin (NEURONTIN) 600 MG tablet take 1 tablet by mouth three times a day 11/8/22   Historical Provider, MD   Fremanezumab-vfrm (AJOVY) 225 MG/1.5ML SOSY inject 1 AND 1/2 milliliters ( 225 milligrams ) subcutaneously EVERY 28 DAYS  Patient not taking: Reported on 12/2/2022 9/14/21   Historical Provider, MD   cyclobenzaprine (FLEXERIL) 10 MG tablet take 1 tablet by mouth at bedtime if needed for muscle spasm 9/14/22   Historical Provider, MD   azelastine (ASTELIN) 0.1 % nasal spray azelastine 137 mcg (0.1 %) nasal spray aerosol   instill 1 spray into each nostril twice a day    Historical Provider, MD   fexofenadine (ALLEGRA) 180 MG tablet Take 1 tablet by mouth daily 12/2/22   Stephanie Delcid MD   fluticasone [facility]) 50 MCG/ACT nasal spray 1 spray by Each Nostril route daily 12/2/22   Stephanie Delcid MD   naproxen (NAPROSYN) 500 MG tablet Take 1 tablet by mouth 2 times daily (with meals) for 15 days 12/1/22 12/16/22  Jaylin Salazar PA-C   levoFLOXacin (LEVAQUIN) 750 MG tablet Take 1 tablet by mouth daily for 7 days 12/1/22 12/8/22  Dusty Figueroa MD   ARIPiprazole (ABILIFY) 20 MG tablet aripiprazole 20 mg tablet    Historical Provider, MD   diclofenac sodium 1 % GEL Apply 2 g topically 4 times daily as needed for Pain 1/18/20   Avery Mendez MD   zolpidem (AMBIEN) 5 MG tablet Take 5 mg by mouth nightly as needed for Sleep.   Patient not taking: Reported on 12/2/2022    Historical Provider, MD   [facility]ron [facility]) 4 MG tablet take 1 tablet by mouth 8 hours if needed for nausea and vomiting 1/16/19   MD Estefany Verdin (AIMOVIG [place]) Inject into the skin  Patient not taking: Reported on 12/2/2022    Historical Provider, MD   OXcarbazepine (TRILEPTAL) 300 MG tablet take 1 tablet by mouth at bedtime  Patient not taking: Reported on 12/2/2022 1/30/18   Historical Provider, MD   polyethylene glycol (GLYCOLAX) 17 GM/SCOOP powder Take 17 g by mouth daily  Patient not taking: Reported on 12/2/2022    Historical Provider, MD   promethazine (PHENERGAN) 25 MG tablet Take 25 mg by mouth every 6 hours as needed for Nausea  Patient not taking: Reported on 12/2/2022    Historical Provider, MD   Multiple Vitamins-Minerals (WOMENS ONE DAILY) TABS Take 1 tablet by mouth daily    Historical Provider, MD   montelukast (SINGULAIR) 10 MG tablet Take 10 mg by mouth nightly 8/7/17   Historical Provider, MD   OXcarbazepine (TRILEPTAL) 150 MG tablet Take 450 mg by mouth nightly  Patient not taking: Reported on 12/2/2022    Historical Provider, MD   amitriptyline (ELAVIL) 100 MG tablet Take 100 mg by mouth nightly    Historical Provider, MD   SUMAtriptan (IMITREX) 100 MG tablet Take 100 mg by mouth once as needed for Migraine  Patient not taking: Reported on 12/2/2022    Historical Provider, MD       REVIEW OF SYSTEMS    (2-9 systems for level 4, 10 or more for level 5)      Review of Systems   Constitutional:  Positive for fatigue. Negative for activity change, appetite change, chills and fever. HENT:  Positive for ear pain, facial swelling, rhinorrhea, sinus pressure and sore throat. Negative for congestion, ear discharge, hearing loss and trouble swallowing. Eyes:  Negative for visual disturbance. Respiratory:  Positive for cough. Negative for shortness of breath. Cardiovascular:  Negative for chest pain and palpitations. Gastrointestinal:  Negative for abdominal pain, diarrhea, nausea and vomiting. Genitourinary:  Negative for dysuria and frequency. Skin:  Negative for rash. Neurological:  Positive for headaches. Negative for dizziness, weakness and light-headedness. PHYSICAL EXAM   (up to 7 for level 4, 8 or more for level 5)      INITIAL VITALS:   /73   Pulse (!) 106   Temp 98.8 °F (37.1 °C) (Oral)   Resp 18   LMP 09/08/2015   SpO2 99%     Physical Exam  Vitals reviewed. Constitutional:       Appearance: Normal appearance. She is normal weight. HENT:      Head: Normocephalic and atraumatic. Right Ear: Ear canal and external ear normal. A middle ear effusion is present. Tympanic membrane is erythematous and bulging. Left Ear: Tympanic membrane, ear canal and external ear normal.      Nose: Nose normal.      Mouth/Throat:      Mouth: Mucous membranes are moist.      Pharynx: Oropharyngeal exudate (plaque over tongue) present. No posterior oropharyngeal erythema. Eyes:      Conjunctiva/sclera: Conjunctivae normal.   Cardiovascular:      Rate and Rhythm: Normal rate and regular rhythm. Heart sounds: Normal heart sounds.    Pulmonary: Effort: Pulmonary effort is normal. No respiratory distress. Breath sounds: Normal breath sounds. No wheezing. Musculoskeletal:         General: No swelling or tenderness. Normal range of motion. Cervical back: Normal range of motion and neck supple. No tenderness. Lymphadenopathy:      Cervical: No cervical adenopathy. Skin:     General: Skin is warm and dry. Findings: No erythema or rash. Neurological:      General: No focal deficit present. Mental Status: She is alert and oriented to person, place, and time. DIFFERENTIAL  DIAGNOSIS     PLAN (LABS / IMAGING / EKG):  No orders of the defined types were placed in this encounter. MEDICATIONS ORDERED:  Orders Placed This Encounter   Medications    clindamycin (CLEOCIN) capsule 450 mg     Order Specific Question:   Antimicrobial Indications     Answer:   Head and Neck Infection    pseudoephedrine (SUDAFED) tablet 30 mg    acetaminophen (TYLENOL) tablet 1,000 mg    clindamycin (CLEOCIN) 150 MG capsule     Sig: Take 3 capsules by mouth 3 times daily for 10 days     Dispense:  90 capsule     Refill:  0    pseudoephedrine (DECONGESTANT) 30 MG tablet     Sig: Take 1 tablet by mouth every 4 hours as needed for Congestion     Dispense:  30 tablet     Refill:  0    acetaminophen (TYLENOL) 500 MG tablet     Sig: Take 1 tablet by mouth 4 times daily as needed for Pain     Dispense:  60 tablet     Refill:  0       DDX: Otitis media, otitis externa, mastoiditis, continued middle ear effusion    DIAGNOSTIC RESULTS / EMERGENCY DEPARTMENT COURSE / MDM   LAB RESULTS:  No results found for this visit on 12/06/22. IMPRESSION: Patient is a 79-year-old female presenting due to continued right ear pain despite completion of entire antibiotic course. Patient appears to have a continued ear infection that was resistant to previous antibiotics. Patient will be given antibiotics, Sudafed and Tylenol for pain.   Patient will be recommended to follow-up with her ENT at the appointment she has scheduled. No notes of EC Admission Criteria type on file. PROCEDURES:  None    CONSULTS:  None    CRITICAL CARE:  See attending note    FINAL IMPRESSION      1.  Subacute otitis media, unspecified otitis media type          DISPOSITION / PLAN     DISPOSITION Decision To Discharge 12/06/2022 08:06:11 PM      PATIENT REFERRED TO:  ENT    Go to       [facility]  [address]  Go to   As needed, If symptoms worsen    DISCHARGE MEDICATIONS:  Discharge Medication List as of 12/6/2022  8:14 PM        START taking these medications    Details   clindamycin (CLEOCIN) 150 MG capsule Take 3 capsules by mouth 3 times daily for 10 days, Disp-90 capsule, R-0Print      pseudoephedrine (DECONGESTANT) 30 MG tablet Take 1 tablet by mouth every 4 hours as needed for Congestion, Disp-30 tablet, R-0Print             Sandoval Owens MD  Emergency Medicine Resident    (Please note that portions of thisnote were completed with a voice recognition program.  Efforts were made to edit the dictations but occasionally words are mis-transcribed.)       Spring Gibbs MD  Resident  12/06/22 [id number]

## 2022-12-07 NOTE — ED PROVIDER NOTES
[facility]     Emergency Department     Faculty Attestation    I performed a history and physical examination of the patient and discussed management with the resident. I reviewed the residents note and agree with the documented findings including all diagnostic interpretations and plan of care. Any areas of disagreement are noted on the chart. I was personally present for the key portions of any procedures. I have documented in the chart those procedures where I was not present during the key portions. I have reviewed the emergency nurses triage note. I agree with the chief complaint, past medical history, past surgical history, allergies, medications, social and family history as documented unless otherwise noted below. Documentation of the HPI, Physical Exam and Medical Decision Making performed by christina is based on my personal performance of the HPI, PE and MDM. For Physician Assistant/ Nurse Practitioner cases/documentation I have personally evaluated this patient and have completed at least one if not all key elements of the E/M (history, physical exam, and MDM). Additional findings are as noted. Primary Care Physician: Kinza Hoover MD    History: This is a 49 y.o. female who presents to the Emergency Department with complaint of ear pain. Right ear. Recently seen for ear infection was placed on Levaquin she reports no improvement in her symptoms in fact some worsening. Is following up with ENT on Friday. No fevers. No drainage from the ear. Physical:     oral temperature is 98.8 °F (37.1 °C). Her blood pressure is 134/73 and her pulse is 106 (abnormal). Her respiration is 18 and oxygen saturation is 99%.     49 y.o. female appears uncomfortable but not acutely distressed, right ear shows erythema in the base of the external canal, there is bulging and opacification with some fluid/pus level visualized concerning for continued otitis media    Impression: Otitis media with initial antibiotic failure    Plan: Decongestant, clindamycin, follow-up with ENT on Friday      Nurys Loo MD, Yanci Mackenzie  Attending Emergency Physician        Megha Hollingsworth MD  12/06/22 2014

## 2022-12-08 DIAGNOSIS — M25.571 RIGHT ANKLE PAIN, UNSPECIFIED CHRONICITY: ICD-10-CM

## 2022-12-08 DIAGNOSIS — S82.839A AVULSION FRACTURE OF DISTAL FIBULA: ICD-10-CM

## 2022-12-08 RX ORDER — NAPROXEN 500 MG/1
TABLET ORAL
Qty: 30 TABLET | Refills: 0 | OUTPATIENT
Start: 2022-12-08

## 2022-12-12 ENCOUNTER — HOSPITAL ENCOUNTER (EMERGENCY)
Age: 47
Discharge: HOME | End: 2022-12-12
Attending: EMERGENCY MEDICINE
Payer: MEDICARE

## 2022-12-12 VITALS
DIASTOLIC BLOOD PRESSURE: 87 MMHG | WEIGHT: 159 LBS | SYSTOLIC BLOOD PRESSURE: 136 MMHG | OXYGEN SATURATION: 99 % | HEART RATE: 73 BPM | TEMPERATURE: 97.9 F | HEIGHT: 62 IN | BODY MASS INDEX: 29.26 KG/M2 | RESPIRATION RATE: 16 BRPM

## 2022-12-12 DIAGNOSIS — R11.2 NAUSEA AND VOMITING, UNSPECIFIED VOMITING TYPE: Primary | ICD-10-CM

## 2022-12-12 LAB
FLU A ANTIGEN: NEGATIVE
FLU B ANTIGEN: NEGATIVE
SARS-COV-2, RAPID: NOT DETECTED
SPECIMEN DESCRIPTION: NORMAL

## 2022-12-12 PROCEDURE — 99283 EMERGENCY DEPT VISIT LOW MDM: CPT

## 2022-12-12 PROCEDURE — 6370000000 HC RX 637 (ALT 250 FOR IP): Performed by: STUDENT IN AN ORGANIZED HEALTH CARE EDUCATION/TRAINING PROGRAM

## 2022-12-12 PROCEDURE — 87635 SARS-COV-2 COVID-19 AMP PRB: CPT

## 2022-12-12 PROCEDURE — 87804 INFLUENZA ASSAY W/OPTIC: CPT

## 2022-12-12 RX ORDER — ACETAMINOPHEN 325 MG/1
650 TABLET ORAL ONCE
Status: COMPLETED | OUTPATIENT
Start: 2022-12-12 | End: 2022-12-12

## 2022-12-12 RX ORDER — ACETAMINOPHEN 500 MG
500 TABLET ORAL 4 TIMES DAILY PRN
Qty: 30 TABLET | Refills: 0 | Status: SHIPPED | OUTPATIENT
Start: 2022-12-12

## 2022-12-12 RX ORDER — ONDANSETRON 4 MG/1
4 TABLET, ORALLY DISINTEGRATING ORAL ONCE
Status: COMPLETED | OUTPATIENT
Start: 2022-12-12 | End: 2022-12-12

## 2022-12-12 RX ORDER — ONDANSETRON 4 MG/1
4 TABLET, ORALLY DISINTEGRATING ORAL 3 TIMES DAILY PRN
Qty: 21 TABLET | Refills: 0 | Status: SHIPPED | OUTPATIENT
Start: 2022-12-12

## 2022-12-12 RX ADMIN — ACETAMINOPHEN 650 MG: 325 TABLET ORAL at 17:08

## 2022-12-12 RX ADMIN — ONDANSETRON 4 MG: 4 TABLET, ORALLY DISINTEGRATING ORAL at 18:29

## 2022-12-12 RX ADMIN — ONDANSETRON 4 MG: 4 TABLET, ORALLY DISINTEGRATING ORAL at 17:08

## 2022-12-12 ASSESSMENT — ENCOUNTER SYMPTOMS
SORE THROAT: 0
VOMITING: 1
RHINORRHEA: 0
SHORTNESS OF BREATH: 0
COUGH: 0
DIARRHEA: 0
ABDOMINAL PAIN: 0
NAUSEA: 1

## 2022-12-12 ASSESSMENT — PAIN SCALES - GENERAL
PAINLEVEL_OUTOF10: 8
PAINLEVEL_OUTOF10: 8

## 2022-12-12 ASSESSMENT — PAIN - FUNCTIONAL ASSESSMENT: PAIN_FUNCTIONAL_ASSESSMENT: 0-10

## 2022-12-12 ASSESSMENT — PAIN DESCRIPTION - DESCRIPTORS
DESCRIPTORS: CRAMPING
DESCRIPTORS: CRAMPING

## 2022-12-12 ASSESSMENT — PAIN DESCRIPTION - ORIENTATION: ORIENTATION: LOWER;RIGHT;LEFT

## 2022-12-12 ASSESSMENT — PAIN DESCRIPTION - PAIN TYPE: TYPE: ACUTE PAIN

## 2022-12-12 ASSESSMENT — PAIN DESCRIPTION - LOCATION
LOCATION: ABDOMEN
LOCATION: ABDOMEN
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
## 2022-12-12 NOTE — DISCHARGE INSTRUCTIONS
Please call your primary care provider for follow up in the next 1-2 days. Please call your ENT and follow up as directed. Your antibiotics may increase your nausea, therefore we have prescribed zofran to help prevent nausea. Take your medication as indicated and prescribed. Avoid drinking alcohol or drinks that have caffeine it. Drink plenty of water or fluids like Gatorade to keep yourself hydrated. You should eat bland foods like bananas, rice, apple sauce and toast / crackers. PLEASE RETURN TO THE EMERGENCY DEPARTMENT IMMEDIATELY for worsening symptoms, increase in the amount of diarrhea you are having, abdominal pain, blood in your stool, vomiting up blood, persistent nausea and/or vomiting, or if you develop any concerning symptoms such as: high fever not relieved by acetaminophen (Tylenol) and/or ibuprofen (Motrin / Advil), chills, shortness of breath, chest pain, feeling of your heart fluttering or racing, loss of consciousness, numbness, weakness or tingling in the arms or legs or change in color of the extremities, changes in mental status, persistent headache, blurry vision, loss of bladder / bowel control, unable to follow up with your physician, or other any other care or concern.

## 2022-12-12 NOTE — ED PROVIDER NOTES
[facility]     Emergency Department     Faculty Attestation    I performed a history and physical examination of the patient and discussed management with the resident. I reviewed the residents note and agree with the documented findings and plan of care. Any areas of disagreement are noted on the chart. I was personally present for the key portions of any procedures. I have documented in the chart those procedures where I was not present during the key portions. I have reviewed the emergency nurses triage note. I agree with the chief complaint, past medical history, past surgical history, allergies, medications, social and family history as documented unless otherwise noted below. For Physician Assistant/ Nurse Practitioner cases/documentation I have personally evaluated this patient and have completed at least one if not all key elements of the E/M (history, physical exam, and MDM). Additional findings are as noted. I have personally seen and evaluated the patient. I find the patient's history and physical exam are consistent with the NP/PA documentation. I agree with the care provided, treatment rendered, disposition and follow-up plan. Patient reporting nausea vomiting the patient has generalized fatigue denies any lower abdominal pain frequency dysuria dysuria        Critical Care     Valentina Patterson M.D.   Attending Emergency  Physician            Jose Miguel Pérez MD  12/12/22 [id number]

## 2022-12-12 NOTE — ED TRIAGE NOTES
Pt here for nausea, vomiting and fatigue   Nausea began last night   Pt woke up today and vomited after visiting the ear doctor today  Pt does report having an ear infection, on treatment for   Pt reports that her roommates daughter is sick with the FLU , is concerns that she may have caught the Flu now
Left axillary dissection

## 2022-12-31 ENCOUNTER — HOSPITAL ENCOUNTER (EMERGENCY)
Age: 47
Discharge: HOME | End: 2022-12-31
Attending: STUDENT IN AN ORGANIZED HEALTH CARE EDUCATION/TRAINING PROGRAM
Payer: MEDICARE

## 2022-12-31 ENCOUNTER — APPOINTMENT (OUTPATIENT)
Dept: CT IMAGING | Age: 47
End: 2022-12-31
Payer: MEDICARE

## 2022-12-31 VITALS
HEIGHT: 62 IN | SYSTOLIC BLOOD PRESSURE: 117 MMHG | OXYGEN SATURATION: 100 % | HEART RATE: 98 BPM | TEMPERATURE: 98.2 F | DIASTOLIC BLOOD PRESSURE: 77 MMHG | WEIGHT: 152 LBS | RESPIRATION RATE: 16 BRPM | BODY MASS INDEX: 27.97 KG/M2

## 2022-12-31 DIAGNOSIS — G43.809 OTHER MIGRAINE WITHOUT STATUS MIGRAINOSUS, NOT INTRACTABLE: Primary | ICD-10-CM

## 2022-12-31 PROCEDURE — 6370000000 HC RX 637 (ALT 250 FOR IP): Performed by: STUDENT IN AN ORGANIZED HEALTH CARE EDUCATION/TRAINING PROGRAM

## 2022-12-31 PROCEDURE — 6360000002 HC RX W HCPCS: Performed by: STUDENT IN AN ORGANIZED HEALTH CARE EDUCATION/TRAINING PROGRAM

## 2022-12-31 PROCEDURE — 96372 THER/PROPH/DIAG INJ SC/IM: CPT

## 2022-12-31 PROCEDURE — 99284 EMERGENCY DEPT VISIT MOD MDM: CPT

## 2022-12-31 PROCEDURE — 70450 CT HEAD/BRAIN W/O DYE: CPT

## 2022-12-31 RX ORDER — DIPHENHYDRAMINE HYDROCHLORIDE 50 MG/ML
25 INJECTION INTRAMUSCULAR; INTRAVENOUS ONCE
Status: COMPLETED | OUTPATIENT
Start: 2022-12-31 | End: 2022-12-31

## 2022-12-31 RX ORDER — METOCLOPRAMIDE HYDROCHLORIDE 5 MG/ML
10 INJECTION INTRAMUSCULAR; INTRAVENOUS ONCE
Status: COMPLETED | OUTPATIENT
Start: 2022-12-31 | End: 2022-12-31

## 2022-12-31 RX ORDER — GABAPENTIN 300 MG/1
600 CAPSULE ORAL 3 TIMES DAILY
Qty: 90 CAPSULE | Refills: 0 | Status: SHIPPED | OUTPATIENT
Start: 2022-12-31 | End: 2023-01-15

## 2022-12-31 RX ORDER — GABAPENTIN 300 MG/1
600 CAPSULE ORAL ONCE
Status: COMPLETED | OUTPATIENT
Start: 2022-12-31 | End: 2022-12-31

## 2022-12-31 RX ORDER — TIZANIDINE 4 MG/1
4 TABLET ORAL 3 TIMES DAILY PRN
Qty: 30 TABLET | Refills: 0 | Status: SHIPPED | OUTPATIENT
Start: 2022-12-31

## 2022-12-31 RX ORDER — ORPHENADRINE CITRATE 30 MG/ML
60 INJECTION INTRAMUSCULAR; INTRAVENOUS ONCE
Status: COMPLETED | OUTPATIENT
Start: 2022-12-31 | End: 2022-12-31

## 2022-12-31 RX ADMIN — ORPHENADRINE CITRATE 60 MG: 30 INJECTION INTRAMUSCULAR; INTRAVENOUS at 20:14

## 2022-12-31 RX ADMIN — DIPHENHYDRAMINE HYDROCHLORIDE 25 MG: 50 INJECTION, SOLUTION INTRAMUSCULAR; INTRAVENOUS at 20:14

## 2022-12-31 RX ADMIN — GABAPENTIN 600 MG: 300 CAPSULE ORAL at 20:14

## 2022-12-31 RX ADMIN — METOCLOPRAMIDE 10 MG: 5 INJECTION, SOLUTION INTRAMUSCULAR; INTRAVENOUS at 20:15

## 2022-12-31 ASSESSMENT — PAIN - FUNCTIONAL ASSESSMENT: PAIN_FUNCTIONAL_ASSESSMENT: 0-10

## 2022-12-31 ASSESSMENT — PAIN SCALES - GENERAL
PAINLEVEL_OUTOF10: 9
PAINLEVEL_OUTOF10: 7

## 2023-01-01 NOTE — DISCHARGE INSTRUCTIONS
Smoking cigarettes or being around anyone that smokes cigarettes can cause constriction of the blood vessels in the brain which in turn can cause you to have further headaches. For pain use acetaminophen (Tylenol) or ibuprofen (Motrin / Advil), unless prescribed medications that have acetaminophen or ibuprofen (or similar medications) in it. You can take over the counter acetaminophen tablets (1 - 2 tablets of the 500-mg strength every 6 hours) or ibuprofen tablets (2 tablets every 4 hours). Avoid taking narcotics for headaches (can cause a worse headache in several hours after taking the medication). Make sure that you drink plenty of water or Gatorade (or similar solution) to keep yourself hydrated.     PLEASE RETURN TO THE EMERGENCY DEPARTMENT IMMEDIATELY for worsening symptoms, change in vision / hearing / taste, ringing in your ears, loss of sensation or difficulty moving your arms or legs, or if you develop any concerning symptoms such as: high fever not relieved by acetaminophen (Tylenol) and/or ibuprofen (Motrin / Advil), chills, shortness of breath, chest pain, feeling of your heart fluttering or racing, persistent nausea and/or vomiting, vomiting up blood, blood in your stool, numbness, loss of consciousness, weakness or tingling in the arms or legs or change in color of the extremities, changes in mental status, persistent headache, blurry vision, loss of bladder / bowel control, unable to follow up with your physician, or other any other care or concern [employer] Pregnancy And Lactation Text: This medication is Pregnancy Category B and is considered safe. There is some data to suggest avoiding during the third trimester. It is unknown if this medication is excreted in breast milk.

## 2023-01-04 ASSESSMENT — ENCOUNTER SYMPTOMS
EYE REDNESS: 0
ABDOMINAL PAIN: 0
EYE DISCHARGE: 0
SHORTNESS OF BREATH: 0
COLOR CHANGE: 0
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
## 2023-01-08 ENCOUNTER — HOSPITAL ENCOUNTER (EMERGENCY)
Age: 48
Discharge: HOME | End: 2023-01-08
Attending: EMERGENCY MEDICINE
Payer: MEDICARE

## 2023-01-08 VITALS
DIASTOLIC BLOOD PRESSURE: 65 MMHG | BODY MASS INDEX: 27.8 KG/M2 | RESPIRATION RATE: 16 BRPM | OXYGEN SATURATION: 99 % | HEART RATE: 95 BPM | WEIGHT: 152 LBS | TEMPERATURE: 98.1 F | SYSTOLIC BLOOD PRESSURE: 98 MMHG

## 2023-01-08 DIAGNOSIS — H66.93 BILATERAL CHRONIC OTITIS MEDIA: Primary | ICD-10-CM

## 2023-01-08 PROCEDURE — 99283 EMERGENCY DEPT VISIT LOW MDM: CPT

## 2023-01-08 RX ORDER — ACETAMINOPHEN 500 MG
500 TABLET ORAL 4 TIMES DAILY PRN
Qty: 80 TABLET | Refills: 0 | Status: SHIPPED | OUTPATIENT
Start: 2023-01-08 | End: 2023-01-28

## 2023-01-08 RX ORDER — AMOXICILLIN AND CLAVULANATE POTASSIUM 875; 125 MG/1; MG/1
1 TABLET, FILM COATED ORAL 2 TIMES DAILY
Qty: 14 TABLET | Refills: 0 | Status: SHIPPED | OUTPATIENT
Start: 2023-01-08 | End: 2023-01-15

## 2023-01-08 RX ORDER — AMOXICILLIN AND CLAVULANATE POTASSIUM 875; 125 MG/1; MG/1
1 TABLET, FILM COATED ORAL EVERY 12 HOURS SCHEDULED
Status: DISCONTINUED | OUTPATIENT
Start: 2023-01-08 | End: 2023-01-08 | Stop reason: HOSPADM

## 2023-01-08 ASSESSMENT — ENCOUNTER SYMPTOMS
ABDOMINAL PAIN: 0
SHORTNESS OF BREATH: 0
BACK PAIN: 0

## 2023-01-08 ASSESSMENT — PAIN DESCRIPTION - LOCATION: LOCATION: EAR

## 2023-01-08 ASSESSMENT — PAIN DESCRIPTION - DESCRIPTORS: DESCRIPTORS: DISCOMFORT;ACHING

## 2023-01-08 ASSESSMENT — PAIN DESCRIPTION - ORIENTATION: ORIENTATION: RIGHT

## 2023-01-08 ASSESSMENT — PAIN SCALES - GENERAL: PAINLEVEL_OUTOF10: 8

## 2023-01-08 NOTE — ED PROVIDER NOTES
[address] [facility]     Emergency Department     Faculty Attestation    I performed a history and physical examination of the patient and discussed management with the resident. I have reviewed and agree with the residents findings including all diagnostic interpretations, and treatment plans as written at the time of my review. Any areas of disagreement are noted on the chart. I was personally present for the key portions of any procedures. I have documented in the chart those procedures where I was not present during the key portions. For Physician Assistant/ Nurse Practitioner cases/documentation I have personally evaluated this patient and have completed at least one if not all key elements of the E/M (history, physical exam, and MDM). Additional findings are as noted. Primary Care Physician: Jeannine Pederson MD    History: This is a 49 y.o. female who presents to the Emergency Department with complaint of with nausea. Patient is complaining of pain to the right knee is been ongoing for the past couple days. She does follow-up with ENT and has been told she needed to have tubes placed in both ears. Denies any fever. Denies any discharge. Physical:   weight is 152 lb (68.9 kg). Her oral temperature is 98.1 °F (36.7 °C). Her blood pressure is 98/65 and her pulse is 95. Her respiration is 16 and oxygen saturation is 99%. There appears to be some whitish fluid behind the right and left tympanic membranes most significantly more on the right side. There is no discharge noted in the external auditory canal.  There is no surrounding erythema. There is no mastoid tenderness. Impression: Otitis media    Plan: Antibiotics, discharge      Medical Decision Making  Risk  Prescription drug management.           (Please note that portions of this note were completed with a voice recognition program.  Efforts were made to edit the dictations but occasionally words are mis-transcribed.)    Juan José Zhu MD, [facility]  Attending Emergency Medicine Physician        Ulysses Moreno MD  01/08/23 [id number]
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
## 2023-01-08 NOTE — ED NOTES
Pt states no drainage from right ear  Pt states no ringing in right ear  Pt states some hearing loss in right ear  Pt states she took tylenol with no relief     Sarina Stuart LPN  73/01/58 [id number]

## 2023-01-08 NOTE — DISCHARGE INSTRUCTIONS
Your exam is concerning for an ear infection on both ears. Please take your antibiotics as prescribed. It is very important that you continue following up with your ENT doctors to further have this looked into. Additionally please follow your primary care provider. Please return the emergency room if you develop any worsening or concerning symptoms.

## 2023-01-10 ENCOUNTER — HOSPITAL ENCOUNTER (OUTPATIENT)
Age: 48
Discharge: HOME | End: 2023-01-10

## 2023-01-10 LAB — RUBV IGG SER QL: 4.6 IU/ML

## 2023-01-10 PROCEDURE — 86735 MUMPS ANTIBODY: CPT

## 2023-01-10 PROCEDURE — 36415 COLL VENOUS BLD VENIPUNCTURE: CPT

## 2023-01-10 PROCEDURE — 86787 VARICELLA-ZOSTER ANTIBODY: CPT

## 2023-01-10 PROCEDURE — 86765 RUBEOLA ANTIBODY: CPT

## 2023-01-10 PROCEDURE — 86762 RUBELLA ANTIBODY: CPT

## 2023-01-10 PROCEDURE — 86481 TB AG RESPONSE T-CELL SUSP: CPT

## 2023-01-11 LAB
MEASLES ANTIBODY IGG: 1.65
MUV IGG SER QL: 1.14
VZV IGG SER QL IA: 1.26

## 2023-01-11 RX ORDER — FUROSEMIDE 20 MG/1
TABLET ORAL
Qty: 60 TABLET | Refills: 0 | Status: SHIPPED | OUTPATIENT
Start: 2023-01-11

## 2023-01-11 RX ORDER — VENLAFAXINE HYDROCHLORIDE 75 MG/1
CAPSULE, EXTENDED RELEASE ORAL
Qty: 30 CAPSULE | Refills: 0 | Status: SHIPPED | OUTPATIENT
Start: 2023-01-11

## 2023-01-13 LAB — T-SPOT TB TEST: NORMAL

## 2023-01-17 ENCOUNTER — HOSPITAL ENCOUNTER (OUTPATIENT)
Dept: PAIN MANAGEMENT | Age: 48
Discharge: HOME | End: 2023-01-17
Payer: MEDICARE

## 2023-01-17 ENCOUNTER — TELEPHONE (OUTPATIENT)
Dept: INTERNAL MEDICINE CLINIC | Age: 48
End: 2023-01-17

## 2023-01-17 VITALS
HEART RATE: 93 BPM | HEIGHT: 62 IN | BODY MASS INDEX: 27.79 KG/M2 | DIASTOLIC BLOOD PRESSURE: 77 MMHG | SYSTOLIC BLOOD PRESSURE: 131 MMHG | TEMPERATURE: 97.3 F | WEIGHT: 151 LBS | OXYGEN SATURATION: 96 %

## 2023-01-17 DIAGNOSIS — M47.812 CERVICAL SPONDYLOSIS: ICD-10-CM

## 2023-01-17 DIAGNOSIS — M51.36 LUMBAR DEGENERATIVE DISC DISEASE: ICD-10-CM

## 2023-01-17 DIAGNOSIS — M54.16 LUMBAR RADICULOPATHY: Primary | ICD-10-CM

## 2023-01-17 DIAGNOSIS — G62.9 NEUROPATHY: ICD-10-CM

## 2023-01-17 DIAGNOSIS — M47.817 LUMBOSACRAL SPONDYLOSIS WITHOUT MYELOPATHY: ICD-10-CM

## 2023-01-17 PROBLEM — M51.369 LUMBAR DEGENERATIVE DISC DISEASE: Status: ACTIVE | Noted: 2023-01-17

## 2023-01-17 PROCEDURE — 99203 OFFICE O/P NEW LOW 30 MIN: CPT

## 2023-01-17 PROCEDURE — 99204 OFFICE O/P NEW MOD 45 MIN: CPT | Performed by: STUDENT IN AN ORGANIZED HEALTH CARE EDUCATION/TRAINING PROGRAM

## 2023-01-17 RX ORDER — GABAPENTIN 300 MG/1
600 CAPSULE ORAL 3 TIMES DAILY
Qty: 180 CAPSULE | Refills: 2 | Status: SHIPPED | OUTPATIENT
Start: 2023-01-17 | End: 2023-04-17

## 2023-01-17 RX ORDER — METHOCARBAMOL 750 MG/1
750 TABLET, FILM COATED ORAL 3 TIMES DAILY PRN
Qty: 90 TABLET | Refills: 2 | Status: SHIPPED | OUTPATIENT
Start: 2023-01-17 | End: 2023-04-17

## 2023-01-17 ASSESSMENT — PAIN SCALES - GENERAL: PAINLEVEL_OUTOF10: 5
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
## 2023-01-22 ENCOUNTER — HOSPITAL ENCOUNTER (EMERGENCY)
Age: 48
Discharge: HOME | End: 2023-01-22
Attending: EMERGENCY MEDICINE
Payer: MEDICARE

## 2023-01-22 VITALS
TEMPERATURE: 98.3 F | RESPIRATION RATE: 16 BRPM | DIASTOLIC BLOOD PRESSURE: 83 MMHG | OXYGEN SATURATION: 96 % | HEART RATE: 98 BPM | SYSTOLIC BLOOD PRESSURE: 135 MMHG

## 2023-01-22 DIAGNOSIS — J06.9 UPPER RESPIRATORY TRACT INFECTION, UNSPECIFIED TYPE: Primary | ICD-10-CM

## 2023-01-22 DIAGNOSIS — Z76.0 ENCOUNTER FOR MEDICATION REFILL: ICD-10-CM

## 2023-01-22 DIAGNOSIS — N39.0 URINARY TRACT INFECTION WITHOUT HEMATURIA, SITE UNSPECIFIED: ICD-10-CM

## 2023-01-22 LAB
BILIRUBIN URINE: NEGATIVE
COLOR: YELLOW
EPITHELIAL CELLS UA: ABNORMAL /HPF (ref 0–5)
FLU A ANTIGEN: NEGATIVE
FLU B ANTIGEN: NEGATIVE
GLUCOSE URINE: NEGATIVE
KETONES, URINE: NEGATIVE
LEUKOCYTE ESTERASE, URINE: ABNORMAL
NITRITE, URINE: NEGATIVE
PH UA: 6 (ref 5–8)
PROTEIN UA: NEGATIVE
RBC UA: ABNORMAL /HPF (ref 0–2)
SARS-COV-2, RAPID: NOT DETECTED
SPECIFIC GRAVITY UA: 1.02 (ref 1–1.03)
SPECIMEN DESCRIPTION: NORMAL
TURBIDITY: ABNORMAL
URINE HGB: ABNORMAL
UROBILINOGEN, URINE: NORMAL
WBC UA: ABNORMAL /HPF (ref 0–5)

## 2023-01-22 PROCEDURE — 81001 URINALYSIS AUTO W/SCOPE: CPT

## 2023-01-22 PROCEDURE — 87086 URINE CULTURE/COLONY COUNT: CPT

## 2023-01-22 PROCEDURE — 87077 CULTURE AEROBIC IDENTIFY: CPT

## 2023-01-22 PROCEDURE — 99283 EMERGENCY DEPT VISIT LOW MDM: CPT

## 2023-01-22 PROCEDURE — 87635 SARS-COV-2 COVID-19 AMP PRB: CPT

## 2023-01-22 PROCEDURE — 87804 INFLUENZA ASSAY W/OPTIC: CPT

## 2023-01-22 PROCEDURE — 87186 SC STD MICRODIL/AGAR DIL: CPT

## 2023-01-22 RX ORDER — CETIRIZINE HYDROCHLORIDE 10 MG/1
10 TABLET ORAL DAILY
Qty: 14 TABLET | Refills: 0 | Status: SHIPPED | OUTPATIENT
Start: 2023-01-22 | End: 2023-02-05

## 2023-01-22 RX ORDER — OXYMETAZOLINE HYDROCHLORIDE 0.05 G/100ML
2 SPRAY NASAL 2 TIMES DAILY
Qty: 12 ML | Refills: 0 | Status: SHIPPED | OUTPATIENT
Start: 2023-01-22

## 2023-01-22 RX ORDER — CEPHALEXIN 500 MG/1
500 CAPSULE ORAL 2 TIMES DAILY
Qty: 10 CAPSULE | Refills: 0 | Status: SHIPPED | OUTPATIENT
Start: 2023-01-22 | End: 2023-01-27

## 2023-01-22 RX ORDER — PSEUDOEPHEDRINE HCL 30 MG
30 TABLET ORAL EVERY 6 HOURS PRN
Qty: 10 TABLET | Refills: 0 | Status: SHIPPED | OUTPATIENT
Start: 2023-01-22

## 2023-01-22 ASSESSMENT — ENCOUNTER SYMPTOMS
RHINORRHEA: 1
COUGH: 1
SHORTNESS OF BREATH: 0
SORE THROAT: 0
ABDOMINAL PAIN: 0
SINUS PRESSURE: 1
COLOR CHANGE: 0
DIARRHEA: 0
VOMITING: 0
NAUSEA: 0

## 2023-01-22 ASSESSMENT — PAIN - FUNCTIONAL ASSESSMENT: PAIN_FUNCTIONAL_ASSESSMENT: 0-10

## 2023-01-22 ASSESSMENT — PAIN SCALES - GENERAL: PAINLEVEL_OUTOF10: 8

## 2023-01-22 NOTE — ED PROVIDER NOTES
eMERGENCY dEPARTMENT eNCOUnter   Independent Attestation     Pt Name: Drea Valencia  MRN: [id number]  [place] 1975  Date of evaluation: 1/22/23     Drea Valencia is a 49 y.o. female with CC: Nasal Congestion (x3days), Cough (X3 days), Otalgia, and Urinary Tract Infection        This visit was performed by both a physician and an APC. I performed all aspects of the MDM as documented. The care is provided during an unprecedented national emergency due to the novel coronavirus, COVID 19.     Lesa Cheadle, MD  Attending Emergency Physician            Lesa Cheadle, MD  77/16/32 1919

## 2023-01-22 NOTE — ED PROVIDER NOTES
[facility] ED  eMERGENCY dEPARTMENT eNCOUnter      Pt Name: Jose Armando Hua  MRN: [id number]  [place] 1975  Date of evaluation: 1/22/2023  Provider: KAYKAY Bowen CNP    CHIEF COMPLAINT       Chief Complaint   Patient presents with    Nasal Congestion     x3days    Cough     X3 days    Otalgia    Urinary Tract Infection         HISTORY OF PRESENT ILLNESS  (Location/Symptom, Timing/Onset, Context/Setting, Quality, Duration, Modifying Factors, Severity.)   Jose Armando Hua is a 49 y.o. female who presents to the emergency department. C/o sinus congestion/drainage, cough, ear pain. Onset was 3 days ago. She has a hx of allergies and has been taking her medication as directed. Also reports dysuria, urinary frequency/urgency for several days. Denies fever, chills, weakness, SOB, abd pain, flank pain. Denies N/V/D. Rates her pain 8/10 at this time. Reports she typically takes Afrin, Zyrtec, and sudafed for her allergy issues, sinus congestion; she is requesting refills of the medications. Nursing Notes were reviewed. ALLERGIES     Ibuprofen, Desonide, Claritin [loratadine], and Tape [adhesive tape]    CURRENT MEDICATIONS       Discharge Medication List as of 1/22/2023  3:59 PM        CONTINUE these medications which have NOT CHANGED    Details   methocarbamol (ROBAXIN-750) 750 MG tablet Take 1 tablet by mouth 3 times daily as needed (muscle pain), Disp-90 tablet, R-2Normal      gabapentin (NEURONTIN) 300 MG capsule Take 2 capsules by mouth 3 times daily for 90 days. , Disp-180 capsule, R-2Normal      venlafaxine (EFFEXOR XR) 75 MG extended release capsule TAKE 1 CAPSULE BY MOUTH EVERY MORNING, Disp-30 capsule, R-0Normal      furosemide (LASIX) 20 MG tablet TAKE 1 TABLET BY MOUTH TWICE DAILY, Disp-60 tablet, R-0Normal      acetaminophen (TYLENOL) 500 MG tablet Take 1 tablet by mouth 4 times daily as needed for Pain, Disp-80 tablet, R-0Print      tiZANidine (ZANAFLEX) 4 MG tablet Take 1 tablet by mouth 3 times daily as needed (Muscle spasms), Disp-30 tablet, R-0Print      ondansetron (ZOFRAN-ODT) 4 MG disintegrating tablet Take 1 tablet by mouth 3 times daily as needed for Nausea or Vomiting, Disp-21 tablet, R-0Normal      albuterol sulfate HFA (PROVENTIL;VENTOLIN;PROAIR) 108 (90 Base) MCG/ACT inhaler Inhale 2 puffs into the lungs every 6 hours as needed for Wheezing, Disp-18 g, R-3Normal      butalbital-acetaminophen-caffeine (FIORICET, ESGIC) -40 MG per tablet take 1 tablet by mouth every 6 hours if needed for headache or migraines, Disp-180 tablet, R-0Normal      omeprazole (PRILOSEC) 20 MG delayed release capsule Take 2 capsules by mouth daily, Disp-30 capsule, R-3Normal      pramipexole (MIRAPEX) 0.125 MG tablet Take 1 tablet by mouth nightly, Disp-90 tablet, R-0Normal      Potassium 75 MG TABS Take 75 mg by mouth 2 times daily, Disp-450 tablet, R-0Normal      vitamin D3 (CHOLECALCIFEROL) 10 MCG (400 UNIT) TABS tablet Take 1 tablet by mouth daily, Disp-30 tablet, R-0Normal      rizatriptan (MAXALT) 10 MG tablet rizatriptan 10 mg tabletHistorical Med      Fremanezumab-vfrm (AJOVY) 225 MG/1.5ML SOSY inject 1 AND 1/2 milliliters ( 225 milligrams ) subcutaneously EVERY 28 DAYSHistorical Med      azelastine (ASTELIN) 0.1 % nasal spray azelastine 137 mcg (0.1 %) nasal spray aerosol   instill 1 spray into each nostril twice a dayHistorical Med      fexofenadine (ALLEGRA) 180 MG tablet Take 1 tablet by mouth daily, Disp-30 tablet, R-0Normal      fluticasone (FLONASE) 50 MCG/ACT nasal spray 1 spray by Each Nostril route daily, Disp-32 g, R-1Normal      naproxen (NAPROSYN) 500 MG tablet Take 1 tablet by mouth 2 times daily (with meals) for 15 days, Disp-30 tablet, R-0Normal      ARIPiprazole (ABILIFY) 20 MG tablet aripiprazole 20 mg tabletHistorical Med      diclofenac sodium 1 % GEL Apply 2 g topically 4 times daily as needed for Pain, Topical, 4 TIMES DAILY PRN Starting Sat 1/18/2020, Disp-1 Tube, R-0, Print      zolpidem (AMBIEN) 5 MG tablet Take 5 mg by mouth nightly as needed for Sleep. Historical Med      Erenumab-aooe (AIMOVIG SC) Inject into the skinHistorical Med      !! OXcarbazepine (TRILEPTAL) 300 MG tablet take 1 tablet by mouth at bedtime, R-0Historical Med      polyethylene glycol (GLYCOLAX) 17 GM/SCOOP powder Take 17 g by mouth dailyHistorical Med      promethazine (PHENERGAN) 25 MG tablet Take 25 mg by mouth every 6 hours as needed for NauseaHistorical Med      Multiple Vitamins-Minerals (WOMENS ONE DAILY) TABS Take 1 tablet by mouth dailyHistorical Med      montelukast (SINGULAIR) 10 MG tablet Take 10 mg by mouth nightly, R-1Historical Med      !! OXcarbazepine (TRILEPTAL) 150 MG tablet Take 450 mg by mouth nightly      amitriptyline (ELAVIL) 100 MG tablet Take 100 mg by mouth nightly      SUMAtriptan (IMITREX) 100 MG tablet Take 100 mg by mouth once as needed for Migraine       !! - Potential duplicate medications found. Please discuss with provider.           PAST MEDICAL HISTORY         Diagnosis Date    Anemia     Anxiety     Asthma     appt with pulmonologist 2/1/18    Bipolar 1 disorder ([address]     Blackout spell     Body piercing     X2 ABOVE AND BELOW LIP     Cervicalgia 8/14/2013    Chest pain     Chronic back pain     Chronic kidney disease     stage 3    Chronic neck pain     Constipation     COPD (chronic obstructive pulmonary disease) ([address]     Dental crown present     has dental clearance    Depression     Fall     landed on tailbone, exacrbated back pain and headaches    GERD (gastroesophageal reflux disease)     Heartburn     History of blood transfusion 2012    no reaction    Hyperglycemia     DIET CONTROLED    Insomnia     Kidney stones     Knee pain, right     Low blood pressure     Low vitamin D level     Migraine     Numbness     bilat. legs    Palpitations     Sprain of lumbosacral (joint) (ligament) 8/14/2013    Staghorn kidney stones     L side Kidney stone  and R side    Tachycardia     HR: 130's with chest pain at times, sees Cardiologist @ [facility]/ pt. can't remember name dr Rodriguez Occitan cardiologist appt for cc next week    Wears glasses     Wears glasses        SURGICAL HISTORY           Procedure Laterality Date    ANKLE FRACTURE SURGERY Right 1998    [facility]  02/26/2019    [facility]    CHOLECYSTECTOMY  12/26/2019    laproscopic    CYSTOSCOPY  04/15/14    CYSTOSCOPY Right 4/29/14    with rt ureteral stent removal (Dr Dhillon Last)    [facility]  05/23/2014    ENDOMETRIAL ABLATION  1/23/14    [address]  02/20/2017    gastric sleeve    GASTRIC BYPASS SURGERY      sleeve    HAND SURGERY Right     cyst removal    HYSTERECTOMY (CERVIX STATUS UNKNOWN)  9-8-15    RONNY with BSO    KNEE ARTHROPLASTY Right 02/20/2018    KNEE ARTHROSCOPY Right 09/12/2017    rt knee scope    KNEE SURGERY Left 12/16/13    LAPAROSCOPY  05/23/2014    pelvic exploratory    LITHOTRIPSY Right 04/15/14    LITHOTRIPSY  2014    MYRINGOTOMY Bilateral     NERVE BLOCK  09-19-13    KENALOG 40 MG    NERVE BLOCK  10/4/13    Left MBNB #2, Decadron 10mg    NERVE BLOCK  12/8/15    duramorph, celestone 9mg, duramorph 1.25mg    NERVE BLOCK  5/24/16    duramorph 1.5  decadron 7mg    NERVE BLOCK  11/21/2016    duramorph 1mg  celestone 9mg    NERVE BLOCK  08/02/2017    duramorph morphine 1.5mg decadron 7.5mg    NERVE BLOCK  06/05/2018    duramorph- decadron 7 mg, duramorph 1.5 mg    NERVE BLOCK Left 08/22/2018    LEFT LUMBAR TRANSFORAMINAL EPIDURAL DECADRON 20MG    NERVE BLOCK Right 08/29/2018    right genicular block, marcaine . 25%    NERVE BLOCK Right 09/18/2018    right genicular knee nerve block #2 no steroids    OTHER SURGICAL HISTORY  age 27    Essure contraceptive implants     ME ARTHROSCOPY KNEE DIAGNOSTIC W/WO SYNOVIAL BX SPX Right 9/12/2017    KNEE ARTHROSCOPY, LATERAL RELEASE PATELLA RETINACULUM   ARTHROSCOPIC BOVIE, performed by Anyi Estrada, DO at [facility] CONDYLE&PLATU MEDIAL&LAT COMPARTMENTS Right 2018    NAVIO ROBOTIC TOTAL KNEE PATELLOFEMORAL  ARTHROPLASTY - NUNEZ &  NEPHEW, NSA=FEMORAL NERVE BLOCK, SPINAL VS GENERAL performed by Kamila Doshi DO at [address] ARTHROSCOPY Right     AUG 3,2015    SLEEVE GASTRECTOMY N/A 2017    GASTRECTOMY SLEEVE LAPAROSCOPIC XI ROBOTIC, ENDOSEALER  performed by Lon Hernandez MD at [facility] Left 12    Left Nephrectomy - staghorn calculus    UMBILICAL HERNIA REPAIR      URETER STENT PLACEMENT Right 04/15/14    removed 2 weeks later    WISDOM TOOTH EXTRACTION      WRIST SURGERY Right     right ganglion cystectomy         FAMILY HISTORY           Problem Relation Age of Onset    Kidney Disease Mother         ? ?? stone     Cancer Father         unknown    Heart Disease Father     Seizures Father     Migraines Father     Coronary Art Dis Father     Migraines Sister     Cervical Cancer Sister     Lung Cancer Maternal Grandmother     Seizures Son     Diabetes Maternal Uncle     Diabetes Maternal Cousin     Lung Cancer Paternal Grandfather     Anxiety Disorder Daughter      Family Status   Relation Name Status    Mother  Alive    Father      Sister  Alive    Brother  Alive    MGM  Alive    MGF  Alive    Son  Alive    MUnc      MCousin  Alive    PGM  Alive    PGF      Son 2 Alive    Pb  Alive        SOCIAL HISTORY      reports that she has been smoking cigarettes. She started smoking about 32 years ago. She has been smoking an average of .5 packs per day. She has never used smokeless tobacco. She reports that she does not drink alcohol and does not use drugs. REVIEW OF SYSTEMS    (2-9 systems for level 4, 10 or more for level 5)     Review of Systems   Constitutional:  Negative for chills, diaphoresis, fatigue and fever. HENT:  Positive for congestion, rhinorrhea and sinus pressure.  Negative for ear discharge, ear pain, postnasal drip and sore throat. Respiratory:  Positive for cough. Negative for shortness of breath. Cardiovascular:  Negative for chest pain and palpitations. Gastrointestinal:  Negative for abdominal pain, diarrhea, nausea and vomiting. Genitourinary:  Positive for dysuria, frequency and urgency. Negative for flank pain and hematuria. Musculoskeletal:  Negative for arthralgias, myalgias, neck pain and neck stiffness. Skin:  Negative for color change and rash. Neurological:  Negative for dizziness, weakness, light-headedness and headaches. Except as noted above the remainder of the review of systems was reviewed and negative. PHYSICAL EXAM    (up to 7 for level 4, 8 or more for level 5)     ED Triage Vitals   BP Temp Temp Source Heart Rate Resp SpO2 Height Weight   01/22/23 1448 01/22/23 1446 01/22/23 1446 01/22/23 1446 01/22/23 1446 01/22/23 1446 -- --   135/83 98.3 °F (36.8 °C) Oral 98 16 96 %       Physical Exam  Vitals reviewed. Constitutional:       General: She is not in acute distress. Appearance: She is well-developed. She is not diaphoretic. HENT:      Right Ear: Tympanic membrane, ear canal and external ear normal.      Left Ear: Tympanic membrane, ear canal and external ear normal.      Nose: Congestion and rhinorrhea present. Mouth/Throat:      Mouth: Mucous membranes are moist.      Pharynx: Oropharynx is clear. No oropharyngeal exudate or posterior oropharyngeal erythema. Eyes:      General: No scleral icterus. Conjunctiva/sclera: Conjunctivae normal.   Cardiovascular:      Rate and Rhythm: Normal rate and regular rhythm. Pulmonary:      Effort: Pulmonary effort is normal. No respiratory distress. Breath sounds: Normal breath sounds. No stridor. No wheezing. Musculoskeletal:      Comments: Moves extremities. Skin:     General: Skin is warm and dry. Findings: No rash.    Neurological:      Mental Status: She is alert and oriented to person, place, and time. Psychiatric:         Behavior: Behavior normal.          DIAGNOSTIC RESULTS     LABS:  Labs Reviewed   URINALYSIS WITH MICROSCOPIC - Abnormal; Notable for the following components:       Result Value    Turbidity UA SLIGHTLY CLOUDY (*)     Urine Hgb 3+ (*)     Leukocyte Esterase, Urine MOD (*)     All other components within normal limits   RAPID INFLUENZA A/B ANTIGENS   COVID-19, RAPID   CULTURE, URINE       All other labs were within normal range or not returned as of this dictation. EMERGENCY DEPARTMENT COURSE and DIFFERENTIAL DIAGNOSIS/MDM:   Vitals:    Vitals:    01/22/23 1446 01/22/23 1448   BP:  135/83   Pulse: 98    Resp: 16    Temp: 98.3 °F (36.8 °C)    TempSrc: Oral    SpO2: 96%          CLINICAL DECISION MAKING:  The patient presented alert with a nontoxic appearance. DDx include UTI, viral URI, Covid-19, influenza. I will order labs including Covid-19, influenza, urinalysis, urine culture. The patient was involved in her plan of care through shared decision making. The testing that was ordered was discussed with the patient. Any medications that may have been ordered were discussed with the patient. I have reviewed the patient's previous medical records using the electronic health record that we have available that were pertinent to today's visit. Labs and imaging were reviewed. Urinalysis showed infection; culture is pending. She tested negative for Covid-19 and influenza. Findings were discussed with the patient. She will be placed on antibiotics for the UTI. She also requested prescriptions for sudafed, Zyrtec, and Afrin. Follow up with pcp for a recheck, further evaluation and treatment. Evaluation and treatment course in the ED, and plan of care upon discharge was discussed in length with the patient. Patient had no further questions prior to being discharged and was instructed to return to the ED for new or worsening symptoms.  Care was provided during an unprecedented national emergency due to the novel coronavirus, Covid-19. MIPS Measure #65:  Appropriate Treatment for Patients with URI    [] The patient was diagnosed with upper respiratory infection and was not prescribed or dispensed an antibiotic. [SATISFIES MIPS]    [x] MIPS EXCLUSIONS:  [X] The patient had a competing diagnosis of UTI        FINAL IMPRESSION      1. Upper respiratory tract infection, unspecified type    2. Encounter for medication refill    3.  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    Calculus of kidney N20.0    Numbness of face R20.0    Right knee pain M25.561    Paresthesia of upper extremity R20.2    Headache disorder R51.9    Herniation of lumbar intervertebral disc without myelopathy M51.26    Status post nephrectomy Z90.5    Chronic obstructive pulmonary disease (HCC) J44.9    Sinus tachycardia R00.0    BMI 35.0-35.9,adult Z68.35    Bipolar I disorder ([facility]) F31.9    Impingement syndrome of shoulder region M75.40    Internal derangement of knee M23.90    Mixed anxiety depressive disorder F41.8    Obesity E66.9    History of tobacco use Z87.891    Osteoarthritis of both knees M17.0    Arthritis M19.90    Osteoarthritis of knee M17.9    Patellar maltracking M22.8X9    Synovitis of knee M65.9    Arthralgia of shoulder M25.519    Atypical migraine G43.009    Neck pain M54.2    Chest pain R07.9    Chondromalacia of patella M22.40    Chronic constipation K59.09    Chronic kidney disease, stage III (moderate) ([facility]) N18.30    Fatigue R53.83    Psychophysiological insomnia F51.04    Laceration of finger S61.219A    Migraine without aura and without status migrainosus, not intractable G43.009    Movement disorder G25.9    Palpitations R00.2    Pins and needles sensation R20.2 Sleep dysfunction with arousal disturbance G47.8    Vitamin D deficiency E55.9    Patellofemoral arthritis of right knee M17.11    Patella, chondromalacia, right M22.41    Chronic fatigue R53.82    L-S radiculopathy M54.17    Lumbar disc herniation M51.26    Chronic back pain M54.9, G89.29    Chronic bilateral low back pain with left-sided sciatica M54.42, G89.29    S/P patellofemoral arthroplasty Z96.651    Pes anserine bursitis M70.50    Sprain of medial collateral ligament of right knee S83.411A    Nondisplaced fracture of first metatarsal bone, right foot, initial encounter for closed fracture S92.314A    Sprain of interphalangeal joint of left thumb J73.414K    Flank pain R10.9    Solitary right kidney [id number]    Recurrent urinary tract infection N39.0    History of kidney stones Z87.442    Lumbar radiculopathy M54.16    Cervical spondylosis M47.812    Lumbar degenerative disc disease M51.36    Lumbosacral spondylosis without myelopathy M47.817    Neuropathy G62.9         DISPOSITION/PLAN   DISPOSITION Decision To Discharge 01/22/2023 03:56:13 PM      PATIENT REFERRED TO:   KAYKAY Bustillo - CNP  [id number]  [place] [phone]    Schedule an appointment as soon as possible for a visit       [facility] ED  [address] [phone]    If symptoms worsen, As needed    DISCHARGE MEDICATIONS:     Discharge Medication List as of 1/22/2023  3:59 PM        START taking these medications    Details   pseudoephedrine (DECONGESTANT) 30 MG tablet Take 1 tablet by mouth every 6 hours as needed for Congestion, Disp-10 tablet, R-0Normal      oxymetazoline (12 HOUR NASAL SPRAY) 0.05 % nasal spray 2 sprays by Nasal route 2 times daily, Disp-12 mL, R-0Normal      cephALEXin (KEFLEX) 500 MG capsule Take 1 capsule by mouth 2 times daily for 5 days, Disp-10 capsule, R-0Normal      cetirizine (ZYRTEC ALLERGY) 10 MG tablet Take 1 tablet by mouth daily for 14 days, Disp-14 tablet, R-0Normal                 (Please note that portions of this note were completed with a voice recognition program.  Efforts were made to edit the dictations but occasionally words are mis-transcribed.)    KAYKAY Gabriel CNP, APRN - CNP  01/22/23 [phone]

## 2023-01-22 NOTE — ED NOTES
Pt presents to ED with c/o right ear pain, cough, nasal congestion, and concern for UTI. Pt states she thinks she may have an UTI because she is experiencing burning with urination. Pt reports she has severe allergies and has not been able to get relief with prescribed allergy medications.       Toan Thompson RN  01/22/23 [id number]

## 2023-01-24 LAB
CULTURE: ABNORMAL
SPECIMEN DESCRIPTION: ABNORMAL

## 2023-02-09 ENCOUNTER — HOSPITAL ENCOUNTER (EMERGENCY)
Age: 48
Discharge: HOME | End: 2023-02-09
Attending: EMERGENCY MEDICINE
Payer: MEDICAID

## 2023-02-09 VITALS
TEMPERATURE: 97.5 F | WEIGHT: 151 LBS | BODY MASS INDEX: 27.79 KG/M2 | HEIGHT: 62 IN | RESPIRATION RATE: 16 BRPM | OXYGEN SATURATION: 98 % | SYSTOLIC BLOOD PRESSURE: 111 MMHG | DIASTOLIC BLOOD PRESSURE: 55 MMHG | HEART RATE: 80 BPM

## 2023-02-09 DIAGNOSIS — G43.811 OTHER MIGRAINE WITH STATUS MIGRAINOSUS, INTRACTABLE: Primary | ICD-10-CM

## 2023-02-09 PROCEDURE — 99284 EMERGENCY DEPT VISIT MOD MDM: CPT

## 2023-02-09 PROCEDURE — 96375 TX/PRO/DX INJ NEW DRUG ADDON: CPT

## 2023-02-09 PROCEDURE — 2580000003 HC RX 258: Performed by: STUDENT IN AN ORGANIZED HEALTH CARE EDUCATION/TRAINING PROGRAM

## 2023-02-09 PROCEDURE — 96365 THER/PROPH/DIAG IV INF INIT: CPT

## 2023-02-09 PROCEDURE — 6360000002 HC RX W HCPCS: Performed by: STUDENT IN AN ORGANIZED HEALTH CARE EDUCATION/TRAINING PROGRAM

## 2023-02-09 RX ORDER — PROCHLORPERAZINE EDISYLATE 5 MG/ML
10 INJECTION INTRAMUSCULAR; INTRAVENOUS ONCE
Status: COMPLETED | OUTPATIENT
Start: 2023-02-09 | End: 2023-02-09

## 2023-02-09 RX ORDER — DIPHENHYDRAMINE HYDROCHLORIDE 50 MG/ML
25 INJECTION INTRAMUSCULAR; INTRAVENOUS ONCE
Status: COMPLETED | OUTPATIENT
Start: 2023-02-09 | End: 2023-02-09

## 2023-02-09 RX ORDER — MAGNESIUM SULFATE IN WATER 40 MG/ML
2000 INJECTION, SOLUTION INTRAVENOUS ONCE
Status: COMPLETED | OUTPATIENT
Start: 2023-02-09 | End: 2023-02-09

## 2023-02-09 RX ORDER — SODIUM CHLORIDE, SODIUM LACTATE, POTASSIUM CHLORIDE, AND CALCIUM CHLORIDE .6; .31; .03; .02 G/100ML; G/100ML; G/100ML; G/100ML
1000 INJECTION, SOLUTION INTRAVENOUS ONCE
Status: COMPLETED | OUTPATIENT
Start: 2023-02-09 | End: 2023-02-09

## 2023-02-09 RX ORDER — KETOROLAC TROMETHAMINE 15 MG/ML
15 INJECTION, SOLUTION INTRAMUSCULAR; INTRAVENOUS ONCE
Status: COMPLETED | OUTPATIENT
Start: 2023-02-09 | End: 2023-02-09

## 2023-02-09 RX ADMIN — SODIUM CHLORIDE, POTASSIUM CHLORIDE, SODIUM LACTATE AND CALCIUM CHLORIDE 1000 ML: 600; 310; 30; 20 INJECTION, SOLUTION INTRAVENOUS at 16:55

## 2023-02-09 RX ADMIN — KETOROLAC TROMETHAMINE 15 MG: 15 INJECTION, SOLUTION INTRAMUSCULAR; INTRAVENOUS at 16:56

## 2023-02-09 RX ADMIN — DIPHENHYDRAMINE HYDROCHLORIDE 25 MG: 50 INJECTION, SOLUTION INTRAMUSCULAR; INTRAVENOUS at 16:56

## 2023-02-09 RX ADMIN — PROCHLORPERAZINE EDISYLATE 10 MG: 5 INJECTION INTRAMUSCULAR; INTRAVENOUS at 16:56

## 2023-02-09 RX ADMIN — MAGNESIUM SULFATE HEPTAHYDRATE 2000 MG: 40 INJECTION, SOLUTION INTRAVENOUS at 16:57

## 2023-02-09 ASSESSMENT — PAIN - FUNCTIONAL ASSESSMENT: PAIN_FUNCTIONAL_ASSESSMENT: 0-10

## 2023-02-09 ASSESSMENT — PAIN DESCRIPTION - DESCRIPTORS: DESCRIPTORS: THROBBING

## 2023-02-09 ASSESSMENT — LIFESTYLE VARIABLES
HOW MANY STANDARD DRINKS CONTAINING ALCOHOL DO YOU HAVE ON A TYPICAL DAY: 1 OR 2
HOW OFTEN DO YOU HAVE A DRINK CONTAINING ALCOHOL: MONTHLY OR LESS

## 2023-02-09 ASSESSMENT — PAIN DESCRIPTION - LOCATION
LOCATION: HEAD
LOCATION: HEAD

## 2023-02-09 ASSESSMENT — PAIN DESCRIPTION - FREQUENCY: FREQUENCY: CONTINUOUS

## 2023-02-09 ASSESSMENT — PAIN SCALES - GENERAL
PAINLEVEL_OUTOF10: 9
PAINLEVEL_OUTOF10: 4
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
## 2023-02-09 NOTE — ED NOTES
Pt states feeling \"much better\". Pain went from 9/10 to 4/10. Pt denies current needs at this time.       Sarah Mcnamara RN  02/09/23 [id number]

## 2023-02-09 NOTE — DISCHARGE INSTRUCTIONS
You were seen in the emergency department today for migraine headache. This was improved with medications. If you have any new or worsening symptoms, please return to the ED for reevaluation, otherwise follow-up with your neurologist as scheduled. Call today or tomorrow to follow up with KAYKAY Braun CNP  in 3 days. Smoking cigarettes or being around anyone that smokes cigarettes can cause constriction of the blood vessels in the brain which in turn can cause you to have further headaches. Use ibuprofen or Tylenol (unless prescribed medications that have Tylenol in it) for pain. Avoid taking narcotics for headaches (can cause a worse headache in several hours after taking the medication). Make sure you are drinking plenty of water or Gatorade to keep yourself hydrated. Return to the Emergency Department for worsening of headache, change in vision / hearing / taste. Ringing in your ears. Loss of sensation or difficulty moving your arms or legs. Any other care or concern.

## 2023-02-09 NOTE — ED TRIAGE NOTES
Patient presented to the ED today with complaints of nausea and migraine. Patient states symptoms presented 6 days ago. Patient has a history of migraine headaches.

## 2023-02-09 NOTE — ED PROVIDER NOTES
[address] [facility]     Emergency Department     Faculty Attestation        I performed a history and physical examination of the patient and discussed management with the resident. I reviewed the residents note and agree with the documented findings and plan of care. Any areas of disagreement are noted on the chart. I was personally present for the key portions of any procedures. I have documented in the chart those procedures where I was not present during the key portions. I have reviewed the emergency nurses triage note. I agree with the chief complaint, past medical history, past surgical history, allergies, medications, social and family history as documented unless otherwise noted below. For mid-level providers such as nurse practitioners as well as physicians assistants:    I have personally seen and evaluated the patient. I find the patient's history and physical exam are consistent with NP/PA documentation. I agree with the care provided, treatment rendered, disposition, & follow-up plan. Additional findings are as noted. Vital Signs: /80   Pulse 86   Temp 97.5 °F (36.4 °C) (Oral)   Resp 18   Ht 5' 2\" (1.575 m)   Wt 151 lb (68.5 kg)   LMP 09/08/2015   SpO2 100%   BMI 27.62 kg/m²   PCP:  Jackie Grover, APRN - CNP    Pertinent Comments:     Patient presents with headache. It feels like her typical migraine headache has been present for about 5 days. Gradual in onset and typical onset and slowly worsening. Not the worst headache of her life. No falls or trauma or anticoagulation use. Feels like her typical migraine headache that has \"gotten out of control\".   No neck pain numbness, tingling, blurry vision or any stroke symptoms she is awake alert and oriented and has a nonfocal neurological exam.      Critical Care  None          Grady Peterson MD    Attending Emergency Medicine Physician            Camille Paul, MD  02/09/23 [id number]

## 2023-02-10 ASSESSMENT — ENCOUNTER SYMPTOMS
PHOTOPHOBIA: 1
BACK PAIN: 0
RHINORRHEA: 0
COUGH: 0
SHORTNESS OF BREATH: 0
ABDOMINAL PAIN: 0
NAUSEA: 1
VOMITING: 0
DIARRHEA: 0

## 2023-02-12 ENCOUNTER — HOSPITAL ENCOUNTER (EMERGENCY)
Age: 48
Discharge: HOME | End: 2023-02-12
Attending: EMERGENCY MEDICINE
Payer: OTHER MISCELLANEOUS

## 2023-02-12 ENCOUNTER — APPOINTMENT (OUTPATIENT)
Dept: GENERAL RADIOLOGY | Age: 48
End: 2023-02-12
Payer: OTHER MISCELLANEOUS

## 2023-02-12 VITALS
WEIGHT: 151 LBS | DIASTOLIC BLOOD PRESSURE: 77 MMHG | SYSTOLIC BLOOD PRESSURE: 123 MMHG | HEART RATE: 102 BPM | TEMPERATURE: 98.5 F | HEIGHT: 62 IN | OXYGEN SATURATION: 100 % | BODY MASS INDEX: 27.79 KG/M2 | RESPIRATION RATE: 12 BRPM

## 2023-02-12 DIAGNOSIS — S80.11XA CONTUSION OF RIGHT LEG, INITIAL ENCOUNTER: ICD-10-CM
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
## 2023-02-12 PROCEDURE — 73562 X-RAY EXAM OF KNEE 3: CPT

## 2023-02-12 PROCEDURE — 73590 X-RAY EXAM OF LOWER LEG: CPT

## 2023-02-12 PROCEDURE — 99283 EMERGENCY DEPT VISIT LOW MDM: CPT

## 2023-02-12 ASSESSMENT — PAIN DESCRIPTION - LOCATION: LOCATION: KNEE

## 2023-02-12 ASSESSMENT — PAIN DESCRIPTION - ORIENTATION: ORIENTATION: RIGHT

## 2023-02-12 ASSESSMENT — PAIN DESCRIPTION - PAIN TYPE: TYPE: ACUTE PAIN

## 2023-02-12 ASSESSMENT — PAIN DESCRIPTION - FREQUENCY: FREQUENCY: CONTINUOUS

## 2023-02-12 ASSESSMENT — PAIN - FUNCTIONAL ASSESSMENT: PAIN_FUNCTIONAL_ASSESSMENT: 0-10

## 2023-02-12 NOTE — Clinical Note
Dimas Blair was seen and treated in our emergency department on 2/12/2023. She may return to work on 02/14/2023. If you have any questions or concerns, please don't hesitate to call.       Lilliana Robles MD
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
## 2023-02-22 ENCOUNTER — HOSPITAL ENCOUNTER (EMERGENCY)
Age: 48
Discharge: HOME | End: 2023-02-22
Attending: EMERGENCY MEDICINE
Payer: MEDICAID

## 2023-02-22 VITALS
TEMPERATURE: 98.1 F | OXYGEN SATURATION: 100 % | DIASTOLIC BLOOD PRESSURE: 80 MMHG | HEART RATE: 97 BPM | SYSTOLIC BLOOD PRESSURE: 122 MMHG | RESPIRATION RATE: 16 BRPM

## 2023-02-22 DIAGNOSIS — J02.0 STREP PHARYNGITIS: Primary | ICD-10-CM

## 2023-02-22 DIAGNOSIS — N30.01 ACUTE CYSTITIS WITH HEMATURIA: ICD-10-CM

## 2023-02-22 LAB
BACTERIA: ABNORMAL
BILIRUBIN URINE: NEGATIVE
COLOR: YELLOW
EPITHELIAL CELLS UA: ABNORMAL /HPF (ref 0–5)
GLUCOSE UR STRIP.AUTO-MCNC: NEGATIVE MG/DL
KETONES UR STRIP.AUTO-MCNC: NEGATIVE MG/DL
LEUKOCYTE ESTERASE UR QL STRIP.AUTO: ABNORMAL
NITRITE UR QL STRIP.AUTO: POSITIVE
PROT UR STRIP.AUTO-MCNC: 5.5 MG/DL (ref 5–8)
PROT UR STRIP.AUTO-MCNC: NEGATIVE MG/DL
RBC CLUMPS #/AREA URNS AUTO: ABNORMAL /HPF (ref 0–2)
S PYO AG THROAT QL: POSITIVE
SOURCE: ABNORMAL
SPECIFIC GRAVITY UA: 1.02 (ref 1–1.03)
TURBIDITY: ABNORMAL
URINE HGB: ABNORMAL
UROBILINOGEN, URINE: NORMAL
WBC UA: ABNORMAL /HPF (ref 0–5)

## 2023-02-22 PROCEDURE — 87088 URINE BACTERIA CULTURE: CPT

## 2023-02-22 PROCEDURE — 87880 STREP A ASSAY W/OPTIC: CPT

## 2023-02-22 PROCEDURE — 87186 SC STD MICRODIL/AGAR DIL: CPT

## 2023-02-22 PROCEDURE — 87086 URINE CULTURE/COLONY COUNT: CPT

## 2023-02-22 PROCEDURE — 6370000000 HC RX 637 (ALT 250 FOR IP): Performed by: STUDENT IN AN ORGANIZED HEALTH CARE EDUCATION/TRAINING PROGRAM

## 2023-02-22 PROCEDURE — 99283 EMERGENCY DEPT VISIT LOW MDM: CPT

## 2023-02-22 PROCEDURE — 86403 PARTICLE AGGLUT ANTBDY SCRN: CPT

## 2023-02-22 PROCEDURE — 81001 URINALYSIS AUTO W/SCOPE: CPT

## 2023-02-22 RX ORDER — CEPHALEXIN 500 MG/1
500 CAPSULE ORAL 3 TIMES DAILY
Qty: 21 CAPSULE | Refills: 0 | Status: SHIPPED | OUTPATIENT
Start: 2023-02-22 | End: 2023-03-01

## 2023-02-22 RX ORDER — ACETAMINOPHEN 500 MG
1000 TABLET ORAL ONCE
Status: COMPLETED | OUTPATIENT
Start: 2023-02-22 | End: 2023-02-22

## 2023-02-22 RX ORDER — CEPHALEXIN 500 MG/1
500 CAPSULE ORAL ONCE
Status: COMPLETED | OUTPATIENT
Start: 2023-02-22 | End: 2023-02-22

## 2023-02-22 RX ADMIN — CEPHALEXIN 500 MG: 500 CAPSULE ORAL at 18:15

## 2023-02-22 RX ADMIN — ACETAMINOPHEN 1000 MG: 500 TABLET ORAL at 17:19

## 2023-02-22 ASSESSMENT — ENCOUNTER SYMPTOMS
VOMITING: 0
DIARRHEA: 0
ABDOMINAL PAIN: 0
NAUSEA: 0
RHINORRHEA: 1
SHORTNESS OF BREATH: 0

## 2023-02-22 ASSESSMENT — PAIN - FUNCTIONAL ASSESSMENT: PAIN_FUNCTIONAL_ASSESSMENT: 0-10

## 2023-02-22 ASSESSMENT — PAIN SCALES - GENERAL: PAINLEVEL_OUTOF10: 9

## 2023-02-22 ASSESSMENT — PAIN DESCRIPTION - LOCATION: LOCATION: THROAT
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
## 2023-02-22 NOTE — Clinical Note
Tonia Calderón was seen and treated in our emergency department on 2/22/2023. She may return to work on 02/23/2023. If you have any questions or concerns, please don't hesitate to call.       Xavier Pérez MD

## 2023-02-22 NOTE — DISCHARGE INSTRUCTIONS
Strep was positive. Urine does appear consistent with UTI. You will be started on Keflex which should treat both strep and the urine infection. Please call and schedule a follow up with Dr. Nathalie Castro, your infectious disease doctor. Take your medication as indicated and prescribed. If you are given an antibiotic then, make sure you get the prescription filled and take the antibiotics until finished. Drink plenty of water while taking the antibiotics. Avoid drinking alcohol or drinks that have caffeine in it while taking antibiotics. For pain use acetaminophen (Tylenol) or ibuprofen (Motrin / Advil), unless prescribed medications that have acetaminophen or ibuprofen (or similar medications) in it. You can take over the counter acetaminophen tablets (1 - 2 tablets of the 500-mg strength every 6 hours) or ibuprofen tablets (2 tablets every 4 hours). PLEASE RETURN TO THE EMERGENCY DEPARTMENT IMMEDIATELY for worsening symptoms, inability to urinate, worsening of blood in your urine, or if you develop any concerning symptoms such as: high fever not relieved by acetaminophen (Tylenol) and/or ibuprofen (Motrin / Advil), chills, shortness of breath, chest pain, feeling of your heart fluttering or racing, persistent nausea and/or vomiting, vomiting up blood, blood in your stool, loss of consciousness, numbness, weakness or tingling in the arms or legs or change in color of the extremities, changes in mental status, persistent headache, blurry vision, loss of bladder / bowel.

## 2023-02-22 NOTE — ED NOTES
Pt c/o having sore throat x 3 days, states she thinks it is strep because she seen white spots on the back of her throat. No difficulty breathing or SOB. States that she has some R ear pain that started yesterday No chest pain or fever. Pt states that she has been taking tylenol at home for the pain. Pt also c/o having dysuria, states that she has frequent UTI's dt having one kidney and is usually on keflex for this but has not had a script for it. Pt resting on stretcher, call light given.       Dez Hernandez RN  02/22/23 SAMMI Rubi  02/22/23 1700

## 2023-02-22 NOTE — ED PROVIDER NOTES
[facility]     Emergency Department     Faculty Attestation    I performed a history and physical examination of the patient and discussed management with the resident. I reviewed the resident´s note and agree with the documented findings and plan of care. Any areas of disagreement are noted on the chart. I was personally present for the key portions of any procedures. I have documented in the chart those procedures where I was not present during the key portions. I have reviewed the emergency nurses triage note. I agree with the chief complaint, past medical history, past surgical history, allergies, medications, social and family history as documented unless otherwise noted below. For Physician Assistant/ Nurse Practitioner cases/documentation I have personally evaluated this patient and have completed at least one if not all key elements of the E/M (history, physical exam, and MDM). Additional findings are as noted. chest clear, heart exam normal, mild erythema posterior pharynx with symmetrical swelling and no signs of peritonsillar abscess, normal voice, no drooling, no sublingual swelling, no cervical lymphadenopathy, no rash or meningeal signs. No pain to palpation of spleen. No CVA tenderness. Neuro intact, no facial swelling. Pt does not appear ill.     Carri Nj MD [address] [facility], [place]  Attending Physician       Juliana Gomez MD  02/22/23 [id number]

## 2023-02-24 LAB
MICROORGANISM SPEC CULT: ABNORMAL
MICROORGANISM SPEC CULT: ABNORMAL
SPECIMEN DESCRIPTION: ABNORMAL

## 2023-02-27 ENCOUNTER — APPOINTMENT (OUTPATIENT)
Dept: GENERAL RADIOLOGY | Age: 48
End: 2023-02-27
Payer: MEDICAID

## 2023-02-27 ENCOUNTER — HOSPITAL ENCOUNTER (EMERGENCY)
Age: 48
Discharge: HOME | End: 2023-02-27
Attending: EMERGENCY MEDICINE
Payer: MEDICAID

## 2023-02-27 VITALS
HEIGHT: 62 IN | HEART RATE: 94 BPM | BODY MASS INDEX: 27.79 KG/M2 | OXYGEN SATURATION: 98 % | SYSTOLIC BLOOD PRESSURE: 131 MMHG | WEIGHT: 151 LBS | TEMPERATURE: 99.1 F | RESPIRATION RATE: 16 BRPM | DIASTOLIC BLOOD PRESSURE: 86 MMHG

## 2023-02-27 DIAGNOSIS — S60.221A CONTUSION OF RIGHT HAND, INITIAL ENCOUNTER: Primary | ICD-10-CM

## 2023-02-27 PROCEDURE — 73110 X-RAY EXAM OF WRIST: CPT

## 2023-02-27 PROCEDURE — 6370000000 HC RX 637 (ALT 250 FOR IP): Performed by: STUDENT IN AN ORGANIZED HEALTH CARE EDUCATION/TRAINING PROGRAM

## 2023-02-27 PROCEDURE — 99283 EMERGENCY DEPT VISIT LOW MDM: CPT

## 2023-02-27 PROCEDURE — 73130 X-RAY EXAM OF HAND: CPT

## 2023-02-27 RX ORDER — ACETAMINOPHEN 500 MG
1000 TABLET ORAL ONCE
Status: COMPLETED | OUTPATIENT
Start: 2023-02-27 | End: 2023-02-27

## 2023-02-27 RX ADMIN — ACETAMINOPHEN 1000 MG: 500 TABLET ORAL at 20:02

## 2023-02-27 ASSESSMENT — ENCOUNTER SYMPTOMS
DIARRHEA: 0
SHORTNESS OF BREATH: 0
VOMITING: 0
NAUSEA: 0
ABDOMINAL PAIN: 0

## 2023-02-27 ASSESSMENT — PAIN DESCRIPTION - ORIENTATION: ORIENTATION: RIGHT

## 2023-02-27 ASSESSMENT — PAIN SCALES - GENERAL
PAINLEVEL_OUTOF10: 9
PAINLEVEL_OUTOF10: 9

## 2023-02-27 ASSESSMENT — PAIN DESCRIPTION - LOCATION: LOCATION: HAND;WRIST

## 2023-02-27 ASSESSMENT — PAIN DESCRIPTION - FREQUENCY: FREQUENCY: CONTINUOUS

## 2023-02-27 ASSESSMENT — PAIN - FUNCTIONAL ASSESSMENT: PAIN_FUNCTIONAL_ASSESSMENT: 0-10

## 2023-02-27 ASSESSMENT — PAIN DESCRIPTION - DESCRIPTORS: DESCRIPTORS: THROBBING

## 2023-02-27 NOTE — PROGRESS NOTES
Reviewed patient's urine culture - culture positive for E. coli and Staph aureus. Patient was discharged on cephalexin, and culture is sensitive to prescribed medication. Antibiotic prescribed at discharge is appropriate - no changes made to antibiotic regimen.      Anila Rodriguez, PharmD, BCCCP  2/27/2023  12:10 PM

## 2023-02-28 NOTE — DISCHARGE INSTRUCTIONS
Take your medications as prescribed. Please call and schedule appoint your primary care provider for repeat x-ray imaging in 1 week. Monitor for signs of swelling or tingling in the hands. Do not get the cast wet. Return to emergency room immediately if you have swelling in your fingers, change in color, tingling, or any other general concerns.
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
## 2023-02-28 NOTE — ED PROVIDER NOTES
Pertanant Comments: This patient was accidentally clicked on. I did not see or participate in the care of this patient.       MD Dylan Doyle  Attending Emergency Medicine Physician       Derrick Grimm MD  02/27/23 2006

## 2023-03-14 RX ORDER — PRAMIPEXOLE DIHYDROCHLORIDE 0.12 MG/1
TABLET ORAL
Qty: 90 TABLET | Refills: 0 | Status: SHIPPED | OUTPATIENT
Start: 2023-03-14

## 2023-03-21 ENCOUNTER — HOSPITAL ENCOUNTER (EMERGENCY)
Age: 48
Discharge: HOME | End: 2023-03-21
Attending: EMERGENCY MEDICINE
Payer: MEDICAID

## 2023-03-21 VITALS
BODY MASS INDEX: 27.79 KG/M2 | TEMPERATURE: 98.2 F | SYSTOLIC BLOOD PRESSURE: 126 MMHG | HEART RATE: 87 BPM | DIASTOLIC BLOOD PRESSURE: 81 MMHG | HEIGHT: 62 IN | WEIGHT: 151 LBS | RESPIRATION RATE: 18 BRPM | OXYGEN SATURATION: 100 %

## 2023-03-21 DIAGNOSIS — M54.2 NECK PAIN: Primary | ICD-10-CM

## 2023-03-21 PROCEDURE — 99284 EMERGENCY DEPT VISIT MOD MDM: CPT

## 2023-03-21 PROCEDURE — 6370000000 HC RX 637 (ALT 250 FOR IP): Performed by: STUDENT IN AN ORGANIZED HEALTH CARE EDUCATION/TRAINING PROGRAM

## 2023-03-21 PROCEDURE — 6360000002 HC RX W HCPCS: Performed by: STUDENT IN AN ORGANIZED HEALTH CARE EDUCATION/TRAINING PROGRAM

## 2023-03-21 PROCEDURE — 96372 THER/PROPH/DIAG INJ SC/IM: CPT

## 2023-03-21 RX ORDER — LIDOCAINE 50 MG/G
1 PATCH TOPICAL DAILY
Qty: 10 PATCH | Refills: 0 | Status: SHIPPED | OUTPATIENT
Start: 2023-03-21 | End: 2023-03-31

## 2023-03-21 RX ORDER — LIDOCAINE 4 G/G
1 PATCH TOPICAL ONCE
Status: DISCONTINUED | OUTPATIENT
Start: 2023-03-21 | End: 2023-03-21 | Stop reason: HOSPADM

## 2023-03-21 RX ORDER — KETOROLAC TROMETHAMINE 30 MG/ML
30 INJECTION, SOLUTION INTRAMUSCULAR; INTRAVENOUS ONCE
Status: COMPLETED | OUTPATIENT
Start: 2023-03-21 | End: 2023-03-21

## 2023-03-21 RX ORDER — CYCLOBENZAPRINE HCL 10 MG
5 TABLET ORAL ONCE
Status: COMPLETED | OUTPATIENT
Start: 2023-03-21 | End: 2023-03-21

## 2023-03-21 RX ADMIN — KETOROLAC TROMETHAMINE 30 MG: 30 INJECTION, SOLUTION INTRAMUSCULAR at 15:42

## 2023-03-21 RX ADMIN — CYCLOBENZAPRINE 5 MG: 10 TABLET, FILM COATED ORAL at 15:42

## 2023-03-21 ASSESSMENT — ENCOUNTER SYMPTOMS
SHORTNESS OF BREATH: 0
CHEST TIGHTNESS: 0
VOMITING: 0
DIARRHEA: 0
NAUSEA: 0
ANAL BLEEDING: 0
SORE THROAT: 0
RHINORRHEA: 0
CONSTIPATION: 0
PHOTOPHOBIA: 0
ABDOMINAL DISTENTION: 0

## 2023-03-21 ASSESSMENT — PAIN DESCRIPTION - LOCATION: LOCATION: BACK;NECK

## 2023-03-21 ASSESSMENT — PAIN - FUNCTIONAL ASSESSMENT: PAIN_FUNCTIONAL_ASSESSMENT: 0-10

## 2023-03-21 ASSESSMENT — PAIN SCALES - GENERAL: PAINLEVEL_OUTOF10: 9

## 2023-03-21 NOTE — ED PROVIDER NOTES
[facility]     Emergency Department     Faculty Attestation    I performed a history and physical examination of the patient and discussed management with the resident. I reviewed the residents note and agree with the documented findings and plan of care. Any areas of disagreement are noted on the chart. I was personally present for the key portions of any procedures. I have documented in the chart those procedures where I was not present during the key portions. I have reviewed the emergency nurses triage note. I agree with the chief complaint, past medical history, past surgical history, allergies, medications, social and family history as documented unless otherwise noted below. For Physician Assistant/ Nurse Practitioner cases/documentation I have personally evaluated this patient and have completed at least one if not all key elements of the E/M (history, physical exam, and MDM). Additional findings are as noted. Presents with chronic upper back and neck pain. She denies any fall or specific injury. She says she is currently dealing with a rotator cuff injury to the right shoulder and thinks that is what is aggravating her chronic pain. She denies any new weakness or numbness down her arms. She denies any recent fever, cough, congestion, chest pain, shortness of breath, abdominal pain, nausea or vomiting. I do not feel that imaging is indicated at this time. We will treat patient's pain.       Georgia Mckinnon MD  Attending Emergency  Physician            Allyson Smyth MD  03/21/23 1953

## 2023-03-21 NOTE — DISCHARGE INSTRUCTIONS
If given narcotics (opiates) or muscle relaxants during this Emergency Department visit, you should not drink, drive or operate any machinery for at least 6 hours. Use an ice pack or bag filled with ice and apply to the injured area 3 - 4 times a day for 15 - 20 minutes each time. If the injury is older than 3 days, then use a heating pad to help relax the muscles in your neck. For pain use acetaminophen (Tylenol) or ibuprofen (Motrin / Advil), unless prescribed medications that have acetaminophen or ibuprofen (or similar medications) in it. You can take over the counter acetaminophen tablets (1 - 2 tablets of the 500-mg strength every 6 hours) or ibuprofen tablets (2 tablets every 4 hours). PLEASE RETURN TO THE EMERGENCY DEPARTMENT IMMEDIATELY for worsening symptoms, inability to move your legs, tingling or loss of sensation, or if you develop any concerning symptoms such as: high fever not relieved by acetaminophen (Tylenol) and/or ibuprofen (Motrin / Advil), chills, shortness of breath, chest pain, feeling of your heart fluttering or racing, persistent nausea and/or vomiting, vomiting up blood, blood in your stool, loss of consciousness, numbness, weakness or tingling in the arms or legs or change in color of the extremities, changes in mental status, persistent headache, blurry vision, loss of bladder / bowel control, unable to follow up with your physician, or other any other care or concern.

## 2023-03-21 NOTE — ED PROVIDER NOTES
days 12/1/22 12/16/22  Jaylin Salazar PA-C   ARIPiprazole (ABILIFY) 20 MG tablet aripiprazole 20 mg tablet    Historical Provider, MD   diclofenac sodium 1 % GEL Apply 2 g topically 4 times daily as needed for Pain 1/18/20   Linn León MD   zolpidem (AMBIEN) 5 MG tablet Take 5 mg by mouth nightly as needed for Sleep. Patient not taking: No sig reported    Historical Provider, MD rBianne Zamora (AIMOVIG SC) Inject into the skin  Patient not taking: No sig reported    Historical Provider, MD   OXcarbazepine (TRILEPTAL) 300 MG tablet take 1 tablet by mouth at bedtime  Patient not taking: No sig reported 1/30/18   Historical Provider, MD   polyethylene glycol (GLYCOLAX) 17 GM/SCOOP powder Take 17 g by mouth daily  Patient not taking: No sig reported    Historical Provider, MD   promethazine (PHENERGAN) 25 MG tablet Take 25 mg by mouth every 6 hours as needed for Nausea  Patient not taking: No sig reported    Historical Provider, MD   Multiple Vitamins-Minerals (WOMENS ONE DAILY) TABS Take 1 tablet by mouth daily    Historical Provider, MD   montelukast (SINGULAIR) 10 MG tablet Take 10 mg by mouth nightly 8/7/17   Historical Provider, MD   OXcarbazepine (TRILEPTAL) 150 MG tablet Take 450 mg by mouth nightly  Patient not taking: No sig reported    Historical Provider, MD   amitriptyline (ELAVIL) 100 MG tablet Take 100 mg by mouth nightly    Historical Provider, MD   SUMAtriptan (IMITREX) 100 MG tablet Take 100 mg by mouth once as needed for Migraine  Patient not taking: No sig reported    Historical Provider, MD       REVIEW OF SYSTEMS       Review of Systems   Constitutional:  Negative for chills and fever. HENT:  Negative for rhinorrhea and sore throat. Eyes:  Negative for photophobia. Respiratory:  Negative for chest tightness and shortness of breath. Cardiovascular:  Negative for chest pain.    Gastrointestinal:  Negative for abdominal distention, anal bleeding, constipation, diarrhea, nausea and vomiting. Endocrine: Negative for polyuria. Genitourinary:  Negative for difficulty urinating and flank pain. Musculoskeletal:  Positive for neck pain. Negative for arthralgias. Skin:  Negative for rash. Neurological:  Negative for weakness, numbness and headaches. PHYSICAL EXAM      INITIAL VITALS:   /81   Pulse 87   Temp 98.2 °F (36.8 °C) (Oral)   Resp 18   Ht 5' 2\" (1.575 m)   Wt 151 lb (68.5 kg)   LMP 09/08/2015   SpO2 100%   BMI 27.62 kg/m²     Physical Exam  Constitutional:       General: She is not in acute distress. Neck:      Comments: No midline cervical thoracic lumbar tenderness step-off or deformity  Cardiovascular:      Rate and Rhythm: Normal rate. Pulses: Normal pulses. Heart sounds: Normal heart sounds. Pulmonary:      Effort: Pulmonary effort is normal.      Breath sounds: Normal breath sounds. Abdominal:      Palpations: Abdomen is soft. Tenderness: There is no abdominal tenderness. Neurological:      Comments: 5 out of 5 strength upper and lower extremities bilaterally. Sensation intact upper lower extremities bilaterally         DDX/DIAGNOSTIC RESULTS / EMERGENCY DEPARTMENT COURSE / MDM     Medical Decision Making  Here with likely cervical paraspinal muscle strain. Neurologically intact. Low suspicion for epidural abscess, cervical fracture or other acute process. Patient neurologically intact no acute distress no erythema induration or fluctuance around the cervical spine. No point tenderness midline. Patient without high risk features such as IV drug use. EKG  None    All EKG's are interpreted by the Emergency Department Physician who either signs or Co-signs this chart in the absence of a cardiologist.    EMERGENCY DEPARTMENT COURSE:  ED Course as of 03/21/23 1519   Tue Mar 21, 2023   1515 Patient here with right-sided neck pain for the last several days. No injuries or falls. Neurologically intact. No step-offs or deformity.

## 2023-03-22 DIAGNOSIS — G89.4 CHRONIC PAIN SYNDROME: ICD-10-CM

## 2023-03-22 DIAGNOSIS — G43.009 MIGRAINE WITHOUT AURA AND WITHOUT STATUS MIGRAINOSUS, NOT INTRACTABLE: ICD-10-CM

## 2023-03-22 RX ORDER — OMEPRAZOLE 20 MG/1
40 CAPSULE, DELAYED RELEASE ORAL DAILY
Qty: 30 CAPSULE | Refills: 3 | Status: SHIPPED | OUTPATIENT
Start: 2023-03-22

## 2023-03-22 RX ORDER — BUTALBITAL, ACETAMINOPHEN AND CAFFEINE 50; 325; 40 MG/1; MG/1; MG/1
TABLET ORAL
Qty: 180 TABLET | Refills: 0 | Status: SHIPPED | OUTPATIENT
Start: 2023-03-22

## 2023-03-22 NOTE — TELEPHONE ENCOUNTER
----- Message from Divide sent at 3/22/2023  2:19 PM EDT -----  Subject: Refill Request    QUESTIONS  Name of Medication? Other - Nurtec ODT 75 MG  Patient-reported dosage and instructions? 1 tablet as needed  How many days do you have left? 0  Preferred Pharmacy? [address] "[employer] [id number]  Pharmacy phone number (if available)? [phone]  ---------------------------------------------------------------------------  --------------,  Name of Medication? omeprazole (PRILOSEC) 20 MG delayed release capsule  Patient-reported dosage and instructions? 2 tablets daily   How many days do you have left? 0  Preferred Pharmacy?  "[employer] #[id number]  Pharmacy phone number (if available)? [phone]  ---------------------------------------------------------------------------  --------------,  Name of Medication? butalbital-acetaminophen-caffeine (FIORICET, ESGIC)   -40 MG per tablet  Patient-reported dosage and instructions? 1 tablet every 6 hours  How many days do you have left? 0  Preferred Pharmacy? [address] [facility] [id number]  Pharmacy phone number (if available)? [phone]  ---------------------------------------------------------------------------  --------------  CALL BACK INFO  What is the best way for the office to contact you? OK to leave message on   voicemail  Preferred Call Back Phone Number? [phone]  ---------------------------------------------------------------------------  --------------  SCRIPT ANSWERS  Relationship to Patient?  Self [FreeTextEntry1] : continue naproxen, don’t lay down on sofa.\par drink plenty of water. Miralax as directed\par Continue current meds\par RTO if pain persists

## 2023-03-22 NOTE — TELEPHONE ENCOUNTER
----- Message from Ankita Mendiola sent at 3/22/2023  2:13 PM EDT -----  Subject: Referral Request    Reason for referral request? Patient needs a neurology referral. She   prefers a female doctor. Please advise. She said [facility] is close   to her. Provider patient wants to be referred to(if known):     Provider Phone Number(if known):     Additional Information for Provider?   ---------------------------------------------------------------------------  --------------  [address] [employer]    [phone]; OK to leave message on voicemail  ---------------------------------------------------------------------------  --------------

## 2023-03-25 ENCOUNTER — HOSPITAL ENCOUNTER (EMERGENCY)
Age: 48
Discharge: HOME | End: 2023-03-25
Attending: EMERGENCY MEDICINE
Payer: MEDICAID

## 2023-03-25 ENCOUNTER — APPOINTMENT (OUTPATIENT)
Dept: GENERAL RADIOLOGY | Age: 48
End: 2023-03-25
Payer: MEDICAID

## 2023-03-25 VITALS
TEMPERATURE: 98.5 F | OXYGEN SATURATION: 100 % | SYSTOLIC BLOOD PRESSURE: 129 MMHG | RESPIRATION RATE: 15 BRPM | HEART RATE: 81 BPM | DIASTOLIC BLOOD PRESSURE: 84 MMHG

## 2023-03-25 DIAGNOSIS — H66.001 ACUTE SUPPURATIVE OTITIS MEDIA OF RIGHT EAR WITHOUT SPONTANEOUS RUPTURE OF TYMPANIC MEMBRANE, RECURRENCE NOT SPECIFIED: Primary | ICD-10-CM

## 2023-03-25 DIAGNOSIS — J06.9 UPPER RESPIRATORY TRACT INFECTION, UNSPECIFIED TYPE: ICD-10-CM

## 2023-03-25 LAB
FLUAV AG SPEC QL: NEGATIVE
FLUBV AG SPEC QL: NEGATIVE
SARS-COV-2 RDRP RESP QL NAA+PROBE: NOT DETECTED
SPECIMEN DESCRIPTION: NORMAL

## 2023-03-25 PROCEDURE — 87635 SARS-COV-2 COVID-19 AMP PRB: CPT

## 2023-03-25 PROCEDURE — 71046 X-RAY EXAM CHEST 2 VIEWS: CPT

## 2023-03-25 PROCEDURE — 87804 INFLUENZA ASSAY W/OPTIC: CPT

## 2023-03-25 PROCEDURE — 6370000000 HC RX 637 (ALT 250 FOR IP)

## 2023-03-25 PROCEDURE — 99284 EMERGENCY DEPT VISIT MOD MDM: CPT

## 2023-03-25 RX ORDER — BUDESONIDE AND FORMOTEROL FUMARATE DIHYDRATE 160; 4.5 UG/1; UG/1
2 AEROSOL RESPIRATORY (INHALATION) DAILY
Qty: 30.6 G | Refills: 1 | Status: SHIPPED | OUTPATIENT
Start: 2023-03-25 | End: 2024-01-25

## 2023-03-25 RX ORDER — GUAIFENESIN/DEXTROMETHORPHAN 100-10MG/5
5 SYRUP ORAL 3 TIMES DAILY PRN
Qty: 120 ML | Refills: 0 | Status: SHIPPED | OUTPATIENT
Start: 2023-03-25 | End: 2023-04-04

## 2023-03-25 RX ORDER — AMOXICILLIN AND CLAVULANATE POTASSIUM 875; 125 MG/1; MG/1
1 TABLET, FILM COATED ORAL ONCE
Status: COMPLETED | OUTPATIENT
Start: 2023-03-25 | End: 2023-03-25

## 2023-03-25 RX ORDER — AMOXICILLIN AND CLAVULANATE POTASSIUM 875; 125 MG/1; MG/1
1 TABLET, FILM COATED ORAL 2 TIMES DAILY
Qty: 9 TABLET | Refills: 0 | Status: SHIPPED | OUTPATIENT
Start: 2023-03-25 | End: 2023-03-30

## 2023-03-25 RX ADMIN — AMOXICILLIN AND CLAVULANATE POTASSIUM 1 TABLET: 875; 125 TABLET, FILM COATED ORAL at 20:52

## 2023-03-25 ASSESSMENT — ENCOUNTER SYMPTOMS
ABDOMINAL PAIN: 0
VOMITING: 0
SHORTNESS OF BREATH: 1
NAUSEA: 0
WHEEZING: 1
SORE THROAT: 0
COUGH: 1

## 2023-03-25 NOTE — Clinical Note
Bryce Campbell was seen and treated in our emergency department on 3/25/2023. She may return to work on 03/26/2023. If you have any questions or concerns, please don't hesitate to call.       Wendy Patton, DO

## 2023-03-25 NOTE — ED NOTES
Pt arrived to ED via ambulatory to room with complaints of cough with sputum  Pt states cough started 3 days ago, sputum is clear  Pt states she was exposed to sick family members within the last couple of weeks  Pt denies pain  Pt has hx of COPD and smoking  Pt alert and oriented x4, talking in complete sentences, respirations even and unlabored. Pt acting age appropriate. Pt placed on cardiac monitor.  White board updated, will continue to plan of care     Ye Rhodes  03/25/23 [address]  03/25/23 0960

## 2023-03-25 NOTE — ED PROVIDER NOTES
[facility]     Emergency Department     Faculty Attestation    I performed a history and physical examination of the patient and discussed management with the resident. I reviewed the residents note and agree with the documented findings and plan of care. Any areas of disagreement are noted on the chart. I was personally present for the key portions of any procedures. I have documented in the chart those procedures where I was not present during the key portions. I have reviewed the emergency nurses triage note. I agree with the chief complaint, past medical history, past surgical history, allergies, medications, social and family history as documented unless otherwise noted below. For Physician Assistant/ Nurse Practitioner cases/documentation I have personally evaluated this patient and have completed at least one if not all key elements of the E/M (history, physical exam, and MDM). Additional findings are as noted. I have personally seen and evaluated the patient. I find the patient's history and physical exam are consistent with the NP/PA documentation. I agree with the care provided, treatment rendered, disposition and follow-up plan. 19-year-old female with a history of asthma and COPD presenting with shortness of breath and cough. Has been trying Tessalon and albuterol at home without improvement. Has been out of her Symbicort. Also needs change to Zyrtec from Allegra. Has not seen a pulmonologist in several years, needs a referral to pulmonology. States last time she was on steroids it caused tachydysrhythmia episodes.     Exam:  General : Laying on the bed, awake, alert, and in no acute distress  CV : normal rate and regular rhythm  Lungs : Breathing comfortably on room air with no tachypnea, hypoxia, or increased work of breathing    DDx: COPD exacerbation, viral upper respiratory infection, pneumonia    Plan:  Patient is not hypoxic or having any difficulty breathing currently. Will obtain chest x-ray to rule out pneumonia, COVID and flu swabs. We will avoid steroids due to tachydysrhythmia on the last use of them, given no active wheezing. Will refer to pulmonology, refill her Symbicort and Zyrtec, and prescribe antitussive medication for cough relief. Medical Decision Making  Amount and/or Complexity of Data Reviewed  Labs: ordered. Radiology: ordered.       Geraldo Ramos MD   Attending Emergency Physician    (Please note that portions of this note were completed with a voice recognition program. Efforts were made to edit the dictations but occasionally words are mis-transcribed.)            Geraldo Ramos MD  03/25/23 1936

## 2023-03-26 NOTE — DISCHARGE INSTRUCTIONS
You were seen for evaluation of cough, ear pain, shortness of breath. You were negative for COVID and influenza in the emergency department. Your chest x-ray did not show pneumonia. You did appear to have a infection of your middle ear. You were given a dose of antibiotics in the emergency department. You will be given a course of antibiotics to go home with, you should take the entire course and do not skip any doses. You will also be given cough medication and one of your home inhalers, symbicort. Make sure to follow-up with your primary care on Monday as scheduled to reevaluate your symptoms.   Otherwise you should return to the emergency department immediately for any worsening shortness of breath, chest pain, fevers, chills, changes in mental status, other new or concerning symptoms

## 2023-03-26 NOTE — ED PROVIDER NOTES
(2014); Dilation and curettage of uterus (2014); myringotomy (Bilateral); other surgical history (age 27); Nerve Block (17--60); Nerve Block (10/4/13); knee surgery (Left, 13); Cystoscopy (04/15/14); Ureter stent placement (Right, 04/15/14); Lithotripsy (Right, 04/15/14); Cystoscopy (Right, 14); Endometrial ablation (14); Hand surgery (Right); Hysterectomy (9-8-15); Nerve Block (12/8/15); Nerve Block (16); Nerve Block (2016); Sleeve Gastrectomy (N/A, 2017); Nerve Block (2017); Gastric bypass surgery (2017); Umbilical hernia repair (); Shoulder arthroscopy (Right); Knee arthroscopy (Right, 2017); pr arthroscopy knee diagnostic w/wo synovial bx spx (Right, 2017); Lithotripsy (); [place] tooth extraction; Gastric bypass surgery; Knee Arthroplasty (Right, 2018); pr arthrp kne condyle&platu medial&lat compartments (Right, 2018); Nerve Block (2018); Nerve Block (Left, 2018); Nerve Block (Right, 2018); Nerve Block (Right, 2018); Arm Surgery (2019); and Cholecystectomy (2019).       Social History     Socioeconomic History    Marital status:      Spouse name: Not on file    Number of children: 4    Years of education: Not on file    Highest education level: Not on file   Occupational History    Not on file   Tobacco Use    Smoking status: Every Day     Packs/day: 0.50     Types: Cigarettes     Start date:      Last attempt to quit: 2017     Years since quittin.3    Smokeless tobacco: Never    Tobacco comments:     litte less then 1/2 mariola/day   Vaping Use    Vaping Use: Never used   Substance and Sexual Activity    Alcohol use: Yes     Comment: social    Drug use: No    Sexual activity: Yes     Partners: Male   Other Topics Concern    Not on file   Social History Narrative    Not on file     Social Determinants of Health     Financial Resource Strain: Low Risk     Difficulty of Paying Historical Provider, MD   OXcarbazepine (TRILEPTAL) 150 MG tablet Take 450 mg by mouth nightly  Patient not taking: No sig reported    Historical Provider, MD   amitriptyline (ELAVIL) 100 MG tablet Take 100 mg by mouth nightly    Historical Provider, MD   SUMAtriptan (IMITREX) 100 MG tablet Take 100 mg by mouth once as needed for Migraine  Patient not taking: No sig reported    Historical Provider, MD         REVIEW OF SYSTEMS       Review of Systems   Constitutional:  Negative for chills and fever. HENT:  Positive for ear pain. Negative for sore throat. Respiratory:  Positive for cough, shortness of breath and wheezing. Cardiovascular:  Negative for chest pain. Gastrointestinal:  Negative for abdominal pain, nausea and vomiting. PHYSICAL EXAM      INITIAL VITALS:   /84   Pulse 81   Temp 98.5 °F (36.9 °C)   Resp 15   LMP 09/08/2015   SpO2 100%     Physical Exam  Vitals and nursing note reviewed. Constitutional:       General: She is not in acute distress. Appearance: Normal appearance. She is ill-appearing. She is not toxic-appearing. HENT:      Head: Normocephalic and atraumatic. Left Ear: Tympanic membrane, ear canal and external ear normal.      Ears:      Comments: Right TM bulging with surrounding green discharge in the canal, no swelling of the external canal, nontender tragus, nonpainful with manipulation of the external ear     Mouth/Throat:      Mouth: Mucous membranes are moist.      Pharynx: Oropharynx is clear. Cardiovascular:      Rate and Rhythm: Normal rate and regular rhythm. Pulmonary:      Effort: Pulmonary effort is normal.      Breath sounds: Normal breath sounds. Abdominal:      Palpations: Abdomen is soft. Tenderness: There is no abdominal tenderness. There is no guarding or rebound. Skin:     General: Skin is warm and dry. Neurological:      General: No focal deficit present.       Mental Status: She is alert and oriented to person, place,

## 2023-03-28 ENCOUNTER — TELEPHONE (OUTPATIENT)
Dept: INTERNAL MEDICINE CLINIC | Age: 48
End: 2023-03-28

## 2023-03-30 ENCOUNTER — APPOINTMENT (OUTPATIENT)
Dept: GENERAL RADIOLOGY | Age: 48
End: 2023-03-30
Payer: MEDICAID

## 2023-03-30 ENCOUNTER — HOSPITAL ENCOUNTER (EMERGENCY)
Age: 48
Discharge: HOME | End: 2023-03-30
Attending: EMERGENCY MEDICINE
Payer: MEDICAID

## 2023-03-30 VITALS
HEART RATE: 96 BPM | SYSTOLIC BLOOD PRESSURE: 107 MMHG | RESPIRATION RATE: 16 BRPM | DIASTOLIC BLOOD PRESSURE: 73 MMHG | TEMPERATURE: 97 F | OXYGEN SATURATION: 97 %

## 2023-03-30 DIAGNOSIS — S82.891A CLOSED AVULSION FRACTURE OF RIGHT ANKLE, INITIAL ENCOUNTER: Primary | ICD-10-CM

## 2023-03-30 PROCEDURE — 73610 X-RAY EXAM OF ANKLE: CPT

## 2023-03-30 PROCEDURE — 6370000000 HC RX 637 (ALT 250 FOR IP): Performed by: EMERGENCY MEDICINE

## 2023-03-30 PROCEDURE — 99283 EMERGENCY DEPT VISIT LOW MDM: CPT

## 2023-03-30 RX ORDER — ACETAMINOPHEN 500 MG
1000 TABLET ORAL ONCE
Status: COMPLETED | OUTPATIENT
Start: 2023-03-30 | End: 2023-03-30

## 2023-03-30 RX ORDER — ACETAMINOPHEN 500 MG
500 TABLET ORAL EVERY 6 HOURS PRN
Qty: 40 TABLET | Refills: 0 | Status: SHIPPED | OUTPATIENT
Start: 2023-03-30

## 2023-03-30 RX ADMIN — ACETAMINOPHEN 1000 MG: 500 TABLET ORAL at 11:27

## 2023-03-30 ASSESSMENT — PAIN - FUNCTIONAL ASSESSMENT: PAIN_FUNCTIONAL_ASSESSMENT: 0-10

## 2023-03-30 ASSESSMENT — PAIN SCALES - GENERAL: PAINLEVEL_OUTOF10: 10

## 2023-03-30 NOTE — ED PROVIDER NOTES
[facility]  Emergency Department  Faculty Attestation     I performed a history and physical examination of the patient and discussed management with the resident. I reviewed the residents note and agree with the documented findings and plan of care. Any areas of disagreement are noted on the chart. I was personally present for the key portions of any procedures. I have documented in the chart those procedures where I was not present during the key portions. I have reviewed the emergency nurses triage note. I agree with the chief complaint, past medical history, past surgical history, allergies, medications, social and family history as documented unless otherwise noted below. For Physician Assistant/ Nurse Practitioner cases/documentation I have personally evaluated this patient and have completed at least one if not all key elements of the E/M (history, physical exam, and MDM). Additional findings are as noted. Primary Care Physician:  No primary care provider on file. Screenings:  [unfilled]    CHIEF COMPLAINT       Chief Complaint   Patient presents with    Ankle Injury     Right         RECENT VITALS:   Temp: 97 °F (36.1 °C),  Heart Rate: 96, Resp: 16, BP: 107/73    LABS:  Labs Reviewed - No data to display    Radiology  XR ANKLE RIGHT (MIN 3 VIEWS)    (Results Pending)           Attending Physician Additional  Notes      Patient is here with her family member who is evaluated in the ED. She states she turned her ankle on the way here and has lateral swelling and pain. She has had prior ORIF of this ankle as well as previous injury. She walked to her orthopedic surgeon's office while here in the ED and was told they have no appointments and that she could be seen as an outpatient. On exam she is nontoxic vital signs are normal.  Patient is able to ambulate. There is no visible deformity. Minimal swelling over the lateral aspect of the ankle.   Plan is imaging, air splint, simple analgesics, follow-up. Mary Hay.  Kashif Garcia MD, [facility]  Attending Emergency  Physician               Elke Beatty MD  03/30/23 3928

## 2023-03-30 NOTE — DISCHARGE INSTRUCTIONS
Call today or tomorrow to follow up with No primary care provider on file. in 3-5 days. Use an ice pack or bag filled with ice and apply to the injured area 3 - 4 times a day for 15 - 20 minutes each time. Use ibuprofen or Tylenol (unless prescribed medications that have Tylenol in it) for pain. You can take over the counter Ibuprofen (advil) tablets (4 every 8 hours or 3 every 6 hours or 2 every 4 hours)    Use your crutches for the next several days until you are able to take 10 steps without pain. Return to the Emergency Department for worsening of pain, increase swelling to the ankle, inability to move your toes, any other care or concern. Crescentic Advancement Flap Text: The defect edges were debeveled with a #15 scalpel blade.  Given the location of the defect and the proximity to free margins a crescentic advancement flap was deemed most appropriate.  Using a sterile surgical marker, the appropriate advancement flap was drawn incorporating the defect and placing the expected incisions within the relaxed skin tension lines where possible.    The area thus outlined was incised deep to adipose tissue with a #15 scalpel blade.  The skin margins were undermined to an appropriate distance in all directions utilizing iris scissors.

## 2023-03-30 NOTE — ED NOTES
Pt to ED for right ankle injury. Pt states she broke her ankle last year. States she tripped this morning and re-injured it. Rates pain 10/10. Patient alert and oriented x4, talking in complete sentences. Respirations even and unlabored.  Call light in reach, all needs met at this time     Moisés Serrano RN  03/30/23 0615
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
## 2023-03-31 DIAGNOSIS — M25.571 RIGHT ANKLE PAIN, UNSPECIFIED CHRONICITY: Primary | ICD-10-CM

## 2023-04-03 ENCOUNTER — OFFICE VISIT (OUTPATIENT)
Dept: ORTHOPEDIC SURGERY | Age: 48
End: 2023-04-03
Payer: MEDICAID

## 2023-04-03 VITALS — BODY MASS INDEX: 27.79 KG/M2 | HEIGHT: 62 IN | OXYGEN SATURATION: 100 % | WEIGHT: 151 LBS | RESPIRATION RATE: 16 BRPM

## 2023-04-03 DIAGNOSIS — S82.831K CLOSED AVULSION FRACTURE OF DISTAL END OF RIGHT FIBULA WITH NONUNION, SUBSEQUENT ENCOUNTER: Primary | ICD-10-CM

## 2023-04-03 DIAGNOSIS — M76.71 PERONEAL TENDINITIS OF RIGHT LOWER EXTREMITY: ICD-10-CM

## 2023-04-03 DIAGNOSIS — M95.8 OSTEOCHONDRAL DEFECT OF ANKLE: ICD-10-CM

## 2023-04-03 DIAGNOSIS — M19.171 POST-TRAUMATIC ARTHRITIS OF RIGHT ANKLE: ICD-10-CM

## 2023-04-03 PROCEDURE — 99204 OFFICE O/P NEW MOD 45 MIN: CPT | Performed by: ORTHOPAEDIC SURGERY

## 2023-04-03 NOTE — PROGRESS NOTES
date    No follow-ups on file. No orders of the defined types were placed in this encounter. No orders of the defined types were placed in this encounter. Roel Garay MD  Orthopedic Surgery        Please excuse any typos/errors, as this note was created with the assistance of voice recognition software. While intending to generate a document that actually reflects the content of the visit, the document can still have some errors including those of syntax and sound-a-like substitutions which may escape proof reading. In such instances, actual meaning can be extrapolated by context.

## 2023-04-06 ENCOUNTER — TELEPHONE (OUTPATIENT)
Dept: ORTHOPEDIC SURGERY | Age: 48
End: 2023-04-06

## 2023-05-08 ENCOUNTER — HOSPITAL ENCOUNTER (OUTPATIENT)
Dept: PAIN MANAGEMENT | Age: 48
Discharge: HOME | End: 2023-05-08
Payer: MEDICAID

## 2023-05-08 VITALS — BODY MASS INDEX: 25.4 KG/M2 | TEMPERATURE: 97.3 F | HEIGHT: 62 IN | WEIGHT: 138 LBS

## 2023-05-08 DIAGNOSIS — M47.817 LUMBOSACRAL SPONDYLOSIS WITHOUT MYELOPATHY: ICD-10-CM

## 2023-05-08 DIAGNOSIS — M54.16 LUMBAR RADICULOPATHY: Primary | ICD-10-CM

## 2023-05-08 DIAGNOSIS — G62.9 NEUROPATHY: ICD-10-CM

## 2023-05-08 DIAGNOSIS — M47.812 CERVICAL SPONDYLOSIS: ICD-10-CM

## 2023-05-08 DIAGNOSIS — M51.36 LUMBAR DEGENERATIVE DISC DISEASE: ICD-10-CM

## 2023-05-08 PROCEDURE — 99213 OFFICE O/P EST LOW 20 MIN: CPT

## 2023-05-08 PROCEDURE — 99213 OFFICE O/P EST LOW 20 MIN: CPT | Performed by: STUDENT IN AN ORGANIZED HEALTH CARE EDUCATION/TRAINING PROGRAM

## 2023-05-08 RX ORDER — GABAPENTIN 300 MG/1
600 CAPSULE ORAL 3 TIMES DAILY
Qty: 180 CAPSULE | Refills: 2 | Status: SHIPPED | OUTPATIENT
Start: 2023-05-08 | End: 2023-08-06

## 2023-05-08 RX ORDER — TIZANIDINE 4 MG/1
4 TABLET ORAL 3 TIMES DAILY PRN
Qty: 30 TABLET | Refills: 0 | Status: SHIPPED | OUTPATIENT
Start: 2023-05-08

## 2023-05-08 RX ORDER — METHOCARBAMOL 750 MG/1
750 TABLET, FILM COATED ORAL 3 TIMES DAILY PRN
Qty: 90 TABLET | Refills: 2 | Status: SHIPPED | OUTPATIENT
Start: 2023-05-08 | End: 2023-08-06

## 2023-05-08 ASSESSMENT — PAIN SCALES - GENERAL: PAINLEVEL_OUTOF10: 7

## 2023-05-08 NOTE — PROGRESS NOTES
treatment. Patient has previously been to pain management and received lumbar epidural steroid injections as well as lumbar medial branch radiofrequency ablations. She does report numbness after her last lumbar medial branch radiofrequency ablation around her sciatic nerve which can be a common side effect from this procedure. She continues to see neurology for her migraines and Botox injections. At today's visit, I refilled her gabapentin and Robaxin. I discussed with the patient that she needs a follow-up every 3 months to get these medications refilled. She is still waiting to obtain her lumbar MRI which will be imperative for further evaluation and treatment for her chronic low back and leg pain. Neurologically, it appears the patient has full strength and altered sensation in her feet. There is no evidence of myelopathy on examination. There are no red flags in the patient's history. The patient has failed conservative measures including outpatient physical therapy, greater than 3 medications for pain relief, a self-directed therapy program, as well as activity modification all within the last 6 weeks over 3 months. The patient's pain has been causing worsening quality of life and function. PLAN:  Medications: For nonopioid therapy, the following medications were prescribed:    -Continue gabapentin 600 mg 3 times daily, refilled  -Continue Robaxin 750 mg 3 times daily as needed, refilled    Opioid therapy:  -Not indicated    Interventions:  -Consider repeat lumbar epidural versus lumbar medial branch radiofrequency ablation  -Of note, patient received moderate sedation    Imaging:  -Ordered lumbar MRI    Behavioral Therapies:  -Continue daily stretching and home exercise program    Referrals:  -Offered referral to physical therapy, patient deferred    Follow-up Plan:  -3 months    Patient was offered intervention where appropriate. Multi-modal Pain Therapy:   The patient was explicitly

## 2023-05-12 ENCOUNTER — OFFICE VISIT (OUTPATIENT)
Dept: FAMILY MEDICINE CLINIC | Age: 48
End: 2023-05-12
Payer: MEDICAID

## 2023-05-12 VITALS
OXYGEN SATURATION: 100 % | WEIGHT: 138 LBS | BODY MASS INDEX: 25.4 KG/M2 | HEIGHT: 62 IN | SYSTOLIC BLOOD PRESSURE: 138 MMHG | DIASTOLIC BLOOD PRESSURE: 84 MMHG | HEART RATE: 100 BPM

## 2023-05-12 DIAGNOSIS — G62.9 NEUROPATHY: ICD-10-CM

## 2023-05-12 DIAGNOSIS — R03.0 ELEVATED BP WITHOUT DIAGNOSIS OF HYPERTENSION: Primary | ICD-10-CM

## 2023-05-12 DIAGNOSIS — F31.9 BIPOLAR I DISORDER (HCC): ICD-10-CM

## 2023-05-12 DIAGNOSIS — Z87.891 PERSONAL HISTORY OF TOBACCO USE: ICD-10-CM

## 2023-05-12 DIAGNOSIS — Z12.11 SCREENING FOR COLON CANCER: ICD-10-CM

## 2023-05-12 DIAGNOSIS — Z11.59 SCREENING FOR VIRAL DISEASE: ICD-10-CM

## 2023-05-12 DIAGNOSIS — M51.26 HERNIATION OF LUMBAR INTERVERTEBRAL DISC WITHOUT MYELOPATHY: Chronic | ICD-10-CM

## 2023-05-12 DIAGNOSIS — Z12.31 SCREENING MAMMOGRAM FOR HIGH-RISK PATIENT: ICD-10-CM

## 2023-05-12 DIAGNOSIS — Z23 ENCOUNTER FOR IMMUNIZATION: ICD-10-CM

## 2023-05-12 DIAGNOSIS — Z98.84 S/P LAPAROSCOPIC SLEEVE GASTRECTOMY: ICD-10-CM

## 2023-05-12 DIAGNOSIS — E55.9 VITAMIN D DEFICIENCY: ICD-10-CM

## 2023-05-12 DIAGNOSIS — J42 CHRONIC BRONCHITIS, UNSPECIFIED CHRONIC BRONCHITIS TYPE (HCC): ICD-10-CM

## 2023-05-12 DIAGNOSIS — G43.009 MIGRAINE WITHOUT AURA AND WITHOUT STATUS MIGRAINOSUS, NOT INTRACTABLE: ICD-10-CM

## 2023-05-12 PROBLEM — R00.2 PALPITATIONS: Status: RESOLVED | Noted: 2017-11-16 | Resolved: 2023-05-12

## 2023-05-12 PROBLEM — M70.50 PES ANSERINE BURSITIS: Status: RESOLVED | Noted: 2018-11-26 | Resolved: 2023-05-12

## 2023-05-12 PROBLEM — R10.9 FLANK PAIN: Status: RESOLVED | Noted: 2020-02-13 | Resolved: 2023-05-12

## 2023-05-12 PROBLEM — M54.2 NECK PAIN: Status: RESOLVED | Noted: 2017-10-26 | Resolved: 2023-05-12

## 2023-05-12 PROBLEM — R07.9 CHEST PAIN: Status: RESOLVED | Noted: 2017-11-16 | Resolved: 2023-05-12

## 2023-05-12 PROBLEM — M22.41 PATELLA, CHONDROMALACIA, RIGHT: Status: RESOLVED | Noted: 2018-02-22 | Resolved: 2023-05-12

## 2023-05-12 PROCEDURE — 90471 IMMUNIZATION ADMIN: CPT | Performed by: FAMILY MEDICINE

## 2023-05-12 PROCEDURE — 90677 PCV20 VACCINE IM: CPT | Performed by: FAMILY MEDICINE

## 2023-05-12 PROCEDURE — 99214 OFFICE O/P EST MOD 30 MIN: CPT | Performed by: FAMILY MEDICINE

## 2023-05-12 RX ORDER — BUDESONIDE AND FORMOTEROL FUMARATE DIHYDRATE 160; 4.5 UG/1; UG/1
2 AEROSOL RESPIRATORY (INHALATION) DAILY
Qty: 30.6 G | Refills: 1 | Status: SHIPPED | OUTPATIENT
Start: 2023-05-12 | End: 2024-03-13

## 2023-05-12 RX ORDER — NICOTINE 21 MG/24HR
1 PATCH, TRANSDERMAL 24 HOURS TRANSDERMAL DAILY
Qty: 42 PATCH | Refills: 0 | Status: SHIPPED | OUTPATIENT
Start: 2023-05-12 | End: 2023-06-23

## 2023-05-12 RX ORDER — BUSPIRONE HYDROCHLORIDE 10 MG/1
10 TABLET ORAL 3 TIMES DAILY
COMMUNITY

## 2023-05-12 RX ORDER — NITROGLYCERIN 0.3 MG/1
0.3 TABLET SUBLINGUAL EVERY 5 MIN PRN
COMMUNITY

## 2023-05-12 SDOH — ECONOMIC STABILITY: FOOD INSECURITY: WITHIN THE PAST 12 MONTHS, YOU WORRIED THAT YOUR FOOD WOULD RUN OUT BEFORE YOU GOT MONEY TO BUY MORE.: NEVER TRUE

## 2023-05-12 SDOH — ECONOMIC STABILITY: INCOME INSECURITY: HOW HARD IS IT FOR YOU TO PAY FOR THE VERY BASICS LIKE FOOD, HOUSING, MEDICAL CARE, AND HEATING?: VERY HARD

## 2023-05-12 SDOH — ECONOMIC STABILITY: HOUSING INSECURITY
IN THE LAST 12 MONTHS, WAS THERE A TIME WHEN YOU DID NOT HAVE A STEADY PLACE TO SLEEP OR SLEPT IN A SHELTER (INCLUDING NOW)?: NO

## 2023-05-12 SDOH — ECONOMIC STABILITY: FOOD INSECURITY: WITHIN THE PAST 12 MONTHS, THE FOOD YOU BOUGHT JUST DIDN'T LAST AND YOU DIDN'T HAVE MONEY TO GET MORE.: NEVER TRUE

## 2023-05-12 ASSESSMENT — ENCOUNTER SYMPTOMS
SINUS PRESSURE: 0
ABDOMINAL DISTENTION: 0
DIARRHEA: 0
SHORTNESS OF BREATH: 0
EYE REDNESS: 0
ABDOMINAL PAIN: 0
WHEEZING: 0
COUGH: 0
CHEST TIGHTNESS: 0
NAUSEA: 0
RHINORRHEA: 0
SORE THROAT: 0
VOMITING: 0
BLOOD IN STOOL: 0
CONSTIPATION: 0
TROUBLE SWALLOWING: 0
RECTAL PAIN: 0
STRIDOR: 0
BACK PAIN: 1
COLOR CHANGE: 0

## 2023-05-12 ASSESSMENT — PATIENT HEALTH QUESTIONNAIRE - PHQ9
SUM OF ALL RESPONSES TO PHQ9 QUESTIONS 1 & 2: 2
7. TROUBLE CONCENTRATING ON THINGS, SUCH AS READING THE NEWSPAPER OR WATCHING TELEVISION: 1
9. THOUGHTS THAT YOU WOULD BE BETTER OFF DEAD, OR OF HURTING YOURSELF: 0
4. FEELING TIRED OR HAVING LITTLE ENERGY: 3
1. LITTLE INTEREST OR PLEASURE IN DOING THINGS: 1
3. TROUBLE FALLING OR STAYING ASLEEP: 3
SUM OF ALL RESPONSES TO PHQ QUESTIONS 1-9: 10
8. MOVING OR SPEAKING SO SLOWLY THAT OTHER PEOPLE COULD HAVE NOTICED. OR THE OPPOSITE, BEING SO FIGETY OR RESTLESS THAT YOU HAVE BEEN MOVING AROUND A LOT MORE THAN USUAL: 0
SUM OF ALL RESPONSES TO PHQ QUESTIONS 1-9: 10
SUM OF ALL RESPONSES TO PHQ QUESTIONS 1-9: 10
2. FEELING DOWN, DEPRESSED OR HOPELESS: 1
5. POOR APPETITE OR OVEREATING: 0
6. FEELING BAD ABOUT YOURSELF - OR THAT YOU ARE A FAILURE OR HAVE LET YOURSELF OR YOUR FAMILY DOWN: 1
10. IF YOU CHECKED OFF ANY PROBLEMS, HOW DIFFICULT HAVE THESE PROBLEMS MADE IT FOR YOU TO DO YOUR WORK, TAKE CARE OF THINGS AT HOME, OR GET ALONG WITH OTHER PEOPLE: 2
SUM OF ALL RESPONSES TO PHQ QUESTIONS 1-9: 10

## 2023-05-12 NOTE — PATIENT INSTRUCTIONS
Thank you for choosing [facility]) as your healthcare provider as your care is important to us. Please arrive at your appointment on time. If you are unable to make your appointment, please call our office as soon as possible so that we may reschedule your appointment. Missing 3 appointments in a calendar year without notifying the office may lead to dismissal from the practice. We appreciate you calling at least 24 hours in advance, when possible. Thank you. New Updates for [facility]    Thank you for choosing [facility] to give you the best care! [facility]) is always trying to think of new ways to help their patients. We are asking all patients to try out the new digital registration that is now available through your [facility] account Via the amarilis you're now able to update your personal and registration information prior to your upcoming appointment. This will save you time once you arrive at the office to check-in, not to mention your information remains safe!! Many other perks come from signing up for an account, such as:  Requesting refills  Scheduling an appointment  Completing an E-Visit  Sending a message to the office/provider  Having access to your medication list  Paying your bill/copay prior to your appointment  Scheduling your yearly mammogram  Review your test results    If you are not familiar with [facility] please ask one of us and we will be happy to answer any questions or help you set-up your account.       Your [employer],

## 2023-05-12 NOTE — PROGRESS NOTES
Chief Complaint   Patient presents with    New Patient    Migraine     Needs referral to Neurologists          Indiana Clark  here today for follow up on chronic medical problems, go over labs and/or diagnostic studies, and medication refills. New Patient and Migraine (Needs referral to Neurologists )      HPI: Patient is scheduled to establish. Patient has history of multiple medical problems, has elevated blood pressure today usually runs normal.  Repeat blood pressure worse borderline high. Patient reports she does not have history of hypertension. Patient has not taken any medications. COPD, is currently on Symbicort and albuterol inhaler. Patient denies any recent flareup reports compliance. Patient denies any shortness of breath wheezing. Patient has history of gastric sleeve done 5 years before, has not updated blood work. Patient is currently on PPI, no recent vitamin D levels and thiamine check. 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 Patient reports her blood work showing low protein levels. Herniated disc with lumbar degenerative disc disease, follows with pain management is currently on gabapentin, muscle relaxant and also methocarbamol. History of chronic migraine headaches, used to follow-up with neurologist was on multiple medications at recent visit in the hospital.  Patient has received furosemide 80 mg. Patient reports she needs refill which I denied. Patient was also taking injectable migraine medications. Bipolar disorder, not under control, patient was taking multiple medications which she has discontinued reports she has scheduled appointment with psychiatrist next week. Patient denies any suicidal thoughts. Patient has history of neuropathy follows with pain management and is on gabapentin. Vitamin D deficiency not on any supplements.       Patient is

## 2023-05-15 ENCOUNTER — TELEPHONE (OUTPATIENT)
Dept: FAMILY MEDICINE CLINIC | Age: 48
End: 2023-05-15

## 2023-05-15 DIAGNOSIS — G43.009 MIGRAINE WITHOUT AURA AND WITHOUT STATUS MIGRAINOSUS, NOT INTRACTABLE: ICD-10-CM

## 2023-05-15 DIAGNOSIS — J42 CHRONIC BRONCHITIS, UNSPECIFIED CHRONIC BRONCHITIS TYPE (HCC): ICD-10-CM

## 2023-05-15 DIAGNOSIS — H66.3X1 CHRONIC SUPPURATIVE OTITIS MEDIA OF RIGHT EAR, UNSPECIFIED OTITIS MEDIA LOCATION: ICD-10-CM

## 2023-05-15 DIAGNOSIS — R00.0 SINUS TACHYCARDIA: Primary | ICD-10-CM

## 2023-05-15 DIAGNOSIS — S82.831K CLOSED AVULSION FRACTURE OF DISTAL END OF RIGHT FIBULA WITH NONUNION, SUBSEQUENT ENCOUNTER: ICD-10-CM

## 2023-05-15 DIAGNOSIS — G89.4 CHRONIC PAIN SYNDROME: ICD-10-CM

## 2023-05-15 RX ORDER — METHOCARBAMOL 750 MG/1
750 TABLET, FILM COATED ORAL 3 TIMES DAILY PRN
Qty: 90 TABLET | Refills: 2 | Status: SHIPPED | OUTPATIENT
Start: 2023-05-15 | End: 2023-08-13

## 2023-05-15 RX ORDER — OMEPRAZOLE 20 MG/1
40 CAPSULE, DELAYED RELEASE ORAL DAILY
Qty: 30 CAPSULE | Refills: 3 | Status: SHIPPED | OUTPATIENT
Start: 2023-05-15

## 2023-05-15 RX ORDER — VENLAFAXINE HYDROCHLORIDE 75 MG/1
75 CAPSULE, EXTENDED RELEASE ORAL EVERY MORNING
Qty: 30 CAPSULE | Refills: 0 | Status: SHIPPED | OUTPATIENT
Start: 2023-05-15

## 2023-05-15 RX ORDER — ALBUTEROL SULFATE 90 UG/1
2 AEROSOL, METERED RESPIRATORY (INHALATION) EVERY 6 HOURS PRN
Qty: 18 G | Refills: 3 | Status: SHIPPED | OUTPATIENT
Start: 2023-05-15

## 2023-05-15 RX ORDER — BUTALBITAL, ACETAMINOPHEN AND CAFFEINE 50; 325; 40 MG/1; MG/1; MG/1
TABLET ORAL
Qty: 180 TABLET | Refills: 0 | Status: SHIPPED | OUTPATIENT
Start: 2023-05-15

## 2023-05-15 RX ORDER — TIZANIDINE 4 MG/1
4 TABLET ORAL 3 TIMES DAILY PRN
Qty: 30 TABLET | Refills: 0 | Status: SHIPPED | OUTPATIENT
Start: 2023-05-15

## 2023-05-15 RX ORDER — FLUTICASONE PROPIONATE 50 MCG
1 SPRAY, SUSPENSION (ML) NASAL DAILY
Qty: 32 G | Refills: 1 | Status: SHIPPED | OUTPATIENT
Start: 2023-05-15

## 2023-05-15 RX ORDER — PRAMIPEXOLE DIHYDROCHLORIDE 0.12 MG/1
0.12 TABLET ORAL NIGHTLY
Qty: 90 TABLET | Refills: 0 | Status: SHIPPED | OUTPATIENT
Start: 2023-05-15

## 2023-05-15 RX ORDER — FUROSEMIDE 20 MG/1
TABLET ORAL
Qty: 60 TABLET | Refills: 0 | Status: SHIPPED | OUTPATIENT
Start: 2023-05-15

## 2023-05-15 RX ORDER — GABAPENTIN 300 MG/1
600 CAPSULE ORAL 3 TIMES DAILY
Qty: 180 CAPSULE | Refills: 2 | Status: SHIPPED | OUTPATIENT
Start: 2023-05-15 | End: 2023-08-13

## 2023-05-15 RX ORDER — OMEGA-3S/DHA/EPA/FISH OIL/D3 300MG-1000
400 CAPSULE ORAL DAILY
Qty: 30 TABLET | Refills: 0 | Status: SHIPPED | OUTPATIENT
Start: 2023-05-15

## 2023-05-15 RX ORDER — BUDESONIDE AND FORMOTEROL FUMARATE DIHYDRATE 160; 4.5 UG/1; UG/1
2 AEROSOL RESPIRATORY (INHALATION) DAILY
Qty: 30.6 G | Refills: 1 | Status: SHIPPED | OUTPATIENT
Start: 2023-05-15 | End: 2024-03-16

## 2023-05-15 RX ORDER — FEXOFENADINE HCL 180 MG/1
180 TABLET ORAL DAILY
Qty: 30 TABLET | Refills: 0 | Status: SHIPPED | OUTPATIENT
Start: 2023-05-15

## 2023-05-15 RX ORDER — NITROGLYCERIN 0.3 MG/1
0.3 TABLET SUBLINGUAL EVERY 5 MIN PRN
Qty: 90 TABLET | Refills: 1 | Status: SHIPPED | OUTPATIENT
Start: 2023-05-15

## 2023-05-15 NOTE — TELEPHONE ENCOUNTER
[facility] Clinical Support Pool  Subject: Referral Request     Reason for referral request? Patient is requesting a referral to a   Cardiologist. Patient was diagnosed with Tachycardia   Provider patient wants to be referred to(if known):     Provider Phone Number(if known): Additional Information for Provider? Any provider in [place].    ---------------------------------------------------------------------------   --------------   Charla SHANKAR     [phone]; OK to leave message on voicemail   ---------------------------------------------------------------------------   --------------

## 2023-05-15 NOTE — TELEPHONE ENCOUNTER
[facility] Clinical Support Pool  Subject: Refill Request     QUESTIONS   Name of Medication? albuterol sulfate HFA (PROVENTIL;VENTOLIN;PROAIR) 108   (90 Base) MCG/ACT inhaler   Patient-reported dosage and instructions? 2 puffs 4 hrs as needed   How many days do you have left? 0   [id number]   Pharmacy phone number (if available)? [phone]   ---------------------------------------------------------------------------   --------------,   Name of Medication? budesonide-formoterol (SYMBICORT) 160-4.5 MCG/ACT AERO   Patient-reported dosage and instructions? 2 puffs twice a day   How many days do you have left? 0   [id number]   Pharmacy phone number (if available)? [phone]   ---------------------------------------------------------------------------   --------------,   Name of Medication? Other - Buspirone   Patient-reported dosage and instructions? 10 MG - once tablet, 3x daily   How many days do you have left? 0   [id number]   Pharmacy phone number (if available)? [phone]   ---------------------------------------------------------------------------   --------------,   Name of Medication? butalbital-acetaminophen-caffeine (FIORICET, ESGIC)   -40 MG per tablet   Patient-reported dosage and instructions? -40 MG   How many days do you have left? 1   Preferred Pharmacy? [address] [place] [address] [id number]   Pharmacy phone number (if available)? [phone]   ---------------------------------------------------------------------------   --------------,   Name of Medication? diclofenac sodium (VOLTAREN) 1 % GEL   Patient-reported dosage and instructions? Apply 2grams as needed   How many days do you have left? 1   Preferred Pharmacy?  [address] [place] [address] #[id number]   Pharmacy phone number (if available)? [phone]   ---------------------------------------------------------------------------   --------------,   Name of Medication? vitamin

## 2023-05-15 NOTE — TELEPHONE ENCOUNTER
Please Approve or Refuse.   Send to Pharmacy per Pt's Request:      Next Visit Date:  8/30/2023   Last Visit Date: 5/12/2023    Hemoglobin A1C (%)   Date Value   01/04/2018 5.1   09/29/2017 4.7   09/16/2016 5.4             ( goal A1C is < 7)   BP Readings from Last 3 Encounters:   05/12/23 138/84   03/30/23 107/73   03/25/23 129/84          (goal 120/80)  BUN   Date Value Ref Range Status   09/29/2022 11 6 - 20 mg/dL Final     Creatinine   Date Value Ref Range Status   09/29/2022 0.94 (H) 0.50 - 0.90 mg/dL Final     Potassium   Date Value Ref Range Status   09/29/2022 4.7 3.7 - 5.3 mmol/L Final

## 2023-05-19 RX ORDER — PSEUDOEPHEDRINE HCL 30 MG
30 TABLET ORAL EVERY 6 HOURS PRN
Qty: 10 TABLET | Refills: 0 | Status: SHIPPED | OUTPATIENT
Start: 2023-05-19

## 2023-05-22 DIAGNOSIS — S82.831K CLOSED AVULSION FRACTURE OF DISTAL END OF RIGHT FIBULA WITH NONUNION, SUBSEQUENT ENCOUNTER: ICD-10-CM

## 2023-06-09 RX ORDER — FUROSEMIDE 20 MG/1
TABLET ORAL
Qty: 60 TABLET | Refills: 0 | Status: SHIPPED | OUTPATIENT
Start: 2023-06-09

## 2023-06-09 RX ORDER — VENLAFAXINE HYDROCHLORIDE 75 MG/1
75 CAPSULE, EXTENDED RELEASE ORAL EVERY MORNING
Qty: 30 CAPSULE | Refills: 0 | Status: SHIPPED | OUTPATIENT
Start: 2023-06-09

## 2023-07-09 RX ORDER — VENLAFAXINE HYDROCHLORIDE 75 MG/1
75 CAPSULE, EXTENDED RELEASE ORAL EVERY MORNING
Qty: 30 CAPSULE | Refills: 0 | Status: SHIPPED | OUTPATIENT
Start: 2023-07-09

## 2023-07-09 RX ORDER — FUROSEMIDE 20 MG/1
TABLET ORAL
Qty: 60 TABLET | Refills: 0 | Status: SHIPPED | OUTPATIENT
Start: 2023-07-09

## 2023-07-10 RX ORDER — NITROGLYCERIN 0.3 MG/1
TABLET SUBLINGUAL
Qty: 100 TABLET | Refills: 0 | Status: SHIPPED | OUTPATIENT
Start: 2023-07-10

## 2023-07-12 ENCOUNTER — TELEPHONE (OUTPATIENT)
Dept: FAMILY MEDICINE CLINIC | Age: 48
End: 2023-07-12

## 2023-07-12 NOTE — TELEPHONE ENCOUNTER
I have already placed 2 different referrals in the last 3 months, please call patient to find out which is under her insurance plan so that we can place referral to that neurologist.  She has also upcoming appointment on July 17 with Suze Fotnana, can discuss with her as well.

## 2023-07-12 NOTE — TELEPHONE ENCOUNTER
----- Message from Staci Pascual sent at 7/12/2023  1:30 PM EDT -----  Subject: Referral Request    Reason for referral request? needs a new jamey due to the last one was not   covered under her insurance  Provider patient wants to be referred to(if known):     Provider Phone Number(if known):     Additional Information for Provider?   ---------------------------------------------------------------------------  --------------  Kolby [place]    [phone]; OK to leave message on voicemail  ---------------------------------------------------------------------------  --------------

## 2023-07-12 NOTE — TELEPHONE ENCOUNTER
----- Message from Juan Carlos Zaidi sent at 7/12/2023  1:30 PM EDT -----  Subject: Referral Request    Reason for referral request? needs a new jamey due to the last one was not   covered under her insurance  Provider patient wants to be referred to(if known):     Provider Phone Number(if known):     Additional Information for Provider?   ---------------------------------------------------------------------------  --------------  [address]    [phone]; OK to leave message on voicemail  ---------------------------------------------------------------------------  --------------

## 2023-07-14 RX ORDER — OMEPRAZOLE 20 MG/1
CAPSULE, DELAYED RELEASE ORAL
Qty: 30 CAPSULE | Refills: 3 | Status: SHIPPED | OUTPATIENT
Start: 2023-07-14

## 2023-07-18 ENCOUNTER — TELEPHONE (OUTPATIENT)
Dept: FAMILY MEDICINE CLINIC | Age: 48
End: 2023-07-18

## 2023-07-18 ENCOUNTER — TELEPHONE (OUTPATIENT)
Dept: PAIN MANAGEMENT | Age: 48
End: 2023-07-18

## 2023-07-18 DIAGNOSIS — G43.009 MIGRAINE WITHOUT AURA AND WITHOUT STATUS MIGRAINOSUS, NOT INTRACTABLE: ICD-10-CM

## 2023-07-18 DIAGNOSIS — M51.26 HERNIATION OF LUMBAR INTERVERTEBRAL DISC WITHOUT MYELOPATHY: Primary | ICD-10-CM

## 2023-07-18 DIAGNOSIS — G89.4 CHRONIC PAIN SYNDROME: ICD-10-CM

## 2023-07-18 RX ORDER — BUTALBITAL, ACETAMINOPHEN AND CAFFEINE 50; 325; 40 MG/1; MG/1; MG/1
TABLET ORAL
Qty: 180 TABLET | Refills: 0 | Status: SHIPPED | OUTPATIENT
Start: 2023-07-18

## 2023-07-18 NOTE — TELEPHONE ENCOUNTER
----- Message from [place]ie [place] sent at 7/18/2023  3:03 PM EDT -----  Subject: Refill Request    QUESTIONS  Name of Medication? butalbital-acetaminophen-caffeine (FIORICET, ESGIC)   -40 MG per tablet  Patient-reported dosage and instructions? take 1 tablet by mouth every 6   hours  How many days do you have left? 0  Preferred Pharmacy? [address] [id number]  Pharmacy phone number (if available)? [phone]  ---------------------------------------------------------------------------  --------------  CALL BACK INFO  What is the best way for the office to contact you? OK to leave message on   voicemail  Preferred Call Back Phone Number? [phone]  ---------------------------------------------------------------------------  --------------  SCRIPT ANSWERS  Relationship to Patient?  Self

## 2023-07-18 NOTE — TELEPHONE ENCOUNTER
[address] [facility] Clinical Support Pool  Subject: Referral Request     Reason for referral request? Pain Management   Provider patient wants to be referred to(if known): Wallace Acosta     Provider Phone Number(if known):      Additional Information for Provider?   ---------------------------------------------------------------------------   --------------   [address]     [phone]; OK to leave message on voicemail   ---------------------------------------------------------------------------

## 2023-07-18 NOTE — TELEPHONE ENCOUNTER
----- Message from Taya Breen sent at 7/18/2023  3:01 PM EDT -----  Subject: Referral Request    Reason for referral request? General surgery  Provider patient wants to be referred to(if known):     Provider Phone Number(if known):[phone]    Additional Information for Provider? fax # [phone]  ---------------------------------------------------------------------------  --------------  Kolby [place]    [phone]; OK to leave message on voicemail  ---------------------------------------------------------------------------  --------------

## 2023-07-18 NOTE — TELEPHONE ENCOUNTER
Please Approve or Refuse.   Send to Pharmacy per Pt's Request: [facility]      Next Visit Date:  8/16/2023   Last Visit Date: 5/12/2023    Hemoglobin A1C (%)   Date Value   01/04/2018 5.1   09/29/2017 4.7   09/16/2016 5.4             ( goal A1C is < 7)   BP Readings from Last 3 Encounters:   05/12/23 138/84   03/30/23 107/73   03/25/23 129/84          (goal 120/80)  BUN   Date Value Ref Range Status   09/29/2022 11 6 - 20 mg/dL Final     Creatinine   Date Value Ref Range Status   09/29/2022 0.94 (H) 0.50 - 0.90 mg/dL Final     Potassium   Date Value Ref Range Status   09/29/2022 4.7 3.7 - 5.3 mmol/L Final

## 2023-07-19 NOTE — TELEPHONE ENCOUNTER
1. Herniation of lumbar intervertebral disc without myelopathy    - External Referral To Pain Clinic    Referral placed

## 2023-07-31 ENCOUNTER — HOSPITAL ENCOUNTER (OUTPATIENT)
Dept: PAIN MANAGEMENT | Age: 48
Discharge: HOME | End: 2023-07-31
Payer: COMMERCIAL

## 2023-07-31 VITALS — TEMPERATURE: 97.3 F | BODY MASS INDEX: 25.4 KG/M2 | RESPIRATION RATE: 20 BRPM | WEIGHT: 138 LBS | HEIGHT: 62 IN

## 2023-07-31 DIAGNOSIS — M47.817 LUMBOSACRAL SPONDYLOSIS WITHOUT MYELOPATHY: Primary | ICD-10-CM

## 2023-07-31 DIAGNOSIS — M54.16 LUMBAR RADICULOPATHY: ICD-10-CM

## 2023-07-31 DIAGNOSIS — M51.36 LUMBAR DEGENERATIVE DISC DISEASE: ICD-10-CM

## 2023-07-31 DIAGNOSIS — M47.812 CERVICAL SPONDYLOSIS: ICD-10-CM

## 2023-07-31 DIAGNOSIS — G62.9 NEUROPATHY: ICD-10-CM

## 2023-07-31 PROCEDURE — 99213 OFFICE O/P EST LOW 20 MIN: CPT

## 2023-07-31 PROCEDURE — 99214 OFFICE O/P EST MOD 30 MIN: CPT | Performed by: STUDENT IN AN ORGANIZED HEALTH CARE EDUCATION/TRAINING PROGRAM

## 2023-07-31 RX ORDER — ALPRAZOLAM 0.25 MG/1
0.25 TABLET ORAL ONCE
Qty: 1 TABLET | Refills: 0 | Status: SHIPPED | OUTPATIENT
Start: 2023-07-31 | End: 2023-07-31

## 2023-07-31 NOTE — PROGRESS NOTES
Chronic Pain Clinic Note     Encounter Date: 7/31/2023     SUBJECTIVE:  Chief Complaint   Patient presents with    Back Pain    Medication Refill     Discuss med.         History of Present Illness:   Aixa Lopez is a 51 y.o. female who presents with back pain    Medication Refill: Gabapentin & ROBAXIN    Current Complaints of Pain:   Location: back   Radiation: both legs  Severity:  Moderate  Pain Numerical Score - 10   Average: 8     Highest: 10  Lowest: 4/5  Character/Quality: Complains of pain that is stabbing, cramping in hands and feet with the stabbing feeling   Timing: Constant  Associated symptoms: none  Numbness:  yes  Weakness: yes  Exacerbating factors:  bending, walking, sitting and standing  Alleviating factors:  tens unit, compound cream  Length of time pain has been present: Started about 16 years ago  Inciting event/injury: fall  Bowel/Bladder incontinence: no  Falls: no  Physical Therapy:  yes, has tried    History of Interventions:   Surgery: No   Injections: LESI, RFA    Imaging:    No new imaging    Past Medical History:   Diagnosis Date    Anemia     Anxiety     Asthma     appt with pulmonologist 2/1/18    Bipolar 1 disorder ([address])     Blackout spell     Body piercing     X2 ABOVE AND BELOW LIP     Cervicalgia 8/14/2013    Chest pain     Chronic back pain     Chronic kidney disease     stage 3    Chronic neck pain     Constipation     COPD (chronic obstructive pulmonary disease) ([address])     Dental crown present     has dental clearance    Depression     Fall     landed on tailbone, exacrbated back pain and headaches    GERD (gastroesophageal reflux disease)     Heartburn     History of blood transfusion 2012    no reaction    Hyperglycemia     DIET CONTROLED    Insomnia     Kidney stones     Knee pain, right     Low blood pressure     Low vitamin D level     Migraine     Numbness     bilat. legs    Palpitations     Sprain of lumbosacral (joint) (ligament) 8/14/2013    Staghorn kidney stones

## 2023-08-01 ENCOUNTER — TELEPHONE (OUTPATIENT)
Dept: FAMILY MEDICINE CLINIC | Age: 48
End: 2023-08-01

## 2023-08-01 DIAGNOSIS — M51.26 HERNIATION OF LUMBAR INTERVERTEBRAL DISC WITHOUT MYELOPATHY: Primary | ICD-10-CM

## 2023-08-01 NOTE — TELEPHONE ENCOUNTER
[address] [place] [address] Clinical Support Pool  Subject: Referral Request     Reason for referral request? Patient states she saw Pain Management   yesterday for a routine 3 month visit but mentioned her migraines because   she thought maybe they could treat there but was told she would need to   see a neurologist and she would need a referral from her PCP. She is   currently taking Fioricet but they do not help. She used to do injections   before her insurance stopped paying for them, but she has since switched   insurance   Provider patient wants to be referred to(if known):     Provider Phone Number(if known):      Additional Information for Provider?   ---------------------------------------------------------------------------   --------------   Kolby [place]     [phone]; OK to leave message on voicemail   ---------------------------------------------------------------------------   --------------  --------------------------------------

## 2023-08-01 NOTE — TELEPHONE ENCOUNTER
Patient called stated that she was seen at pain management  [facility] office they will like to increase gabapentin to 800 mg. Also she will like a new referral placed for her migraines. They stated that they are unable to treat her.       gabapentin (NEURONTIN) 300 MG capsule [[id number]]     Order Details  Dose: 600 mg Route: Oral Frequency: 3 TIMES DAILY   Dispense Quantity: 180 capsule Refills: 2          Sig: Take 2 capsules by mouth 3 times daily for 90 days. Start Date: 05/15/23 End Date: 08/13/23 after 270 doses   Written Date: 05/15/23 Expiration Date: 05/14/24   Original Order:  gabapentin (NEURONTIN) 300 MG capsule [[id number]]   Providers    Authorizing Provider: Luly Hill MD NPI: [id number]   Ordering User:  Luly Hill MD          Pharmacy    [id number] [address] [place], [place] - [address] [facility] [address] [facility], [address] [address]   Phone:  [phone]  Fax:  [phone]       Please Approve or Refuse.   Send to Pharmacy per Pt's Request[facility]     Next Visit Date:  8/16/2023   Last Visit Date: 5/12/2023    Hemoglobin A1C (%)   Date Value   01/04/2018 5.1   09/29/2017 4.7   09/16/2016 5.4             ( goal A1C is < 7)   BP Readings from Last 3 Encounters:   05/12/23 138/84   03/30/23 107/73   03/25/23 129/84          (goal 120/80)  BUN   Date Value Ref Range Status   09/29/2022 11 6 - 20 mg/dL Final     Creatinine   Date Value Ref Range Status   09/29/2022 0.94 (H) 0.50 - 0.90 mg/dL Final     Potassium   Date Value Ref Range Status   09/29/2022 4.7 3.7 - 5.3 mmol/L Final

## 2023-08-01 NOTE — TELEPHONE ENCOUNTER
Patient is on highest dose of gabapentin, cannot increase at this time. She can follow with neurologist for migraine headaches, may need Botox if headaches are not controlled, I will place a new referral to pain management.

## 2023-08-02 RX ORDER — GABAPENTIN 300 MG/1
CAPSULE ORAL
Qty: 180 CAPSULE | Refills: 0 | Status: SHIPPED | OUTPATIENT
Start: 2023-08-02 | End: 2023-09-01

## 2023-08-02 NOTE — TELEPHONE ENCOUNTER
Patient called back, she would like to go to a pain management doctor that will put her to sleep if they do anything to her back. She states previous provider did not, and she felt everything. She says she never established with neurology; advised patient to reach out to them to get established.

## 2023-08-02 NOTE — TELEPHONE ENCOUNTER
Please Approve or Refuse.   Send to Pharmacy per Pt's Request:      Next Visit Date:  8/16/2023   Last Visit Date: 5/12/2023    Hemoglobin A1C (%)   Date Value   01/04/2018 5.1   09/29/2017 4.7   09/16/2016 5.4             ( goal A1C is < 7)   BP Readings from Last 3 Encounters:   05/12/23 138/84   03/30/23 107/73   03/25/23 129/84          (goal 120/80)  BUN   Date Value Ref Range Status   09/29/2022 11 6 - 20 mg/dL Final     Creatinine   Date Value Ref Range Status   09/29/2022 0.94 (H) 0.50 - 0.90 mg/dL Final     Potassium   Date Value Ref Range Status   09/29/2022 4.7 3.7 - 5.3 mmol/L Final

## 2023-08-03 ENCOUNTER — OFFICE VISIT (OUTPATIENT)
Dept: BARIATRICS/WEIGHT MGMT | Age: 48
End: 2023-08-03
Payer: COMMERCIAL

## 2023-08-03 VITALS
HEART RATE: 80 BPM | WEIGHT: 111 LBS | HEIGHT: 62 IN | OXYGEN SATURATION: 98 % | SYSTOLIC BLOOD PRESSURE: 118 MMHG | DIASTOLIC BLOOD PRESSURE: 80 MMHG | BODY MASS INDEX: 20.43 KG/M2

## 2023-08-03 DIAGNOSIS — Z98.84 STATUS POST LAPAROSCOPIC SLEEVE GASTRECTOMY: ICD-10-CM

## 2023-08-03 DIAGNOSIS — K43.9 VENTRAL HERNIA WITHOUT OBSTRUCTION OR GANGRENE: ICD-10-CM

## 2023-08-03 DIAGNOSIS — K90.89 OTHER SPECIFIED INTESTINAL MALABSORPTION: Primary | ICD-10-CM

## 2023-08-03 PROCEDURE — G8427 DOCREV CUR MEDS BY ELIG CLIN: HCPCS | Performed by: SURGERY

## 2023-08-03 PROCEDURE — 99213 OFFICE O/P EST LOW 20 MIN: CPT | Performed by: SURGERY

## 2023-08-03 PROCEDURE — 4004F PT TOBACCO SCREEN RCVD TLK: CPT | Performed by: SURGERY

## 2023-08-03 PROCEDURE — G8420 CALC BMI NORM PARAMETERS: HCPCS | Performed by: SURGERY

## 2023-08-03 NOTE — TELEPHONE ENCOUNTER
Please notify pt, it is upto pain management , how he prefer to treat you . I cannot take decision on his behalf. Please schedule amarilis as reffered , I cannot place any further refferals.

## 2023-08-07 RX ORDER — VENLAFAXINE HYDROCHLORIDE 75 MG/1
75 CAPSULE, EXTENDED RELEASE ORAL EVERY MORNING
Qty: 30 CAPSULE | Refills: 0 | Status: SHIPPED | OUTPATIENT
Start: 2023-08-07

## 2023-08-07 RX ORDER — FUROSEMIDE 20 MG/1
TABLET ORAL
Qty: 60 TABLET | Refills: 3 | Status: SHIPPED | OUTPATIENT
Start: 2023-08-07

## 2023-08-08 RX ORDER — PRAMIPEXOLE DIHYDROCHLORIDE 0.12 MG/1
TABLET ORAL
Qty: 90 TABLET | Refills: 3 | Status: SHIPPED | OUTPATIENT
Start: 2023-08-08

## 2023-08-13 ENCOUNTER — APPOINTMENT (OUTPATIENT)
Dept: GENERAL RADIOLOGY | Age: 48
End: 2023-08-13
Payer: COMMERCIAL

## 2023-08-13 ENCOUNTER — HOSPITAL ENCOUNTER (EMERGENCY)
Age: 48
Discharge: HOME | End: 2023-08-13
Attending: STUDENT IN AN ORGANIZED HEALTH CARE EDUCATION/TRAINING PROGRAM
Payer: COMMERCIAL

## 2023-08-13 VITALS
SYSTOLIC BLOOD PRESSURE: 99 MMHG | OXYGEN SATURATION: 98 % | WEIGHT: 118 LBS | RESPIRATION RATE: 16 BRPM | TEMPERATURE: 98.3 F | DIASTOLIC BLOOD PRESSURE: 59 MMHG | HEART RATE: 87 BPM | BODY MASS INDEX: 21.71 KG/M2 | HEIGHT: 62 IN

## 2023-08-13 DIAGNOSIS — S82.891S CLOSED RIGHT ANKLE FRACTURE, SEQUELA: Primary | ICD-10-CM

## 2023-08-13 DIAGNOSIS — H65.91 RIGHT NON-SUPPURATIVE OTITIS MEDIA: ICD-10-CM

## 2023-08-13 PROCEDURE — 73562 X-RAY EXAM OF KNEE 3: CPT

## 2023-08-13 PROCEDURE — 73590 X-RAY EXAM OF LOWER LEG: CPT

## 2023-08-13 PROCEDURE — 6370000000 HC RX 637 (ALT 250 FOR IP): Performed by: STUDENT IN AN ORGANIZED HEALTH CARE EDUCATION/TRAINING PROGRAM

## 2023-08-13 PROCEDURE — 73610 X-RAY EXAM OF ANKLE: CPT

## 2023-08-13 PROCEDURE — 99283 EMERGENCY DEPT VISIT LOW MDM: CPT

## 2023-08-13 RX ORDER — AMOXICILLIN AND CLAVULANATE POTASSIUM 875; 125 MG/1; MG/1
1 TABLET, FILM COATED ORAL 2 TIMES DAILY
Qty: 14 TABLET | Refills: 0 | Status: SHIPPED | OUTPATIENT
Start: 2023-08-13 | End: 2023-08-20

## 2023-08-13 RX ORDER — AMOXICILLIN AND CLAVULANATE POTASSIUM 875; 125 MG/1; MG/1
1 TABLET, FILM COATED ORAL ONCE
Status: COMPLETED | OUTPATIENT
Start: 2023-08-13 | End: 2023-08-13

## 2023-08-13 RX ORDER — ACETAMINOPHEN 500 MG
1000 TABLET ORAL ONCE
Status: COMPLETED | OUTPATIENT
Start: 2023-08-13 | End: 2023-08-13

## 2023-08-13 RX ADMIN — AMOXICILLIN AND CLAVULANATE POTASSIUM 1 TABLET: 875; 125 TABLET, FILM COATED ORAL at 22:19

## 2023-08-13 RX ADMIN — ACETAMINOPHEN 1000 MG: 500 TABLET ORAL at 22:19

## 2023-08-13 ASSESSMENT — LIFESTYLE VARIABLES
HOW OFTEN DO YOU HAVE A DRINK CONTAINING ALCOHOL: MONTHLY OR LESS
HOW MANY STANDARD DRINKS CONTAINING ALCOHOL DO YOU HAVE ON A TYPICAL DAY: 1 OR 2

## 2023-08-13 ASSESSMENT — ENCOUNTER SYMPTOMS
BACK PAIN: 0
VOMITING: 0
FACIAL SWELLING: 0
SINUS PAIN: 0
DIARRHEA: 0
WHEEZING: 0
RHINORRHEA: 0
ABDOMINAL DISTENTION: 0
NAUSEA: 0
SINUS PRESSURE: 0
TROUBLE SWALLOWING: 0
PHOTOPHOBIA: 0
SHORTNESS OF BREATH: 0
COUGH: 0
SORE THROAT: 0
ABDOMINAL PAIN: 0

## 2023-08-13 ASSESSMENT — PAIN DESCRIPTION - FREQUENCY: FREQUENCY: CONTINUOUS

## 2023-08-13 ASSESSMENT — PAIN SCALES - GENERAL
PAINLEVEL_OUTOF10: 9
PAINLEVEL_OUTOF10: 6

## 2023-08-13 ASSESSMENT — PAIN DESCRIPTION - DESCRIPTORS: DESCRIPTORS: ACHING

## 2023-08-13 ASSESSMENT — PAIN - FUNCTIONAL ASSESSMENT
PAIN_FUNCTIONAL_ASSESSMENT: 0-10
PAIN_FUNCTIONAL_ASSESSMENT: 0-10

## 2023-08-13 ASSESSMENT — PAIN DESCRIPTION - LOCATION
LOCATION: ANKLE
LOCATION: ANKLE;EAR

## 2023-08-13 ASSESSMENT — PAIN DESCRIPTION - ORIENTATION
ORIENTATION: RIGHT
ORIENTATION: RIGHT

## 2023-08-14 ENCOUNTER — TELEPHONE (OUTPATIENT)
Dept: FAMILY MEDICINE CLINIC | Age: 48
End: 2023-08-14

## 2023-08-14 NOTE — DISCHARGE INSTRUCTIONS
Seen in the emergency department for a chronic right ankle fracture and acute right inner ear infection. Please take Augmentin to completion as prescribed for the inner ear infection. Please use walking boot when ambulating. Okay to not use the boot while at home and resting. Follow-up with your orthopedic surgeon as soon as possible. Please follow-up with your primary care as needed. Please return to the emergency department if you begin to develop any worsened pain, acute injury to your ankle, decreased hearing, or any other concerning symptoms.   Please follow-up with your PCP regarding the recurrent ear infections as well

## 2023-08-14 NOTE — ED PROVIDER NOTES
[address] [facility] ED  Emergency Department Encounter  Emergency Medicine Resident     Pt Name:Marycruz Hein  MRN: [id number]  [facility] 1975  Date of evaluation: 8/13/23  PCP:  Ishaan Turcios MD  Note Started: 10:04 PM EDT      CHIEF COMPLAINT       Chief Complaint   Patient presents with    Ankle Pain    Otalgia       HISTORY OF PRESENT ILLNESS  (Location/Symptom, Timing/Onset, Context/Setting, Quality, Duration, Modifying Factors, Severity.)      Zaina Deras is a 51 y.o. female with recurrent ear infections and a chronic right ankle fracture since September 2022 who presents to the ED tonight with right ear pain and right ankle pain. Tells me that she has an outpatient MRI scheduled by an orthopedic surgeon for the right ankle however the pain has increased. No recent traumas or any reason that she could think of as to while she has increased ankle pain. She does mention that she has peripheral neuropathy and it is difficult for her to wear a walking boot which has previously been prescribed to her. Regarding her right earache, she tells me that she is working on getting \"tubes\" for her recurrent ear infections. Denies fever, hearing loss, decreased hearing, cough, congestion, headaches, visual changes, facial numbness, difficulty swallowing, sore throat.      PAST MEDICAL / SURGICAL / SOCIAL / FAMILY HISTORY      has a past medical history of Anemia, Anxiety, Asthma, Bipolar 1 disorder ([address]), Blackout spell, Body piercing, Cervicalgia, Chest pain, Chronic back pain, Chronic kidney disease, Chronic neck pain, Constipation, COPD (chronic obstructive pulmonary disease) ([address]), Dental crown present, Depression, Fall, GERD (gastroesophageal reflux disease), Heartburn, History of blood transfusion, Hyperglycemia, Insomnia, Kidney stones, Knee pain, right, Low blood pressure, Low vitamin D level, Migraine, Numbness, Palpitations, Sprain of lumbosacral (joint) (ligament), Staghorn kidney stones, Normal rate and regular rhythm. Pulses: Normal pulses. Heart sounds: Normal heart sounds. No murmur heard. Pulmonary:      Effort: Pulmonary effort is normal. No respiratory distress. Breath sounds: Normal breath sounds. Abdominal:      General: Abdomen is flat. There is no distension. Palpations: Abdomen is soft. Tenderness: There is no abdominal tenderness. There is no right CVA tenderness, left CVA tenderness or guarding. Musculoskeletal:         General: Tenderness present. No deformity or signs of injury. Cervical back: Normal range of motion and neck supple. No tenderness. Right lower leg: No edema. Left lower leg: No edema. Comments: Tender to palpation right lateral malleolus and right proximal fibular head   Skin:     General: Skin is warm and dry. Findings: Bruising (right lateral foot) present. Neurological:      Mental Status: She is alert and oriented to person, place, and time. Mental status is at baseline. Sensory: No sensory deficit. Motor: No weakness. Coordination: Coordination normal.   Psychiatric:         Mood and Affect: Mood normal.         Behavior: Behavior normal.         Thought Content: Thought content normal.         Judgment: Judgment normal.         DDX/DIAGNOSTIC RESULTS / EMERGENCY DEPARTMENT COURSE / MDM     Medical Decision Making  48-year-old female with medical history of a chronic right lateral malleoli are fracture suffered in September 2022 along with recurrent ear infections who presents with right-sided otalgia and right lateral malleolus with right proximal fibular head tenderness palpation. Concerns for potential Maisonneuve fracture so will obtain xray imaging and treat pain with Tylenol. Scheduled outpatient ortho follow-up already.  Giving Augmentin for right sided otitis media infection and will send home with prescription     Amount and/or Complexity of Data Reviewed  Radiology:

## 2023-08-14 NOTE — TELEPHONE ENCOUNTER
[facility]) ED Follow up Call    Reason for ED visit:  [facility] , this is Amelia Phan from Dr. Mallika Calhoun office, just calling to see how you are doing after your recent ED visit. Did you receive discharge instructions? YES  Do you understand the discharge instructions? Yes  Did the ED give you any new prescriptions? Yes  Were you able to fill your prescriptions? Yes      Do you have one of our red, yellow and green  Zone sheets that help you to determine when you should go to the ED? Not Applicable      Do you need or want to make a follow up appt with your PCP? No ALREADY HAS APPOINTMENT SCHEDULED     Do you have any further needs in the home i.e. Equipment? No        FU appts/Provider:    Future Appointments   Date Time Provider [address]   8/15/2023  5:30 PM [facility] MRI RM 1 (1.5T) [facility]   8/15/2023  6:15 PM [facility] MRI RM 1 (1.5T) [facility]   8/16/2023  1:15 PM Adriano Lla MD  [id number]   8/21/2023 10:30 AM Desiree Banegas MD [facility]   8/21/2023  3:45 PM Vilma Smith DO [address] IF NOT USED  Hi, this message is for  CRISTOBAL  This is Amelia Phan from [employer] office. Just calling to see how you are doing after your recent visit to the Emergency Room. [employer] wants to make sure you were able to fill any prescriptions and that you understand your discharge instructions. Please return our call if you need to make a follow up appointment with your provider or have any further needs. Our phone number is [phone]. Have a great day.

## 2023-08-15 ENCOUNTER — HOSPITAL ENCOUNTER (OUTPATIENT)
Dept: MRI IMAGING | Age: 48
Discharge: HOME | End: 2023-08-17
Attending: ORTHOPAEDIC SURGERY
Payer: COMMERCIAL

## 2023-08-15 DIAGNOSIS — S82.831K CLOSED AVULSION FRACTURE OF DISTAL END OF RIGHT FIBULA WITH NONUNION, SUBSEQUENT ENCOUNTER: ICD-10-CM

## 2023-08-15 DIAGNOSIS — M19.171 POST-TRAUMATIC ARTHRITIS OF RIGHT ANKLE: ICD-10-CM

## 2023-08-15 DIAGNOSIS — M95.8 OSTEOCHONDRAL DEFECT OF ANKLE: ICD-10-CM

## 2023-08-15 DIAGNOSIS — M54.16 LUMBAR RADICULOPATHY: ICD-10-CM

## 2023-08-15 PROCEDURE — 73721 MRI JNT OF LWR EXTRE W/O DYE: CPT

## 2023-08-15 PROCEDURE — 72148 MRI LUMBAR SPINE W/O DYE: CPT

## 2023-08-18 ENCOUNTER — HOSPITAL ENCOUNTER (EMERGENCY)
Age: 48
Discharge: HOME | End: 2023-08-18
Attending: EMERGENCY MEDICINE
Payer: COMMERCIAL

## 2023-08-18 VITALS
SYSTOLIC BLOOD PRESSURE: 126 MMHG | OXYGEN SATURATION: 97 % | TEMPERATURE: 98.2 F | HEART RATE: 98 BPM | RESPIRATION RATE: 18 BRPM | WEIGHT: 119 LBS | HEIGHT: 62 IN | BODY MASS INDEX: 21.9 KG/M2 | DIASTOLIC BLOOD PRESSURE: 83 MMHG

## 2023-08-18 DIAGNOSIS — H92.02 LEFT EAR PAIN: Primary | ICD-10-CM

## 2023-08-18 DIAGNOSIS — J06.9 ACUTE UPPER RESPIRATORY INFECTION: ICD-10-CM

## 2023-08-18 PROCEDURE — 6370000000 HC RX 637 (ALT 250 FOR IP): Performed by: EMERGENCY MEDICINE

## 2023-08-18 PROCEDURE — 99283 EMERGENCY DEPT VISIT LOW MDM: CPT

## 2023-08-18 RX ORDER — ACETAMINOPHEN 500 MG
1000 TABLET ORAL ONCE
Status: COMPLETED | OUTPATIENT
Start: 2023-08-18 | End: 2023-08-18

## 2023-08-18 RX ORDER — ACETAMINOPHEN 500 MG
1000 TABLET ORAL EVERY 6 HOURS PRN
Qty: 40 TABLET | Refills: 0 | Status: SHIPPED | OUTPATIENT
Start: 2023-08-18

## 2023-08-18 RX ADMIN — ACETAMINOPHEN 1000 MG: 500 TABLET ORAL at 00:52

## 2023-08-18 ASSESSMENT — ENCOUNTER SYMPTOMS
CONSTIPATION: 0
DIARRHEA: 0
COUGH: 1
ABDOMINAL PAIN: 0
RHINORRHEA: 0
NAUSEA: 0
TROUBLE SWALLOWING: 0
SINUS PRESSURE: 0
WHEEZING: 0
BACK PAIN: 0
VOMITING: 0
BLOOD IN STOOL: 0
FACIAL SWELLING: 0
COLOR CHANGE: 0
EYE DISCHARGE: 0
EYE PAIN: 0
SORE THROAT: 0
EYE REDNESS: 0
CHEST TIGHTNESS: 0
SHORTNESS OF BREATH: 0

## 2023-08-18 ASSESSMENT — PAIN DESCRIPTION - LOCATION: LOCATION: EAR

## 2023-08-18 ASSESSMENT — LIFESTYLE VARIABLES
HOW MANY STANDARD DRINKS CONTAINING ALCOHOL DO YOU HAVE ON A TYPICAL DAY: 3 OR 4
HOW OFTEN DO YOU HAVE A DRINK CONTAINING ALCOHOL: 2-3 TIMES A WEEK

## 2023-08-18 ASSESSMENT — PAIN DESCRIPTION - FREQUENCY: FREQUENCY: CONTINUOUS

## 2023-08-18 ASSESSMENT — PAIN - FUNCTIONAL ASSESSMENT
PAIN_FUNCTIONAL_ASSESSMENT: 0-10
PAIN_FUNCTIONAL_ASSESSMENT: PREVENTS OR INTERFERES WITH ALL ACTIVE AND SOME PASSIVE ACTIVITIES

## 2023-08-18 ASSESSMENT — PAIN DESCRIPTION - DESCRIPTORS
DESCRIPTORS: THROBBING;PRESSURE
DESCRIPTORS: PRESSURE;THROBBING

## 2023-08-18 ASSESSMENT — PAIN DESCRIPTION - ORIENTATION: ORIENTATION: RIGHT;LEFT

## 2023-08-18 ASSESSMENT — PAIN SCALES - GENERAL
PAINLEVEL_OUTOF10: 9
PAINLEVEL_OUTOF10: 9

## 2023-08-18 ASSESSMENT — PAIN DESCRIPTION - PAIN TYPE: TYPE: ACUTE PAIN

## 2023-08-18 ASSESSMENT — PAIN DESCRIPTION - ONSET: ONSET: GRADUAL

## 2023-08-18 NOTE — ED TRIAGE NOTES
Mode of arrival (squad #, walk in, police, etc) : walk in        Chief complaint(s): Pt reports pain in bilateral ears for the last three days. Arrival Note (brief scenario, treatment PTA, etc). : ear pain        C= \"Have you ever felt that you should Cut down on your drinking? \"  No  A= \"Have people Annoyed you by criticizing your drinking? \"  No  G= \"Have you ever felt bad or Guilty about your drinking? \"  No  E= \"Have you ever had a drink as an Eye-opener first thing in the morning to steady your nerves or to help a hangover? \"  No      Deferred []      Reason for deferring: N/A    *If yes to two or more: probable alcohol abuse. *

## 2023-08-18 NOTE — ED NOTES
Discharge instructions reviewed with patient, noting all directions and education by provider. Patient verbalizes understanding of all information reviewed, gathered personal items, and transferred under own power off unit to lobby without incident.       Karlos Sanchez RN  89/00/69 [phone]

## 2023-08-18 NOTE — ED PROVIDER NOTES
[facility], [place], operated by [facility]6Th Floor ED  eMERGENCY dEPARTMENT eNCOUnter      Pt Name: rKupa Manzano  MRN: [id number]  [facility] 1975  Date of evaluation: 8/18/23      CHIEF COMPLAINT       Chief Complaint   Patient presents with    Otalgia    Cough         HISTORY OF PRESENT ILLNESS    Krupa Manzano is a 51 y.o. female who presents complaining of otalgia. Patient states that for the last week she has been having pain in her ears right and left. Patient was seen thought was an ear infection was started on amoxicillin which she has been taking. Patient states left ear is getting worse getting pain coming down her left jaw. Patient states now she is also starting to feel some congestion and a cough. Patient denies any vomiting diarrhea or fevers. REVIEW OF SYSTEMS       Review of Systems   Constitutional:  Negative for activity change, appetite change, chills, diaphoresis and fever. HENT:  Positive for congestion and ear pain. Negative for facial swelling, nosebleeds, rhinorrhea, sinus pressure, sore throat and trouble swallowing. Eyes:  Negative for pain, discharge and redness. Respiratory:  Positive for cough. Negative for chest tightness, shortness of breath and wheezing. Cardiovascular:  Negative for chest pain, palpitations and leg swelling. Gastrointestinal:  Negative for abdominal pain, blood in stool, constipation, diarrhea, nausea and vomiting. Genitourinary:  Negative for difficulty urinating, dysuria, flank pain, frequency, genital sores and hematuria. Musculoskeletal:  Negative for arthralgias, back pain, gait problem, joint swelling, myalgias and neck pain. Skin:  Negative for color change, pallor, rash and wound. Neurological:  Negative for dizziness, tremors, seizures, syncope, speech difficulty, weakness, numbness and headaches. Psychiatric/Behavioral:  Negative for confusion, decreased concentration, hallucinations, self-injury, sleep disturbance and suicidal ideas.       PAST MEDICAL

## 2023-08-21 ENCOUNTER — HOSPITAL ENCOUNTER (OUTPATIENT)
Dept: PAIN MANAGEMENT | Age: 48
Discharge: HOME | End: 2023-08-21
Payer: COMMERCIAL

## 2023-08-21 VITALS — BODY MASS INDEX: 21.9 KG/M2 | HEIGHT: 62 IN | TEMPERATURE: 97.3 F | RESPIRATION RATE: 20 BRPM | WEIGHT: 119 LBS

## 2023-08-21 DIAGNOSIS — G62.9 NEUROPATHY: ICD-10-CM

## 2023-08-21 DIAGNOSIS — M51.36 LUMBAR DEGENERATIVE DISC DISEASE: ICD-10-CM

## 2023-08-21 DIAGNOSIS — M47.817 LUMBOSACRAL SPONDYLOSIS WITHOUT MYELOPATHY: Primary | ICD-10-CM

## 2023-08-21 PROCEDURE — 99213 OFFICE O/P EST LOW 20 MIN: CPT

## 2023-08-21 PROCEDURE — 99214 OFFICE O/P EST MOD 30 MIN: CPT | Performed by: STUDENT IN AN ORGANIZED HEALTH CARE EDUCATION/TRAINING PROGRAM

## 2023-08-21 NOTE — PROGRESS NOTES
Inhalation, EVERY 6 HOURS PRN    budesonide-formoterol (SYMBICORT) 160-4.5 MCG/ACT AERO 2 puffs, Inhalation, DAILY    busPIRone (BUSPAR) 10 mg, Oral, 3 TIMES DAILY    butalbital-acetaminophen-caffeine (FIORICET, ESGIC) -40 MG per tablet take 1 tablet by mouth every 6 hours if needed for headache or migraines    diclofenac sodium (VOLTAREN) 1 % GEL apply 4 grams topically four times a day AS NEEDED FOR PAIN    diclofenac sodium (VOLTAREN) 2 g, Topical, 4 TIMES DAILY PRN    fexofenadine (ALLEGRA) 180 mg, Oral, DAILY    fluticasone (FLONASE) 50 MCG/ACT nasal spray 1 spray, Each Nostril, DAILY    Fremanezumab-vfrm (AJOVY) 225 MG/1.5ML SOSY inject 1 AND 1/2 milliliters ( 225 milligrams ) subcutaneously EVERY 28 DAYS    furosemide (LASIX) 20 MG tablet take 1 tablet by mouth twice a day    gabapentin (NEURONTIN) 300 MG capsule take 2 capsules by mouth three times a day    montelukast (SINGULAIR) 10 mg, NIGHTLY    Multiple Vitamins-Minerals (WOMENS ONE DAILY) TABS 1 tablet, DAILY    naproxen (NAPROSYN) 500 mg, Oral, 2 TIMES DAILY WITH MEALS    nicotine (NICODERM CQ) 14 MG/24HR 1 patch, TransDERmal, DAILY    nitroGLYCERIN (NITROSTAT) 0.3 MG SL tablet place 1 tablet under the tongue if needed every 5 minutes for chest pain for 3 doses IF NO RELIEF AFTER FIRST DOSE CALL PRESCRIBER .    omeprazole (PRILOSEC) 20 MG delayed release capsule take 2 capsules by mouth once daily    oxymetazoline (12 HOUR NASAL SPRAY) 0.05 % nasal spray 2 sprays, Nasal, 2 TIMES DAILY    polyethylene glycol (GLYCOLAX) 17 g, DAILY    Potassium 75 mg, Oral, 2 TIMES DAILY    pramipexole (MIRAPEX) 0.125 MG tablet take 1 tablet by mouth once daily at bedtime    pseudoephedrine (DECONGESTANT) 30 mg, Oral, EVERY 6 HOURS PRN    rizatriptan (MAXALT) 10 MG tablet rizatriptan 10 mg tablet    SUMAtriptan (IMITREX) 100 mg, ONCE PRN    tiZANidine (ZANAFLEX) 4 mg, Oral, 3 TIMES DAILY PRN    venlafaxine (EFFEXOR XR) 75 mg, Oral, EVERY MORNING    vitamin D3

## 2023-08-23 RX ORDER — ALBUTEROL SULFATE 90 UG/1
AEROSOL, METERED RESPIRATORY (INHALATION)
Qty: 18 G | Refills: 3 | Status: SHIPPED | OUTPATIENT
Start: 2023-08-23

## 2023-08-23 NOTE — TELEPHONE ENCOUNTER
Please Approve or Refuse.   Send to Pharmacy per Pt's Request: [facility]     Next Visit Date:  called out to pt lvm to schedule   Last Visit Date: 5/12/2023    Hemoglobin A1C (%)   Date Value   01/04/2018 5.1   09/29/2017 4.7   09/16/2016 5.4             ( goal A1C is < 7)   BP Readings from Last 3 Encounters:   08/18/23 126/83   08/13/23 (!) 99/59   08/03/23 118/80          (goal 120/80)  BUN   Date Value Ref Range Status   09/29/2022 11 6 - 20 mg/dL Final     Creatinine   Date Value Ref Range Status   09/29/2022 0.94 (H) 0.50 - 0.90 mg/dL Final     Potassium   Date Value Ref Range Status   09/29/2022 4.7 3.7 - 5.3 mmol/L Final

## 2023-08-25 ENCOUNTER — HOSPITAL ENCOUNTER (EMERGENCY)
Age: 48
Discharge: HOME | End: 2023-08-25
Attending: STUDENT IN AN ORGANIZED HEALTH CARE EDUCATION/TRAINING PROGRAM
Payer: COMMERCIAL

## 2023-08-25 VITALS
BODY MASS INDEX: 21.71 KG/M2 | SYSTOLIC BLOOD PRESSURE: 114 MMHG | TEMPERATURE: 97.9 F | HEART RATE: 87 BPM | HEIGHT: 62 IN | RESPIRATION RATE: 16 BRPM | DIASTOLIC BLOOD PRESSURE: 72 MMHG | WEIGHT: 118 LBS | OXYGEN SATURATION: 97 %

## 2023-08-25 DIAGNOSIS — G43.909 MIGRAINE WITHOUT STATUS MIGRAINOSUS, NOT INTRACTABLE, UNSPECIFIED MIGRAINE TYPE: Primary | ICD-10-CM

## 2023-08-25 PROCEDURE — 6360000002 HC RX W HCPCS: Performed by: STUDENT IN AN ORGANIZED HEALTH CARE EDUCATION/TRAINING PROGRAM

## 2023-08-25 PROCEDURE — 2580000003 HC RX 258: Performed by: STUDENT IN AN ORGANIZED HEALTH CARE EDUCATION/TRAINING PROGRAM

## 2023-08-25 PROCEDURE — 99284 EMERGENCY DEPT VISIT MOD MDM: CPT

## 2023-08-25 PROCEDURE — 96372 THER/PROPH/DIAG INJ SC/IM: CPT

## 2023-08-25 RX ORDER — PROMETHAZINE HYDROCHLORIDE 25 MG/1
25 TABLET ORAL EVERY 8 HOURS PRN
Qty: 20 TABLET | Refills: 0 | Status: SHIPPED | OUTPATIENT
Start: 2023-08-25 | End: 2023-09-01

## 2023-08-25 RX ORDER — SODIUM CHLORIDE, SODIUM LACTATE, POTASSIUM CHLORIDE, AND CALCIUM CHLORIDE .6; .31; .03; .02 G/100ML; G/100ML; G/100ML; G/100ML
1000 INJECTION, SOLUTION INTRAVENOUS ONCE
Status: COMPLETED | OUTPATIENT
Start: 2023-08-25 | End: 2023-08-25

## 2023-08-25 RX ORDER — DROPERIDOL 2.5 MG/ML
1.25 INJECTION, SOLUTION INTRAMUSCULAR; INTRAVENOUS ONCE
Status: COMPLETED | OUTPATIENT
Start: 2023-08-25 | End: 2023-08-25

## 2023-08-25 RX ADMIN — SODIUM CHLORIDE, POTASSIUM CHLORIDE, SODIUM LACTATE AND CALCIUM CHLORIDE 1000 ML: 600; 310; 30; 20 INJECTION, SOLUTION INTRAVENOUS at 22:41

## 2023-08-25 RX ADMIN — DROPERIDOL 1.25 MG: 2.5 INJECTION, SOLUTION INTRAMUSCULAR; INTRAVENOUS at 22:37

## 2023-08-25 ASSESSMENT — LIFESTYLE VARIABLES
HOW MANY STANDARD DRINKS CONTAINING ALCOHOL DO YOU HAVE ON A TYPICAL DAY: 3 OR 4
HOW OFTEN DO YOU HAVE A DRINK CONTAINING ALCOHOL: 2-4 TIMES A MONTH

## 2023-08-25 ASSESSMENT — ENCOUNTER SYMPTOMS
ABDOMINAL PAIN: 0
BACK PAIN: 0
RHINORRHEA: 0
COUGH: 0
VOMITING: 0
NAUSEA: 0
SHORTNESS OF BREATH: 0

## 2023-08-25 ASSESSMENT — PAIN - FUNCTIONAL ASSESSMENT: PAIN_FUNCTIONAL_ASSESSMENT: 0-10

## 2023-08-25 ASSESSMENT — PAIN SCALES - GENERAL: PAINLEVEL_OUTOF10: 3

## 2023-08-26 NOTE — ED TRIAGE NOTES
Mode of arrival (squad #, walk in, police, etc) : walk in        Chief complaint(s): Migraine        Arrival Note (brief scenario, treatment PTA, etc). : Pt reports migraine x 4 days. Pt states she has been nauseated, vomiting and unable to eat. C= \"Have you ever felt that you should Cut down on your drinking? \"  No  A= \"Have people Annoyed you by criticizing your drinking? \"  No  G= \"Have you ever felt bad or Guilty about your drinking? \"  No  E= \"Have you ever had a drink as an Eye-opener first thing in the morning to steady your nerves or to help a hangover? \"  No      Deferred []      Reason for deferring: N/A    *If yes to two or more: probable alcohol abuse. *

## 2023-08-26 NOTE — DISCHARGE INSTRUCTIONS
Please follow-up with your family doctor with your neurologist  Please take the Phenergan as needed as prescribed for migraines  Please stay well-hydrated  Return to the emergency room any severe worsening symptoms that you cannot control at home

## 2023-08-26 NOTE — ED PROVIDER NOTES
not have Code status discussion since patient being discharged      PROCEDURES:  None    CONSULTS:  None    CRITICAL CARE:  There was a high probability of clinically significant/life threatening deterioration in this patient's condition which required my urgent intervention. Total critical care time was 0 minutes. This excludes any time for separately reportable procedures. FINAL IMPRESSION     1. Migraine without status migrainosus, not intractable, unspecified migraine type          DISPOSITION / PLAN   DISPOSITION Decision To Discharge 08/25/2023 11:46:24 PM      Evaluation and treatment course in the ED, and plan of care upon discharge was discussed in length with the patient/patient representative. Patient/patient representative had no further questions prior to being discharged and was instructed to return to the ED for new or worsening symptoms. Any changes to existing medications or new prescriptions were reviewed with patient/patient representative and they expressed understanding of how to correctly take their medications and the possible side effects. PATIENT REFERRED TO:  To Garcia MD  [address] [place] [id number] [phone]    Schedule an appointment as soon as possible for a visit         DISCHARGE MEDICATIONS:  Discharge Medication List as of 8/25/2023 11:48 PM        START taking these medications    Details   promethazine (PHENERGAN) 25 MG tablet Take 1 tablet by mouth every 8 hours as needed for Nausea (Migraine) WARNING:  May cause drowsiness. May impair ability to operate vehicles or machinery.   Do not use in combination with alcohol., Disp-20 tablet, R-0Normal             Wai Hogan DO  Emergency Medicine Attending    (Please note that portions of this note were completed with a voice recognition program.  Efforts were made to edit the dictations but occasionally words are mis-transcribed.)          Wai Hogan DO  08/26/23 0952

## 2023-08-28 ENCOUNTER — TELEPHONE (OUTPATIENT)
Dept: FAMILY MEDICINE CLINIC | Age: 48
End: 2023-08-28

## 2023-08-28 NOTE — TELEPHONE ENCOUNTER
[facility]) ED Follow up Call    Reason for ED visit:  MIGRAINE           FU appts/Provider:    Future Appointments   Date Time Provider [address]   9/18/2023 10:30 AM MD DELIA [facility]   9/26/2023  2:30 PM Teodorasandy Hussein, DO [facility]   10/10/2023  2:30 PM Cristian Griffin, DO [facility]   11/6/2023  2:00 PM Teodora Hussein, DO [facility]       VOICEMAIL DOCUMENTATION - ERASE IF NOT USED  Hi, this message is for  CRISTOBAL  This is Bharat Dunn from [facility] office. Just calling to see how you are doing after your recent visit to the Emergency Room. [facility] wants to make sure you were able to fill any prescriptions and that you understand your discharge instructions. Please return our call if you need to make a follow up appointment with your provider or have any further needs. Our phone number is [phone]*. Have a great day.

## 2023-08-30 ENCOUNTER — TELEPHONE (OUTPATIENT)
Dept: ORTHOPEDIC SURGERY | Age: 48
End: 2023-08-30

## 2023-08-30 NOTE — TELEPHONE ENCOUNTER
Britt Nixon from [employer] Office left message on the answering machine asking if Dr. Monika Euceda ever does narrative reports because they would like one on this patient. Please call her back at [phone]. detailed exam

## 2023-09-05 RX ORDER — OMEPRAZOLE 20 MG/1
CAPSULE, DELAYED RELEASE ORAL
Qty: 30 CAPSULE | Refills: 3 | Status: SHIPPED | OUTPATIENT
Start: 2023-09-05

## 2023-09-05 RX ORDER — VENLAFAXINE HYDROCHLORIDE 75 MG/1
75 CAPSULE, EXTENDED RELEASE ORAL EVERY MORNING
Qty: 30 CAPSULE | Refills: 0 | Status: SHIPPED | OUTPATIENT
Start: 2023-09-05

## 2023-09-05 NOTE — TELEPHONE ENCOUNTER
Please Approve or Refuse.   Send to Pharmacy per Pt's Request: [facility]     Next Visit Date:  called pt and lvm   Last Visit Date: 5/12/2023    Hemoglobin A1C (%)   Date Value   01/04/2018 5.1   09/29/2017 4.7   09/16/2016 5.4             ( goal A1C is < 7)   BP Readings from Last 3 Encounters:   08/25/23 114/72   08/18/23 126/83   08/13/23 (!) 99/59          (goal 120/80)  BUN   Date Value Ref Range Status   09/29/2022 11 6 - 20 mg/dL Final     Creatinine   Date Value Ref Range Status   09/29/2022 0.94 (H) 0.50 - 0.90 mg/dL Final     Potassium   Date Value Ref Range Status   09/29/2022 4.7 3.7 - 5.3 mmol/L Final

## 2023-09-05 NOTE — TELEPHONE ENCOUNTER
Please Approve or Refuse.   Send to Pharmacy per Pt's Request:      Next Visit Date:  Visit date not found   Last Visit Date: 5/12/2023    Hemoglobin A1C (%)   Date Value   01/04/2018 5.1   09/29/2017 4.7   09/16/2016 5.4             ( goal A1C is < 7)   BP Readings from Last 3 Encounters:   08/25/23 114/72   08/18/23 126/83   08/13/23 (!) 99/59          (goal 120/80)  BUN   Date Value Ref Range Status   09/29/2022 11 6 - 20 mg/dL Final     Creatinine   Date Value Ref Range Status   09/29/2022 0.94 (H) 0.50 - 0.90 mg/dL Final     Potassium   Date Value Ref Range Status   09/29/2022 4.7 3.7 - 5.3 mmol/L Final

## 2023-09-08 DIAGNOSIS — S82.831K CLOSED AVULSION FRACTURE OF DISTAL END OF RIGHT FIBULA WITH NONUNION, SUBSEQUENT ENCOUNTER: ICD-10-CM

## 2023-09-08 RX ORDER — GABAPENTIN 300 MG/1
CAPSULE ORAL
Qty: 180 CAPSULE | Refills: 0 | Status: SHIPPED | OUTPATIENT
Start: 2023-09-08 | End: 2023-09-19 | Stop reason: ALTCHOICE

## 2023-09-08 NOTE — TELEPHONE ENCOUNTER
----- Message from Radha Mejía sent at 9/8/2023 11:05 AM EDT -----  Subject: Refill Request    QUESTIONS  Name of Medication? gabapentin (NEURONTIN) 300 MG capsule  Patient-reported dosage and instructions? 300mg, 6 pills daily  How many days do you have left? 0  Preferred Pharmacy? [id number]  Pharmacy phone number (if available)? [phone]  ---------------------------------------------------------------------------  --------------,  Name of Medication? methocarbamol (ROBAXIN-750) 750 MG tablet  Patient-reported dosage and instructions? 750mg 3 times daily  How many days do you have left? 30  Preferred Pharmacy? [facility] phone number (if available)? [phone]  ---------------------------------------------------------------------------  --------------,  Name of Medication? diclofenac sodium (VOLTAREN) 1 % GEL  Patient-reported dosage and instructions? as needed  How many days do you have left? 0  Preferred Pharmacy? [facility] phone number (if available)? [phone]  Additional Information for Provider? call pt back when these refills have   been sent in or if theres any issues refilling  ---------------------------------------------------------------------------  --------------  CALL BACK INFO  What is the best way for the office to contact you? OK to leave message on   voicemail  Preferred Call Back Phone Number? [phone]  ---------------------------------------------------------------------------  --------------  SCRIPT ANSWERS  Relationship to Patient?  Self

## 2023-09-08 NOTE — TELEPHONE ENCOUNTER
ONLY PENDED MEDICATION THAT ARE ON PATIENT MEDICATION LIST. Please Approve or Refuse.   Send to Pharmacy per Pt's Request: [facility]     Next Visit Date:  Visit date not found   Last Visit Date: 5/12/2023    Hemoglobin A1C (%)   Date Value   01/04/2018 5.1   09/29/2017 4.7   09/16/2016 5.4             ( goal A1C is < 7)   BP Readings from Last 3 Encounters:   08/25/23 114/72   08/18/23 126/83   08/13/23 (!) 99/59          (goal 120/80)  BUN   Date Value Ref Range Status   09/29/2022 11 6 - 20 mg/dL Final     Creatinine   Date Value Ref Range Status   09/29/2022 0.94 (H) 0.50 - 0.90 mg/dL Final     Potassium   Date Value Ref Range Status   09/29/2022 4.7 3.7 - 5.3 mmol/L Final

## 2023-09-12 RX ORDER — METHOCARBAMOL 750 MG/1
750 TABLET, FILM COATED ORAL 3 TIMES DAILY PRN
Qty: 90 TABLET | Refills: 2 | OUTPATIENT
Start: 2023-09-12

## 2023-09-13 ENCOUNTER — TELEPHONE (OUTPATIENT)
Dept: FAMILY MEDICINE CLINIC | Age: 48
End: 2023-09-13

## 2023-09-13 NOTE — TELEPHONE ENCOUNTER
[address] [facility] Clinical Support Pool  Subject: Refill Request     QUESTIONS   Name of Medication? methocarbamol (ROBAXIN-750) 750 MG tablet   Patient-reported dosage and instructions? 1 tablet by mouth three times   daily as needed for muscle spasm   How many days do you have left? 6   Preferred Pharmacy? [address] [id number]   Pharmacy phone number (if available)? [phone]   ---------------------------------------------------------------------------   --------------   CALL BACK INFO   What is the best way for the office to contact you? OK to leave message on   voicemail   Preferred Call Back Phone Number? [phone]   ---------------------------------------------------------------------------   --------------   SCRIPT ANSWERS   Relationship to Patient?  Self

## 2023-09-13 NOTE — TELEPHONE ENCOUNTER
Patient missed her last appointment, I do not see this medication on her list either I have prescribed this medication. She is already on Zanaflex, needs appointment to discuss.

## 2023-09-15 NOTE — TELEPHONE ENCOUNTER
Please Approve or Refuse.   Send to Pharmacy per Pt's Request:      Next Visit Date:  9/19/2023   Last Visit Date: 5/12/2023    Hemoglobin A1C (%)   Date Value   01/04/2018 5.1   09/29/2017 4.7   09/16/2016 5.4             ( goal A1C is < 7)   BP Readings from Last 3 Encounters:   08/25/23 114/72   08/18/23 126/83   08/13/23 (!) 99/59          (goal 120/80)  BUN   Date Value Ref Range Status   09/29/2022 11 6 - 20 mg/dL Final     Creatinine   Date Value Ref Range Status   09/29/2022 0.94 (H) 0.50 - 0.90 mg/dL Final     Potassium   Date Value Ref Range Status   09/29/2022 4.7 3.7 - 5.3 mmol/L Final

## 2023-09-18 ENCOUNTER — TELEPHONE (OUTPATIENT)
Dept: ORTHOPEDIC SURGERY | Age: 48
End: 2023-09-18

## 2023-09-18 RX ORDER — METHOCARBAMOL 750 MG/1
750 TABLET, FILM COATED ORAL 3 TIMES DAILY PRN
Qty: 90 TABLET | Refills: 2 | Status: SHIPPED | OUTPATIENT
Start: 2023-09-18 | End: 2023-09-19

## 2023-09-18 NOTE — TELEPHONE ENCOUNTER
Spoke with patient. She stated that the voltaren gel hasn't been helping, and used to get a compound cream through [facility] that helped her a lot. I told her she can speak with Dr. Miryam Andino about that to see if he would order that for her, but that would be a discussion for her to have with him at her follow up. She stated that her PCP ordered it for her, and she will try and see if they may be able to give her a new RX, otherwise she will wait to speak with Dr. Miryam Andino. I stated temporarily she can try other OTC alternatives in the meantime to see if something else may help with her pain. She stated understanding and had no further questions.

## 2023-09-18 NOTE — TELEPHONE ENCOUNTER
Patient is requesting a different cream for ankle because the voltaren is not working. Please call [phone] to advise of recommendations.

## 2023-09-19 ENCOUNTER — TELEPHONE (OUTPATIENT)
Dept: BEHAVIORAL/MENTAL HEALTH CLINIC | Age: 48
End: 2023-09-19

## 2023-09-19 ENCOUNTER — OFFICE VISIT (OUTPATIENT)
Dept: FAMILY MEDICINE CLINIC | Age: 48
End: 2023-09-19
Payer: COMMERCIAL

## 2023-09-19 VITALS
DIASTOLIC BLOOD PRESSURE: 70 MMHG | HEIGHT: 62 IN | BODY MASS INDEX: 23.37 KG/M2 | OXYGEN SATURATION: 99 % | WEIGHT: 127 LBS | SYSTOLIC BLOOD PRESSURE: 120 MMHG | HEART RATE: 96 BPM

## 2023-09-19 DIAGNOSIS — J44.1 CHRONIC OBSTRUCTIVE PULMONARY DISEASE WITH ACUTE EXACERBATION (HCC): Primary | ICD-10-CM

## 2023-09-19 DIAGNOSIS — M47.817 LUMBOSACRAL SPONDYLOSIS WITHOUT MYELOPATHY: ICD-10-CM

## 2023-09-19 DIAGNOSIS — J40 BRONCHITIS: ICD-10-CM

## 2023-09-19 DIAGNOSIS — F14.10 COCAINE ABUSE (HCC): ICD-10-CM

## 2023-09-19 PROCEDURE — 3023F SPIROM DOC REV: CPT | Performed by: FAMILY MEDICINE

## 2023-09-19 PROCEDURE — 4004F PT TOBACCO SCREEN RCVD TLK: CPT | Performed by: FAMILY MEDICINE

## 2023-09-19 PROCEDURE — G8420 CALC BMI NORM PARAMETERS: HCPCS | Performed by: FAMILY MEDICINE

## 2023-09-19 PROCEDURE — G8427 DOCREV CUR MEDS BY ELIG CLIN: HCPCS | Performed by: FAMILY MEDICINE

## 2023-09-19 PROCEDURE — 99214 OFFICE O/P EST MOD 30 MIN: CPT | Performed by: FAMILY MEDICINE

## 2023-09-19 RX ORDER — MONTELUKAST SODIUM 10 MG/1
10 TABLET ORAL NIGHTLY
Qty: 30 TABLET | Refills: 1 | Status: SHIPPED | OUTPATIENT
Start: 2023-09-19

## 2023-09-19 RX ORDER — AZITHROMYCIN 250 MG/1
TABLET, FILM COATED ORAL
Qty: 6 TABLET | Refills: 0 | Status: SHIPPED | OUTPATIENT
Start: 2023-09-19 | End: 2023-09-24

## 2023-09-19 RX ORDER — ALBUTEROL SULFATE 2.5 MG/3ML
2.5 SOLUTION RESPIRATORY (INHALATION) EVERY 6 HOURS PRN
Qty: 125 EACH | Refills: 0 | Status: SHIPPED | OUTPATIENT
Start: 2023-09-19

## 2023-09-19 RX ORDER — METHYLPREDNISOLONE 4 MG/1
TABLET ORAL
Qty: 1 KIT | Refills: 0 | Status: SHIPPED | OUTPATIENT
Start: 2023-09-19 | End: 2023-09-25

## 2023-09-19 ASSESSMENT — ENCOUNTER SYMPTOMS
ABDOMINAL DISTENTION: 0
EYE REDNESS: 0
ABDOMINAL PAIN: 0
BACK PAIN: 1
STRIDOR: 0
VOMITING: 0
RECTAL PAIN: 0
SINUS PRESSURE: 1
CHEST TIGHTNESS: 0
DIARRHEA: 0
COUGH: 1
SHORTNESS OF BREATH: 0
TROUBLE SWALLOWING: 0
CONSTIPATION: 0
RHINORRHEA: 1
BLOOD IN STOOL: 0
NAUSEA: 0
WHEEZING: 1
SORE THROAT: 0
COLOR CHANGE: 0

## 2023-09-19 NOTE — PATIENT INSTRUCTIONS
Thank you for choosing [facility]) as your healthcare provider as your care is important to us. Please arrive at your appointment on time. If you are unable to make your appointment, please call our office as soon as possible so that we may reschedule your appointment. Missing 3 appointments in a calendar year without notifying the office may lead to dismissal from the practice. We appreciate you calling at least 24 hours in advance, when possible. Thank you. New Updates for [facility]    Thank you for choosing US to give you the best care! [facility]) is always trying to think of new ways to help their patients. We are asking all patients to try out the new digital registration that is now available through your [facility] account Via the amarilis you're now able to update your personal and registration information prior to your upcoming appointment. This will save you time once you arrive at the office to check-in, not to mention your information remains safe!! Many other perks come from signing up for an account, such as:  Requesting refills  Scheduling an appointment  Completing an E-Visit  Sending a message to the office/provider  Having access to your medication list  Paying your bill/copay prior to your appointment  Scheduling your yearly mammogram  Review your test results    If you are not familiar with [facility] please ask one of us and we will be happy to answer any questions or help you set-up your account.       Your [employer],

## 2023-09-19 NOTE — PROGRESS NOTES
Visit Information    Have you changed or started any medications since your last visit including any over-the-counter medicines, vitamins, or herbal medicines? no   Are you having any side effects from any of your medications? -  no  Have you stopped taking any of your medications? Is so, why? -  no    Have you seen any other physician or provider since your last visit? No  Have you had any other diagnostic tests since your last visit? No  Have you been seen in the emergency room and/or had an admission to a hospital since we last saw you? No  Have you had your routine dental cleaning in the past 6 months? Have you activated your [employer] account? If not, what are your barriers?  Yes     Patient Care Team:  Luc Vela MD as PCP - General (Family Medicine)  Luc Vela MD as PCP - Empaneled Provider  Shirlene Gonzalez MD as Consulting Physician (Urology)  Mychelle Collette Folds, DO as Obstetrician (Obstetrics & Gynecology)    Medical History Review  Past Medical, Family, and Social History reviewed and does contribute to the patient presenting condition    Health Maintenance   Topic Date Due    Hepatitis B vaccine (1 of 3 - 3-dose series) Never done    Hepatitis C screen  Never done    Colorectal Cancer Screen  Never done    COVID-19 Vaccine (6 - Pfizer series) 05/24/2022    Flu vaccine (1) 08/01/2023    GFR test (Diabetes, CKD 3-4, OR last GFR 15-59)  09/29/2023    Lipids  03/25/2024    Depression Monitoring  05/12/2024    DTaP/Tdap/Td vaccine (5 - Td or Tdap) 06/16/2031    Pneumococcal 0-64 years Vaccine  Completed    HIV screen  Completed    Hepatitis A vaccine  Aged Out    Hib vaccine  Aged Out    Meningococcal (ACWY) vaccine  Aged Out    Diabetes screen  Discontinued    Cervical cancer screen  Discontinued
Barriers to medication compliance addressed. Patient given educational materials - see patient instructions. All patient questions answered. Patient voiced understanding. The patient'spast medical, surgical, social, and family history as well as her   current medications and allergies were reviewed as documented in today's encounter. Medications, labs, diagnostic studies, consultations andfollow-up as documented in this encounter. Return in about 3 months (around 12/19/2023) for 30min,always chronic conditons. Patient wasseen with total face to face time of 35 minutes. More than 50% of this visit was counseling and education. Future Appointments   Date Time Provider [address]   9/26/2023  2:30 PM Cristian Huitron DO [facility]   10/10/2023  2:30 PM Dave Reyes DO [facility]   10/16/2023 11:15 AM MD DELIA [facility]   10/26/2023  1:00 PM Micah Barron[address] Neuro Spec Neurology -   11/6/2023  2:00 PM Dave Reyes DO [address] [place] [address]   12/19/2023  1:00 PM Vikki Dwyer MD [facility]     This note was completed by using the assistance of a speech-recognition program. However, inadvertent computerized transcription errors may be present. Althoughevery effort was made to ensure accuracy, no guarantees can be provided that every mistake has been identified and corrected by editing.   Electronically signed by Vikki Dwyer MD on 9/19/2023  12:13 PM

## 2023-09-19 NOTE — TELEPHONE ENCOUNTER
Per Dr. Nidia Lozano contacted pharmacy and confirm they received notification of the medications being discontinued, confirmed with Kim Marte. Writer also confirmed pharmacy did receive 5 medications that we sent in today.

## 2023-09-22 ENCOUNTER — TELEPHONE (OUTPATIENT)
Dept: FAMILY MEDICINE CLINIC | Age: 48
End: 2023-09-22

## 2023-09-22 DIAGNOSIS — M47.817 LUMBOSACRAL SPONDYLOSIS WITHOUT MYELOPATHY: Primary | ICD-10-CM

## 2023-09-22 NOTE — TELEPHONE ENCOUNTER
PATIENT CALLED ASKING FOR A NEW REFERRAL TO BE PLACED FOR A NEW PAIN MANAGEMENT PROVIDER.  STATED CALLED INSURANCE AND CAN GO TO: DR Dennise Allan PLEASE ADVISE

## 2023-09-26 ENCOUNTER — HOSPITAL ENCOUNTER (OUTPATIENT)
Dept: PAIN MANAGEMENT | Facility: CLINIC | Age: 48
Discharge: HOME | End: 2023-09-26
Payer: COMMERCIAL

## 2023-09-26 VITALS
SYSTOLIC BLOOD PRESSURE: 127 MMHG | HEART RATE: 89 BPM | OXYGEN SATURATION: 100 % | HEIGHT: 62 IN | DIASTOLIC BLOOD PRESSURE: 75 MMHG | RESPIRATION RATE: 10 BRPM | WEIGHT: 127 LBS | TEMPERATURE: 97.7 F | BODY MASS INDEX: 23.37 KG/M2

## 2023-09-26 DIAGNOSIS — R52 PAIN MANAGEMENT: ICD-10-CM

## 2023-09-26 PROCEDURE — 6360000002 HC RX W HCPCS: Performed by: STUDENT IN AN ORGANIZED HEALTH CARE EDUCATION/TRAINING PROGRAM

## 2023-09-26 PROCEDURE — 99152 MOD SED SAME PHYS/QHP 5/>YRS: CPT | Performed by: STUDENT IN AN ORGANIZED HEALTH CARE EDUCATION/TRAINING PROGRAM

## 2023-09-26 PROCEDURE — 64636 DESTROY L/S FACET JNT ADDL: CPT | Performed by: STUDENT IN AN ORGANIZED HEALTH CARE EDUCATION/TRAINING PROGRAM

## 2023-09-26 PROCEDURE — 2500000003 HC RX 250 WO HCPCS: Performed by: STUDENT IN AN ORGANIZED HEALTH CARE EDUCATION/TRAINING PROGRAM

## 2023-09-26 PROCEDURE — 64635 DESTROY LUMB/SAC FACET JNT: CPT | Performed by: STUDENT IN AN ORGANIZED HEALTH CARE EDUCATION/TRAINING PROGRAM

## 2023-09-26 PROCEDURE — 64635 DESTROY LUMB/SAC FACET JNT: CPT

## 2023-09-26 PROCEDURE — 64636 DESTROY L/S FACET JNT ADDL: CPT

## 2023-09-26 RX ORDER — MIDAZOLAM HYDROCHLORIDE 2 MG/2ML
INJECTION, SOLUTION INTRAMUSCULAR; INTRAVENOUS
Status: COMPLETED | OUTPATIENT
Start: 2023-09-26 | End: 2023-09-26

## 2023-09-26 RX ORDER — LIDOCAINE HYDROCHLORIDE 10 MG/ML
INJECTION, SOLUTION EPIDURAL; INFILTRATION; INTRACAUDAL; PERINEURAL
Status: COMPLETED | OUTPATIENT
Start: 2023-09-26 | End: 2023-09-26

## 2023-09-26 RX ORDER — LIDOCAINE HYDROCHLORIDE 20 MG/ML
INJECTION, SOLUTION INFILTRATION; PERINEURAL
Status: COMPLETED | OUTPATIENT
Start: 2023-09-26 | End: 2023-09-26

## 2023-09-26 RX ORDER — TRIAMCINOLONE ACETONIDE 40 MG/ML
INJECTION, SUSPENSION INTRA-ARTICULAR; INTRAMUSCULAR
Status: COMPLETED | OUTPATIENT
Start: 2023-09-26 | End: 2023-09-26

## 2023-09-26 RX ADMIN — LIDOCAINE HYDROCHLORIDE 5 ML: 10 INJECTION, SOLUTION EPIDURAL; INFILTRATION; INTRACAUDAL at 10:04

## 2023-09-26 RX ADMIN — MIDAZOLAM HYDROCHLORIDE 2 MG: 1 INJECTION, SOLUTION INTRAMUSCULAR; INTRAVENOUS at 10:02

## 2023-09-26 RX ADMIN — TRIAMCINOLONE ACETONIDE 40 MG: 40 INJECTION, SUSPENSION INTRA-ARTICULAR; INTRAMUSCULAR at 10:07

## 2023-09-26 RX ADMIN — LIDOCAINE HYDROCHLORIDE 2 ML: 10 INJECTION, SOLUTION EPIDURAL; INFILTRATION; INTRACAUDAL at 10:07

## 2023-09-26 RX ADMIN — LIDOCAINE HYDROCHLORIDE 5 ML: 20 INJECTION, SOLUTION INFILTRATION; PERINEURAL at 10:06

## 2023-09-26 ASSESSMENT — PAIN - FUNCTIONAL ASSESSMENT
PAIN_FUNCTIONAL_ASSESSMENT: 0-10
PAIN_FUNCTIONAL_ASSESSMENT: PREVENTS OR INTERFERES WITH MANY ACTIVE NOT PASSIVE ACTIVITIES

## 2023-09-26 ASSESSMENT — PAIN DESCRIPTION - DESCRIPTORS: DESCRIPTORS: THROBBING;SHARP

## 2023-09-26 NOTE — OP NOTE
PROCEDURE PERFORMED: Right Lumbar Medial Branch Radiofrequency Ablation using Fluoroscopy    PREOPERATIVE DIAGNOSIS: Lumbosacral spondylosis    INDICATIONS: Chronic low back pain    The patient's history and physical exam were reviewed. The risk, benefits, and alternatives of the procedure were discussed and all questions were answered to the patient's satisfaction. The patient agreed to proceed and written informed consent was obtained. POSTOPERATIVE DIAGNOSIS: Lumbosacral spondylosis    PHYSICIAN:  Dr. Ayesha He DO    ANESTHESIA:  LOCAL    ASSISTANT:  NONE    PATHOLOGY:  NONE    ESTIMATED BLOOD LOSS:  N/A    IMPLANTS:  NONE    PROCEDURE DESCRIPTION: Right lumbar medial branch radiofrequency ablation using fluoroscopy    The patient was placed on the operative bed in prone position. The area was prepped with  Chlorhexidine. The area was then draped in a sterile fashion. Targeted levels: Right lumbar L3, L4, L5 medial branch radiofrequency ablation    An AP  fluoroscopy image was used to identify and alison Hernandez's point at the L3, L4 levels on the targeted side. Additionally, the junction of the SAP and sacral ala was also marked on the same side. The skin and subcutaneous tissues were then anesthetized with 1% lidocaine. At each lumbar medial branch, a 20-gauge, 100 mm curved with 10 mm active tip probe was advanced under AP fluoroscopic projections until bone was contacted along the medial aspect of the transverse process. Aspiration of each needle was negative for blood, CSF and paresthesia prior to injection. Motor stimulation at 2 Hz and 1.2 V was negative. Then, after negative aspiration, 1 mL lidocaine 2% was injected at each level prior to lesioning which was performed at [facility] for 90 seconds. Once the lesion was complete, injectate solution containing Kenalog 40 mg and 2 mL lidocaine 1% was injected at each site with a total of 1 mL. The probes were then removed.   The needle

## 2023-09-26 NOTE — DISCHARGE INSTRUCTIONS

## 2023-09-26 NOTE — H&P
Heart Disease Father     Seizures Father     Migraines Father     Coronary Art Dis Father     Migraines Sister     Cervical Cancer Sister     Lung Cancer Maternal Grandmother     Seizures Son     Diabetes Maternal Uncle     Diabetes Maternal Cousin     Lung Cancer Paternal Grandfather     Anxiety Disorder Daughter        Social History     Socioeconomic History    Marital status:      Spouse name: Not on file    Number of children: 4    Years of education: Not on file    Highest education level: Not on file   Occupational History    Not on file   Tobacco Use    Smoking status: Every Day     Packs/day: .5     Types: Cigarettes     Start date:      Last attempt to quit: 2017     Years since quittin.8    Smokeless tobacco: Never    Tobacco comments:     litte less then 1/2 mariola/day   Vaping Use    Vaping Use: Never used   Substance and Sexual Activity    Alcohol use: Yes     Comment: social    Drug use: No    Sexual activity: Yes     Partners: Male   Other Topics Concern    Not on file   Social History Narrative    Not on file     Social Determinants of Health     Financial Resource Strain: High Risk (2023)    Overall Financial Resource Strain (CARDIA)     Difficulty of Paying Living Expenses: Very hard   Food Insecurity: No Food Insecurity (2023)    Hunger Vital Sign     Worried About Running Out of Food in the Last Year: Never true     Ran Out of Food in the Last Year: Never true   Transportation Needs: Unknown (2023)    PRAPARE - Transportation     Lack of Transportation (Medical): Not on file     Lack of Transportation (Non-Medical): No   Physical Activity: Unknown (2022)    Exercise Vital Sign     Days of Exercise per Week: 1 day     Minutes of Exercise per Session: Not on file   Stress: Not on file   Social Connections: Not on file   Intimate Partner Violence:  At Risk (2022)    Humiliation, Afraid, Rape, and Kick questionnaire     Fear of Current or Ex-Partner: No

## 2023-09-30 DIAGNOSIS — G89.4 CHRONIC PAIN SYNDROME: ICD-10-CM

## 2023-09-30 DIAGNOSIS — G43.009 MIGRAINE WITHOUT AURA AND WITHOUT STATUS MIGRAINOSUS, NOT INTRACTABLE: ICD-10-CM

## 2023-10-02 RX ORDER — VENLAFAXINE HYDROCHLORIDE 75 MG/1
75 CAPSULE, EXTENDED RELEASE ORAL EVERY MORNING
Qty: 30 CAPSULE | Refills: 3 | Status: SHIPPED | OUTPATIENT
Start: 2023-10-02

## 2023-10-02 RX ORDER — BUTALBITAL, ACETAMINOPHEN AND CAFFEINE 50; 325; 40 MG/1; MG/1; MG/1
TABLET ORAL
Qty: 180 TABLET | Refills: 0 | Status: SHIPPED | OUTPATIENT
Start: 2023-10-02

## 2023-10-16 ENCOUNTER — OFFICE VISIT (OUTPATIENT)
Dept: ORTHOPEDIC SURGERY | Age: 48
End: 2023-10-16
Payer: COMMERCIAL

## 2023-10-16 VITALS — WEIGHT: 122 LBS | HEIGHT: 62 IN | BODY MASS INDEX: 22.45 KG/M2 | OXYGEN SATURATION: 100 % | RESPIRATION RATE: 16 BRPM

## 2023-10-16 DIAGNOSIS — M95.8 OSTEOCHONDRAL DEFECT OF ANKLE: ICD-10-CM

## 2023-10-16 DIAGNOSIS — M25.371 INSTABILITY OF RIGHT ANKLE JOINT: ICD-10-CM

## 2023-10-16 DIAGNOSIS — S82.831K CLOSED AVULSION FRACTURE OF DISTAL END OF RIGHT FIBULA WITH NONUNION, SUBSEQUENT ENCOUNTER: Primary | ICD-10-CM

## 2023-10-16 DIAGNOSIS — M25.371 INSTABILITY OF RIGHT ANKLE JOINT: Primary | ICD-10-CM

## 2023-10-16 DIAGNOSIS — M76.71 PERONEAL TENDINITIS OF RIGHT LOWER EXTREMITY: ICD-10-CM

## 2023-10-16 PROCEDURE — G8420 CALC BMI NORM PARAMETERS: HCPCS | Performed by: ORTHOPAEDIC SURGERY

## 2023-10-16 PROCEDURE — 99214 OFFICE O/P EST MOD 30 MIN: CPT | Performed by: ORTHOPAEDIC SURGERY

## 2023-10-16 PROCEDURE — G8427 DOCREV CUR MEDS BY ELIG CLIN: HCPCS | Performed by: ORTHOPAEDIC SURGERY

## 2023-10-16 PROCEDURE — G8484 FLU IMMUNIZE NO ADMIN: HCPCS | Performed by: ORTHOPAEDIC SURGERY

## 2023-10-16 PROCEDURE — 4004F PT TOBACCO SCREEN RCVD TLK: CPT | Performed by: ORTHOPAEDIC SURGERY

## 2023-10-16 NOTE — PROGRESS NOTES
effort  Musculoskeletal:    Affected lower extremity:    Vascular: Limb well perfused, compartments soft/compressible. Skin: No erythema/ulcers. Intact. Healed incision at the anterolateral ankle. Neurovascular Status:  Grossly neurovascularly intact throughout  Motion:  Grossly able to fire major muscle groups with appropriate/expected AROM  Tenderness to Palpation: Lateral ankle/hindfoot diffusely (including the distal tip of the fibula significantly, anterolateral ankle joint line, along the peroneal tendons posterior to the fibula)  -Painful and weak eversion  -Instability:  Talar tilt: Mildly positive; Anterior drawer: Mildly positive  -No peroneal subluxation/dislocation with dorsiflexion + eversion or with circumduction        RADIOLOGY:   10/16/2023 Prior images reviewed for reference. MRI images and radiology report reviewed, as below:    IMPRESSION:  1. Osteochondral lesion/subcortical cystic changes at the lateral talar dome  measuring 1.6 x 0.8 cm.  2. Remote avulsion fracture fragment of the distal fibula. 3. Moderate to large tibiotalar, subtalar and moderate talonavicular fusion. 4. Mild edema in the subcutaneous fat about the ankle and foot. 5. Mild distal Achilles enthesopathy. 6. Moderate plantar calcaneal spur. Reactive marrow edema at the plantar  calcaneus. Moderate central cord plantar fasciitis. No plantar fascial tear. 7. Remote partial split tearing of peroneus brevis tendon. 8. Mild medial flexor tenosynovitis. Mild insertional posterior tibialis  tendinosis. 9. Age-indeterminate partial tear of the ATFL and posterior talofibular  ligament. 10. Possible grade 1 sprain of the syndesmotic ligaments.         FINDINGS:  Three weightbearing views (AP, Mortise, and Lateral) of the right ankle and three weightbearing views (AP, Oblique, Lateral) of the right foot were obtained in the office today and reviewed, revealing no acute fracture, dislocation, or radioopaque foreign

## 2023-10-24 ENCOUNTER — HOSPITAL ENCOUNTER (OUTPATIENT)
Dept: PAIN MANAGEMENT | Facility: CLINIC | Age: 48
Discharge: HOME | End: 2023-10-24

## 2023-10-24 ENCOUNTER — TELEPHONE (OUTPATIENT)
Dept: PAIN MANAGEMENT | Age: 48
End: 2023-10-24

## 2023-10-24 NOTE — FLOWSHEET NOTE
RN called patient, no answer. Voice message left for patient to call office to re schedule appointment that was scheduled for 0900 this morning if she is unable to make this appointment.

## 2023-10-24 NOTE — TELEPHONE ENCOUNTER
I called patient to advise she will need to schedule OV to discuss missed RFA yesterday. Patient states she is having ankle surgery on 11/10; will call us once she has recovered and released by her surgeon.

## 2023-10-24 NOTE — DISCHARGE INSTRUCTIONS

## 2023-10-25 ENCOUNTER — TELEPHONE (OUTPATIENT)
Dept: FAMILY MEDICINE CLINIC | Age: 48
End: 2023-10-25

## 2023-10-25 NOTE — TELEPHONE ENCOUNTER
Please notify patient we have done multiple referrals for pain management, you have appointment scheduled on 31st, you need to decide where you want to go. We cannot keep changing appointments every week.

## 2023-10-25 NOTE — TELEPHONE ENCOUNTER
Patient called requesting if a referral can be placed to [address] [place] [address]. Reasoning/Diagnosis : Diagnosis Information    Diagnosis   M47.817 (ICD-10-CM) - Lumbosacral spondylosis without myelopathy   . Location patient is requesting REFERRAL. Fax number patient is providing [phone]     Also will like a call back once referral is placed and sent over.

## 2023-10-26 ENCOUNTER — OFFICE VISIT (OUTPATIENT)
Dept: NEUROLOGY | Age: 48
End: 2023-10-26
Payer: COMMERCIAL

## 2023-10-26 VITALS
WEIGHT: 123 LBS | DIASTOLIC BLOOD PRESSURE: 72 MMHG | HEART RATE: 82 BPM | BODY MASS INDEX: 22.63 KG/M2 | HEIGHT: 62 IN | SYSTOLIC BLOOD PRESSURE: 110 MMHG

## 2023-10-26 DIAGNOSIS — G43.011 INTRACTABLE MIGRAINE WITHOUT AURA AND WITH STATUS MIGRAINOSUS: Primary | ICD-10-CM

## 2023-10-26 DIAGNOSIS — G57.93 NEUROPATHY INVOLVING BOTH LOWER EXTREMITIES: ICD-10-CM

## 2023-10-26 PROCEDURE — G8427 DOCREV CUR MEDS BY ELIG CLIN: HCPCS | Performed by: PHYSICIAN ASSISTANT

## 2023-10-26 PROCEDURE — G8484 FLU IMMUNIZE NO ADMIN: HCPCS | Performed by: PHYSICIAN ASSISTANT

## 2023-10-26 PROCEDURE — G8420 CALC BMI NORM PARAMETERS: HCPCS | Performed by: PHYSICIAN ASSISTANT

## 2023-10-26 PROCEDURE — 4004F PT TOBACCO SCREEN RCVD TLK: CPT | Performed by: PHYSICIAN ASSISTANT

## 2023-10-26 PROCEDURE — 99204 OFFICE O/P NEW MOD 45 MIN: CPT | Performed by: PHYSICIAN ASSISTANT

## 2023-10-26 RX ORDER — RIZATRIPTAN BENZOATE 10 MG/1
10 TABLET ORAL
Qty: 9 TABLET | Refills: 5 | Status: SHIPPED | OUTPATIENT
Start: 2023-10-26 | End: 2023-10-26

## 2023-10-26 RX ORDER — FREMANEZUMAB-VFRM 225 MG/1.5ML
225 INJECTION SUBCUTANEOUS
Qty: 1 ADJUSTABLE DOSE PRE-FILLED PEN SYRINGE | Refills: 5 | Status: SHIPPED | OUTPATIENT
Start: 2023-10-26 | End: 2024-04-23

## 2023-10-26 NOTE — PROGRESS NOTES
[address] [place] [address]  Dept: [phone]    PATIENT NAME: Aixa Lopez  PATIENT MRN: [id number]  PRIMARY CARE PHYSICIAN: Mar Oconnell MD    HPI:      Aixa Lopez is a 51 y.o. female who presents to clinic today for evaluation of migraine headaches. Her medical history is significant for COPD, stage 3 kidney disease, h/o gastric bypass (2017), lumbar degenerative disc disease, and bipolar disorder; s/p nephrectomy. She receives Fioricet for headache rescue and does take Effexor 75 mg through psych, not for migraine prophylaxis. Previously followed with Dr. Angel Jaquez at [facility]. Migraine description:  Location: variable, usually unilateral frontal/parietal  Character: sharp/ throbbing  Frequency: at least 8 days per month- usually they will cluster 5 days in a row, then nothing for a few weeks  Associated symptoms: light, sound sensitivity, nausea with vomiting, right facial paresthesias (rare)  Denies associated symptoms: vision changes, unilateral weakness, dizziness, aura  Rescue: Imitrex, Fioricet ineffective     She last saw outside neurologist in August and was started on Nortriptyline 25 mg for prevention; imitrex for rescue. She reports she has not achieved relief with either medication. If she experiences a migraine, she often will travel to ED for \"migraine cocktail. \"     Reports her best combination of medication was Ajovy for prevention, Rizatriptan for rescue    She has failed multiple products including injectable products. Cannot take Topamax due to kidney stones. Failed Botox. Failed Aimovig    Reports increased numbness and burning dysesthesias of bilateral feet, worse on right. Reports a remote history of neuropathy of unclear etiology, but her symptoms seem to be worsening and ascending up bilateral legs to the level of ankle. Admits mild bilateral hand tingling which does not isolate to any specific digits. It is worse for bilateral palms.

## 2023-10-30 ENCOUNTER — TELEPHONE (OUTPATIENT)
Dept: NEUROLOGY | Age: 48
End: 2023-10-30

## 2023-10-30 PROBLEM — S82.839A AVULSION FRACTURE OF DISTAL END OF FIBULA: Status: ACTIVE | Noted: 2022-09-14

## 2023-10-30 PROBLEM — R05.9 COUGH IN ADULT PATIENT: Status: ACTIVE | Noted: 2019-04-04

## 2023-10-30 PROBLEM — T78.40XA ALLERGY, UNSPECIFIED, INITIAL ENCOUNTER: Status: ACTIVE | Noted: 2020-04-25

## 2023-10-30 PROBLEM — E87.6 HYPOKALEMIA: Status: ACTIVE | Noted: 2019-03-29

## 2023-10-30 PROBLEM — H91.90 UNSPECIFIED HEARING LOSS, UNSPECIFIED EAR: Status: ACTIVE | Noted: 2020-01-03

## 2023-10-30 PROBLEM — G47.10 HYPERSOMNIA: Status: ACTIVE | Noted: 2020-01-03

## 2023-10-30 PROBLEM — F32.9 MAJOR DEPRESSIVE DISORDER, SINGLE EPISODE, UNSPECIFIED: Status: ACTIVE | Noted: 2020-04-25

## 2023-10-30 PROBLEM — J06.9 UPPER RESPIRATORY INFECTION WITH COUGH AND CONGESTION: Status: ACTIVE | Noted: 2019-04-04

## 2023-10-30 PROBLEM — S06.9XAA MILD TRAUMATIC BRAIN INJURY (HCC): Status: ACTIVE | Noted: 2019-07-18

## 2023-10-30 PROBLEM — E21.1 HYPERPARATHYROIDISM , SECONDARY, NON-RENAL (HCC): Status: ACTIVE | Noted: 2020-01-03

## 2023-10-30 PROBLEM — S42.253A CLOSED FRACTURE OF GREATER TUBEROSITY OF HUMERUS: Status: ACTIVE | Noted: 2019-02-12

## 2023-10-30 PROBLEM — K21.9 GASTRO-ESOPHAGEAL REFLUX DISEASE WITHOUT ESOPHAGITIS: Status: ACTIVE | Noted: 2020-04-25

## 2023-10-30 PROBLEM — R63.4 WEIGHT LOSS: Status: ACTIVE | Noted: 2023-01-12

## 2023-10-30 PROBLEM — F17.210 CIGARETTE NICOTINE DEPENDENCE WITHOUT COMPLICATION: Status: ACTIVE | Noted: 2020-09-28

## 2023-10-30 PROBLEM — E07.9 DISORDER OF THYROID, UNSPECIFIED: Status: ACTIVE | Noted: 2020-04-25

## 2023-10-30 PROBLEM — I49.1 PAC (PREMATURE ATRIAL CONTRACTION): Status: ACTIVE | Noted: 2019-03-29

## 2023-10-30 PROBLEM — G25.81 RESTLESS LEG SYNDROME: Status: ACTIVE | Noted: 2019-08-15

## 2023-10-30 PROBLEM — E83.52 HYPERCALCEMIA: Status: ACTIVE | Noted: 2020-01-03

## 2023-10-30 PROBLEM — R11.2 NAUSEA AND VOMITING: Status: ACTIVE | Noted: 2020-09-28

## 2023-10-30 PROBLEM — M25.361 INSTABILITY OF RIGHT KNEE JOINT: Status: ACTIVE | Noted: 2023-02-21

## 2023-10-30 PROBLEM — R06.02 SOB (SHORTNESS OF BREATH) ON EXERTION: Status: ACTIVE | Noted: 2019-03-29

## 2023-10-31 ENCOUNTER — HOSPITAL ENCOUNTER (OUTPATIENT)
Dept: PREADMISSION TESTING | Age: 48
Discharge: HOME | End: 2023-11-04
Payer: COMMERCIAL

## 2023-10-31 ENCOUNTER — OFFICE VISIT (OUTPATIENT)
Dept: FAMILY MEDICINE CLINIC | Age: 48
End: 2023-10-31
Payer: COMMERCIAL

## 2023-10-31 VITALS
SYSTOLIC BLOOD PRESSURE: 104 MMHG | HEART RATE: 100 BPM | RESPIRATION RATE: 16 BRPM | BODY MASS INDEX: 23 KG/M2 | TEMPERATURE: 97.9 F | WEIGHT: 125 LBS | OXYGEN SATURATION: 100 % | HEIGHT: 62 IN | DIASTOLIC BLOOD PRESSURE: 63 MMHG

## 2023-10-31 VITALS
BODY MASS INDEX: 22.93 KG/M2 | SYSTOLIC BLOOD PRESSURE: 98 MMHG | WEIGHT: 124.6 LBS | HEIGHT: 62 IN | TEMPERATURE: 97.3 F | DIASTOLIC BLOOD PRESSURE: 62 MMHG | OXYGEN SATURATION: 98 % | HEART RATE: 86 BPM

## 2023-10-31 DIAGNOSIS — E55.9 VITAMIN D DEFICIENCY: ICD-10-CM

## 2023-10-31 DIAGNOSIS — F41.8 MIXED ANXIETY DEPRESSIVE DISORDER: ICD-10-CM

## 2023-10-31 DIAGNOSIS — Z87.891 PERSONAL HISTORY OF TOBACCO USE: ICD-10-CM

## 2023-10-31 DIAGNOSIS — G43.009 MIGRAINE WITHOUT AURA AND WITHOUT STATUS MIGRAINOSUS, NOT INTRACTABLE: ICD-10-CM

## 2023-10-31 DIAGNOSIS — M47.817 LUMBOSACRAL SPONDYLOSIS WITHOUT MYELOPATHY: ICD-10-CM

## 2023-10-31 DIAGNOSIS — J41.8 MIXED SIMPLE AND MUCOPURULENT CHRONIC BRONCHITIS (HCC): ICD-10-CM

## 2023-10-31 DIAGNOSIS — F14.10 COCAINE ABUSE (HCC): ICD-10-CM

## 2023-10-31 DIAGNOSIS — S82.839A AVULSION FRACTURE OF DISTAL END OF FIBULA: ICD-10-CM

## 2023-10-31 DIAGNOSIS — K43.9 ABDOMINAL WALL HERNIA: ICD-10-CM

## 2023-10-31 DIAGNOSIS — Z12.11 COLON CANCER SCREENING: ICD-10-CM

## 2023-10-31 DIAGNOSIS — Z01.818 PRE-OPERATIVE CLEARANCE: Primary | ICD-10-CM

## 2023-10-31 PROBLEM — J40 BRONCHITIS: Status: RESOLVED | Noted: 2023-09-19 | Resolved: 2023-10-31

## 2023-10-31 PROBLEM — J06.9 UPPER RESPIRATORY INFECTION WITH COUGH AND CONGESTION: Status: RESOLVED | Noted: 2019-04-04 | Resolved: 2023-10-31

## 2023-10-31 PROBLEM — N39.0 RECURRENT URINARY TRACT INFECTION: Status: RESOLVED | Noted: 2020-02-13 | Resolved: 2023-10-31

## 2023-10-31 PROBLEM — R06.02 SOB (SHORTNESS OF BREATH) ON EXERTION: Status: RESOLVED | Noted: 2019-03-29 | Resolved: 2023-10-31

## 2023-10-31 PROBLEM — R63.4 WEIGHT LOSS: Status: RESOLVED | Noted: 2023-01-12 | Resolved: 2023-10-31

## 2023-10-31 PROBLEM — R05.9 COUGH IN ADULT PATIENT: Status: RESOLVED | Noted: 2019-04-04 | Resolved: 2023-10-31

## 2023-10-31 PROBLEM — R11.2 NAUSEA AND VOMITING: Status: RESOLVED | Noted: 2020-09-28 | Resolved: 2023-10-31

## 2023-10-31 PROBLEM — E87.6 HYPOKALEMIA: Status: RESOLVED | Noted: 2019-03-29 | Resolved: 2023-10-31

## 2023-10-31 LAB
ANION GAP SERPL CALCULATED.3IONS-SCNC: 9 MMOL/L (ref 9–17)
BUN SERPL-MCNC: 8 MG/DL (ref 6–20)
BUN/CREAT SERPL: 10 (ref 9–20)
CALCIUM SERPL-MCNC: 9.3 MG/DL (ref 8.6–10.4)
CHLORIDE SERPL-SCNC: 106 MMOL/L (ref 98–107)
CO2 SERPL-SCNC: 26 MMOL/L (ref 20–31)
CREAT SERPL-MCNC: 0.8 MG/DL (ref 0.5–0.9)
ERYTHROCYTE [DISTWIDTH] IN BLOOD BY AUTOMATED COUNT: 13.2 % (ref 11.8–14.4)
GFR SERPL CREATININE-BSD FRML MDRD: >60 ML/MIN/1.73M2
GLUCOSE SERPL-MCNC: 70 MG/DL (ref 70–99)
HCT VFR BLD AUTO: 38.9 % (ref 36.3–47.1)
HGB BLD-MCNC: 12.6 G/DL (ref 11.9–15.1)
MCH RBC QN AUTO: 30.4 PG (ref 25.2–33.5)
MCHC RBC AUTO-ENTMCNC: 32.4 G/DL (ref 28.4–34.8)
MCV RBC AUTO: 94 FL (ref 82.6–102.9)
NRBC BLD-RTO: 0 PER 100 WBC
PLATELET # BLD AUTO: 250 K/UL (ref 138–453)
PMV BLD AUTO: 10.2 FL (ref 8.1–13.5)
POTASSIUM SERPL-SCNC: 4.3 MMOL/L (ref 3.7–5.3)
RBC # BLD AUTO: 4.14 M/UL (ref 3.95–5.11)
SODIUM SERPL-SCNC: 141 MMOL/L (ref 135–144)
WBC OTHER # BLD: 6.1 K/UL (ref 3.5–11.3)

## 2023-10-31 PROCEDURE — 3023F SPIROM DOC REV: CPT | Performed by: FAMILY MEDICINE

## 2023-10-31 PROCEDURE — 80048 BASIC METABOLIC PNL TOTAL CA: CPT

## 2023-10-31 PROCEDURE — G8427 DOCREV CUR MEDS BY ELIG CLIN: HCPCS | Performed by: FAMILY MEDICINE

## 2023-10-31 PROCEDURE — G8420 CALC BMI NORM PARAMETERS: HCPCS | Performed by: FAMILY MEDICINE

## 2023-10-31 PROCEDURE — 4004F PT TOBACCO SCREEN RCVD TLK: CPT | Performed by: FAMILY MEDICINE

## 2023-10-31 PROCEDURE — 99214 OFFICE O/P EST MOD 30 MIN: CPT | Performed by: FAMILY MEDICINE

## 2023-10-31 PROCEDURE — G8482 FLU IMMUNIZE ORDER/ADMIN: HCPCS | Performed by: FAMILY MEDICINE

## 2023-10-31 PROCEDURE — 36415 COLL VENOUS BLD VENIPUNCTURE: CPT

## 2023-10-31 PROCEDURE — 85027 COMPLETE CBC AUTOMATED: CPT

## 2023-10-31 RX ORDER — GINSENG 100 MG
CAPSULE ORAL ONCE
OUTPATIENT
Start: 2023-11-10

## 2023-10-31 RX ORDER — ACETAMINOPHEN 500 MG
1000 TABLET ORAL ONCE
OUTPATIENT
Start: 2023-11-10

## 2023-10-31 RX ORDER — PSEUDOEPHEDRINE HYDROCHLORIDE 120 MG/1
TABLET, FILM COATED, EXTENDED RELEASE ORAL
COMMUNITY
Start: 2023-10-11

## 2023-10-31 SDOH — ECONOMIC STABILITY: FOOD INSECURITY: WITHIN THE PAST 12 MONTHS, THE FOOD YOU BOUGHT JUST DIDN'T LAST AND YOU DIDN'T HAVE MONEY TO GET MORE.: NEVER TRUE

## 2023-10-31 SDOH — ECONOMIC STABILITY: FOOD INSECURITY: WITHIN THE PAST 12 MONTHS, YOU WORRIED THAT YOUR FOOD WOULD RUN OUT BEFORE YOU GOT MONEY TO BUY MORE.: NEVER TRUE

## 2023-10-31 SDOH — ECONOMIC STABILITY: INCOME INSECURITY: HOW HARD IS IT FOR YOU TO PAY FOR THE VERY BASICS LIKE FOOD, HOUSING, MEDICAL CARE, AND HEATING?: NOT HARD AT ALL

## 2023-10-31 ASSESSMENT — PATIENT HEALTH QUESTIONNAIRE - PHQ9
2. FEELING DOWN, DEPRESSED OR HOPELESS: 1
1. LITTLE INTEREST OR PLEASURE IN DOING THINGS: 1
5. POOR APPETITE OR OVEREATING: 0
8. MOVING OR SPEAKING SO SLOWLY THAT OTHER PEOPLE COULD HAVE NOTICED. OR THE OPPOSITE, BEING SO FIGETY OR RESTLESS THAT YOU HAVE BEEN MOVING AROUND A LOT MORE THAN USUAL: 3
3. TROUBLE FALLING OR STAYING ASLEEP: 2
4. FEELING TIRED OR HAVING LITTLE ENERGY: 2
SUM OF ALL RESPONSES TO PHQ QUESTIONS 1-9: 12
SUM OF ALL RESPONSES TO PHQ9 QUESTIONS 1 & 2: 2
6. FEELING BAD ABOUT YOURSELF - OR THAT YOU ARE A FAILURE OR HAVE LET YOURSELF OR YOUR FAMILY DOWN: 0
7. TROUBLE CONCENTRATING ON THINGS, SUCH AS READING THE NEWSPAPER OR WATCHING TELEVISION: 3
SUM OF ALL RESPONSES TO PHQ QUESTIONS 1-9: 12
10. IF YOU CHECKED OFF ANY PROBLEMS, HOW DIFFICULT HAVE THESE PROBLEMS MADE IT FOR YOU TO DO YOUR WORK, TAKE CARE OF THINGS AT HOME, OR GET ALONG WITH OTHER PEOPLE: 2
9. THOUGHTS THAT YOU WOULD BE BETTER OFF DEAD, OR OF HURTING YOURSELF: 0

## 2023-10-31 ASSESSMENT — ENCOUNTER SYMPTOMS
BLOOD IN STOOL: 0
CONSTIPATION: 0
ABDOMINAL DISTENTION: 0
VOMITING: 0
ABDOMINAL PAIN: 0
SINUS PRESSURE: 0
BACK PAIN: 1
COLOR CHANGE: 0
SHORTNESS OF BREATH: 0
SORE THROAT: 0
RECTAL PAIN: 0
TROUBLE SWALLOWING: 0
EYE REDNESS: 0
STRIDOR: 0
RHINORRHEA: 0
CHEST TIGHTNESS: 0
WHEEZING: 0
DIARRHEA: 0
COUGH: 0
NAUSEA: 0

## 2023-10-31 ASSESSMENT — COLUMBIA-SUICIDE SEVERITY RATING SCALE - C-SSRS
4. HAVE YOU HAD THESE THOUGHTS AND HAD SOME INTENTION OF ACTING ON THEM?: NO
5. HAVE YOU STARTED TO WORK OUT OR WORKED OUT THE DETAILS OF HOW TO KILL YOURSELF? DO YOU INTEND TO CARRY OUT THIS PLAN?: NO
7. DID THIS OCCUR IN THE LAST THREE MONTHS: NO
3. HAVE YOU BEEN THINKING ABOUT HOW YOU MIGHT KILL YOURSELF?: NO

## 2023-10-31 NOTE — PATIENT INSTRUCTIONS
Thank you for choosing [facility]) as your healthcare provider as your care is important to us. Please arrive at your appointment on time. If you are unable to make your appointment, please call our office as soon as possible so that we may reschedule your appointment. Missing 3 appointments in a calendar year without notifying the office may lead to dismissal from the practice. We appreciate you calling at least 24 hours in advance, when possible. Thank you. New Updates for [facility]    Thank you for choosing US to give you the best care! [facility]) is always trying to think of new ways to help their patients. We are asking all patients to try out the new digital registration that is now available through your [facility] account Via the amarilis you're now able to update your personal and registration information prior to your upcoming appointment. This will save you time once you arrive at the office to check-in, not to mention your information remains safe!! Many other perks come from signing up for an account, such as:  Requesting refills  Scheduling an appointment  Completing an E-Visit  Sending a message to the office/provider  Having access to your medication list  Paying your bill/copay prior to your appointment  Scheduling your yearly mammogram  Review your test results    If you are not familiar with [facility] please ask one of us and we will be happy to answer any questions or help you set-up your account.       Your [employer],

## 2023-10-31 NOTE — PRE-PROCEDURE INSTRUCTIONS
On the Day of Your Surgery, Friday, 11/10/2023, Please Arrive At [address] AM     Enter the hospital through the Main Entrance, take the lobby elevators to the second floor and check in at the Surgery Registration desk. Continue to take your home medications as you normally do up to and including the night before surgery with the exception of blood thinning medications. Blood Thinning Medications:  Please stop prescription blood thinning medications such as Apixaban (Eliquis); Clopidogrel (Plavix); Dabigatran (Pradaxa); Prasugrel (Effient); Rivaroxaban (Xarelto); Ticagrelor (Brilinta); Warfarin (Coumadin) only as directed by your surgeon and/or the prescribing physician    Some common examples of other medications that can thin your blood are: Aspirin, Ibuprofen (Advil, Motrin), Naproxen (Aleve), Meloxicam (Mobic), Celecoxib (Celebrex), Fish Oil, many Herbal Supplements. These medications should usually be stopped at least 7 days prior to surgery. Voltaren Gel    Tylenol is OK to take for pain the week prior to surgery. Failure to stop certain medications may interfere with your scheduled surgery. If you receive instructions from your surgeon regarding what medications to stop prior to surgery, please follow those specific instructions. If You Have Diabetes:  Do not take any of your diabetic medications, (injectables or by mouth) the morning of surgery unless otherwise instructed by the doctor who manages your diabetes. If you are taking insulin, contact the doctor the manages your diabetes for instructions about any changes to your insulin dosages the day before surgery. Please take the following medication(s) the day of surgery with small sips of water:              Sudafed, Omeprazole, Buspar    Please use your inhaler(s) if needed and bring your inhaler(s) from home the day of surgery. Showering Before Surgery:      You can play an important role in your own health by carefully washing

## 2023-10-31 NOTE — PROGRESS NOTES
Visit Information    Have you changed or started any medications since your last visit including any over-the-counter medicines, vitamins, or herbal medicines? Have you stopped taking any of your medications? Is so, why? -  yes -   Are you having any side effects from any of your medications? - NO    Have you seen any other physician or provider since your last visit?  no   Have you had any other diagnostic tests since your last visit? YES  Have you been seen in the emergency room and/or had an admission in a hospital since we last saw you?  no   Have you had your routine dental cleaning in the past 6 months?  no     Do you have an active MyChart account? If no, what is the barrier?   Yes    Patient Care Team:  Steven Pérez MD as PCP - General (Family Medicine)  Steven Pérez MD as PCP - Empaneled Provider  Casandra Trujillo MD as Consulting Physician (Urology)  Yury Macias DO as Obstetrician (Obstetrics & Gynecology)    Medical History Review  Past Medical, Family, and Social History reviewed and does contribute to the patient presenting condition    Health Maintenance   Topic Date Due    Hepatitis B vaccine (1 of 3 - 3-dose series) Never done    Hepatitis C screen  Never done    Colorectal Cancer Screen  Never done    GFR test (Diabetes, CKD 3-4, OR last GFR 15-59)  09/29/2023    Lipids  03/25/2024    Depression Monitoring  05/12/2024    DTaP/Tdap/Td vaccine (5 - Td or Tdap) 06/16/2031    Flu vaccine  Completed    Pneumococcal 0-64 years Vaccine  Completed    COVID-19 Vaccine  Completed    HIV screen  Completed    Hepatitis A vaccine  Aged Out    Hib vaccine  Aged Out    Meningococcal (ACWY) vaccine  Aged Out    Diabetes screen  Discontinued    Cervical cancer screen  Discontinued
[facility]   11/1/2023  2:00 PM Marcin Swift, PT [facility]   11/2/2023  8:45 AM Millie Hernandez MD Sports Med [facility]   11/6/2023  2:00 PM Carlos Thompson, DO [facility] [address] [facility]   11/30/2023  8:45 AM Jorge Peacock MD Sports Med [facility]   12/19/2023  1:00 PM Bret Warren MD [facility]   12/21/2023  2:15 PM Rommel Cheng MD Sports Med [facility]   1/31/2024 10:00 AM SANDRO Mirza Neuro Spec Neurology -   1/31/2024 10:30 AM [facility]   2/5/2024  2:45 PM Rommel Cheng MD [facility]   2/27/2024  4:00 PM [facility]  [facility]   2/27/2024  4:00 PM Shaquille Cuevas MD [address] [place] [address]     This note was completed by using the assistance of a speech-recognition program. However, inadvertent computerized transcription errors may be present. Althoughevery effort was made to ensure accuracy, no guarantees can be provided that every mistake has been identified and corrected by editing.   Electronically signed by Bret Warren MD on 10/31/2023  10:23 AM

## 2023-11-01 ENCOUNTER — HOSPITAL ENCOUNTER (OUTPATIENT)
Dept: PHYSICAL THERAPY | Age: 48
Setting detail: THERAPIES SERIES
Discharge: HOME | End: 2023-11-01
Payer: COMMERCIAL

## 2023-11-01 PROCEDURE — 97162 PT EVAL MOD COMPLEX 30 MIN: CPT

## 2023-11-01 PROCEDURE — 97110 THERAPEUTIC EXERCISES: CPT

## 2023-11-01 NOTE — CONSULTS
[] [address] &  Therapy  [address] [facility] [place] [facility].    [phone]  F: [phone]   [] [address]   [address]  P: [phone]  F: [phone]  [] [address] &  Therapy  [address]  P: [phone]  F: [phone]   [] [place]  P: [phone]  F: [phone]  [x] [place]  [address]  [place]: [phone]   F: [phone] [] [facility]  Outpatient Rehabilitation &  Therapy  [address] [facility] [address]   [place]: [phone]  F: [phone]           Physical Therapy Evaluation    Date:  2023   Patient: Brie Trejo  : 1975  MRN: [id number]  Physician: Jose Davies MD    Insurance: [facility] 30vs/year   Medical Diagnosis: instability of (R) ankle joint M25.371  Rehab Codes: ankle pain M25.572 (R)  Onset date: 10/16/23 referral for DME; years ago per patient per intake   Next Dr's appt.: 23    Subjective:   Patient presents to therapy with complaints of (R) ankle pain and instability. Noted initial injury \"years ago\" with stepping into a hole a local apartment complex, this resulted in fracture of the lateral malleolus as well as ligamentous issues. Patient MRI eventually showed ligamentous tear, but patient stated that she was initially only treated for the fracture issue and then cleared. Stated that she had to go to multiple foot/ankle doctors, and the MRI was eventually done which showed additional injury. MRI is listed below. Planned surgery includes ligamentous repair for lateral ankle ligament as well as excision of bone in lateral ankle and talus- exploration of peroneal muscle issue.   We discussed precautions for both just the repair/bone issues

## 2023-11-02 ENCOUNTER — OFFICE VISIT (OUTPATIENT)
Dept: ORTHOPEDIC SURGERY | Age: 48
End: 2023-11-02
Payer: COMMERCIAL

## 2023-11-02 VITALS — HEIGHT: 62 IN | BODY MASS INDEX: 23 KG/M2 | RESPIRATION RATE: 16 BRPM | WEIGHT: 125 LBS | OXYGEN SATURATION: 100 %

## 2023-11-02 DIAGNOSIS — M76.71 PERONEAL TENDINITIS OF RIGHT LOWER EXTREMITY: ICD-10-CM

## 2023-11-02 DIAGNOSIS — M25.371 INSTABILITY OF RIGHT ANKLE JOINT: ICD-10-CM

## 2023-11-02 DIAGNOSIS — S82.831K CLOSED AVULSION FRACTURE OF DISTAL END OF RIGHT FIBULA WITH NONUNION, SUBSEQUENT ENCOUNTER: Primary | ICD-10-CM

## 2023-11-02 DIAGNOSIS — M95.8 OSTEOCHONDRAL DEFECT OF ANKLE: ICD-10-CM

## 2023-11-02 PROCEDURE — 99213 OFFICE O/P EST LOW 20 MIN: CPT | Performed by: ORTHOPAEDIC SURGERY

## 2023-11-02 PROCEDURE — G8420 CALC BMI NORM PARAMETERS: HCPCS | Performed by: ORTHOPAEDIC SURGERY

## 2023-11-02 PROCEDURE — G8482 FLU IMMUNIZE ORDER/ADMIN: HCPCS | Performed by: ORTHOPAEDIC SURGERY

## 2023-11-02 PROCEDURE — 4004F PT TOBACCO SCREEN RCVD TLK: CPT | Performed by: ORTHOPAEDIC SURGERY

## 2023-11-02 PROCEDURE — G8427 DOCREV CUR MEDS BY ELIG CLIN: HCPCS | Performed by: ORTHOPAEDIC SURGERY

## 2023-11-02 NOTE — PROGRESS NOTES
911., Disp: 100 tablet, Rfl: 0    budesonide-formoterol (SYMBICORT) 160-4.5 MCG/ACT AERO, Inhale 2 puffs into the lungs daily, Disp: 30.6 g, Rfl: 1    fexofenadine (ALLEGRA) 180 MG tablet, Take 1 tablet by mouth daily, Disp: 30 tablet, Rfl: 0    fluticasone (FLONASE) 50 MCG/ACT nasal spray, 1 spray by Each Nostril route daily, Disp: 32 g, Rfl: 1    vitamin D3 (CHOLECALCIFEROL) 10 MCG (400 UNIT) TABS tablet, Take 1 tablet by mouth daily (Patient not taking: Reported on 10/31/2023), Disp: 30 tablet, Rfl: 0    busPIRone (BUSPAR) 10 MG tablet, Take 1 tablet by mouth 3 times daily, Disp: , Rfl:     Potassium 75 MG TABS, Take 75 mg by mouth 2 times daily, Disp: 450 tablet, Rfl: 0    diclofenac sodium 1 % GEL, Apply 2 g topically 4 times daily as needed for Pain (Patient not taking: Reported on 10/31/2023), Disp: 1 Tube, Rfl: 0    SUMAtriptan (IMITREX) 100 MG tablet, Take 1 tablet by mouth once as needed for Migraine (Patient not taking: Reported on 10/31/2023), Disp: , Rfl:      Allergies:    Ibuprofen, Desonide, Claritin [loratadine], and Tape [adhesive tape]    Family History:  family history includes Anxiety Disorder in her daughter; Cancer in her father; Cervical Cancer in her sister; Coronary Art Dis in her father; Diabetes in her maternal cousin and maternal uncle; Heart Disease in her father; Kidney Disease in her mother; Asa Leticia in her maternal grandmother and paternal grandfather; Migraines in her father and sister; Seizures in her father and son.     Social History:   Social History     Occupational History    Not on file   Tobacco Use    Smoking status: Every Day     Packs/day: .25     Types: Cigarettes     Start date: 11     Passive exposure: Never    Smokeless tobacco: Never    Tobacco comments:     litte less then 1/2 mariola/day   Vaping Use    Vaping Use: Some days    Start date: 9/30/2023    Substances: Nicotine, Flavoring    Devices: Disposable   Substance and Sexual Activity    Alcohol use: Not

## 2023-11-03 RX ORDER — OMEPRAZOLE 20 MG/1
CAPSULE, DELAYED RELEASE ORAL
Qty: 30 CAPSULE | Refills: 3 | Status: SHIPPED | OUTPATIENT
Start: 2023-11-03

## 2023-11-03 NOTE — TELEPHONE ENCOUNTER
Please Approve or Refuse.   Send to Pharmacy per Pt's Request:      Next Visit Date:  12/19/2023   Last Visit Date: 10/31/2023    Hemoglobin A1C (%)   Date Value   01/04/2018 5.1   09/29/2017 4.7   09/16/2016 5.4             ( goal A1C is < 7)   BP Readings from Last 3 Encounters:   10/31/23 104/63   10/31/23 98/62   10/26/23 110/72          (goal 120/80)  BUN   Date Value Ref Range Status   10/31/2023 8 6 - 20 mg/dL Final     Creatinine   Date Value Ref Range Status   10/31/2023 0.8 0.5 - 0.9 mg/dL Final     Potassium   Date Value Ref Range Status   10/31/2023 4.3 3.7 - 5.3 mmol/L Final

## 2023-11-10 ENCOUNTER — APPOINTMENT (OUTPATIENT)
Dept: GENERAL RADIOLOGY | Age: 48
End: 2023-11-10
Attending: ORTHOPAEDIC SURGERY
Payer: COMMERCIAL

## 2023-11-10 ENCOUNTER — ANESTHESIA (OUTPATIENT)
Dept: OPERATING ROOM | Age: 48
End: 2023-11-10
Payer: COMMERCIAL

## 2023-11-10 ENCOUNTER — ANESTHESIA EVENT (OUTPATIENT)
Dept: OPERATING ROOM | Age: 48
End: 2023-11-10
Payer: COMMERCIAL

## 2023-11-10 ENCOUNTER — HOSPITAL ENCOUNTER (OUTPATIENT)
Age: 48
Setting detail: OUTPATIENT SURGERY
Discharge: HOME | End: 2023-11-10
Attending: ORTHOPAEDIC SURGERY | Admitting: ORTHOPAEDIC SURGERY
Payer: COMMERCIAL

## 2023-11-10 VITALS
BODY MASS INDEX: 23 KG/M2 | WEIGHT: 125 LBS | OXYGEN SATURATION: 98 % | DIASTOLIC BLOOD PRESSURE: 78 MMHG | HEIGHT: 62 IN | RESPIRATION RATE: 13 BRPM | TEMPERATURE: 96.8 F | HEART RATE: 93 BPM | SYSTOLIC BLOOD PRESSURE: 113 MMHG

## 2023-11-10 DIAGNOSIS — M95.8 OSTEOCHONDRAL DEFECT OF ANKLE: Primary | ICD-10-CM

## 2023-11-10 DIAGNOSIS — J44.1 CHRONIC OBSTRUCTIVE PULMONARY DISEASE WITH ACUTE EXACERBATION (HCC): ICD-10-CM

## 2023-11-10 PROCEDURE — 64447 NJX AA&/STRD FEMORAL NRV IMG: CPT | Performed by: ANESTHESIOLOGY

## 2023-11-10 PROCEDURE — 2580000003 HC RX 258: Performed by: SPECIALIST

## 2023-11-10 PROCEDURE — 6360000002 HC RX W HCPCS: Performed by: ANESTHESIOLOGY

## 2023-11-10 PROCEDURE — 6360000002 HC RX W HCPCS: Performed by: ORTHOPAEDIC SURGERY

## 2023-11-10 PROCEDURE — 7100000010 HC PHASE II RECOVERY - FIRST 15 MIN: Performed by: ORTHOPAEDIC SURGERY

## 2023-11-10 PROCEDURE — 2500000003 HC RX 250 WO HCPCS: Performed by: ANESTHESIOLOGY

## 2023-11-10 PROCEDURE — 3600000002 HC SURGERY LEVEL 2 BASE: Performed by: ORTHOPAEDIC SURGERY

## 2023-11-10 PROCEDURE — 7100000000 HC PACU RECOVERY - FIRST 15 MIN: Performed by: ORTHOPAEDIC SURGERY

## 2023-11-10 PROCEDURE — 6370000000 HC RX 637 (ALT 250 FOR IP): Performed by: ORTHOPAEDIC SURGERY

## 2023-11-10 PROCEDURE — 6360000002 HC RX W HCPCS: Performed by: SPECIALIST

## 2023-11-10 PROCEDURE — 7100000001 HC PACU RECOVERY - ADDTL 15 MIN: Performed by: ORTHOPAEDIC SURGERY

## 2023-11-10 PROCEDURE — C1713 ANCHOR/SCREW BN/BN,TIS/BN: HCPCS | Performed by: ORTHOPAEDIC SURGERY

## 2023-11-10 PROCEDURE — 3600000012 HC SURGERY LEVEL 2 ADDTL 15MIN: Performed by: ORTHOPAEDIC SURGERY

## 2023-11-10 PROCEDURE — 2500000003 HC RX 250 WO HCPCS: Performed by: SPECIALIST

## 2023-11-10 PROCEDURE — 2709999900 HC NON-CHARGEABLE SUPPLY: Performed by: ORTHOPAEDIC SURGERY

## 2023-11-10 PROCEDURE — 6370000000 HC RX 637 (ALT 250 FOR IP): Performed by: ANESTHESIOLOGY

## 2023-11-10 PROCEDURE — 3700000001 HC ADD 15 MINUTES (ANESTHESIA): Performed by: ORTHOPAEDIC SURGERY

## 2023-11-10 PROCEDURE — 7100000011 HC PHASE II RECOVERY - ADDTL 15 MIN: Performed by: ORTHOPAEDIC SURGERY

## 2023-11-10 PROCEDURE — 3700000000 HC ANESTHESIA ATTENDED CARE: Performed by: ORTHOPAEDIC SURGERY

## 2023-11-10 DEVICE — DEVICE GRFT FIX FOR LIGMNT AUG ANCHR REP PEEK INT BRAC: Type: IMPLANTABLE DEVICE | Site: ANKLE | Status: FUNCTIONAL

## 2023-11-10 DEVICE — ANCHOR SUT NDL DX FIBERTAK: Type: IMPLANTABLE DEVICE | Site: ANKLE | Status: FUNCTIONAL

## 2023-11-10 RX ORDER — SODIUM CHLORIDE 9 MG/ML
INJECTION, SOLUTION INTRAVENOUS PRN
Status: DISCONTINUED | OUTPATIENT
Start: 2023-11-10 | End: 2023-11-10 | Stop reason: HOSPADM

## 2023-11-10 RX ORDER — FENTANYL CITRATE 50 UG/ML
25 INJECTION, SOLUTION INTRAMUSCULAR; INTRAVENOUS EVERY 5 MIN PRN
Status: DISCONTINUED | OUTPATIENT
Start: 2023-11-10 | End: 2023-11-10 | Stop reason: HOSPADM

## 2023-11-10 RX ORDER — GINSENG 100 MG
CAPSULE ORAL ONCE
Status: COMPLETED | OUTPATIENT
Start: 2023-11-10 | End: 2023-11-10

## 2023-11-10 RX ORDER — LIDOCAINE HYDROCHLORIDE 10 MG/ML
INJECTION, SOLUTION INFILTRATION; PERINEURAL PRN
Status: DISCONTINUED | OUTPATIENT
Start: 2023-11-10 | End: 2023-11-10 | Stop reason: SDUPTHER

## 2023-11-10 RX ORDER — SODIUM CHLORIDE 0.9 % (FLUSH) 0.9 %
5-40 SYRINGE (ML) INJECTION EVERY 12 HOURS SCHEDULED
Status: DISCONTINUED | OUTPATIENT
Start: 2023-11-10 | End: 2023-11-10 | Stop reason: HOSPADM

## 2023-11-10 RX ORDER — DOCUSATE SODIUM 100 MG/1
100 CAPSULE, LIQUID FILLED ORAL 2 TIMES DAILY PRN
Qty: 20 CAPSULE | Refills: 0 | Status: SHIPPED | OUTPATIENT
Start: 2023-11-10 | End: 2023-11-17

## 2023-11-10 RX ORDER — DEXAMETHASONE SODIUM PHOSPHATE 10 MG/ML
INJECTION, SOLUTION INTRAMUSCULAR; INTRAVENOUS PRN
Status: DISCONTINUED | OUTPATIENT
Start: 2023-11-10 | End: 2023-11-10 | Stop reason: SDUPTHER

## 2023-11-10 RX ORDER — ONDANSETRON 2 MG/ML
INJECTION INTRAMUSCULAR; INTRAVENOUS PRN
Status: DISCONTINUED | OUTPATIENT
Start: 2023-11-10 | End: 2023-11-10 | Stop reason: SDUPTHER

## 2023-11-10 RX ORDER — SODIUM CHLORIDE 0.9 % (FLUSH) 0.9 %
5-40 SYRINGE (ML) INJECTION PRN
Status: DISCONTINUED | OUTPATIENT
Start: 2023-11-10 | End: 2023-11-10 | Stop reason: HOSPADM

## 2023-11-10 RX ORDER — MIDAZOLAM HYDROCHLORIDE 1 MG/ML
2 INJECTION INTRAMUSCULAR; INTRAVENOUS
Status: COMPLETED | OUTPATIENT
Start: 2023-11-10 | End: 2023-11-10

## 2023-11-10 RX ORDER — ROPIVACAINE HYDROCHLORIDE 5 MG/ML
INJECTION, SOLUTION EPIDURAL; INFILTRATION; PERINEURAL PRN
Status: DISCONTINUED | OUTPATIENT
Start: 2023-11-10 | End: 2023-11-10 | Stop reason: SDUPTHER

## 2023-11-10 RX ORDER — ASPIRIN 325 MG
325 TABLET, DELAYED RELEASE (ENTERIC COATED) ORAL DAILY
Qty: 42 TABLET | Refills: 0 | Status: SHIPPED | OUTPATIENT
Start: 2023-11-10

## 2023-11-10 RX ORDER — PROPOFOL 10 MG/ML
INJECTION, EMULSION INTRAVENOUS PRN
Status: DISCONTINUED | OUTPATIENT
Start: 2023-11-10 | End: 2023-11-10 | Stop reason: SDUPTHER

## 2023-11-10 RX ORDER — HYDROMORPHONE HYDROCHLORIDE 1 MG/ML
0.5 INJECTION, SOLUTION INTRAMUSCULAR; INTRAVENOUS; SUBCUTANEOUS EVERY 5 MIN PRN
Status: DISCONTINUED | OUTPATIENT
Start: 2023-11-10 | End: 2023-11-10 | Stop reason: HOSPADM

## 2023-11-10 RX ORDER — LIDOCAINE HYDROCHLORIDE 20 MG/ML
INJECTION, SOLUTION EPIDURAL; INFILTRATION; INTRACAUDAL; PERINEURAL PRN
Status: DISCONTINUED | OUTPATIENT
Start: 2023-11-10 | End: 2023-11-10 | Stop reason: SDUPTHER

## 2023-11-10 RX ORDER — SODIUM CHLORIDE, SODIUM LACTATE, POTASSIUM CHLORIDE, CALCIUM CHLORIDE 600; 310; 30; 20 MG/100ML; MG/100ML; MG/100ML; MG/100ML
INJECTION, SOLUTION INTRAVENOUS CONTINUOUS PRN
Status: DISCONTINUED | OUTPATIENT
Start: 2023-11-10 | End: 2023-11-10 | Stop reason: SDUPTHER

## 2023-11-10 RX ORDER — OXYCODONE HYDROCHLORIDE AND ACETAMINOPHEN 5; 325 MG/1; MG/1
1 TABLET ORAL EVERY 6 HOURS PRN
Qty: 20 TABLET | Refills: 0 | Status: SHIPPED | OUTPATIENT
Start: 2023-11-10 | End: 2023-11-17

## 2023-11-10 RX ORDER — ONDANSETRON 2 MG/ML
4 INJECTION INTRAMUSCULAR; INTRAVENOUS
Status: DISCONTINUED | OUTPATIENT
Start: 2023-11-10 | End: 2023-11-10 | Stop reason: HOSPADM

## 2023-11-10 RX ORDER — DEXAMETHASONE SODIUM PHOSPHATE 4 MG/ML
INJECTION, SOLUTION INTRA-ARTICULAR; INTRALESIONAL; INTRAMUSCULAR; INTRAVENOUS; SOFT TISSUE PRN
Status: DISCONTINUED | OUTPATIENT
Start: 2023-11-10 | End: 2023-11-10 | Stop reason: SDUPTHER

## 2023-11-10 RX ORDER — PHENYLEPHRINE HCL IN 0.9% NACL 1 MG/10 ML
SYRINGE (ML) INTRAVENOUS PRN
Status: DISCONTINUED | OUTPATIENT
Start: 2023-11-10 | End: 2023-11-10 | Stop reason: SDUPTHER

## 2023-11-10 RX ORDER — MONTELUKAST SODIUM 10 MG/1
10 TABLET ORAL NIGHTLY
Qty: 30 TABLET | Refills: 1 | Status: SHIPPED | OUTPATIENT
Start: 2023-11-10

## 2023-11-10 RX ORDER — ONDANSETRON 4 MG/1
4 TABLET, FILM COATED ORAL EVERY 8 HOURS PRN
Qty: 20 TABLET | Refills: 0 | Status: SHIPPED | OUTPATIENT
Start: 2023-11-10 | End: 2023-11-17

## 2023-11-10 RX ORDER — ACETAMINOPHEN 500 MG
1000 TABLET ORAL ONCE
Status: COMPLETED | OUTPATIENT
Start: 2023-11-10 | End: 2023-11-10

## 2023-11-10 RX ORDER — SULFAMETHOXAZOLE AND TRIMETHOPRIM 800; 160 MG/1; MG/1
1 TABLET ORAL 2 TIMES DAILY
Qty: 10 TABLET | Refills: 0 | Status: SHIPPED | OUTPATIENT
Start: 2023-11-10 | End: 2023-11-15

## 2023-11-10 RX ORDER — ROCURONIUM BROMIDE 10 MG/ML
INJECTION, SOLUTION INTRAVENOUS PRN
Status: DISCONTINUED | OUTPATIENT
Start: 2023-11-10 | End: 2023-11-10 | Stop reason: SDUPTHER

## 2023-11-10 RX ORDER — FENTANYL CITRATE 50 UG/ML
INJECTION, SOLUTION INTRAMUSCULAR; INTRAVENOUS PRN
Status: DISCONTINUED | OUTPATIENT
Start: 2023-11-10 | End: 2023-11-10 | Stop reason: SDUPTHER

## 2023-11-10 RX ADMIN — ACETAMINOPHEN 1000 MG: 500 TABLET ORAL at 07:28

## 2023-11-10 RX ADMIN — FENTANYL CITRATE 50 MCG: 50 INJECTION INTRAMUSCULAR; INTRAVENOUS at 09:31

## 2023-11-10 RX ADMIN — ROPIVACAINE HYDROCHLORIDE 10 ML: 5 INJECTION EPIDURAL; INFILTRATION; PERINEURAL at 07:43

## 2023-11-10 RX ADMIN — ROPIVACAINE HYDROCHLORIDE 30 ML: 5 INJECTION EPIDURAL; INFILTRATION; PERINEURAL at 07:40

## 2023-11-10 RX ADMIN — LIDOCAINE HYDROCHLORIDE 3 ML: 10 INJECTION, SOLUTION INFILTRATION; PERINEURAL at 07:40

## 2023-11-10 RX ADMIN — Medication 50 MCG: at 10:16

## 2023-11-10 RX ADMIN — SODIUM CHLORIDE, POTASSIUM CHLORIDE, SODIUM LACTATE AND CALCIUM CHLORIDE: 600; 310; 30; 20 INJECTION, SOLUTION INTRAVENOUS at 07:03

## 2023-11-10 RX ADMIN — PROPOFOL 150 MG: 10 INJECTION, EMULSION INTRAVENOUS at 09:31

## 2023-11-10 RX ADMIN — MIDAZOLAM 2 MG: 1 INJECTION INTRAMUSCULAR; INTRAVENOUS at 07:36

## 2023-11-10 RX ADMIN — DEXAMETHASONE SODIUM PHOSPHATE 4 MG: 4 INJECTION, SOLUTION INTRAMUSCULAR; INTRAVENOUS at 07:40

## 2023-11-10 RX ADMIN — Medication 50 MCG: at 10:22

## 2023-11-10 RX ADMIN — PROPOFOL 50 MG: 10 INJECTION, EMULSION INTRAVENOUS at 09:34

## 2023-11-10 RX ADMIN — SUGAMMADEX 200 MG: 100 INJECTION, SOLUTION INTRAVENOUS at 11:02

## 2023-11-10 RX ADMIN — FENTANYL CITRATE 50 MCG: 50 INJECTION INTRAMUSCULAR; INTRAVENOUS at 09:49

## 2023-11-10 RX ADMIN — ONDANSETRON 4 MG: 2 INJECTION INTRAMUSCULAR; INTRAVENOUS at 10:50

## 2023-11-10 RX ADMIN — Medication 50 MCG: at 10:07

## 2023-11-10 RX ADMIN — LIDOCAINE HYDROCHLORIDE 100 MG: 20 INJECTION, SOLUTION EPIDURAL; INFILTRATION; INTRACAUDAL; PERINEURAL at 09:31

## 2023-11-10 RX ADMIN — DEXAMETHASONE SODIUM PHOSPHATE 10 MG: 10 INJECTION, SOLUTION INTRAMUSCULAR; INTRAVENOUS at 09:46

## 2023-11-10 RX ADMIN — Medication 2000 MG: at 09:37

## 2023-11-10 RX ADMIN — ROCURONIUM BROMIDE 40 MG: 10 INJECTION, SOLUTION INTRAVENOUS at 09:46

## 2023-11-10 ASSESSMENT — PAIN DESCRIPTION - DESCRIPTORS: DESCRIPTORS: SHARP

## 2023-11-10 ASSESSMENT — LIFESTYLE VARIABLES: SMOKING_STATUS: 1

## 2023-11-10 ASSESSMENT — PAIN - FUNCTIONAL ASSESSMENT: PAIN_FUNCTIONAL_ASSESSMENT: 0-10

## 2023-11-10 NOTE — TELEPHONE ENCOUNTER
Please Approve or Refuse.   Send to Pharmacy per Pt's Request: [facility]      Next Visit Date:  12/19/2023   Last Visit Date: 10/31/2023    Hemoglobin A1C (%)   Date Value   01/04/2018 5.1   09/29/2017 4.7   09/16/2016 5.4             ( goal A1C is < 7)   BP Readings from Last 3 Encounters:   11/10/23 113/78   10/31/23 104/63   10/31/23 98/62          (goal 120/80)  BUN   Date Value Ref Range Status   10/31/2023 8 6 - 20 mg/dL Final     Creatinine   Date Value Ref Range Status   10/31/2023 0.8 0.5 - 0.9 mg/dL Final     Potassium   Date Value Ref Range Status   10/31/2023 4.3 3.7 - 5.3 mmol/L Final

## 2023-11-10 NOTE — PROGRESS NOTES
Bedside Single shot nerve block done at bedside with Dr. Boyd Velásquez. Pt monitored per protocol. She tolerated procedure well. Siderails up x 2 and call light given to the pt. She was instructed to not get OOB and she verbalized understanding.  WIll cont to monitor

## 2023-11-10 NOTE — PROGRESS NOTES
I spoke to the patient before heading to the OR, and the operative site was identified, verified and marked. The procedure was verified. We have reviewed the risks, benefits, and alternatives to the procedure. No guarantees were made. I have also reviewed the history and physical. There are no significant changes in medical history. All questions were answered and they wish to proceed as planned.      Electronically signed by Raul Duvall MD

## 2023-11-10 NOTE — ANESTHESIA POSTPROCEDURE EVALUATION
Department of Anesthesiology  Postprocedure Note    Patient: Arianne Li  MRN: [id number]  YOB: 1975  Date of evaluation: 11/10/2023      Procedure Summary     Date: 11/10/23 Room / Location: [address] [place] [address] / [facility] - INPATIENT    Anesthesia Start: 3514 Anesthesia Stop: 1122    Procedures:       RIGHT LATERAL ANKLE LIGAMENT STABILIZATION PERONEAL DEBRIDEMENT (Right: Ankle)      OCD MICROFX (Right: Ankle) Diagnosis:       Osteochondritis dissecans of talus, right      (Osteochondritis dissecans of talus, right [Y81.249])    Surgeons: Gareth Kearney MD Responsible Provider: Del Hinkle DO    Anesthesia Type: general ASA Status: 3          Anesthesia Type: No value filed.     Donis Phase I: Donis Score: 10    Donis Phase II:        Anesthesia Post Evaluation    Patient location during evaluation: PACU  Patient participation: complete - patient participated  Level of consciousness: awake and alert  Airway patency: patent  Nausea & Vomiting: no nausea and no vomiting  Complications: no  Cardiovascular status: hemodynamically stable  Respiratory status: acceptable  Hydration status: stable  Pain management: adequate

## 2023-11-10 NOTE — DISCHARGE INSTRUCTIONS
[address] [place] [address]  Dept: [phone]  Loc: [phone]    Dr. Jonny Estrada Post Operative Instructions (Lower Extremity)      Fluids and Diet:    Begin with clear liquids, broth, dry toast, and crackers. If not nauseated, then resume your regular pre-operative diet when you feel ready. Medications: Take your prescriptions as directed. You may resume your regularly scheduled medications (unless otherwise directed). If any side effects or adverse reactions occur, discontinue the medication and contact your doctor. Review the patient drug information that is provided before you take any medication. If you have a fracture or a surgery that involves placing hardware (screws, plates, joint replacement), avoid the routine use of NSAIDs for about 6 months if possible (due to a risk of delayed bone healing). Ambulation and Activity:    You are advised to go directly home from the hospital.  You may not put any weight on the operative foot (unless otherwise directed). Avoid stairs if possible. Do not lift or move heavy objects. Do not drive until cleared by your physician. Bandage and Wound Care Instructions:    Keep splint/dressing clean and dry. Do not shower or bathe the operative extremity. Do not remove the splint/dressing (unless otherwise directed); it will be removed at your first postoperative office visit at about 2 weeks. Do not attempt to put anything between the splint or dressing and your skin; some itching is normal.  If the overlying ace wrap feels too tight, you may gently loosen it. Call the office if you have any questions or concerns. Ice and Elevation:    Elevate operative extremity as often as possible to reduce swelling and discomfort during the first 2 weeks. For the first 2 weeks, keep it elevated almost around the clock, and don't leave it not elevated for longer than 15 minutes at a time.   Elevate with about 2-3

## 2023-11-10 NOTE — ANESTHESIA PROCEDURE NOTES
Peripheral Block    Patient location during procedure: pre-op  Reason for block: post-op pain management and at surgeon's request  Start time: 11/10/2023 7:36 AM  End time: 11/10/2023 7:43 AM  Staffing  Performed: anesthesiologist   Performed by: Suman Selby DO  Authorized by: Suman Selby DO    Preanesthetic Checklist  Completed: patient identified, IV checked, site marked, risks and benefits discussed, surgical/procedural consents, equipment checked, pre-op evaluation, timeout performed, anesthesia consent given, oxygen available and monitors applied/VS acknowledged  Peripheral Block   Patient position: supine  Prep: ChloraPrep  Provider prep: mask and sterile gloves  Patient monitoring: cardiac monitor, continuous pulse ox, frequent blood pressure checks and IV access  Block type: Saphenous  Laterality: right  Injection technique: single-shot  Guidance: ultrasound guided  Local infiltration: lidocaine  Infiltration strength: 1 %  Local infiltration: lidocaine  Dose: 3 mL    Needle   Needle type: insulated echogenic nerve stimulator needle   Needle gauge: 21 G  Needle localization: ultrasound guidance  Needle length: 10 cm  Assessment   Injection assessment: negative aspiration for heme, no paresthesia on injection and local visualized surrounding nerve on ultrasound  Paresthesia pain: none  Slow fractionated injection: yes  Hemodynamics: stable  Real-time US image taken/store: yes  Outcomes: uncomplicated    Additional Notes  Right popliteal single shot   30ml 0.5% ropivacaine + 4mg decadron     Right saphenous single shot   10ml 0.5% ropivacaine

## 2023-11-11 ENCOUNTER — CLINICAL DOCUMENTATION (OUTPATIENT)
Dept: ORTHOPEDIC SURGERY | Age: 48
End: 2023-11-11

## 2023-11-13 DIAGNOSIS — M95.8 OSTEOCHONDRAL DEFECT OF ANKLE: Primary | ICD-10-CM

## 2023-11-13 DIAGNOSIS — S82.831K CLOSED AVULSION FRACTURE OF DISTAL END OF RIGHT FIBULA WITH NONUNION, SUBSEQUENT ENCOUNTER: ICD-10-CM

## 2023-11-13 DIAGNOSIS — M76.71 PERONEAL TENDINITIS OF RIGHT LOWER EXTREMITY: ICD-10-CM

## 2023-11-13 DIAGNOSIS — M95.8 OSTEOCHONDRAL DEFECT OF ANKLE: ICD-10-CM

## 2023-11-13 RX ORDER — OXYCODONE HYDROCHLORIDE AND ACETAMINOPHEN 5; 325 MG/1; MG/1
1 TABLET ORAL EVERY 6 HOURS PRN
Qty: 20 TABLET | Refills: 0 | Status: CANCELLED | OUTPATIENT
Start: 2023-11-13 | End: 2023-11-20

## 2023-11-13 RX ORDER — HYDROCODONE BITARTRATE AND ACETAMINOPHEN 5; 325 MG/1; MG/1
1 TABLET ORAL EVERY 6 HOURS PRN
Qty: 10 TABLET | Refills: 0 | Status: SHIPPED | OUTPATIENT
Start: 2023-11-13 | End: 2023-11-20

## 2023-11-13 RX ORDER — ONDANSETRON 4 MG/1
TABLET, FILM COATED ORAL
Qty: 20 TABLET | Refills: 0 | OUTPATIENT
Start: 2023-11-13

## 2023-11-13 NOTE — TELEPHONE ENCOUNTER
Left VM for patient to call back. Cancelled rx request that was attached to this message. Patient just had surgery on 11/10 and was given #20 Percocet. She should not be out of this medication. Unfortunately, by law, we cannot fill this medication for 7 days since fill date (next fill will be 11/17/23). She can take Tylenol for pain, and not to exceed 3000 mg in a 24 hour time frame. If patient has further questions please let me know. Please only send message in regards to pain medication refill, do not attach medication in message. Thank you.

## 2023-11-13 NOTE — PROGRESS NOTES
I called the patient at home for a routine check to discuss how she was doing, as well as reinforce the relevant postoperative restrictions/precautions, discuss medications, and answer any further questions. I was unable to reach her, and was unable to leave a voice message, but the patient has previously been encouraged to reach out with any concerns.

## 2023-11-13 NOTE — TELEPHONE ENCOUNTER
DOS: 11/10/23 Right lateral ankle ligament stabilization     Last Fill: 11/10/23 #20 Percocet     Patient states she is out of pain medication and requesting a refill. Attached RX for [place] to this message. Please advise.

## 2023-11-13 NOTE — TELEPHONE ENCOUNTER
Patient is requesting a refill on her pain medication to be sent to her pharmacy listed on file. Please advise if any questions/concerns. Thank you.

## 2023-11-14 ENCOUNTER — TELEPHONE (OUTPATIENT)
Dept: ORTHOPEDIC SURGERY | Age: 48
End: 2023-11-14

## 2023-11-14 DIAGNOSIS — M25.371 INSTABILITY OF RIGHT ANKLE JOINT: ICD-10-CM

## 2023-11-14 DIAGNOSIS — S82.831K CLOSED AVULSION FRACTURE OF DISTAL END OF RIGHT FIBULA WITH NONUNION, SUBSEQUENT ENCOUNTER: ICD-10-CM

## 2023-11-14 DIAGNOSIS — M95.8 OSTEOCHONDRAL DEFECT OF ANKLE: Primary | ICD-10-CM

## 2023-11-14 DIAGNOSIS — M76.71 PERONEAL TENDINITIS OF RIGHT LOWER EXTREMITY: ICD-10-CM

## 2023-11-14 RX ORDER — ACETAMINOPHEN 500 MG
1000 TABLET ORAL EVERY 8 HOURS
Qty: 40 TABLET | Refills: 0 | Status: SHIPPED | OUTPATIENT
Start: 2023-11-14 | End: 2023-11-21

## 2023-11-14 NOTE — TELEPHONE ENCOUNTER
Spoke with patient regarding pain medication per [facility]. Advised her that new Rx sent in but to take Tylenol to stretch the Norco.  Patient asking if Dr. Cally Pinto can send Rx for Tylenol to [facility] because [facility] covers it.

## 2023-11-30 ENCOUNTER — OFFICE VISIT (OUTPATIENT)
Dept: ORTHOPEDIC SURGERY | Age: 48
End: 2023-11-30

## 2023-11-30 VITALS — BODY MASS INDEX: 23 KG/M2 | WEIGHT: 125 LBS | HEIGHT: 62 IN | OXYGEN SATURATION: 100 % | RESPIRATION RATE: 15 BRPM

## 2023-11-30 DIAGNOSIS — S82.831K CLOSED AVULSION FRACTURE OF DISTAL END OF RIGHT FIBULA WITH NONUNION, SUBSEQUENT ENCOUNTER: ICD-10-CM

## 2023-11-30 DIAGNOSIS — M76.71 PERONEAL TENDINITIS OF RIGHT LOWER EXTREMITY: ICD-10-CM

## 2023-11-30 DIAGNOSIS — M95.8 OSTEOCHONDRAL DEFECT OF ANKLE: Primary | ICD-10-CM

## 2023-11-30 PROCEDURE — 99024 POSTOP FOLLOW-UP VISIT: CPT | Performed by: ORTHOPAEDIC SURGERY

## 2023-11-30 RX ORDER — ACETAMINOPHEN 500 MG
1000 TABLET ORAL EVERY 8 HOURS
Qty: 40 TABLET | Refills: 0 | OUTPATIENT
Start: 2023-11-30 | End: 2023-12-07

## 2023-11-30 RX ORDER — ACETAMINOPHEN 500 MG
1000 TABLET ORAL EVERY 8 HOURS PRN
Qty: 40 TABLET | Refills: 0 | Status: SHIPPED | OUTPATIENT
Start: 2023-11-30 | End: 2023-12-07

## 2023-12-22 ENCOUNTER — TELEPHONE (OUTPATIENT)
Dept: NEUROLOGY | Age: 48
End: 2023-12-22

## 2023-12-22 DIAGNOSIS — G43.011 INTRACTABLE MIGRAINE WITHOUT AURA AND WITH STATUS MIGRAINOSUS: Primary | ICD-10-CM

## 2023-12-22 NOTE — TELEPHONE ENCOUNTER
Marycruz called the office and stated that she has had a migraine for the past three days. She already had her Ajovy injection about 4 days ago and is unable to get her Rizatriptan from the pharmacy until the 29 th. She would like to know if there is something else we can send for her as she does not want to go to urgent care/ER.     Please review and respond.   Thank you.

## 2023-12-26 DIAGNOSIS — M95.8 OSTEOCHONDRAL DEFECT OF ANKLE: Primary | ICD-10-CM

## 2023-12-26 NOTE — TELEPHONE ENCOUNTER
Okay for prednisone 40 mg in AM for 5 days, unless she has issues taking steroids. I didn't see anything concerning in the chart, but an odd allergy comes up. Can you confirm with her?

## 2023-12-28 ENCOUNTER — OFFICE VISIT (OUTPATIENT)
Dept: ORTHOPEDIC SURGERY | Age: 48
End: 2023-12-28

## 2023-12-28 VITALS — WEIGHT: 125 LBS | BODY MASS INDEX: 23 KG/M2 | RESPIRATION RATE: 15 BRPM | HEIGHT: 62 IN | OXYGEN SATURATION: 100 %

## 2023-12-28 DIAGNOSIS — M95.8 OSTEOCHONDRAL DEFECT OF ANKLE: Primary | ICD-10-CM

## 2023-12-28 PROCEDURE — 99024 POSTOP FOLLOW-UP VISIT: CPT | Performed by: ORTHOPAEDIC SURGERY

## 2023-12-28 RX ORDER — ACETAMINOPHEN 500 MG
1000 TABLET ORAL EVERY 8 HOURS PRN
Qty: 40 TABLET | Refills: 0 | Status: SHIPPED | OUTPATIENT
Start: 2023-12-28 | End: 2024-01-04

## 2023-12-29 NOTE — TELEPHONE ENCOUNTER
I tried calling again and left another message asking for a return call.     Patient has not been [facility] since 11/30/2022.

## 2024-01-03 NOTE — TELEPHONE ENCOUNTER
Marycruz called the office and stated that she is still dealing with a headache. She stated that she is unable to take prednisone because it causes tachycardia. Marycruz denied wanting to try something else at this time, she took two Maxalt today and stated that she will call the office back to let us know how she feels.

## 2024-01-08 DIAGNOSIS — J44.1 CHRONIC OBSTRUCTIVE PULMONARY DISEASE WITH ACUTE EXACERBATION (HCC): ICD-10-CM

## 2024-01-08 RX ORDER — MONTELUKAST SODIUM 10 MG/1
10 TABLET ORAL NIGHTLY
Qty: 30 TABLET | Refills: 3 | Status: SHIPPED | OUTPATIENT
Start: 2024-01-08

## 2024-01-10 ENCOUNTER — TELEPHONE (OUTPATIENT)
Dept: ORTHOPEDIC SURGERY | Age: 49
End: 2024-01-10

## 2024-01-10 DIAGNOSIS — M95.8 OSTEOCHONDRAL DEFECT OF ANKLE: Primary | ICD-10-CM

## 2024-01-10 DIAGNOSIS — M76.71 PERONEAL TENDINITIS OF RIGHT LOWER EXTREMITY: ICD-10-CM

## 2024-01-10 DIAGNOSIS — S82.831K CLOSED AVULSION FRACTURE OF DISTAL END OF RIGHT FIBULA WITH NONUNION, SUBSEQUENT ENCOUNTER: ICD-10-CM

## 2024-01-10 NOTE — TELEPHONE ENCOUNTER
Pt called requesting a refill of Tylenol 500 mg 2 caps every 8 hrs for pain.  Pt has 8 pills left.  Pt uses [facility].

## 2024-01-10 NOTE — TELEPHONE ENCOUNTER
Dr. Lagos,   Patient requesting a refill on Tylenol. She states she only has 8 tablets left.     Last Fill: 12/28/23 #40    Patient states she is taking it every 8 hours as directed because she is still in a lot of pain. Next appt isn't until 2/12/24.

## 2024-01-11 RX ORDER — ACETAMINOPHEN 500 MG
1000 TABLET ORAL EVERY 6 HOURS PRN
Qty: 40 TABLET | Refills: 0 | OUTPATIENT
Start: 2024-01-11

## 2024-01-15 DIAGNOSIS — M95.8 OSTEOCHONDRAL DEFECT OF ANKLE: ICD-10-CM

## 2024-01-15 RX ORDER — HAND SANITIZING WIPES 0.66 ML/ML
SWAB TOPICAL
Qty: 40 TABLET | Refills: 0 | OUTPATIENT
Start: 2024-01-15

## 2024-01-19 ENCOUNTER — TELEPHONE (OUTPATIENT)
Dept: ORTHOPEDIC SURGERY | Age: 49
End: 2024-01-19

## 2024-01-19 NOTE — TELEPHONE ENCOUNTER
Patient called and stated her leg is red and warm to the touch. I recommended she go to the ER per Dr Yolis Caro's MA

## 2024-01-31 ENCOUNTER — TELEPHONE (OUTPATIENT)
Dept: FAMILY MEDICINE CLINIC | Age: 49
End: 2024-01-31

## 2024-01-31 NOTE — TELEPHONE ENCOUNTER
Patient was scheduled today 1/31/2024  and missed appointment.     Due to office policy. Patient now has exceeded maximum history of no show's for this year,allowed in office. Patient has 3 no show's total. Please advise if patient should be dismissed? To let our  start the dismissal letter.    No Show #1 7/18/2023 w/Armin  No Show #2 8/16/2023 w/Dr. Eddy  No Show #3  1/31/2024 w/Dr. Eddy    Thank you!.    No future appointments.

## 2024-02-05 DIAGNOSIS — M95.8 OSTEOCHONDRAL DEFECT OF ANKLE: ICD-10-CM

## 2024-02-05 RX ORDER — HAND SANITIZING WIPES 0.66 ML/ML
SWAB TOPICAL
Qty: 40 TABLET | Refills: 0 | OUTPATIENT
Start: 2024-02-05

## 2024-02-12 ENCOUNTER — OFFICE VISIT (OUTPATIENT)
Dept: ORTHOPEDIC SURGERY | Age: 49
End: 2024-02-12

## 2024-02-12 VITALS — HEIGHT: 62 IN | OXYGEN SATURATION: 100 % | WEIGHT: 125 LBS | BODY MASS INDEX: 23 KG/M2 | RESPIRATION RATE: 16 BRPM

## 2024-02-12 DIAGNOSIS — M95.8 OSTEOCHONDRAL DEFECT OF ANKLE: Primary | ICD-10-CM

## 2024-02-12 DIAGNOSIS — M54.17 RADICULOPATHY, LUMBOSACRAL REGION: ICD-10-CM

## 2024-02-12 DIAGNOSIS — S82.831K CLOSED AVULSION FRACTURE OF DISTAL END OF RIGHT FIBULA WITH NONUNION, SUBSEQUENT ENCOUNTER: ICD-10-CM

## 2024-02-12 DIAGNOSIS — M76.71 PERONEAL TENDINITIS OF RIGHT LOWER EXTREMITY: ICD-10-CM

## 2024-02-12 PROCEDURE — 99024 POSTOP FOLLOW-UP VISIT: CPT | Performed by: ORTHOPAEDIC SURGERY

## 2024-02-12 RX ORDER — GABAPENTIN 100 MG/1
100 CAPSULE ORAL 3 TIMES DAILY
Qty: 90 CAPSULE | Refills: 0 | Status: SHIPPED | OUTPATIENT
Start: 2024-02-12 | End: 2024-03-13

## 2024-02-12 NOTE — PROGRESS NOTES
[facility]  [address] [place] [address]  Dept: [phone]    Ambulatory Orthopedic Postoperative Visit     Preoperative Diagnosis:   Right ankle chronic lateral ligamentous tears  Right ankle talus osteochondral defect   Right peroneal tendinopathy with peroneus brevis tear  Right distal fibula avulsion fracture nonunion   Asthma/COPD  Stage III chronic kidney disease  Tobacco use (smokes 1/2 pack of cigarettes per day)  History of bipolar disorder  History of vitamin D deficiency  History of neuropathy  Body mass index is 22.86 kg/m².     Postoperative Diagnosis:   Same      Procedures Performed:  (11/10/2023)  Right ankle lateral ligamentous repair   Right ankle talus microfracture and debridement, open   Right peroneus brevis debridement of low-lying muscle belly and repair (split tear involving approximately 40/60% of the tendon)  Right peroneus longus tenolysis   Right distal fibula avulsion fracture nonunion open treatment with excision of fragments         SUBJECTIVE:     The patient returns post op from the above stated procedure. Reports doing well overall, denies wound drainage/issues, fevers/chills/night sweats, calf swelling/pain, chest pain, shortness of breath.     She is ambulating today in a lace up ankle brace without an assistive device.  She is here today with her fiancé.  At today's visit, she does report that she is having pain associated with numbness/tingling in her fifth toe and along her lateral midfoot/forefoot, which began around 1/12/2024 atraumatically; she also reports a sense of itching around the same location.  She also reports a longstanding history of low back pain due to \"bulging disks\".  She reports that she has not gotten into physical therapy yet due to medical issues with her mother.  She denies any significant pain in her right lateral ankle/hindfoot.  She denies any reinjury or

## 2024-02-27 ENCOUNTER — HOSPITAL ENCOUNTER (OUTPATIENT)
Dept: NEUROLOGY | Age: 49
Discharge: HOME | End: 2024-02-27
Payer: COMMERCIAL

## 2024-02-27 PROCEDURE — 95886 MUSC TEST DONE W/N TEST COMP: CPT | Performed by: PHYSICAL MEDICINE & REHABILITATION

## 2024-02-27 PROCEDURE — 95909 NRV CNDJ TST 5-6 STUDIES: CPT | Performed by: PHYSICAL MEDICINE & REHABILITATION

## 2024-03-06 DIAGNOSIS — M76.71 PERONEAL TENDINITIS OF RIGHT LOWER EXTREMITY: ICD-10-CM

## 2024-03-06 DIAGNOSIS — M95.8 OSTEOCHONDRAL DEFECT OF ANKLE: ICD-10-CM

## 2024-03-06 DIAGNOSIS — S82.831K CLOSED AVULSION FRACTURE OF DISTAL END OF RIGHT FIBULA WITH NONUNION, SUBSEQUENT ENCOUNTER: ICD-10-CM

## 2024-03-07 DIAGNOSIS — M95.8 OSTEOCHONDRAL DEFECT OF ANKLE: ICD-10-CM

## 2024-03-07 DIAGNOSIS — S82.831K CLOSED AVULSION FRACTURE OF DISTAL END OF RIGHT FIBULA WITH NONUNION, SUBSEQUENT ENCOUNTER: ICD-10-CM

## 2024-03-07 DIAGNOSIS — M76.71 PERONEAL TENDINITIS OF RIGHT LOWER EXTREMITY: ICD-10-CM

## 2024-03-07 RX ORDER — GABAPENTIN 100 MG/1
CAPSULE ORAL
Qty: 90 CAPSULE | Refills: 0 | OUTPATIENT
Start: 2024-03-07

## 2024-03-07 RX ORDER — GABAPENTIN 100 MG/1
100 CAPSULE ORAL 3 TIMES DAILY
Qty: 90 CAPSULE | Refills: 0 | Status: SHIPPED | OUTPATIENT
Start: 2024-03-07 | End: 2024-04-06

## 2024-03-07 NOTE — TELEPHONE ENCOUNTER
Dr. Lagos,     Patient is requesting a refill on this medication. Is this something you want her to continue or was she to wean off? Last OV was 2/12/24, and next visit isn't until 5/13/24.     DOS: 11/10/23 Right lateral ankle ligament stabilization peroneal debridement     Please advise. Medication is attached, and pharmacy was verified by patient.

## 2024-03-19 ENCOUNTER — OFFICE VISIT (OUTPATIENT)
Dept: NEUROLOGY | Age: 49
End: 2024-03-19
Payer: COMMERCIAL

## 2024-03-19 VITALS
SYSTOLIC BLOOD PRESSURE: 127 MMHG | DIASTOLIC BLOOD PRESSURE: 82 MMHG | WEIGHT: 140 LBS | BODY MASS INDEX: 25.76 KG/M2 | HEIGHT: 62 IN | HEART RATE: 78 BPM

## 2024-03-19 DIAGNOSIS — G57.93 NEUROPATHY INVOLVING BOTH LOWER EXTREMITIES: ICD-10-CM

## 2024-03-19 DIAGNOSIS — M54.2 CERVICALGIA: ICD-10-CM

## 2024-03-19 DIAGNOSIS — M54.50 CHRONIC LOW BACK PAIN WITHOUT SCIATICA, UNSPECIFIED BACK PAIN LATERALITY: ICD-10-CM

## 2024-03-19 DIAGNOSIS — G89.29 CHRONIC LOW BACK PAIN WITHOUT SCIATICA, UNSPECIFIED BACK PAIN LATERALITY: ICD-10-CM

## 2024-03-19 DIAGNOSIS — G43.011 INTRACTABLE MIGRAINE WITHOUT AURA AND WITH STATUS MIGRAINOSUS: Primary | ICD-10-CM

## 2024-03-19 PROCEDURE — G8419 CALC BMI OUT NRM PARAM NOF/U: HCPCS | Performed by: PHYSICIAN ASSISTANT

## 2024-03-19 PROCEDURE — 99214 OFFICE O/P EST MOD 30 MIN: CPT | Performed by: PHYSICIAN ASSISTANT

## 2024-03-19 PROCEDURE — G8482 FLU IMMUNIZE ORDER/ADMIN: HCPCS | Performed by: PHYSICIAN ASSISTANT

## 2024-03-19 PROCEDURE — G8427 DOCREV CUR MEDS BY ELIG CLIN: HCPCS | Performed by: PHYSICIAN ASSISTANT

## 2024-03-19 PROCEDURE — 4004F PT TOBACCO SCREEN RCVD TLK: CPT | Performed by: PHYSICIAN ASSISTANT

## 2024-03-19 RX ORDER — PANTOPRAZOLE SODIUM 40 MG/1
40 TABLET, DELAYED RELEASE ORAL DAILY
COMMUNITY
Start: 2024-02-29

## 2024-03-19 RX ORDER — MAGNESIUM OXIDE 400 MG/1
400 TABLET ORAL DAILY
Qty: 30 TABLET | Refills: 5 | Status: SHIPPED | OUTPATIENT
Start: 2024-03-19

## 2024-03-19 RX ORDER — FAMOTIDINE 40 MG/1
40 TABLET, FILM COATED ORAL DAILY
COMMUNITY
Start: 2024-02-29

## 2024-03-19 NOTE — PROGRESS NOTES
[address]  [facility] [address]  Dept: [phone]    PATIENT NAME: Marycruz Lomsa  PATIENT MRN: [id number]  PRIMARY CARE PHYSICIAN: Noel Freed PA-C    HPI:      Marycruz Lomas is a 48 y.o. female who presents to clinic today for evaluation of migraine headaches. Her medical history is significant for COPD, stage 3 kidney disease, h/o gastric bypass (2017), lumbar degenerative disc disease, and bipolar disorder; s/p nephrectomy. She receives Fioricet for headache rescue and does take Effexor 75 mg through psych, not for migraine prophylaxis. Previously followed with Dr. Gerardo at [facility].    Restless leg is managed with Mirapex. She is looking to re-establish with pain management for neck and low back pain.  Reports continued cramping and burning pain of bilateral feet, worse for all toes. It is noted more with standing than with sitting. During the evening she feels more paresthesias of her legs with cramping when she lays down. She was resumed on Gabapentin 100 mg by Dr. Lagos.     Ajovy was started at last appointment and has been helpful for migraines- only breakthrough is leading up to next dose.    Migraine description:  Location: variable, usually unilateral frontal/parietal  Character: sharp/ throbbing  Frequency: 2 days leading up to next Ajovy  Associated symptoms: light, sound sensitivity, nausea with vomiting, right facial paresthesias (rare)  Denies associated symptoms: vision changes, unilateral weakness, dizziness, aura  Rescue: Imitrex, Fioricet ineffective    EMG NCS bilat lower ext Feb 2024:  Right sural and right peroneal motor revealed decreased amplitude. This may be due to local trauma from surgeries vs an axonal neuropathy of there peroneal portion of the sciatic nerve, although the former appears more likely. Clinical correlation recommended  There is currently no evidence of significant radiculopathy, plexopathy, myopathy, peripheral

## 2024-04-01 NOTE — TELEPHONE ENCOUNTER
Pharmacy requesting refill of Fremanezumab-vfrm (AJOVY) 225 MG/1.5ML SOAJ      Medication active on med list yes      Date of last Rx: 10/26/2023 with 5 refills          verified by GISSELLE CHAMPAGNE      Date of last appointment 03/19/2024    Next Visit Date:  6/19/2024

## 2024-04-02 RX ORDER — FREMANEZUMAB-VFRM 225 MG/1.5ML
1 INJECTION SUBCUTANEOUS
Qty: 1.5 ML | Refills: 5 | Status: SHIPPED | OUTPATIENT
Start: 2024-04-02

## 2024-04-03 ENCOUNTER — TELEPHONE (OUTPATIENT)
Dept: ORTHOPEDIC SURGERY | Age: 49
End: 2024-04-03

## 2024-04-03 NOTE — TELEPHONE ENCOUNTER
Received call from patient stating she has a dental appt tomorrow 4/4/2024 and would like to know is she still needs to take an antibiotic. If so, could one please be called in for her.    Please call into: [facility] in [place]    Please advise.    Call back#:[phone]

## 2024-04-04 NOTE — TELEPHONE ENCOUNTER
Can you call this patient this morning and inform her that I spoke with Dr. Lagos and she does not need antibiotics prior to dental cleanings per the surgery she had with Dr. Lagos. Please and thank you.

## 2024-04-05 ENCOUNTER — TELEPHONE (OUTPATIENT)
Dept: NEUROLOGY | Age: 49
End: 2024-04-05

## 2024-04-09 ENCOUNTER — TELEPHONE (OUTPATIENT)
Dept: NEUROLOGY | Age: 49
End: 2024-04-09

## 2024-04-09 DIAGNOSIS — M76.71 PERONEAL TENDINITIS OF RIGHT LOWER EXTREMITY: ICD-10-CM

## 2024-04-09 DIAGNOSIS — M95.8 OSTEOCHONDRAL DEFECT OF ANKLE: ICD-10-CM

## 2024-04-09 DIAGNOSIS — S82.831K CLOSED AVULSION FRACTURE OF DISTAL END OF RIGHT FIBULA WITH NONUNION, SUBSEQUENT ENCOUNTER: ICD-10-CM

## 2024-04-09 RX ORDER — GABAPENTIN 100 MG/1
200 CAPSULE ORAL 3 TIMES DAILY
Qty: 180 CAPSULE | Refills: 2 | Status: SHIPPED | OUTPATIENT
Start: 2024-04-09 | End: 2024-07-08

## 2024-04-09 NOTE — TELEPHONE ENCOUNTER
She gets 100 mg TID, I suggested that she could take 1 in AM, 2 in PM for worsened night symptoms. Regardless, 200 TID is still a low dose and so I am okay with this, given symptoms discussed at last appointment. I would not increase further though

## 2024-04-09 NOTE — TELEPHONE ENCOUNTER
Marycruz called in today and said that Dr. Lagos is not going to prescribe the gabapentin for her anymore. She said she takes Gabapentin 100 mg. and Jeanie JO told her she could take 2 caps tid. She will need a RX to mariano to [facility] in [place].

## 2024-04-19 DIAGNOSIS — S82.831K CLOSED AVULSION FRACTURE OF DISTAL END OF RIGHT FIBULA WITH NONUNION, SUBSEQUENT ENCOUNTER: ICD-10-CM

## 2024-04-25 DIAGNOSIS — J44.1 CHRONIC OBSTRUCTIVE PULMONARY DISEASE WITH ACUTE EXACERBATION (HCC): ICD-10-CM

## 2024-04-25 RX ORDER — MONTELUKAST SODIUM 10 MG/1
10 TABLET ORAL NIGHTLY
Qty: 30 TABLET | Refills: 3 | OUTPATIENT
Start: 2024-04-25

## 2024-04-29 ENCOUNTER — TELEPHONE (OUTPATIENT)
Dept: NEUROLOGY | Age: 49
End: 2024-04-29

## 2024-04-29 DIAGNOSIS — G57.93 NEUROPATHY INVOLVING BOTH LOWER EXTREMITIES: Primary | ICD-10-CM

## 2024-04-29 RX ORDER — GABAPENTIN 300 MG/1
300 CAPSULE ORAL 3 TIMES DAILY
Qty: 90 CAPSULE | Refills: 2 | Status: SHIPPED | OUTPATIENT
Start: 2024-04-29 | End: 2024-07-28

## 2024-04-29 NOTE — TELEPHONE ENCOUNTER
300 mg TID gabapentin and she needs a skin punch for small fiber neuropathy, as there was not neuropathy noted on EMG

## 2024-04-29 NOTE — TELEPHONE ENCOUNTER
Marycruz called in still complaining of pain and burning in both feet. Patient stated that she is taking Gabapentin 100 mg 2 tablets TID. Marycruz is aware of the message from encounter dated 4/9/24.  She stated that she is unsure on what to do.

## 2024-05-02 RX ORDER — RIZATRIPTAN BENZOATE 10 MG/1
TABLET ORAL
Qty: 9 TABLET | Refills: 5 | Status: SHIPPED | OUTPATIENT
Start: 2024-05-02

## 2024-05-02 NOTE — TELEPHONE ENCOUNTER
Pharmacy requesting refill of rizatriptan (MAXALT) 10 MG tablet      Medication active on med list yes      Date of last Rx: 10/26/2023 with 5 refills          verified by GISSELLE CHAMPAGNE      Date of last appointment 3/19/2024    Next Visit Date:  6/19/2024

## 2024-05-05 DIAGNOSIS — J44.1 CHRONIC OBSTRUCTIVE PULMONARY DISEASE WITH ACUTE EXACERBATION (HCC): ICD-10-CM

## 2024-05-05 RX ORDER — MONTELUKAST SODIUM 10 MG/1
10 TABLET ORAL NIGHTLY
Qty: 30 TABLET | Refills: 3 | OUTPATIENT
Start: 2024-05-05

## 2024-05-08 ENCOUNTER — HOSPITAL ENCOUNTER (OUTPATIENT)
Dept: PHYSICAL THERAPY | Age: 49
Setting detail: THERAPIES SERIES
Discharge: HOME | End: 2024-05-08
Payer: COMMERCIAL

## 2024-05-08 PROCEDURE — 97162 PT EVAL MOD COMPLEX 30 MIN: CPT

## 2024-05-08 PROCEDURE — 97110 THERAPEUTIC EXERCISES: CPT

## 2024-05-08 NOTE — THERAPY EVALUATION
as charted below subjective information as well as low back pain. She is scheduled to see neurology due to numbness and tingling in bilateral feet. She demonstrates a decrease in right ankle dorsiflexion, right ankle motion, and functional ability with her right ankle and low back. Patient would benefit from skilled physical therapy services in order to: decrease pain, improve ankle range of motion, strength, functional ability of low back and right ankle so that she can continue to work and perform activity at home and in the community.     Problems:    [x] ? Pain:8/10  [x] ? ROM: R ankle DF  [x] ? Strength:R ankle PF, DF  [x] ? Function: FAAM ADL, Mod Oswestry  [] Other:            Goals  MET NOT MET ON-  GOING  Details   Date Addressed:        STG: To be met in 8 treatments           1. ? Pain: Decrease pain levels to 4/10 with ADL []  []  [x]      2. ? ROM: Increase flexibility and AROM limitations throughout to equal bilat to reduce difficulty with ADLs []  []  [x]      3. ? Strength: Increase MMT to 5/5 throughout to ease functional limitations and mobility  []  []  [x]     4. Independent with Home Exercise Programs []  []  [x]     5. Demonstrate knowledge of fall risk prevention  []  []  [x]      []  []  [x]     Date Addressed:        LTG: To be met in 16 treatments       1. Improve score on assessment tool FAAM ADL from 42% impairment to less than 32% impairment  []  []  [x]     2. Reduce pain levels to 2/10 or less with ADLs/sport []  []  [x]     3. Patient will be able to single leg stand for 10 seconds or greater to demonstrate improvement in proprioception and weight bearing tolerance on right lower extremity []  []  [x]     4. Mod Oswestry to 58% impaired or less to improve ability with ADLs and IADLs in  low back.  []  []  []         Patient goals:strength, relieve pain    Rehab Potential:  [x] Good  [] Fair  [] Poor   Suggested Professional Referral:  [x] No  [] Yes:  Barriers to Goal Achievement::

## 2024-05-10 DIAGNOSIS — M95.8 OSTEOCHONDRAL DEFECT OF ANKLE: Primary | ICD-10-CM

## 2024-05-10 DIAGNOSIS — S82.831K CLOSED AVULSION FRACTURE OF DISTAL END OF RIGHT FIBULA WITH NONUNION, SUBSEQUENT ENCOUNTER: ICD-10-CM

## 2024-05-13 ENCOUNTER — OFFICE VISIT (OUTPATIENT)
Dept: ORTHOPEDIC SURGERY | Age: 49
End: 2024-05-13
Payer: COMMERCIAL

## 2024-05-13 VITALS — RESPIRATION RATE: 15 BRPM | WEIGHT: 140 LBS | HEIGHT: 62 IN | BODY MASS INDEX: 25.76 KG/M2 | OXYGEN SATURATION: 98 %

## 2024-05-13 DIAGNOSIS — S82.831K CLOSED AVULSION FRACTURE OF DISTAL END OF RIGHT FIBULA WITH NONUNION, SUBSEQUENT ENCOUNTER: ICD-10-CM

## 2024-05-13 DIAGNOSIS — M19.171 POST-TRAUMATIC ARTHRITIS OF RIGHT ANKLE: ICD-10-CM

## 2024-05-13 DIAGNOSIS — M79.2 NERVE PAIN: ICD-10-CM

## 2024-05-13 DIAGNOSIS — M19.171 POST-TRAUMATIC ARTHRITIS OF RIGHT ANKLE: Primary | ICD-10-CM

## 2024-05-13 DIAGNOSIS — M95.8 OSTEOCHONDRAL DEFECT OF ANKLE: Primary | ICD-10-CM

## 2024-05-13 PROCEDURE — G8427 DOCREV CUR MEDS BY ELIG CLIN: HCPCS | Performed by: ORTHOPAEDIC SURGERY

## 2024-05-13 PROCEDURE — G8419 CALC BMI OUT NRM PARAM NOF/U: HCPCS | Performed by: ORTHOPAEDIC SURGERY

## 2024-05-13 PROCEDURE — 99213 OFFICE O/P EST LOW 20 MIN: CPT | Performed by: ORTHOPAEDIC SURGERY

## 2024-05-13 PROCEDURE — 4004F PT TOBACCO SCREEN RCVD TLK: CPT | Performed by: ORTHOPAEDIC SURGERY

## 2024-05-13 NOTE — PROGRESS NOTES
sources), Disp: 40 tablet, Rfl: 0    acetaminophen (TYLENOL) 500 MG tablet, Take 2 tablets by mouth in the morning and 2 tablets at noon and 2 tablets in the evening. Do all this for 7 days. Do not exceed 4g of Acetaminophen in 24 hrs (including all sources)., Disp: 40 tablet, Rfl: 0    omeprazole (PRILOSEC) 20 MG delayed release capsule, take 2 capsules by mouth once daily (Patient not taking: Reported on 3/19/2024), Disp: 30 capsule, Rfl: 3    SUDAFED SINUS CONGESTION 12HR 120 MG extended release tablet, take 1 tablet by mouth every 12 hours if needed, Disp: , Rfl:     venlafaxine (EFFEXOR XR) 75 MG extended release capsule, take 1 capsule by mouth every morning (Patient taking differently: Take 1 capsule by mouth as needed), Disp: 30 capsule, Rfl: 3    albuterol (PROVENTIL) (2.5 MG/3ML) 0.083% nebulizer solution, Take 3 mLs by nebulization every 6 hours as needed for Wheezing or Shortness of Breath, Disp: 125 each, Rfl: 0    Nebulizers (COMPRESSOR/NEBULIZER) MISC, Nebulizer with compressor. Disposable Med Nebs 2 /month. Reusable Med Nebs 1 per 6 months.Aerosol Mask 1 per month. Replacement Filters 2 per month. All other related supplies as needed per month. Medications being used:Albuterol .083 unit dose vial. Frequency: every 6 hrs x 4/day .Length of Need: 1, Disp: 1 each, Rfl: 3    diclofenac sodium (VOLTAREN) 1 % GEL, apply 4 grams topically four times a day AS NEEDED FOR PAIN, Disp: 200 g, Rfl: 0    VENTOLIN  (90 Base) MCG/ACT inhaler, inhale 2 puffs by mouth and INTO THE LUNGS every 6 hours if needed for wheezing, Disp: 18 g, Rfl: 3    acetaminophen (TYLENOL) 500 MG tablet, Take 2 tablets by mouth every 6 hours as needed for Pain, Disp: 40 tablet, Rfl: 0    pramipexole (MIRAPEX) 0.125 MG tablet, take 1 tablet by mouth once daily at bedtime, Disp: 90 tablet, Rfl: 3    furosemide (LASIX) 20 MG tablet, take 1 tablet by mouth twice a day, Disp: 60 tablet, Rfl: 3    nitroGLYCERIN (NITROSTAT) 0.3 MG SL

## 2024-05-17 DIAGNOSIS — M95.8 OSTEOCHONDRAL DEFECT OF ANKLE: ICD-10-CM

## 2024-05-17 DIAGNOSIS — S82.831K CLOSED AVULSION FRACTURE OF DISTAL END OF RIGHT FIBULA WITH NONUNION, SUBSEQUENT ENCOUNTER: ICD-10-CM

## 2024-05-17 DIAGNOSIS — M19.171 POST-TRAUMATIC ARTHRITIS OF RIGHT ANKLE: ICD-10-CM

## 2024-06-03 ENCOUNTER — PROCEDURE VISIT (OUTPATIENT)
Dept: NEUROLOGY | Age: 49
End: 2024-06-03
Payer: COMMERCIAL

## 2024-06-03 DIAGNOSIS — R20.0 NUMBNESS: Primary | ICD-10-CM

## 2024-06-03 PROCEDURE — 11105 PUNCH BX SKIN EA SEP/ADDL: CPT | Performed by: PSYCHIATRY & NEUROLOGY

## 2024-06-03 PROCEDURE — 11104 PUNCH BX SKIN SINGLE LESION: CPT | Performed by: PSYCHIATRY & NEUROLOGY

## 2024-06-03 NOTE — PROGRESS NOTES
Skin Punch Biopsy Procedure  Indication: Dysthesia and pain in lower extremities.  Complications: None  Blood loss: Less than 1 cc.  Procedure Details:  The area to be biopsied was selected, 10 cm proximal to lateral malleolus and 20 cm distal to iliac crest in the lower extremity.   The skin was cleansed with povidone-iodine solution and anesthetized with 2 percent lidocaine with epinephrine. A 30-gauge needle was used to administer the anesthetic to limit discomfort.  The area of skin to be biopsied was identified, The punch biopsy instrument was held vertically over the skin and rotated downward. Once the instrument had penetrated the dermis into the subcutaneous fat, it was removed.   The cylindrical skin specimen was elevated with the foreceps. Scissors were used to cut the specimen free from the subcutaneous tissues. The cut was made below the level of the dermis.  The bandages were applied at the site of skin biopsy.     Statement Selected

## 2024-06-03 NOTE — TELEPHONE ENCOUNTER
Pharmacy requesting refill of acetaminophen (TYLENOL) 500 MG tablet       Medication active on med list yes      Date of last Rx: 12/28/2023 with 0 refills          verified by GISSELLE CHAMPAGNE      Date of last appointment 6/3/2024    Next Visit Date:  6/19/2024    Please send script after skin punch biopsy - per patient.     Thank you.

## 2024-06-06 RX ORDER — ACETAMINOPHEN 500 MG
500 TABLET ORAL EVERY 6 HOURS PRN
Qty: 20 TABLET | Refills: 0 | Status: SHIPPED | OUTPATIENT
Start: 2024-06-06

## 2024-06-20 RX ORDER — PRAMIPEXOLE DIHYDROCHLORIDE 0.12 MG/1
TABLET ORAL
Qty: 90 TABLET | Refills: 3 | OUTPATIENT
Start: 2024-06-20

## 2024-06-27 RX ORDER — PRAMIPEXOLE DIHYDROCHLORIDE 0.12 MG/1
TABLET ORAL
Qty: 90 TABLET | Refills: 3 | OUTPATIENT
Start: 2024-06-27

## 2024-07-23 RX ORDER — PRAMIPEXOLE DIHYDROCHLORIDE 0.12 MG/1
0.12 TABLET ORAL NIGHTLY
Qty: 90 TABLET | Refills: 3 | Status: SHIPPED | OUTPATIENT
Start: 2024-07-23

## 2024-07-23 RX ORDER — GABAPENTIN 300 MG/1
CAPSULE ORAL
Qty: 90 CAPSULE | Refills: 2 | Status: SHIPPED | OUTPATIENT
Start: 2024-07-23 | End: 2024-08-16 | Stop reason: SDUPTHER

## 2024-08-16 RX ORDER — GABAPENTIN 300 MG/1
CAPSULE ORAL
Qty: 90 CAPSULE | Refills: 2 | Status: SHIPPED | OUTPATIENT
Start: 2024-08-16 | End: 2024-09-16

## 2024-08-16 RX ORDER — FREMANEZUMAB-VFRM 225 MG/1.5ML
1 INJECTION SUBCUTANEOUS
Qty: 1.5 ML | Refills: 5 | Status: SHIPPED | OUTPATIENT
Start: 2024-08-16

## 2024-08-16 NOTE — TELEPHONE ENCOUNTER
Please fill for Jeanie as she is out of the office;    Pharmacy requesting refill of Ajovy 225mg/1.5mL.      Medication active on med list yes      Date of last Rx: 4/2/2024 with 5 refills          verified by CARLOS JEFFERSON        Pharmacy requesting refill of gabapentin 300mg.      Medication active on med list yes      Date of last Rx: 7/23/2024 with 2 refills          verified by CARLOS JEFFERSON      Date of last appointment 3/19/2024    Next Visit Date:  10/23/2024

## 2024-08-28 ENCOUNTER — HOSPITAL ENCOUNTER (EMERGENCY)
Age: 49
Discharge: HOME | End: 2024-08-28
Attending: EMERGENCY MEDICINE
Payer: COMMERCIAL

## 2024-08-28 VITALS
DIASTOLIC BLOOD PRESSURE: 72 MMHG | TEMPERATURE: 98.3 F | OXYGEN SATURATION: 98 % | WEIGHT: 145 LBS | HEART RATE: 82 BPM | SYSTOLIC BLOOD PRESSURE: 108 MMHG | RESPIRATION RATE: 14 BRPM | BODY MASS INDEX: 26.68 KG/M2 | HEIGHT: 62 IN

## 2024-08-28 DIAGNOSIS — H66.004 RECURRENT ACUTE SUPPURATIVE OTITIS MEDIA OF RIGHT EAR WITHOUT SPONTANEOUS RUPTURE OF TYMPANIC MEMBRANE: Primary | ICD-10-CM

## 2024-08-28 PROCEDURE — 99283 EMERGENCY DEPT VISIT LOW MDM: CPT

## 2024-08-28 PROCEDURE — 6370000000 HC RX 637 (ALT 250 FOR IP): Performed by: EMERGENCY MEDICINE

## 2024-08-28 RX ORDER — ACETAMINOPHEN 500 MG
1000 TABLET ORAL EVERY 6 HOURS PRN
Qty: 60 TABLET | Refills: 0 | Status: SHIPPED | OUTPATIENT
Start: 2024-08-28

## 2024-08-28 RX ORDER — ACETAMINOPHEN 500 MG
1000 TABLET ORAL ONCE
Status: COMPLETED | OUTPATIENT
Start: 2024-08-28 | End: 2024-08-28

## 2024-08-28 RX ADMIN — ACETAMINOPHEN 1000 MG: 500 TABLET ORAL at 16:00

## 2024-08-28 RX ADMIN — AMOXICILLIN AND CLAVULANATE POTASSIUM 1 TABLET: 875; 125 TABLET, FILM COATED ORAL at 15:59

## 2024-08-28 ASSESSMENT — PAIN DESCRIPTION - DESCRIPTORS
DESCRIPTORS: ACHING;THROBBING
DESCRIPTORS: THROBBING

## 2024-08-28 ASSESSMENT — PAIN DESCRIPTION - LOCATION
LOCATION: EAR
LOCATION: EAR

## 2024-08-28 ASSESSMENT — PAIN SCALES - GENERAL
PAINLEVEL_OUTOF10: 9
PAINLEVEL_OUTOF10: 9

## 2024-08-28 ASSESSMENT — LIFESTYLE VARIABLES
HOW OFTEN DO YOU HAVE A DRINK CONTAINING ALCOHOL: NEVER
HOW MANY STANDARD DRINKS CONTAINING ALCOHOL DO YOU HAVE ON A TYPICAL DAY: PATIENT DOES NOT DRINK

## 2024-08-28 ASSESSMENT — PAIN DESCRIPTION - ORIENTATION
ORIENTATION: RIGHT;LEFT
ORIENTATION: LEFT;RIGHT

## 2024-08-28 ASSESSMENT — PAIN - FUNCTIONAL ASSESSMENT: PAIN_FUNCTIONAL_ASSESSMENT: 0-10

## 2024-08-28 NOTE — ED NOTES
Mode of arrival (squad #, walk in, police, etc) : walk-in        Chief complaint(s): ear pain, bilateral        Arrival Note (brief scenario, treatment PTA, etc).: patient reports being seen at [facility] on Saturday for same thing and was diagnosed with ear infection and started on amoxicillin. She reports she has taken it as prescribed with no relief. She reports the pain is shooting down her face. She ibuprofen and tylenol at home with little relief. She reports low grade temps at home.

## 2024-08-28 NOTE — ED PROVIDER NOTES
EMERGENCY DEPARTMENT ENCOUNTER    Pt Name: Marycruz Lomas  MRN: [id number]  Birthdate 1975  Date of evaluation: 8/28/24  CHIEF COMPLAINT       Chief Complaint   Patient presents with    Otalgia     Bilateral R>L       HISTORY OF PRESENT ILLNESS   HPI  Bilateral ear pain but right is much worse.  She has been on amoxicillin from other ER over the weekend.  It is not helping.  Persistent right ear pain.  Little bit of sore throat.  She has had myringotomy in the past but does not have tubes.  She has an ENT that she can follow-up with.  No fever chills sweats.  No neck pain.  No nausea or vomiting.  Does not need a work note.      PASTMEDICAL HISTORY     Past Medical History:   Diagnosis Date    Anemia     Anxiety     Asthma     appt with pulmonologist 2/1/18    Bipolar 1 disorder ([facility])     Blackout spell     Body piercing     X2 ABOVE AND BELOW LIP     Cervicalgia 08/14/2013    Chest pain     Chronic back pain     Chronic kidney disease     stage 3    Chronic neck pain     Constipation     COPD (chronic obstructive pulmonary disease) (Former[facility])     Dental crown present     has dental clearance    Depression     Fall     landed on tailbone, exacrbated back pain and headaches    GERD (gastroesophageal reflux disease)     Hearing loss 2014    Heartburn     History of blood transfusion 2012    no reaction    Hyperglycemia     DIET CONTROLED    Insomnia     Kidney stones     Knee pain, right     Low blood pressure     Low vitamin D level     Migraine     Movement disorder 2008    Neuropathy     Numbness     bilat. legs    Palpitations     Sleep difficulties 2008    Sprain of lumbosacral (joint) (ligament) 08/14/2013    Staghorn kidney stones     L side Kidney stone  and R side    Tachycardia     HR: 130's with chest pain at times, sees Cardiologist @ [place]/ pt. can't remember name dr mckinley cardiologist appt for cc next week    Tremor 2014    Wears glasses      Past Problem List  Patient Active Problem List  regular rhythm.      Pulses: Normal pulses.      Heart sounds: Normal heart sounds.   Pulmonary:      Effort: Pulmonary effort is normal. No respiratory distress.      Breath sounds: Normal breath sounds. No stridor. No wheezing or rales.   Abdominal:      Palpations: Abdomen is soft.      Tenderness: There is no abdominal tenderness. There is no guarding or rebound.   Musculoskeletal:         General: No tenderness or deformity. Normal range of motion.      Cervical back: Normal range of motion and neck supple.   Skin:     General: Skin is warm and dry.      Capillary Refill: Capillary refill takes less than 2 seconds.      Findings: No erythema or rash.   Neurological:      General: No focal deficit present.      Mental Status: She is alert and oriented to person, place, and time.      Coordination: Coordination normal.   Psychiatric:         Mood and Affect: Mood normal.         Behavior: Behavior normal.         Thought Content: Thought content normal.         Judgment: Judgment normal.         MEDICAL DECISION MAKING / ED COURSE:         1)  Number and Complexity of Problems Addressed at this Encounter  Problem List This Visit: Right ear pain, on Amoxil    Differential Diagnosis: Suspect otitis media, amoxicillin not improving it.    Diagnoses Considered but Do Not Suspect: Sepsis or CNS infection or mastoiditis or malignant otitis externa or necrotizing infection.      3)  Treatment and Disposition    Putting her on Augmentin for 10 days.  Prescription for Tylenol provided.  Given ENT follow-up for [facility] which she states might be better since she think she will go to get into there sooner.  Given anticipatory guidance, discharge actions, return for any worsening symptoms.  Follow-up ENT 1 to 2 days.  She agrees with discharge         Vitals Reviewed:    Vitals:    08/28/24 1515   BP: 108/72   Pulse: 82   Resp: 14   Temp: 98.3 °F (36.8 °C)   TempSrc: Oral   SpO2: 98%   Weight: 65.8 kg

## 2024-10-23 ENCOUNTER — OFFICE VISIT (OUTPATIENT)
Dept: NEUROLOGY | Age: 49
End: 2024-10-23
Payer: COMMERCIAL

## 2024-10-23 VITALS
WEIGHT: 148 LBS | SYSTOLIC BLOOD PRESSURE: 122 MMHG | HEART RATE: 85 BPM | HEIGHT: 62 IN | BODY MASS INDEX: 27.23 KG/M2 | DIASTOLIC BLOOD PRESSURE: 80 MMHG

## 2024-10-23 DIAGNOSIS — M54.50 CHRONIC LOW BACK PAIN WITHOUT SCIATICA, UNSPECIFIED BACK PAIN LATERALITY: ICD-10-CM

## 2024-10-23 DIAGNOSIS — M79.2 NEUROPATHIC PAIN: Primary | ICD-10-CM

## 2024-10-23 DIAGNOSIS — G43.011 INTRACTABLE MIGRAINE WITHOUT AURA AND WITH STATUS MIGRAINOSUS: ICD-10-CM

## 2024-10-23 DIAGNOSIS — G89.29 CHRONIC LOW BACK PAIN WITHOUT SCIATICA, UNSPECIFIED BACK PAIN LATERALITY: ICD-10-CM

## 2024-10-23 PROCEDURE — G8427 DOCREV CUR MEDS BY ELIG CLIN: HCPCS | Performed by: PHYSICIAN ASSISTANT

## 2024-10-23 PROCEDURE — G8484 FLU IMMUNIZE NO ADMIN: HCPCS | Performed by: PHYSICIAN ASSISTANT

## 2024-10-23 PROCEDURE — G8419 CALC BMI OUT NRM PARAM NOF/U: HCPCS | Performed by: PHYSICIAN ASSISTANT

## 2024-10-23 PROCEDURE — 3017F COLORECTAL CA SCREEN DOC REV: CPT | Performed by: PHYSICIAN ASSISTANT

## 2024-10-23 PROCEDURE — 4004F PT TOBACCO SCREEN RCVD TLK: CPT | Performed by: PHYSICIAN ASSISTANT

## 2024-10-23 PROCEDURE — 99214 OFFICE O/P EST MOD 30 MIN: CPT | Performed by: PHYSICIAN ASSISTANT

## 2024-10-23 RX ORDER — PREGABALIN 100 MG/1
100 CAPSULE ORAL 2 TIMES DAILY
Qty: 60 CAPSULE | Refills: 2 | Status: SHIPPED | OUTPATIENT
Start: 2024-10-23 | End: 2025-01-21

## 2024-10-23 RX ORDER — ZINC GLUCONATE 50 MG
50 TABLET ORAL EVERY MORNING
COMMUNITY
Start: 2024-07-22

## 2024-10-24 RX ORDER — RIZATRIPTAN BENZOATE 10 MG/1
TABLET ORAL
Qty: 9 TABLET | Refills: 5 | Status: SHIPPED | OUTPATIENT
Start: 2024-10-24

## 2024-10-24 NOTE — TELEPHONE ENCOUNTER
Pharmacy requesting refill of Maxalt 10 mg tablets.    Medication active on med list yes    Date of last Rx: 5/2/2024 #9 with 5 refills   verified by SAMMI Funez    Date of last appointment 10/23/2024    Next Visit Date:  1/13/2025

## 2024-10-28 ENCOUNTER — HOSPITAL ENCOUNTER (EMERGENCY)
Age: 49
Discharge: LEFT BEFORE DISCHARGE | End: 2024-10-28
Attending: EMERGENCY MEDICINE

## 2024-10-28 VITALS
BODY MASS INDEX: 27.23 KG/M2 | SYSTOLIC BLOOD PRESSURE: 114 MMHG | HEART RATE: 84 BPM | OXYGEN SATURATION: 100 % | HEIGHT: 62 IN | WEIGHT: 148 LBS | TEMPERATURE: 99 F | DIASTOLIC BLOOD PRESSURE: 68 MMHG | RESPIRATION RATE: 18 BRPM

## 2024-10-28 ASSESSMENT — LIFESTYLE VARIABLES
HOW MANY STANDARD DRINKS CONTAINING ALCOHOL DO YOU HAVE ON A TYPICAL DAY: PATIENT DOES NOT DRINK
HOW OFTEN DO YOU HAVE A DRINK CONTAINING ALCOHOL: NEVER

## 2024-11-01 ENCOUNTER — CLINICAL DOCUMENTATION (OUTPATIENT)
Dept: PHYSICAL THERAPY | Age: 49
End: 2024-11-01

## 2024-11-01 NOTE — THERAPY DISCHARGE
[x] [facility]  Rehabilitation Services  [address]  [place], [place] [address]  Phone [phone]  Fax [phone]    Physical Therapy Discharge Note    Date: 2024      Patient: Marycruz Lomas  : 1975  MRN: [id number]    Physician: Mayank Lagos MD                                                  Medical Diagnosis:   M95.8 (ICD-10-CM) - Osteochondral defect of ankle       M54.17 (ICD-10-CM) - Radiculopathy, lumbosacral region   Rehab Codes: M54.50 low back pain, M25.571 Right ankle pain  Onset date: 11/10/23                           Total visits attended:1  Date of initial visit: 24                   [] Patient recovered from conditions. Treatment goals were met.  [] Patient received maximum benefit. No further therapy indicated at this time.  [] Patient demonstrated improvement from condition with  ** Of  ** Short term goals met.  []Patient demonstrated improvement from condition with **   Of **  Long term goals met.  [] Patient to continue exercise/home instructions independently.  [] Therapy interrupted due to:    [] Patient has 2 or more no shows/cancels, is discontinued per our policy.    [] Patient has completed prescribed number of treatment sessions.    [x] Other: Patient only attended initial evaluation and was called but never called back to schedule. Please see initial evaluation for last known status. Patient is to be discharged at this time. If patient is to require more physical therapy services, they are to obtain new script from physician.          Treatment Included:     [x] Therapeutic Exercise   80420  [] Iontophoresis: 4 mg/mL Dexamethasone Sodium Phosphate  mAmin  75301   [x] Therapeutic Activity  92843 [] Vasopneumatic cold with compression  65554    [x] Gait Training   55237 [] Ultrasound   09955   [x] Neuromuscular Re-education  82572 [] Electrical Stimulation Unattended  88412   [x] Manual Therapy  82809 [] Electrical

## 2025-01-22 DIAGNOSIS — M79.2 NEUROPATHIC PAIN: Primary | ICD-10-CM

## 2025-01-22 NOTE — TELEPHONE ENCOUNTER
Pharmacy requesting refill of Lyrica 100 mg capsules.    Medication active on med list yes    Date of last Rx: 10/23/2024 #60 with 2 refills   verified by SAMMI Funez    Date of last appointment 10/23/2024    Next Visit Date:  3/19/2025

## 2025-01-24 NOTE — TELEPHONE ENCOUNTER
Marycruz called the office asking if she could be restarted on Robaxin.     Please review and advise.

## 2025-01-27 RX ORDER — PREGABALIN 100 MG/1
100 CAPSULE ORAL 2 TIMES DAILY
Qty: 60 CAPSULE | Refills: 3 | Status: SHIPPED | OUTPATIENT
Start: 2025-01-27 | End: 2025-05-27

## 2025-01-27 NOTE — TELEPHONE ENCOUNTER
Call placed to the patient and this information was given.  Patient confirmed that she has tried Tizanidine previously and it did help.  Patient states that she would like the Rx sent to [facility] on [address].

## 2025-02-22 ENCOUNTER — HOSPITAL ENCOUNTER (EMERGENCY)
Age: 50
Discharge: HOME | End: 2025-02-22
Attending: EMERGENCY MEDICINE
Payer: COMMERCIAL

## 2025-02-22 ENCOUNTER — APPOINTMENT (OUTPATIENT)
Dept: GENERAL RADIOLOGY | Age: 50
End: 2025-02-22
Payer: COMMERCIAL

## 2025-02-22 VITALS
RESPIRATION RATE: 17 BRPM | HEART RATE: 89 BPM | OXYGEN SATURATION: 98 % | TEMPERATURE: 98.8 F | DIASTOLIC BLOOD PRESSURE: 88 MMHG | SYSTOLIC BLOOD PRESSURE: 130 MMHG

## 2025-02-22 DIAGNOSIS — M25.552 LEFT HIP PAIN: Primary | ICD-10-CM

## 2025-02-22 PROCEDURE — 99283 EMERGENCY DEPT VISIT LOW MDM: CPT

## 2025-02-22 PROCEDURE — 73502 X-RAY EXAM HIP UNI 2-3 VIEWS: CPT

## 2025-02-22 RX ORDER — PREDNISONE 10 MG/1
10 TABLET ORAL DAILY
Qty: 5 TABLET | Refills: 0 | Status: SHIPPED | OUTPATIENT
Start: 2025-02-22 | End: 2025-02-26

## 2025-02-22 RX ORDER — PREGABALIN 100 MG/1
100 CAPSULE ORAL 3 TIMES DAILY
Qty: 270 CAPSULE | Refills: 0 | Status: SHIPPED | OUTPATIENT
Start: 2025-02-22 | End: 2025-02-26

## 2025-02-22 ASSESSMENT — ENCOUNTER SYMPTOMS: ABDOMINAL PAIN: 0

## 2025-02-22 NOTE — ED PROVIDER NOTES
[facility] EMERGENCY DEPARTMENT  Emergency Department Encounter  Emergency Medicine Resident     Pt Name:Marycruz Lomas  MRN: [id number]  Birthdate 1975  Date of evaluation: 2/22/25  PCP:  Noel Freed PA-C  Note Started: 3:19 PM EST      CHIEF COMPLAINT       Chief Complaint   Patient presents with    Hip Pain     Left x3 days        HISTORY OF PRESENT ILLNESS  (Location/Symptom, Timing/Onset, Context/Setting, Quality, Duration, Modifying Factors, Severity.)      Marycruz Lomas is a 49 y.o. female who presents with left hip pain x 3 days, hx of bursitits.  States that this pain feels very similar to her normal bursitis.  Taken Tylenol, Lyrica, and another medication that she cannot remember the name of. Ran out of Lyrica 3 days ago which is when her pain was exacerbated. Radiates into groin. No new trauma or injury.  No urinary symptoms.  No abdominal pain.  No nausea, vomiting, abnormal bowel movements.  No midline neck or back pain.    PAST MEDICAL / SURGICAL / SOCIAL / FAMILY HISTORY      has a past medical history of Anemia, Anxiety, Asthma, Bipolar 1 disorder (HCC), Blackout spell, Body piercing, Cervicalgia, Chest pain, Chronic back pain, Chronic kidney disease, Chronic neck pain, Constipation, COPD (chronic obstructive pulmonary disease) (HCC), Dental crown present, Depression, Fall, GERD (gastroesophageal reflux disease), Hearing loss, Heartburn, History of blood transfusion, Hyperglycemia, Insomnia, Kidney stones, Knee pain, right, Low blood pressure, Low vitamin D level, Migraine, Movement disorder, Neuropathy, Numbness, Palpitations, Sleep difficulties, Sprain of lumbosacral (joint) (ligament), Staghorn kidney stones, Tachycardia, Tremor, and Wears glasses.       has a past surgical history that includes Ankle fracture surgery (Right, 1998); Wrist surgery (Right, 2002); total nephrectomy (Left, 04/17/2012); laparoscopy (05/23/2014); Dilation and curettage of uterus (05/23/2014);  External ear normal.      Nose: Nose normal.      Mouth/Throat:      Pharynx: Oropharynx is clear.   Eyes:      Conjunctiva/sclera: Conjunctivae normal.   Pulmonary:      Effort: Pulmonary effort is normal.   Musculoskeletal:         General: Normal range of motion.      Cervical back: Normal range of motion.      Left hip: Tenderness present.      Comments: Patient reports tenderness along the anterior aspect of her left hip.  Full range of motion.  Neurovascular intact distally.  No overlying skin changes.   Skin:     General: Skin is warm and dry.   Neurological:      General: No focal deficit present.      Mental Status: She is alert. Mental status is at baseline.   Psychiatric:         Mood and Affect: Mood normal.         Behavior: Behavior normal.       DDX/DIAGNOSTIC RESULTS / EMERGENCY DEPARTMENT COURSE / MDM     Medical Decision Making  Patient is a 49-year-old female who presents with left hip pain.  She states that this feels like her chronic hip pain but she ran out of Lyrica.  On arrival, vital signs are unremarkable.  Her review was performed showing that patient has not had any imaging of her left hip performed within this facility.  X-ray was obtained.  Patient cannot get Toradol due to her history of kidney issues.  Anticipate refilling her Lyrica.    Amount and/or Complexity of Data Reviewed  Radiology: ordered.    Risk  Prescription drug management.        EMERGENCY DEPARTMENT COURSE:      ED Course as of 02/22/25 2229   Sat Feb 22, 2025   1618 X-ray L hip:  No acute abnormality of the hip. [JR]   2229 Patient reevaluated.  She feels comfortable going home at this time.  She was given a small dose of steroids as well as a refill of Lyrica.  She was given orthopedic follow-up.  Strict return precautions are provided.  Discharged home in stable condition. [JR]      ED Course User Index  [JR] Aurora Aguilar MD       PROCEDURES:  None    CONSULTS:  None    CRITICAL CARE:  There was significant risk of

## 2025-02-22 NOTE — ED PROVIDER NOTES
[facility]  Emergency Department  Faculty Attestation     I performed a history and physical examination of the patient and discussed management with the resident. I reviewed the resident’s note and agree with the documented findings and plan of care. Any areas of disagreement are noted on the chart. I was personally present for the key portions of any procedures. I have documented in the chart those procedures where I was not present during the key portions. I have reviewed the emergency nurses triage note. I agree with the chief complaint, past medical history, past surgical history, allergies, medications, social and family history as documented unless otherwise noted below.    For Physician Assistant/ Nurse Practitioner cases/documentation I have personally evaluated this patient and have completed at least one if not all key elements of the E/M (history, physical exam, and MDM). Additional findings are as noted.    Preliminary note started at 3:47 PM EST    Primary Care Physician:  Noel Freed PA-C    Screenings:  [unfilled]    CHIEF COMPLAINT       Chief Complaint   Patient presents with    Hip Pain     Left x3 days        RECENT VITALS:   /88   Pulse 89   Temp 98.8 °F (37.1 °C) (Oral)   Resp 17   LMP 09/08/2015   SpO2 98%     LABS:  Labs Reviewed - No data to display    Radiology  XR HIP 2-3 VW W PELVIS LEFT    (Results Pending)       Attending Physician Additional  Notes    Patient has been having chronic recurrent pain in and around the left hip especially near the ischial tuberosity which is newer than her left anterior upper thigh pain.  This is worse with movement standing but also present with sitting and even resting at sleep.  Is been no trauma.  No fevers chills or sweats.  No swelling in her legs but she had swelling in her left thigh yesterday which is new for her.  She has single kidney and does not use NSAIDs but used high-dose Tylenol

## 2025-02-22 NOTE — DISCHARGE INSTRUCTIONS
Thank you for visiting [facility] Emergency Department.    Your left hip x-ray did not reveal any new fracture or bony abnormality.  Please take the Lyrica as prescribed.  You were also prescribed 5 days of 10 mg of steroids.  This will very much help with the inflammation if you want to take it.    You need to call Noel Freed PA-C to make an appointment as directed for follow up.    Follow up with Dr. Arriaza, Orthopedic surgery, as needed for ongoing management.    Should you have any questions regarding your care or further treatment, please call [facility] Emergency Department at [phone].

## 2025-02-24 NOTE — TELEPHONE ENCOUNTER
Pharmacy requesting refill of Ajovy 225 mg.      Medication active on med list yes      Date of last Rx: 8/16/2024 with 5 refills          verified by KAILEE, DAPHNEA      Date of last appointment 10/23/2024    Next Visit Date:  3/19/2025

## 2025-02-25 RX ORDER — FREMANEZUMAB-VFRM 225 MG/1.5ML
INJECTION SUBCUTANEOUS
Qty: 1.5 ML | Refills: 5 | Status: SHIPPED | OUTPATIENT
Start: 2025-02-25

## 2025-02-26 RX ORDER — PREDNISONE 10 MG/1
10 TABLET ORAL DAILY
Qty: 5 TABLET | Refills: 0 | Status: SHIPPED | OUTPATIENT
Start: 2025-02-26 | End: 2025-03-03

## 2025-02-26 RX ORDER — PREGABALIN 100 MG/1
100 CAPSULE ORAL 3 TIMES DAILY
Qty: 270 CAPSULE | Refills: 0 | Status: SHIPPED | OUTPATIENT
Start: 2025-02-26 | End: 2025-05-27

## 2025-02-27 ENCOUNTER — TELEPHONE (OUTPATIENT)
Dept: NEUROLOGY | Age: 50
End: 2025-02-27

## 2025-03-07 ENCOUNTER — TELEPHONE (OUTPATIENT)
Dept: NEUROLOGY | Age: 50
End: 2025-03-07

## 2025-03-27 ENCOUNTER — OFFICE VISIT (OUTPATIENT)
Dept: PODIATRY | Age: 50
End: 2025-03-27

## 2025-03-27 VITALS — HEIGHT: 61 IN | BODY MASS INDEX: 27 KG/M2 | WEIGHT: 143 LBS

## 2025-03-27 DIAGNOSIS — M72.2 PLANTAR FASCIITIS OF RIGHT FOOT: Primary | ICD-10-CM

## 2025-03-27 DIAGNOSIS — M79.675 PAIN OF TOES OF BOTH FEET: ICD-10-CM

## 2025-03-27 DIAGNOSIS — M79.674 PAIN OF TOES OF BOTH FEET: ICD-10-CM

## 2025-03-27 DIAGNOSIS — B35.1 ONYCHOMYCOSIS OF TOENAIL: ICD-10-CM

## 2025-03-27 DIAGNOSIS — M79.671 PAIN IN RIGHT FOOT: ICD-10-CM

## 2025-03-27 ASSESSMENT — ENCOUNTER SYMPTOMS
BACK PAIN: 0
SHORTNESS OF BREATH: 0
DIARRHEA: 0
NAUSEA: 0
COLOR CHANGE: 0

## 2025-03-27 NOTE — PROGRESS NOTES
Marycruz Lomas is a 49 y.o. female who presents to the office today with chief complaint of pain to the right heel.  Chief Complaint   Patient presents with    Foot Pain     Right heel pain for 3-4 years     Nail Problem     Nail trim/last saw Noel Freed PA-C 6/18/24   Symptoms began about 4 year(s) ago. Patient denies injury to the right foot. Patient states that the pain is greatest when standing all day. Pain is rated 10 out of 10 at it's worst and is described as intermittent. Treatments prior to today's visit include: Patient was treated for this about three years ago with steroid injections. Patient also complains of thick, painful toenails to both feet.      Allergies   Allergen Reactions    Ibuprofen Diarrhea and Nausea Only     Only 1 kidney    Desonide     Adhesive Tape Rash and Other (See Comments)     Blisters    Claritin [Loratadine] Palpitations       Past Medical History:   Diagnosis Date    Anemia     Anxiety     Asthma     appt with pulmonologist 2/1/18    Bipolar 1 disorder (HCC)     Blackout spell     Body piercing     X2 ABOVE AND BELOW LIP     Cervicalgia 08/14/2013    Chest pain     Chronic back pain     Chronic kidney disease     stage 3    Chronic neck pain     Constipation     COPD (chronic obstructive pulmonary disease) (HCC)     Dental crown present     has dental clearance    Depression     Fall     landed on tailbone, exacrbated back pain and headaches    GERD (gastroesophageal reflux disease)     Hearing loss 2014    Heartburn     History of blood transfusion 2012    no reaction    Hyperglycemia     DIET CONTROLED    Insomnia     Kidney stones     Knee pain, right     Low blood pressure     Low vitamin D level     Migraine     Movement disorder 2008    Neuropathy     Numbness     bilat. legs    Palpitations     Sleep difficulties 2008    Sprain of lumbosacral (joint) (ligament) 08/14/2013    Staghorn kidney stones     L side Kidney stone  and R side    Tachycardia     HR: 130's with

## 2025-03-29 RX ORDER — BUPIVACAINE HYDROCHLORIDE 5 MG/ML
5 INJECTION, SOLUTION PERINEURAL ONCE
Status: SHIPPED | OUTPATIENT
Start: 2025-03-29

## 2025-04-07 RX ORDER — RIZATRIPTAN BENZOATE 10 MG/1
TABLET, ORALLY DISINTEGRATING ORAL
Qty: 9 TABLET | Refills: 1 | Status: SHIPPED | OUTPATIENT
Start: 2025-04-07

## 2025-05-28 ENCOUNTER — OFFICE VISIT (OUTPATIENT)
Dept: NEUROLOGY | Age: 50
End: 2025-05-28
Payer: COMMERCIAL

## 2025-05-28 VITALS
BODY MASS INDEX: 26.24 KG/M2 | HEART RATE: 75 BPM | HEIGHT: 61 IN | WEIGHT: 139 LBS | DIASTOLIC BLOOD PRESSURE: 78 MMHG | SYSTOLIC BLOOD PRESSURE: 120 MMHG

## 2025-05-28 DIAGNOSIS — M79.2 NEUROPATHIC PAIN: ICD-10-CM

## 2025-05-28 DIAGNOSIS — M54.50 CHRONIC LOW BACK PAIN WITHOUT SCIATICA, UNSPECIFIED BACK PAIN LATERALITY: ICD-10-CM

## 2025-05-28 DIAGNOSIS — G43.011 INTRACTABLE MIGRAINE WITHOUT AURA AND WITH STATUS MIGRAINOSUS: Primary | ICD-10-CM

## 2025-05-28 DIAGNOSIS — G89.29 CHRONIC LOW BACK PAIN WITHOUT SCIATICA, UNSPECIFIED BACK PAIN LATERALITY: ICD-10-CM

## 2025-05-28 PROCEDURE — G8427 DOCREV CUR MEDS BY ELIG CLIN: HCPCS | Performed by: PHYSICIAN ASSISTANT

## 2025-05-28 PROCEDURE — 3017F COLORECTAL CA SCREEN DOC REV: CPT | Performed by: PHYSICIAN ASSISTANT

## 2025-05-28 PROCEDURE — 4004F PT TOBACCO SCREEN RCVD TLK: CPT | Performed by: PHYSICIAN ASSISTANT

## 2025-05-28 PROCEDURE — G8419 CALC BMI OUT NRM PARAM NOF/U: HCPCS | Performed by: PHYSICIAN ASSISTANT

## 2025-05-28 PROCEDURE — 99214 OFFICE O/P EST MOD 30 MIN: CPT | Performed by: PHYSICIAN ASSISTANT

## 2025-05-28 RX ORDER — PREGABALIN 100 MG/1
100 CAPSULE ORAL 3 TIMES DAILY
Qty: 90 CAPSULE | Refills: 2 | Status: SHIPPED | OUTPATIENT
Start: 2025-05-28 | End: 2025-08-26

## 2025-05-28 RX ORDER — UBROGEPANT 50 MG/1
50 TABLET ORAL DAILY PRN
Qty: 16 TABLET | Refills: 5 | Status: SHIPPED | OUTPATIENT
Start: 2025-05-28

## 2025-05-28 NOTE — PROGRESS NOTES
[address]  [place] [facility] [address]  Dept: [phone]    PATIENT NAME: Marycruz Lomas  PATIENT MRN: [id number]  PRIMARY CARE PHYSICIAN: Noel Freed PA-C    HPI:      Marycruz Lomas is a 49 y.o. female who presents to clinic today for evaluation of migraine headaches. Her medical history is significant for COPD, stage 3 kidney disease, h/o gastric bypass (2017), lumbar degenerative disc disease, and bipolar disorder; s/p nephrectomy.     For headache prevention she receives Ajovy, for rescue maxalt/ tizanidine.     Reports Ajovy has been effective for migraine, except for left-sided headache in the last week. It has not responded to rizatriptan, tylenol.    RLS has been manged and she is only using pramipexol as needed, rarely.     Following with pain management regarding low back pain.     Prior information:  Since last appointment she was increased by another provider to Gabapentin to 600 mg TID. She has not done well. When standing/ working she has severe burning of all toes and will feel her toes curling. She did have a surgery on the right ankle and feels that 2 toes on this foot are especially painful. There is burning at the site of the surgery as well along the lateral aspect of her foot.    Ajovy is effective for migraine but she has breakthrough leading up to the week she is due. Insurance will only pay for q3 0 days, not 28.     A skin punch biopsy June 2024 was negative for small fiber neuropathy.       Prior information:   Restless leg is managed with Mirapex. She is looking to re-establish with pain management for neck and low back pain.  Reports continued cramping and burning pain of bilateral feet, worse for all toes. It is noted more with standing than with sitting. During the evening she feels more paresthesias of her legs with cramping when she lays down. She was resumed on Gabapentin 100 mg by Dr. Lagos.     Ajovy was started at last appointment and has been

## 2025-06-02 ENCOUNTER — TELEPHONE (OUTPATIENT)
Dept: NEUROLOGY | Age: 50
End: 2025-06-02

## 2025-06-02 DIAGNOSIS — M54.16 LUMBAR RADICULOPATHY: Primary | ICD-10-CM

## 2025-06-02 NOTE — TELEPHONE ENCOUNTER
Marycruz called the office stating a couple of days ago she developed pain and stiffness in bilateral legs from the hips down.  Patient states that the symptoms are worse on the left and she is having a hard time walking.  Marycruz states that she is using Lyrica 100 mg TID and scheduled to see pain management on June 27th.  Patient wants to know if Jeanie would prescribe a \"muscle relaxer or should I go to the ER\".  Lu advised that I would forward this information to Jeanie.

## 2025-06-06 ENCOUNTER — TELEPHONE (OUTPATIENT)
Dept: NEUROLOGY | Age: 50
End: 2025-06-06

## 2025-06-06 NOTE — TELEPHONE ENCOUNTER
PA approved from May 29, 2025 to November 24, 2025 . Called patient and left message with approval details.

## 2025-06-27 RX ORDER — RIZATRIPTAN BENZOATE 10 MG/1
TABLET, ORALLY DISINTEGRATING ORAL
Qty: 9 TABLET | Refills: 0 | OUTPATIENT
Start: 2025-06-27

## 2025-07-14 NOTE — TELEPHONE ENCOUNTER
Please send for [facility]    Pharmacy requesting refill of rizatriptan 10 mg.      Medication active on med list yes      Date of last Rx: 4/7/2025 with 1 refills          verified by GISSELLE DUGAN      Date of last appointment 5/28/2025    Next Visit Date:  Visit date not found

## 2025-07-15 RX ORDER — RIZATRIPTAN BENZOATE 10 MG/1
TABLET, ORALLY DISINTEGRATING ORAL
Qty: 9 TABLET | Refills: 5 | Status: SHIPPED | OUTPATIENT
Start: 2025-07-15

## 2025-07-25 NOTE — TELEPHONE ENCOUNTER
Marycruz called the office requesting refill of tiZANidine (ZANAFLEX) 4 MG tablet      Medication active on med list yes      Date of last Rx: 06/2/25 with 1 refills          verified by GISSELLE CHAMPAGNE      Date of last appointment 5/28/2025    Next Visit Date:  Visit date not found

## 2025-08-21 DIAGNOSIS — M79.2 NEUROPATHIC PAIN: ICD-10-CM

## 2025-08-21 RX ORDER — PREGABALIN 100 MG/1
CAPSULE ORAL
Qty: 90 CAPSULE | Refills: 1 | Status: SHIPPED | OUTPATIENT
Start: 2025-08-21 | End: 2025-11-19

## (undated) DEVICE — STERLING XTRASHARP SHAVER GREAT WHITE SHAVER BLADE, 4.2 MM: Brand: STERLING XTRASHARP SHAVER GREAT WHITE

## (undated) DEVICE — NAVIO FLAT MARKERS: Brand: NAVIO

## (undated) DEVICE — NEEDLE ECHOGENIC 20GA L4IN INSUL W/ 30DEG BVL EXTN SET

## (undated) DEVICE — GLOVE ORANGE PI 7 1/2   MSG9075

## (undated) DEVICE — C-ARM: Brand: UNBRANDED

## (undated) DEVICE — BANDAGE COMPR W6INXL5YD WHT BGE POLY COT M E WRP WV HK AND

## (undated) DEVICE — COVER,TABLE,HEAVY DUTY,50"X90",STRL: Brand: MEDLINE

## (undated) DEVICE — PADDING,UNDERCAST,COTTON, 4"X4YD STERILE: Brand: MEDLINE

## (undated) DEVICE — DRAPE,U/ SHT,SPLIT,PLAS,STERIL: Brand: MEDLINE

## (undated) DEVICE — ARTHRO WAND REPROCESS TURBOVAC

## (undated) DEVICE — STERILE PATIENT PROTECTIVE PAD FOR IMP® KNEE POSITIONERS & COHESIVE WRAP (10 / CASE): Brand: DE MAYO KNEE POSITIONER®

## (undated) DEVICE — OSCILLATING TIP SAW CARTRIDGE: Brand: PRECISION FALCON

## (undated) DEVICE — GLOVE SURG SZ 6 THK91MIL LTX FREE SYN POLYISOPRENE ANTI

## (undated) DEVICE — SEAL

## (undated) DEVICE — ADHESIVE SKIN CLSR 0.7ML TOP DERMBND ADV

## (undated) DEVICE — ZIMMER® STERILE DISPOSABLE TOURNIQUET CUFF WITH PROTECTIVE SLEEVE AND PLC, DUAL PORT, SINGLE BLADDER, 34 IN. (86 CM)

## (undated) DEVICE — SYRINGE, LUER LOCK, 10ML: Brand: MEDLINE

## (undated) DEVICE — ELECTRODE PT RET AD L9FT HI MOIST COND ADH HYDRGEL CORDED

## (undated) DEVICE — SUTURE ETHLN SZ 3-0 L18IN NONABSORBABLE BLK PS-2 L19MM 3/8 1669H

## (undated) DEVICE — BLADE,CARBON-STEEL,15,STRL,DISPOSABLE,TB: Brand: MEDLINE

## (undated) DEVICE — STRIP WND CLSR W1 4XL4IN POLYAMIDE MACROPOROUS NONWOVEN H2O

## (undated) DEVICE — GLOVE ORANGE PI 8 1/2   MSG9085

## (undated) DEVICE — GLOVE SURG SZ 65 THK91MIL LTX FREE SYN POLYISOPRENE

## (undated) DEVICE — DISCONTINUED USE 416978 CEMENT PALACOS R SING DOSE 40GR: Type: IMPLANTABLE DEVICE | Status: NON-FUNCTIONAL

## (undated) DEVICE — GAUZE,SPONGE,FLUFF,6"X6.75",STRL,5/TRAY: Brand: MEDLINE

## (undated) DEVICE — 3M™ STERI-DRAPE™ INCISE DRAPE 1050 (60CM X 45CM): Brand: STERI-DRAPE™

## (undated) DEVICE — STAPLER 45 RELOAD: Brand: ENDOWRIST

## (undated) DEVICE — SUTURE STRATAFIX SPRL SZ 2-0 L9IN ABSRB VLT MH L36MM 1/2 SXPD2B408

## (undated) DEVICE — SKIN AFFIX SURG ADHESIVE 72/CS 0.55ML: Brand: MEDLINE

## (undated) DEVICE — GOWN,AURORA,NONREINFORCED,LARGE: Brand: MEDLINE

## (undated) DEVICE — ARM DRAPE

## (undated) DEVICE — TOWEL,OR,DSP,ST,BLUE,DLX,XR,4/PK,20PK/CS: Brand: MEDLINE

## (undated) DEVICE — SUTURE VCRL SZ 0 L27IN ABSRB UD L26MM CT-2 1/2 CIR J270H

## (undated) DEVICE — GOWN,AURORA,NONRNF,XL,30/CS: Brand: MEDLINE

## (undated) DEVICE — Device

## (undated) DEVICE — ANSPACH 5MM FLUT BALL BUR

## (undated) DEVICE — STOCKINETTE,IMPERVIOUS,12X48,STERILE: Brand: MEDLINE

## (undated) DEVICE — Z DISCONTINUED USE 2423037 APPLICATOR MEDICATED 3 CC CHLORHEXIDINE GLUC 2% CHLORAPREP

## (undated) DEVICE — PACK PROCEDURE SURG SVMMC TOT KNEE

## (undated) DEVICE — SMARTGOWN SURGICAL GOWN, 3XL, LONG: Brand: CONVERTORS

## (undated) DEVICE — Z DISCONTINUED BY MEDLINE USE 2711682 TRAY SKIN PREP DRY W/ PREM GLV

## (undated) DEVICE — DISCONTINUED USE 310056 COVER RGD STRL LF DVN LGLV LGHT HNDL PLS

## (undated) DEVICE — NEEDLE HYPO 21GA L1.5IN INTRAMUSCULAR S STL LATCH BVL UP

## (undated) DEVICE — DRESSING PETRO W3XL8IN OIL EMUL N ADH GZ KNIT IMPREG CELOS

## (undated) DEVICE — KENDALL SCD EXPRESS SLEEVES, KNEE LENGTH, MEDIUM: Brand: KENDALL SCD

## (undated) DEVICE — STANDARD HYPODERMIC NEEDLE,POLYPROPYLENE HUB: Brand: MONOJECT

## (undated) DEVICE — SUTURE MCRYL SZ 3-0 L27IN ABSRB UD L26MM SH 1/2 CIR Y416H

## (undated) DEVICE — KIT INSTR DRL GUID 1.6/1.35MM GUIDWIRE DISP FIBERTAK DX

## (undated) DEVICE — 3M™ STERI-DRAPE™ U-DRAPE 1015: Brand: STERI-DRAPE™

## (undated) DEVICE — SUTURE STRATAFIX SPRL SZ 1 L14IN ABSRB VLT L48CM CTX 1/2 SXPD2B405

## (undated) DEVICE — Z CONVERTED USE 2162213 APPLICATOR PREP 4OZ CHG 4% BACTOSHIELD USE FOR HND WSH AND

## (undated) DEVICE — BASIC SINGLE BASIN 1-LF: Brand: MEDLINE INDUSTRIES, INC.

## (undated) DEVICE — 3 ML SYRINGE LUER-LOCK TIP: Brand: MONOJECT

## (undated) DEVICE — TOWEL SURG W16XL26IN WHT NONFENESTRATED ST 4 PER PK

## (undated) DEVICE — STAPLER 45 RELOAD BLUE: Brand: ENDOWRIST

## (undated) DEVICE — SUTURE ETHBND EXCEL SZ 0 L30IN NONABSORBABLE GRN CT1 L36MM X424H

## (undated) DEVICE — C-ARMOR C-ARM EQUIPMENT COVERS CLEAR STERILE UNIVERSAL FIT 12 PER CASE: Brand: C-ARMOR

## (undated) DEVICE — 3 BONE CEMENT MIXER: Brand: MIXEVAC

## (undated) DEVICE — DRESSING TRNSPAR W5XL4.5IN FLM SHT SEMIPERMEABLE WIND

## (undated) DEVICE — PADDING UNDERCAST W6INXL4YD RAYON POLY SYN NONADHESIVE

## (undated) DEVICE — GLOVE SURG SZ 85 L12IN FNGR THK79MIL GRN LTX FREE

## (undated) DEVICE — SUTURE VCRL SZ 2-0 L27IN ABSRB UD L26MM CT-2 1/2 CIR J269H

## (undated) DEVICE — STAZ LOWER EXTREMITY: Brand: MEDLINE INDUSTRIES, INC.

## (undated) DEVICE — SUTURE ABSRB BRAID COAT UD CP NO 2 27IN VCRL J195H

## (undated) DEVICE — SUTURE PERMAHAND SZ 2-0 L30IN NONABSORBABLE BLK L17MM RB-1 K873H

## (undated) DEVICE — 10K ARTHROSCOPY INFLOW/OUTFLOW TUBE SET: Brand: 10K

## (undated) DEVICE — BLADE SURG NO15 S STL STR DISP GLASSVAN

## (undated) DEVICE — SUTURE VCRL SZ 1 L36IN ABSRB UD L48MM CTXB 1/2 CIR BLNT PNT JB977H

## (undated) DEVICE — ENDOSCOPIC ELECTROSURGICAL(5)

## (undated) DEVICE — NDL CNTR 40CT FM MAG: Brand: MEDLINE INDUSTRIES, INC.

## (undated) DEVICE — CONTAINER,SPECIMEN,OR STERILE,4OZ: Brand: MEDLINE

## (undated) DEVICE — TOWEL,OR,DSP,ST,BLUE,STD,4/PK,20PK/CS: Brand: MEDLINE

## (undated) DEVICE — GLOVE SURG SZ 8 CRM LTX FREE POLYISOPRENE POLYMER BEAD ANTI

## (undated) DEVICE — GARMENT COMPR XL BARIATRIC FOR 28IN CALF FLOTRN TRIO

## (undated) DEVICE — TUBE IRRIG HNDPC HI FLO TP INTRPULS W/SUCTION TUBE

## (undated) DEVICE — TRAY NRV BLK 1 CATHETER CONN CLMP STYL

## (undated) DEVICE — REDUCER: Brand: ENDOWRIST

## (undated) DEVICE — CHLORAPREP 26ML ORANGE

## (undated) DEVICE — SUTURE STRATAFIX SPRL SZ 3-0 L5IN ABSRB UD FS L26MM 3/8 CIR SXMP2B411

## (undated) DEVICE — GEL US 20GM NONIRRITATING OVERWRAPPED FILE PCH TRNSMIT

## (undated) DEVICE — 12 ML SYRINGE,LUER-LOCK TIP: Brand: MONOJECT

## (undated) DEVICE — YANKAUER,FLEXIBLE HANDLE,REGLR CAPACITY: Brand: MEDLINE INDUSTRIES, INC.

## (undated) DEVICE — GLOVE ORANGE PI 8   MSG9080

## (undated) DEVICE — Z DISCONTINUED USE 2744636  DRESSING AQUACEL 14 IN ALG W3.5XL14IN POLYUR FLM CVR W/ HYDRCOLL

## (undated) DEVICE — SKIN PREP TRAY W/CHG: Brand: MEDLINE INDUSTRIES, INC.

## (undated) DEVICE — BANDAGE COMPR W6INXL12FT SMOOTH FOR LIMB EXSANG ESMARCH

## (undated) DEVICE — DRAPE,REIN 53X77,STERILE: Brand: MEDLINE

## (undated) DEVICE — PADDING CAST W6INXL4YD COT LO LINTING WYTEX

## (undated) DEVICE — CANNULA IV 18GA L15IN BLNT FILL LUERLOCK HUB MJCT

## (undated) DEVICE — ANSPACH 3.5MMX8.4MM FLUT DR